# Patient Record
Sex: MALE | Race: WHITE | Employment: OTHER | URBAN - METROPOLITAN AREA
[De-identification: names, ages, dates, MRNs, and addresses within clinical notes are randomized per-mention and may not be internally consistent; named-entity substitution may affect disease eponyms.]

---

## 2017-07-20 PROBLEM — K31.89 DUODENAL MASS: Status: ACTIVE | Noted: 2017-07-20

## 2017-07-20 PROBLEM — K22.70 BARRETT'S ESOPHAGUS WITHOUT DYSPLASIA: Status: ACTIVE | Noted: 2017-07-20

## 2020-05-12 ENCOUNTER — HOSPITAL ENCOUNTER (OUTPATIENT)
Dept: NON INVASIVE DIAGNOSTICS | Age: 70
Discharge: HOME | End: 2020-05-12
Payer: MEDICARE

## 2020-05-12 LAB
LV EF: 60 %
LVEF MODALITY: NORMAL

## 2020-05-12 PROCEDURE — 93225 XTRNL ECG REC<48 HRS REC: CPT

## 2020-05-12 PROCEDURE — 93306 TTE W/DOPPLER COMPLETE: CPT

## 2020-05-12 PROCEDURE — 93226 XTRNL ECG REC<48 HR SCAN A/R: CPT

## 2021-05-07 LAB
AVERAGE GLUCOSE: NORMAL
CREATININE, URINE: NORMAL
HBA1C MFR BLD: 6.3 %
MICROALBUMIN/CREAT 24H UR: NORMAL MG/G{CREAT}
MICROALBUMIN/CREAT UR-RTO: NORMAL

## 2022-02-28 RX ORDER — TAMSULOSIN HYDROCHLORIDE 0.4 MG/1
CAPSULE ORAL
Qty: 90 CAPSULE | Refills: 3 | Status: ON HOLD
Start: 2022-02-28 | End: 2022-05-27 | Stop reason: HOSPADM

## 2022-02-28 RX ORDER — OMEPRAZOLE 40 MG/1
CAPSULE, DELAYED RELEASE ORAL
Qty: 90 CAPSULE | Refills: 3 | Status: ON HOLD
Start: 2022-02-28 | End: 2022-05-27 | Stop reason: HOSPADM

## 2022-02-28 RX ORDER — METOPROLOL TARTRATE 50 MG/1
TABLET, FILM COATED ORAL
Qty: 180 TABLET | Refills: 3 | Status: ON HOLD
Start: 2022-02-28 | End: 2022-05-27 | Stop reason: HOSPADM

## 2022-05-02 RX ORDER — LOSARTAN POTASSIUM AND HYDROCHLOROTHIAZIDE 25; 100 MG/1; MG/1
TABLET ORAL
Qty: 90 TABLET | Refills: 1 | OUTPATIENT
Start: 2022-05-02

## 2022-05-16 RX ORDER — LOSARTAN POTASSIUM AND HYDROCHLOROTHIAZIDE 25; 100 MG/1; MG/1
TABLET ORAL
Qty: 90 TABLET | Refills: 1 | OUTPATIENT
Start: 2022-05-16

## 2022-05-17 ENCOUNTER — OFFICE VISIT (OUTPATIENT)
Dept: PRIMARY CARE CLINIC | Age: 72
End: 2022-05-17
Payer: MEDICARE

## 2022-05-17 ENCOUNTER — CLINICAL DOCUMENTATION (OUTPATIENT)
Dept: SPIRITUAL SERVICES | Age: 72
End: 2022-05-17

## 2022-05-17 VITALS
SYSTOLIC BLOOD PRESSURE: 138 MMHG | HEART RATE: 60 BPM | TEMPERATURE: 98 F | BODY MASS INDEX: 40.36 KG/M2 | WEIGHT: 298 LBS | DIASTOLIC BLOOD PRESSURE: 86 MMHG | OXYGEN SATURATION: 97 % | HEIGHT: 72 IN

## 2022-05-17 DIAGNOSIS — Z86.79 HISTORY OF ATRIAL FLUTTER: ICD-10-CM

## 2022-05-17 DIAGNOSIS — Z00.00 INITIAL MEDICARE ANNUAL WELLNESS VISIT: ICD-10-CM

## 2022-05-17 DIAGNOSIS — Z11.59 NEED FOR HEPATITIS C SCREENING TEST: ICD-10-CM

## 2022-05-17 DIAGNOSIS — E11.9 TYPE 2 DIABETES MELLITUS WITHOUT COMPLICATION, WITHOUT LONG-TERM CURRENT USE OF INSULIN (HCC): ICD-10-CM

## 2022-05-17 DIAGNOSIS — Z72.89 OTHER PROBLEMS RELATED TO LIFESTYLE: ICD-10-CM

## 2022-05-17 DIAGNOSIS — Z71.89 COUNSELING FOR LIVING WILL: ICD-10-CM

## 2022-05-17 DIAGNOSIS — E11.9 TYPE 2 DIABETES MELLITUS WITHOUT COMPLICATION, WITHOUT LONG-TERM CURRENT USE OF INSULIN (HCC): Primary | ICD-10-CM

## 2022-05-17 DIAGNOSIS — I10 PRIMARY HYPERTENSION: ICD-10-CM

## 2022-05-17 LAB
ALBUMIN SERPL-MCNC: 4 G/DL (ref 3.5–5.2)
ALP BLD-CCNC: 75 U/L (ref 40–129)
ALT SERPL-CCNC: 14 U/L (ref 0–40)
ANION GAP SERPL CALCULATED.3IONS-SCNC: 18 MMOL/L (ref 7–16)
AST SERPL-CCNC: 26 U/L (ref 0–39)
BILIRUB SERPL-MCNC: 0.6 MG/DL (ref 0–1.2)
BUN BLDV-MCNC: 21 MG/DL (ref 6–23)
CALCIUM SERPL-MCNC: 9.6 MG/DL (ref 8.6–10.2)
CHLORIDE BLD-SCNC: 102 MMOL/L (ref 98–107)
CHOLESTEROL, TOTAL: 142 MG/DL (ref 0–199)
CO2: 23 MMOL/L (ref 22–29)
CREAT SERPL-MCNC: 1.2 MG/DL (ref 0.7–1.2)
GFR AFRICAN AMERICAN: >60
GFR NON-AFRICAN AMERICAN: 59 ML/MIN/1.73
GLUCOSE BLD-MCNC: 116 MG/DL (ref 74–99)
HBA1C MFR BLD: 6.7 % (ref 4–5.6)
HCT VFR BLD CALC: 43.4 % (ref 37–54)
HDLC SERPL-MCNC: 54 MG/DL
HEMOGLOBIN: 13.1 G/DL (ref 12.5–16.5)
LDL CHOLESTEROL CALCULATED: 67 MG/DL (ref 0–99)
MCH RBC QN AUTO: 28.3 PG (ref 26–35)
MCHC RBC AUTO-ENTMCNC: 30.2 % (ref 32–34.5)
MCV RBC AUTO: 93.7 FL (ref 80–99.9)
PDW BLD-RTO: 15.3 FL (ref 11.5–15)
PLATELET # BLD: 161 E9/L (ref 130–450)
PMV BLD AUTO: 12.4 FL (ref 7–12)
POTASSIUM SERPL-SCNC: 4.9 MMOL/L (ref 3.5–5)
RBC # BLD: 4.63 E12/L (ref 3.8–5.8)
SODIUM BLD-SCNC: 143 MMOL/L (ref 132–146)
TOTAL PROTEIN: 8.3 G/DL (ref 6.4–8.3)
TRIGL SERPL-MCNC: 106 MG/DL (ref 0–149)
TSH SERPL DL<=0.05 MIU/L-ACNC: 2.55 UIU/ML (ref 0.27–4.2)
VLDLC SERPL CALC-MCNC: 21 MG/DL
WBC # BLD: 6.5 E9/L (ref 4.5–11.5)

## 2022-05-17 PROCEDURE — 3044F HG A1C LEVEL LT 7.0%: CPT | Performed by: FAMILY MEDICINE

## 2022-05-17 PROCEDURE — 3017F COLORECTAL CA SCREEN DOC REV: CPT | Performed by: FAMILY MEDICINE

## 2022-05-17 PROCEDURE — G0439 PPPS, SUBSEQ VISIT: HCPCS | Performed by: FAMILY MEDICINE

## 2022-05-17 PROCEDURE — 4040F PNEUMOC VAC/ADMIN/RCVD: CPT | Performed by: FAMILY MEDICINE

## 2022-05-17 PROCEDURE — 1123F ACP DISCUSS/DSCN MKR DOCD: CPT | Performed by: FAMILY MEDICINE

## 2022-05-17 RX ORDER — ATORVASTATIN CALCIUM 20 MG/1
20 TABLET, FILM COATED ORAL DAILY
Qty: 90 TABLET | Refills: 1 | Status: SHIPPED | OUTPATIENT
Start: 2022-05-17

## 2022-05-17 RX ORDER — BLOOD SUGAR DIAGNOSTIC
STRIP MISCELLANEOUS
COMMUNITY
Start: 2022-03-24 | End: 2022-07-13 | Stop reason: SDUPTHER

## 2022-05-17 RX ORDER — GLIPIZIDE 5 MG/1
5 TABLET ORAL
Qty: 180 TABLET | Refills: 1 | Status: ON HOLD
Start: 2022-05-17 | End: 2022-05-27 | Stop reason: HOSPADM

## 2022-05-17 RX ORDER — ATORVASTATIN CALCIUM 20 MG/1
20 TABLET, FILM COATED ORAL DAILY
COMMUNITY
End: 2022-05-17 | Stop reason: SDUPTHER

## 2022-05-17 RX ORDER — GLIPIZIDE 5 MG/1
TABLET ORAL
COMMUNITY
Start: 2022-04-03 | End: 2022-05-17 | Stop reason: SDUPTHER

## 2022-05-17 RX ORDER — LOSARTAN POTASSIUM AND HYDROCHLOROTHIAZIDE 25; 100 MG/1; MG/1
TABLET ORAL
Qty: 90 TABLET | Refills: 1 | Status: ON HOLD
Start: 2022-05-17 | End: 2022-05-27 | Stop reason: HOSPADM

## 2022-05-17 SDOH — ECONOMIC STABILITY: FOOD INSECURITY: WITHIN THE PAST 12 MONTHS, THE FOOD YOU BOUGHT JUST DIDN'T LAST AND YOU DIDN'T HAVE MONEY TO GET MORE.: NEVER TRUE

## 2022-05-17 SDOH — ECONOMIC STABILITY: FOOD INSECURITY: WITHIN THE PAST 12 MONTHS, YOU WORRIED THAT YOUR FOOD WOULD RUN OUT BEFORE YOU GOT MONEY TO BUY MORE.: NEVER TRUE

## 2022-05-17 SDOH — HEALTH STABILITY: PHYSICAL HEALTH: ON AVERAGE, HOW MANY DAYS PER WEEK DO YOU ENGAGE IN MODERATE TO STRENUOUS EXERCISE (LIKE A BRISK WALK)?: 0 DAYS

## 2022-05-17 ASSESSMENT — LIFESTYLE VARIABLES
HOW OFTEN DURING THE LAST YEAR HAVE YOU FAILED TO DO WHAT WAS NORMALLY EXPECTED FROM YOU BECAUSE OF DRINKING: 0
HOW MANY STANDARD DRINKS CONTAINING ALCOHOL DO YOU HAVE ON A TYPICAL DAY: 1
HOW OFTEN DURING THE LAST YEAR HAVE YOU FAILED TO DO WHAT WAS NORMALLY EXPECTED FROM YOU BECAUSE OF DRINKING: NEVER
HOW OFTEN DURING THE LAST YEAR HAVE YOU FOUND THAT YOU WERE NOT ABLE TO STOP DRINKING ONCE YOU HAD STARTED: NEVER
HOW OFTEN DO YOU HAVE SIX OR MORE DRINKS ON ONE OCCASION: 2
HAS A RELATIVE, FRIEND, DOCTOR, OR ANOTHER HEALTH PROFESSIONAL EXPRESSED CONCERN ABOUT YOUR DRINKING OR SUGGESTED YOU CUT DOWN: NO
HOW OFTEN DURING THE LAST YEAR HAVE YOU FOUND THAT YOU WERE NOT ABLE TO STOP DRINKING ONCE YOU HAD STARTED: 0
HOW OFTEN DO YOU HAVE A DRINK CONTAINING ALCOHOL: MONTHLY OR LESS
HOW OFTEN DURING THE LAST YEAR HAVE YOU BEEN UNABLE TO REMEMBER WHAT HAPPENED THE NIGHT BEFORE BECAUSE YOU HAD BEEN DRINKING: NEVER
HOW OFTEN DURING THE LAST YEAR HAVE YOU BEEN UNABLE TO REMEMBER WHAT HAPPENED THE NIGHT BEFORE BECAUSE YOU HAD BEEN DRINKING: 0
HOW OFTEN DURING THE LAST YEAR HAVE YOU HAD A FEELING OF GUILT OR REMORSE AFTER DRINKING: 0
HOW OFTEN DURING THE LAST YEAR HAVE YOU NEEDED AN ALCOHOLIC DRINK FIRST THING IN THE MORNING TO GET YOURSELF GOING AFTER A NIGHT OF HEAVY DRINKING: NEVER
HOW OFTEN DO YOU HAVE A DRINK CONTAINING ALCOHOL: 2
HOW OFTEN DURING THE LAST YEAR HAVE YOU HAD A FEELING OF GUILT OR REMORSE AFTER DRINKING: NEVER
HOW OFTEN DURING THE LAST YEAR HAVE YOU NEEDED AN ALCOHOLIC DRINK FIRST THING IN THE MORNING TO GET YOURSELF GOING AFTER A NIGHT OF HEAVY DRINKING: 0
HAVE YOU OR SOMEONE ELSE BEEN INJURED AS A RESULT OF YOUR DRINKING: 0
HAS A RELATIVE, FRIEND, DOCTOR, OR ANOTHER HEALTH PROFESSIONAL EXPRESSED CONCERN ABOUT YOUR DRINKING OR SUGGESTED YOU CUT DOWN: 0
HOW MANY STANDARD DRINKS CONTAINING ALCOHOL DO YOU HAVE ON A TYPICAL DAY: 1 OR 2
HAVE YOU OR SOMEONE ELSE BEEN INJURED AS A RESULT OF YOUR DRINKING: NO

## 2022-05-17 ASSESSMENT — PATIENT HEALTH QUESTIONNAIRE - PHQ9
SUM OF ALL RESPONSES TO PHQ9 QUESTIONS 1 & 2: 0
SUM OF ALL RESPONSES TO PHQ QUESTIONS 1-9: 0
1. LITTLE INTEREST OR PLEASURE IN DOING THINGS: 0
SUM OF ALL RESPONSES TO PHQ QUESTIONS 1-9: 0
2. FEELING DOWN, DEPRESSED OR HOPELESS: 0
SUM OF ALL RESPONSES TO PHQ QUESTIONS 1-9: 0
SUM OF ALL RESPONSES TO PHQ QUESTIONS 1-9: 0

## 2022-05-17 ASSESSMENT — SOCIAL DETERMINANTS OF HEALTH (SDOH): HOW HARD IS IT FOR YOU TO PAY FOR THE VERY BASICS LIKE FOOD, HOUSING, MEDICAL CARE, AND HEATING?: NOT HARD AT ALL

## 2022-05-17 NOTE — PATIENT INSTRUCTIONS
Advance Directives: Care Instructions  Overview  An advance directive is a legal way to state your wishes at the end of your life. It tells your family and your doctor what to do if you can't say what youwant. There are two main types of advance directives. You can change them any timeyour wishes change. Living will. This form tells your family and your doctor your wishes about life support and other treatment. The form is also called a declaration. Medical power of . This form lets you name a person to make treatment decisions for you when you can't speak for yourself. This person is called a health care agent (health care proxy, health care surrogate). The form is also called a durable power of  for health care. If you do not have an advance directive, decisions about your medical care maybe made by a family member, or by a doctor or a  who doesn't know you. It may help to think of an advance directive as a gift to the people who carefor you. If you have one, they won't have to make tough decisions by themselves. Follow-up care is a key part of your treatment and safety. Be sure to make and go to all appointments, and call your doctor if you are having problems. It's also a good idea to know your test results and keep alist of the medicines you take. What should you include in an advance directive? Many states have a unique advance directive form. (It may ask you to address specific issues.) Or you might use a universal form that's approved by manystates. If your form doesn't tell you what to address, it may be hard to know what to include in your advance directive. Use the questions below to help you getstarted.  Who do you want to make decisions about your medical care if you are not able to?  What life-support measures do you want if you have a serious illness that gets worse over time or can't be cured?  What are you most afraid of that might happen?  (Maybe you're afraid of having pain, losing your independence, or being kept alive by machines.)   Where would you prefer to die? (Your home? A hospital? A nursing home?)   Do you want to donate your organs when you die?  Do you want certain Denominational practices performed before you die? When should you call for help? Be sure to contact your doctor if you have any questions. Where can you learn more? Go to https://chpepiceweb.[employer]. org and sign in to your Oceen account. Enter R264 in the [employer] box to learn more about \"Advance Directives: Care Instructions. \"     If you do not have an account, please click on the \"Sign Up Now\" link. Current as of: October 18, 2021               Content Version: 13.2  © 2006-2022 [employer]. Care instructions adapted under license by [facility] ([facility]). If you have questions about a medical condition or this instruction, always ask your healthcare professional. [place] [address] any warranty or liability for your use of this information. Learning About Living Zulmasoto Ibanez  What is a living will? A living will, also called a declaration, is a legal form. It tells your family and your doctor your wishes when you can't speak for yourself. It's used by the health professionals who will treat you as you near the end of your life or ifyou get seriously hurt or ill. If you put your wishes in writing, your loved ones and others will know what kind of care you want. They won't need to guess. This can ease your mind and behelpful to others. And you can change or cancel your living will at any time. A living will is not the same as an estate or property will. An estate willexplains what you want to happen with your money and property after you die. How do you use it? Keep these facts in mind about how a living will is used.  Your living will is used only if you can't speak or make decisions for yourself.  Most often, one or more doctors must certify that you can't speak or decide for yourself before your living will takes effect.  If you get better and can speak for yourself again, you can accept or refuse any treatment. It doesn't matter what you said in your living will.  Some states may limit your right to refuse treatment in certain cases. For example, you may need to clearly state in your living will that you don't want artificial hydration and nutrition, such as being fed through a tube. Is a living will a legal document? A living will is a legal document. Each state has its own laws about livingwills. And a living will may be called something else in your state. Here are some things to know about living lerma.  You don't need an  to complete a living will. But legal advice can be helpful if your state's laws are unclear. It can also help if your health history is complicated or your family can't agree on what should be in your living will.  You can change your living will at any time. Some people find that their wishes about end-of-life care change as their health changes. If you make big changes to your living will, complete a new form.  If you move to another state, make sure that your living will is legal in the state where you now live. In most cases, doctors will respect your wishes even if you have a form from a different state.  You might use a universal form that has been approved by many states. This kind of form can sometimes be filled out and stored online. Your digital copy will then be available wherever you have a connection to the internet. The doctors and nurses who need to treat you can find it right away.  Your state may offer an online registry. This is another place where you can store your living will online.  It's a good idea to get your living will notarized. This means using a person called a  to watch two people sign, or witness, your living will.   What should you know when you create a living will? Here are some questions to ask yourself as you make your living will.  Do you know enough about life support methods that might be used? If not, talk to your doctor so you know what might be done if you can't breathe on your own, your heart stops, or you can't swallow.  What things would you still want to be able to do after you receive life-support methods? Would you want to be able to walk? To speak? To eat on your own? To live without the help of machines?  Do you want certain Restorationism practices performed if you become very ill?  If you have a choice, where do you want to be cared for? In your home? At a hospital or nursing home?  If you have a choice at the end of your life, where would you prefer to die? At home? In a hospital or nursing home? Somewhere else?  Would you prefer to be buried or cremated?  Do you want your organs to be donated after you die? What should you do with your living will?  Make sure that your family members and your health care agent have copies of your living will (also called a declaration).  Give your doctor a copy of your living will. Ask to have it kept as part of your medical record. If you have more than one doctor, make sure that each one has a copy.  Put a copy of your living will where it can be easily found. For example, some people may put a copy on their refrigerator door. If you are using a digital copy, be sure your doctor, family members, and health care agent know how to find and access it. Where can you learn more? Go to https://elias.[employer] org and sign in to your [employer] account. Enter A506 in the [facility] box to learn more about \"Learning About Living [place]. \"     If you do not have an account, please click on the \"Sign Up Now\" link. Current as of: October 18, 2021               Content Version: 13.2  © 8024-0561 Healthwise, Incorporated.    Care instructions adapted under license by [employer]. If you have questions about a medical condition or this instruction, always ask your healthcare professional. [address] any warranty or liability for your use of this information. Eating Healthy Foods: Care Instructions  Your Care Instructions     Eating healthy foods can help lower your risk for disease. Healthy food gives you energy and keeps your heart strong, your brain active, your musclesworking, and your bones strong. A healthy diet includes a variety of foods from the basic food groups: grains, vegetables, fruits, milk and milk products, and meat and beans. Some people may eat more of their favorite foods from only one food group and, as a result, miss getting the nutrients they need. So, it is important to pay attention not only to what you eat but also to what you are missing from your diet. You caneat a healthy, balanced diet by making a few small changes. Follow-up care is a key part of your treatment and safety. Be sure to make and go to all appointments, and call your doctor if you are having problems. It's also a good idea to know your test results and keep alist of the medicines you take. How can you care for yourself at home? Look at what you eat   Keep a food diary for a week or two and record everything you eat or drink. Track the number of servings you eat from each food group.  For a balanced diet every day, eat a variety of:  ? 6 or more ounce-equivalents of grains, such as cereals, breads, crackers, rice, or pasta, every day. An ounce-equivalent is 1 slice of bread, 1 cup of ready-to-eat cereal, or ½ cup of cooked rice, cooked pasta, or cooked cereal.  ? 2½ cups of vegetables, especially:  - Dark-green vegetables such as broccoli and spinach.  - Orange vegetables such as carrots and sweet potatoes. - Dry beans (such as richard and kidney beans) and peas (such as lentils). ? 2 cups of fresh, frozen, or canned fruit.  A small apple or 1 banana or orange equals 1 cup. ? 3 cups of nonfat or low-fat milk, yogurt, or other milk products. ? 5½ ounces of meat and beans, such as chicken, fish, lean meat, beans, nuts, and seeds. One egg, 1 tablespoon of peanut butter, ½ ounce nuts or seeds, or ¼ cup of cooked beans equals 1 ounce of meat.  Learn how to read food labels for serving sizes and ingredients. Fast-food and convenience-food meals often contain few or no fruits or vegetables. Make sure you eat some fruits and vegetables to make the meal more nutritious.  Look at your food diary. For each food group, add up what you have eaten and then divide the total by the number of days. This will give you an idea of how much you are eating from each food group. See if you can find some ways to change your diet to make it more healthy. Start small   Do not try to make dramatic changes to your diet all at once. You might feel that you are missing out on your favorite foods and then be more likely to fail.  Start slowly, and gradually change your habits. Try some of the following:  ? Use whole wheat bread instead of white bread. ? Use nonfat or low-fat milk instead of whole milk. ? Eat brown rice instead of white rice, and eat whole wheat pasta instead of white-flour pasta. ? Try low-fat cheeses and low-fat yogurt. ? Add more fruits and vegetables to meals and have them for snacks. ? Add lettuce, tomato, cucumber, and onion to sandwiches. ? Add fruit to yogurt and cereal.  Enjoy food   You can still eat your favorite foods. You just may need to eat less of them. If your favorite foods are high in fat, salt, and sugar, limit how often you eat them, but do not cut them out entirely.  Eat a wide variety of foods. Make healthy choices when eating out   The type of restaurant you choose can help you make healthy choices. Even fast-food chains are now offering more low-fat or healthier choices on the menu.    Choose smaller portions, or take half of your meal home.  When eating out, try:  ? A veggie pizza with a whole wheat crust or grilled chicken (instead of sausage or pepperoni). ? Pasta with roasted vegetables, grilled chicken, or marinara sauce instead of cream sauce. ? A vegetable wrap or grilled chicken wrap. ? Broiled or poached food instead of fried or breaded items. Make healthy choices easy   Buy packaged, prewashed, ready-to-eat fresh vegetables and fruits, such as baby carrots, salad mixes, and chopped or shredded broccoli and cauliflower.  Buy packaged, presliced fruits, such as melon or pineapple.  Choose 100% fruit or vegetable juice instead of soda. Limit juice intake to 4 to 6 oz (½ to ¾ cup) a day.  Blend low-fat yogurt, fruit juice, and canned or frozen fruit to make a smoothie for breakfast or a snack. Where can you learn more? Go to https://WorkThinkpePillPack.[employer]. org and sign in to your Anipipo account. Enter F536 in the [employer] box to learn more about \"Eating Healthy Foods: Care Instructions. \"     If you do not have an account, please click on the \"Sign Up Now\" link. Current as of: September 8, 2021               Content Version: 13.2  © 7924-8230 Healthwise, Incorporated. Care instructions adapted under license by [facility] ([facility]). If you have questions about a medical condition or this instruction, always ask your healthcare professional. [place] [address] any warranty or liability for your use of this information. Personalized Preventive Plan for Mary Nicole - 5/17/2022  Medicare offers a range of preventive health benefits. Some of the tests and screenings are paid in full while other may be subject to a deductible, co-insurance, and/or copay. Some of these benefits include a comprehensive review of your medical history including lifestyle, illnesses that may run in your family, and various assessments and screenings as appropriate.     After reviewing your medical record and screening and assessments performed today your provider may have ordered immunizations, labs, imaging, and/or referrals for you. A list of these orders (if applicable) as well as your Preventive Care list are included within your After Visit Summary for your review. Other Preventive Recommendations:    · A preventive eye exam performed by an eye specialist is recommended every 1-2 years to screen for glaucoma; cataracts, macular degeneration, and other eye disorders. · A preventive dental visit is recommended every 6 months. · Try to get at least 150 minutes of exercise per week or 10,000 steps per day on a pedometer . · Order or download the FREE \"Exercise & Physical Activity: Your Everyday Guide\" from [employer]. Call 6-347.238.4782 or search [employer] online. · You need 8042-8559 mg of calcium and 7430-8875 IU of vitamin D per day. It is possible to meet your calcium requirement with diet alone, but a vitamin D supplement is usually necessary to meet this goal.  · When exposed to the sun, use a sunscreen that protects against both UVA and UVB radiation with an SPF of 30 or greater. Reapply every 2 to 3 hours or after sweating, drying off with a towel, or swimming. · Always wear a seat belt when traveling in a car. Always wear a helmet when riding a bicycle or motorcycle.

## 2022-05-18 ENCOUNTER — CLINICAL DOCUMENTATION (OUTPATIENT)
Dept: SPIRITUAL SERVICES | Age: 72
End: 2022-05-18

## 2022-05-18 LAB — HEPATITIS C ANTIBODY INTERPRETATION: NORMAL

## 2022-05-18 NOTE — ACP (ADVANCE CARE PLANNING)
Advance Care Planning   Ambulatory ACP Specialist Patient Outreach    Date:  5/18/2022  ACP Specialist:  Natali Bates    Outreach call to patient in follow-up to ACP Specialist referral from: Beau Epley, DO    [x] PCP  [] Provider   [] Ambulatory Care Management [] Other for Reason:    [x] Advance Directive Assistance  [] Code Status Discussion  [] Complete Portable DNR Order  [] Discuss Goals of Care  [] Complete POST/MOST  [] Early ACP Decision-Making  [] Other    Date Referral Received: 05/17/2022  Today's Outreach:  [x] First   [] Second  [] Third                               Third outreach made by [x]  phone  [] email []   "[employer]t     Intervention:  [x] Spoke with Patient  [] Left VM requesting return call      Outcome: Appointment set to complete ACP doc. along with his spouse. Next Step:   [x] ACP scheduled conversation  [] Outreach again in one week               [] Email / Mail ACP Info Sheets  [] Email / Mail Advance Directive            [] Close Referral. Routing closure to referring provider/staff and to ACP Specialist .      Thank you for this referral.

## 2022-05-23 ENCOUNTER — CLINICAL DOCUMENTATION (OUTPATIENT)
Dept: SPIRITUAL SERVICES | Age: 72
End: 2022-05-23

## 2022-05-23 NOTE — PROGRESS NOTES
Advance Care Planning   Advance Care Planning Note  Ambulatory Spiritual Care Services    Date:  5/23/2022    Received request from [facility] Provider. Consultation conversation participants:   Patient who understands ACP conversation  Spouse     Goals of ACP Conversation:  Discuss advance care planning documents    Health Care Decision Makers:      Primary Decision Maker: John Anderson - Spouse - [phone]    Secondary Decision Maker: Clyde Morfin 57 Lamberto Hernandez (Other) Decision Maker: Wong Christopher - [phone]     Summary:  Completed [address] (Patient Wishes)  Currently on file:   Healthcare Power of /Advance Directive Appointment of Health Care Agent  Living Will/Advance Directive  Anatomical Gift/Organ Donation  Legal Guardianship Document    Assessment:   I met with Mr. Lauri Lam and assisted him to complete HCPOA & Living Will Declaration    Interventions:  Provided education on documents for clarity and greater understanding  Discussed and provided education on state decision maker hierarchy  Assisted in the completion of documents according to patient's wishes at this time  Encouraged ongoing ACP conversation with future decision makers and loved ones    Care Preferences Communicated:     Hospitalization:  If the patient's health worsens and it becomes clear that the chance of recovery is unlikely,     the patient wants hospitalization. Ventilation:   If the patient, in their present state of health, suddenly became very ill and unable to breathe on their own,     the patient would desire the use of a ventilator (breathing machine). If their health worsens and it becomes clear that the change of recovery is unlikely,     the patient would NOT desire the use of a ventilator (breathing machine).     Resuscitation:  In the event the patient's heart stopped as a result of an underlying serious health condition, the patient communicates a preference for      a natural death (no CPR). Outcomes:  New advance directive completed. Returned original document(s) to patient, as well as copies for distribution to appointed agents  Copy of advance directive given to staff to scan into medical record. Routed ACP note to attending provider or other IDT member. Patient / Healthcare Decision Maker Instructions:  Upload completed ACP document(s) in their K1 Speedt ACP page.     Electronically signed by Senia Acuña, [address] [employer] on 5/23/2022 at 2:35 PM.

## 2022-05-24 ENCOUNTER — HOSPITAL ENCOUNTER (INPATIENT)
Age: 72
LOS: 3 days | Discharge: ANOTHER HOSPITAL | DRG: 291 | End: 2022-05-27
Attending: EMERGENCY MEDICINE | Admitting: INTERNAL MEDICINE
Payer: MEDICARE

## 2022-05-24 ENCOUNTER — APPOINTMENT (OUTPATIENT)
Dept: GENERAL RADIOLOGY | Age: 72
DRG: 291 | End: 2022-05-24
Payer: MEDICARE

## 2022-05-24 DIAGNOSIS — I20.8 OTHER FORMS OF ANGINA PECTORIS (HCC): Primary | ICD-10-CM

## 2022-05-24 PROBLEM — R07.9 CHEST PAIN: Status: ACTIVE | Noted: 2022-05-24

## 2022-05-24 LAB
ALBUMIN SERPL-MCNC: 3.9 G/DL (ref 3.5–5.2)
ALP BLD-CCNC: 82 U/L (ref 40–129)
ALT SERPL-CCNC: 17 U/L (ref 0–40)
ANION GAP SERPL CALCULATED.3IONS-SCNC: 10 MMOL/L (ref 7–16)
AST SERPL-CCNC: 23 U/L (ref 0–39)
BASOPHILS ABSOLUTE: 0.05 E9/L (ref 0–0.2)
BASOPHILS RELATIVE PERCENT: 0.7 % (ref 0–2)
BILIRUB SERPL-MCNC: 0.5 MG/DL (ref 0–1.2)
BUN BLDV-MCNC: 26 MG/DL (ref 6–23)
CALCIUM SERPL-MCNC: 9.4 MG/DL (ref 8.6–10.2)
CHLORIDE BLD-SCNC: 102 MMOL/L (ref 98–107)
CO2: 25 MMOL/L (ref 22–29)
CREAT SERPL-MCNC: 1.1 MG/DL (ref 0.7–1.2)
EOSINOPHILS ABSOLUTE: 0.11 E9/L (ref 0.05–0.5)
EOSINOPHILS RELATIVE PERCENT: 1.4 % (ref 0–6)
GFR AFRICAN AMERICAN: >60
GFR NON-AFRICAN AMERICAN: >60 ML/MIN/1.73
GLUCOSE BLD-MCNC: 152 MG/DL (ref 74–99)
HBA1C MFR BLD: 6.5 % (ref 4–5.6)
HCT VFR BLD CALC: 42.7 % (ref 37–54)
HEMOGLOBIN: 13.4 G/DL (ref 12.5–16.5)
IMMATURE GRANULOCYTES #: 0.02 E9/L
IMMATURE GRANULOCYTES %: 0.3 % (ref 0–5)
LYMPHOCYTES ABSOLUTE: 1.39 E9/L (ref 1.5–4)
LYMPHOCYTES RELATIVE PERCENT: 18.2 % (ref 20–42)
MCH RBC QN AUTO: 28.4 PG (ref 26–35)
MCHC RBC AUTO-ENTMCNC: 31.4 % (ref 32–34.5)
MCV RBC AUTO: 90.5 FL (ref 80–99.9)
METER GLUCOSE: 106 MG/DL (ref 74–99)
METER GLUCOSE: 112 MG/DL (ref 74–99)
MONOCYTES ABSOLUTE: 0.45 E9/L (ref 0.1–0.95)
MONOCYTES RELATIVE PERCENT: 5.9 % (ref 2–12)
NEUTROPHILS ABSOLUTE: 5.61 E9/L (ref 1.8–7.3)
NEUTROPHILS RELATIVE PERCENT: 73.5 % (ref 43–80)
PDW BLD-RTO: 14.7 FL (ref 11.5–15)
PLATELET # BLD: 223 E9/L (ref 130–450)
PMV BLD AUTO: 12.2 FL (ref 7–12)
POTASSIUM REFLEX MAGNESIUM: 4.2 MMOL/L (ref 3.5–5)
PRO-BNP: 334 PG/ML (ref 0–125)
RBC # BLD: 4.72 E12/L (ref 3.8–5.8)
SODIUM BLD-SCNC: 137 MMOL/L (ref 132–146)
TOTAL PROTEIN: 8.1 G/DL (ref 6.4–8.3)
TROPONIN, HIGH SENSITIVITY: 10 NG/L (ref 0–11)
TROPONIN, HIGH SENSITIVITY: 7 NG/L (ref 0–11)
TROPONIN, HIGH SENSITIVITY: 9 NG/L (ref 0–11)
TROPONIN, HIGH SENSITIVITY: 9 NG/L (ref 0–11)
WBC # BLD: 7.6 E9/L (ref 4.5–11.5)

## 2022-05-24 PROCEDURE — 93005 ELECTROCARDIOGRAM TRACING: CPT | Performed by: PHYSICIAN ASSISTANT

## 2022-05-24 PROCEDURE — 80053 COMPREHEN METABOLIC PANEL: CPT

## 2022-05-24 PROCEDURE — 6370000000 HC RX 637 (ALT 250 FOR IP): Performed by: INTERNAL MEDICINE

## 2022-05-24 PROCEDURE — 6370000000 HC RX 637 (ALT 250 FOR IP): Performed by: EMERGENCY MEDICINE

## 2022-05-24 PROCEDURE — 99285 EMERGENCY DEPT VISIT HI MDM: CPT

## 2022-05-24 PROCEDURE — 71046 X-RAY EXAM CHEST 2 VIEWS: CPT

## 2022-05-24 PROCEDURE — 36415 COLL VENOUS BLD VENIPUNCTURE: CPT

## 2022-05-24 PROCEDURE — 83036 HEMOGLOBIN GLYCOSYLATED A1C: CPT

## 2022-05-24 PROCEDURE — 83880 ASSAY OF NATRIURETIC PEPTIDE: CPT

## 2022-05-24 PROCEDURE — 1200000000 HC SEMI PRIVATE

## 2022-05-24 PROCEDURE — 85025 COMPLETE CBC W/AUTO DIFF WBC: CPT

## 2022-05-24 PROCEDURE — 96372 THER/PROPH/DIAG INJ SC/IM: CPT

## 2022-05-24 PROCEDURE — 84484 ASSAY OF TROPONIN QUANT: CPT

## 2022-05-24 PROCEDURE — 6360000002 HC RX W HCPCS: Performed by: INTERNAL MEDICINE

## 2022-05-24 PROCEDURE — 82962 GLUCOSE BLOOD TEST: CPT

## 2022-05-24 RX ORDER — ENOXAPARIN SODIUM 100 MG/ML
40 INJECTION SUBCUTANEOUS DAILY
Status: DISCONTINUED | OUTPATIENT
Start: 2022-05-24 | End: 2022-05-24 | Stop reason: DRUGHIGH

## 2022-05-24 RX ORDER — TAMSULOSIN HYDROCHLORIDE 0.4 MG/1
0.4 CAPSULE ORAL DAILY
Status: DISCONTINUED | OUTPATIENT
Start: 2022-05-25 | End: 2022-05-27 | Stop reason: HOSPADM

## 2022-05-24 RX ORDER — DEXTROSE MONOHYDRATE 50 MG/ML
100 INJECTION, SOLUTION INTRAVENOUS PRN
Status: DISCONTINUED | OUTPATIENT
Start: 2022-05-24 | End: 2022-05-27 | Stop reason: HOSPADM

## 2022-05-24 RX ORDER — MORPHINE SULFATE 2 MG/ML
2 INJECTION, SOLUTION INTRAMUSCULAR; INTRAVENOUS EVERY 4 HOURS PRN
Status: DISCONTINUED | OUTPATIENT
Start: 2022-05-24 | End: 2022-05-27 | Stop reason: HOSPADM

## 2022-05-24 RX ORDER — INSULIN LISPRO 100 [IU]/ML
0-6 INJECTION, SOLUTION INTRAVENOUS; SUBCUTANEOUS
Status: DISCONTINUED | OUTPATIENT
Start: 2022-05-24 | End: 2022-05-27 | Stop reason: HOSPADM

## 2022-05-24 RX ORDER — ATORVASTATIN CALCIUM 40 MG/1
80 TABLET, FILM COATED ORAL NIGHTLY
Status: DISCONTINUED | OUTPATIENT
Start: 2022-05-24 | End: 2022-05-27 | Stop reason: HOSPADM

## 2022-05-24 RX ORDER — INSULIN LISPRO 100 [IU]/ML
0-3 INJECTION, SOLUTION INTRAVENOUS; SUBCUTANEOUS NIGHTLY
Status: DISCONTINUED | OUTPATIENT
Start: 2022-05-24 | End: 2022-05-27 | Stop reason: HOSPADM

## 2022-05-24 RX ORDER — METOPROLOL TARTRATE 50 MG/1
50 TABLET, FILM COATED ORAL DAILY
Status: DISCONTINUED | OUTPATIENT
Start: 2022-05-25 | End: 2022-05-25

## 2022-05-24 RX ORDER — ASPIRIN 81 MG/1
324 TABLET, CHEWABLE ORAL ONCE
Status: COMPLETED | OUTPATIENT
Start: 2022-05-24 | End: 2022-05-24

## 2022-05-24 RX ORDER — LOSARTAN POTASSIUM AND HYDROCHLOROTHIAZIDE 25; 100 MG/1; MG/1
1 TABLET ORAL DAILY
Status: DISCONTINUED | OUTPATIENT
Start: 2022-05-24 | End: 2022-05-24 | Stop reason: RX

## 2022-05-24 RX ORDER — ASPIRIN 325 MG
325 TABLET ORAL DAILY
Status: DISCONTINUED | OUTPATIENT
Start: 2022-05-25 | End: 2022-05-25

## 2022-05-24 RX ORDER — OMEPRAZOLE 20 MG/1
40 CAPSULE, DELAYED RELEASE ORAL EVERY MORNING
Status: DISCONTINUED | OUTPATIENT
Start: 2022-05-25 | End: 2022-05-24 | Stop reason: CLARIF

## 2022-05-24 RX ORDER — LOSARTAN POTASSIUM 50 MG/1
100 TABLET ORAL DAILY
Status: DISCONTINUED | OUTPATIENT
Start: 2022-05-25 | End: 2022-05-27 | Stop reason: HOSPADM

## 2022-05-24 RX ORDER — NITROGLYCERIN 0.4 MG/1
0.4 TABLET SUBLINGUAL EVERY 5 MIN PRN
Status: DISCONTINUED | OUTPATIENT
Start: 2022-05-24 | End: 2022-05-27 | Stop reason: HOSPADM

## 2022-05-24 RX ORDER — HYDROCHLOROTHIAZIDE 25 MG/1
25 TABLET ORAL DAILY
Status: DISCONTINUED | OUTPATIENT
Start: 2022-05-25 | End: 2022-05-25

## 2022-05-24 RX ORDER — ENOXAPARIN SODIUM 100 MG/ML
30 INJECTION SUBCUTANEOUS 2 TIMES DAILY
Status: DISCONTINUED | OUTPATIENT
Start: 2022-05-24 | End: 2022-05-25

## 2022-05-24 RX ORDER — METOPROLOL TARTRATE 50 MG/1
50 TABLET, FILM COATED ORAL 2 TIMES DAILY
Status: DISCONTINUED | OUTPATIENT
Start: 2022-05-24 | End: 2022-05-24

## 2022-05-24 RX ORDER — PANTOPRAZOLE SODIUM 40 MG/1
40 TABLET, DELAYED RELEASE ORAL EVERY MORNING
Status: DISCONTINUED | OUTPATIENT
Start: 2022-05-25 | End: 2022-05-27 | Stop reason: HOSPADM

## 2022-05-24 RX ORDER — ONDANSETRON 2 MG/ML
4 INJECTION INTRAMUSCULAR; INTRAVENOUS EVERY 6 HOURS PRN
Status: DISCONTINUED | OUTPATIENT
Start: 2022-05-24 | End: 2022-05-27 | Stop reason: HOSPADM

## 2022-05-24 RX ADMIN — ENOXAPARIN SODIUM 30 MG: 100 INJECTION SUBCUTANEOUS at 22:44

## 2022-05-24 RX ADMIN — ASPIRIN 81 MG 324 MG: 81 TABLET ORAL at 16:05

## 2022-05-24 RX ADMIN — ATORVASTATIN CALCIUM 80 MG: 40 TABLET, FILM COATED ORAL at 22:44

## 2022-05-24 ASSESSMENT — ENCOUNTER SYMPTOMS
CHEST TIGHTNESS: 0
WHEEZING: 0
NAUSEA: 0
DIARRHEA: 0
SHORTNESS OF BREATH: 0
VOMITING: 0
ABDOMINAL PAIN: 0
BACK PAIN: 0
COUGH: 0
SORE THROAT: 0

## 2022-05-24 ASSESSMENT — PAIN SCALES - GENERAL
PAINLEVEL_OUTOF10: 0
PAINLEVEL_OUTOF10: 0

## 2022-05-24 NOTE — PROGRESS NOTES
Pharmacist Review and Automatic Dose Adjustment of Prophylactic Enoxaparin    *Review reason for admission/hospital problem list*    The reviewing pharmacist has made an adjustment to the ordered enoxaparin dose or converted to UFH per the approved [facility] protocol and table as identified below. Analisa Esteban is a 66 y.o. male. Recent Labs     05/24/22  1125   CREATININE 1.1       CrCl cannot be calculated (Unknown ideal weight. ). Recent Labs     05/24/22  1125   HGB 13.4   HCT 42.7        No results for input(s): INR in the last 72 hours. Height:   Ht Readings from Last 1 Encounters:   05/24/22 6' (1.829 m)     Weight:  Wt Readings from Last 1 Encounters:   05/17/22 298 lb (135.2 kg)               Plan: Based upon the patient's weight and renal function, the ordered enoxaparin dose of 40 mg daily has been changed/converted to 30 mg BID.       Thank you,  Lucia Dotson 1159, [facility]  5/24/2022, 4:36 PM

## 2022-05-24 NOTE — ED NOTES
Department of Emergency Medicine  FIRST PROVIDER TRIAGE NOTE             Independent MLP           5/24/22  11:14 AM EDT    Date of Encounter: 5/24/22   MRN: [id number]      HPI: Geovanna Noyola is a 66 y.o. male who presents to the ED for No chief complaint on file. chest pain, palpitations     ROS: Negative for fever or cough. PE: Gen Appearance/Constitutional: alert  CV: regular rate  Pulm: CTA bilat     Initial Plan of Care: All treatment areas with department are currently occupied. Plan to order/Initiate the following while awaiting opening in ED: labs, EKG and imaging studies.   Initiate Treatment-Testing, Proceed toTreatment Area When Bed Available for ED Attending/MLP to Continue Care    Electronically signed by TIFFANIE Ayala   DD: 5/24/22       TIFFANIE Ayala  05/24/22 5146

## 2022-05-24 NOTE — ED PROVIDER NOTES
Chief complaint:  Chest pain    HPI history provided by the patient  Patient presents her complaining of months of intermittent midsternal chest pain, typically gets it a couple times a week and does not last very long. No migration or radiation of pain and no palpitations or shortness of breath, no lightheadedness or syncope. States he had another episode today that has been there more persistently throughout the day and has not really gone away so he is in for an evaluation. He has a history of atrial fibrillation and was ablated about 10 years ago and has not seen cardiology since then. Denies new leg pain or swelling. No lightheadedness or syncope. No abdominal pain. No nausea vomiting or diarrhea. No fevers or URI symptoms. Nothing has made it better or worse. No blood in the stool or black tarry stools. No emesis. Review of Systems   Constitutional: Negative for chills, diaphoresis, fatigue and fever. HENT: Negative for congestion and sore throat. Respiratory: Negative for cough, chest tightness, shortness of breath and wheezing. Cardiovascular: Positive for chest pain. Negative for palpitations and leg swelling. Gastrointestinal: Negative for abdominal pain, diarrhea, nausea and vomiting. Genitourinary: Negative for dysuria, flank pain and frequency. Musculoskeletal: Negative for arthralgias, back pain, gait problem, joint swelling, myalgias, neck pain and neck stiffness. Skin: Negative for rash and wound. Neurological: Negative for dizziness, syncope, weakness, light-headedness, numbness and headaches. All other systems reviewed and are negative. Physical Exam  Vitals and nursing note reviewed. Constitutional:       General: He is not in acute distress. Appearance: He is well-developed. He is not ill-appearing, toxic-appearing or diaphoretic. HENT:      Head: Normocephalic and atraumatic. Eyes:      General: No scleral icterus.      Pupils: Pupils are equal, round, and reactive to light. Cardiovascular:      Rate and Rhythm: Normal rate and regular rhythm. Heart sounds: Normal heart sounds. No murmur heard. Pulmonary:      Effort: Pulmonary effort is normal. No respiratory distress. Breath sounds: Normal breath sounds. No stridor, decreased air movement or transmitted upper airway sounds. No decreased breath sounds, wheezing, rhonchi or rales. Chest:      Chest wall: No tenderness. Abdominal:      General: Bowel sounds are normal. There is no distension. Palpations: Abdomen is soft. Tenderness: There is no abdominal tenderness. There is no right CVA tenderness, left CVA tenderness, guarding or rebound. Musculoskeletal:         General: No swelling, tenderness, deformity or signs of injury. Cervical back: Normal range of motion and neck supple. No signs of trauma or rigidity. No pain with movement, spinous process tenderness or muscular tenderness. Normal range of motion. Right lower leg: No edema. Left lower leg: No edema. Comments: Arms legs are neurovascular intact. No pretibial edema or calf pain. Skin:     General: Skin is warm and dry. Coloration: Skin is not cyanotic, jaundiced, mottled or pale. Findings: No erythema or rash. Neurological:      General: No focal deficit present. Mental Status: He is alert and oriented to person, place, and time. GCS: GCS eye subscore is 4. GCS verbal subscore is 5. GCS motor subscore is 6. Cranial Nerves: Cranial nerves are intact. No cranial nerve deficit. Sensory: Sensation is intact. Motor: Motor function is intact. Coordination: Coordination is intact. Coordination normal.          Procedures     [facility]     ED Course as of 05/30/22 1230   Tue May 24, 2022   1314 Patient sitting the bed resting comfortably no distress. Unchanged complaint or exam.  Repeat troponin ordered.  [NC]   6213 Case discussed with Dr. Kim Babcock, detailed overview given, they will admit the patient. [NC]      ED Course User Index  [NC] Mukul Young DO     EKG Interpretation    Interpreted by emergency department physician    Rhythm: normal sinus  and sinus arrhythmia  Rate: 72  Axis: normal  Ectopy: none  Conduction: left bundle branch block (complete)  ST Segments: non specific   T Waves: non specific changes  Q Waves: none    Clinical Impression: no acute changes    Mukul Young DO  ED Course as of 05/30/22 1230   Tue May 24, 2022   1314 Patient sitting the bed resting comfortably no distress. Unchanged complaint or exam.  Repeat troponin ordered. [NC]   1108 Case discussed with Dr. Etta Hughes, detailed overview given, they will admit the patient. [NC]      ED Course User Index  [NC] Mukul Young DO       --------------------------------------------- PAST HISTORY ---------------------------------------------  Past Medical History:  has a past medical history of Anemia, Atrial flutter ([facility] [address] Aguila's esophagus, Coronary artery disease involving native coronary artery, Diverticulitis, Fatty liver, History of Holter monitoring, Hyperlipidemia, Hypertension, Lipoma, Lumbar spinal stenosis, Osteoarthritis, PUD (peptic ulcer disease), S/P angioplasty with stent, and Type 2 diabetes mellitus without complication ([facility] [address] Past Surgical History:  has a past surgical history that includes Cardiac surgery; Upper gastrointestinal endoscopy (03/27/2017); Upper gastrointestinal endoscopy (07/17/2017); lipoma resection; Knee Arthroplasty (Left); and Atrial ablation surgery (2012). Social History:  reports that he has never smoked. He has never used smokeless tobacco. He reports that he does not drink alcohol and does not use drugs. Family History: family history is not on file. The patients home medications have been reviewed. Allergies: Patient has no known allergies.     -------------------------------------------------- RESULTS -------------------------------------------------    LABS:  Results for orders placed or performed during the hospital encounter of 05/24/22   COVID-19, Rapid    Specimen: Nasopharyngeal Swab; Nares   Result Value Ref Range    SARS-CoV-2, NAAT Not Detected Not Detected   CBC with Auto Differential   Result Value Ref Range    WBC 7.6 4.5 - 11.5 E9/L    RBC 4.72 3.80 - 5.80 E12/L    Hemoglobin 13.4 12.5 - 16.5 g/dL    Hematocrit 42.7 37.0 - 54.0 %    MCV 90.5 80.0 - 99.9 fL    MCH 28.4 26.0 - 35.0 pg    MCHC 31.4 (L) 32.0 - 34.5 %    RDW 14.7 11.5 - 15.0 fL    Platelets 429 823 - 507 E9/L    MPV 12.2 (H) 7.0 - 12.0 fL    Neutrophils % 73.5 43.0 - 80.0 %    Immature Granulocytes % 0.3 0.0 - 5.0 %    Lymphocytes % 18.2 (L) 20.0 - 42.0 %    Monocytes % 5.9 2.0 - 12.0 %    Eosinophils % 1.4 0.0 - 6.0 %    Basophils % 0.7 0.0 - 2.0 %    Neutrophils Absolute 5.61 1.80 - 7.30 E9/L    Immature Granulocytes # 0.02 E9/L    Lymphocytes Absolute 1.39 (L) 1.50 - 4.00 E9/L    Monocytes Absolute 0.45 0.10 - 0.95 E9/L    Eosinophils Absolute 0.11 0.05 - 0.50 E9/L    Basophils Absolute 0.05 0.00 - 0.20 E9/L   Comprehensive Metabolic Panel w/ Reflex to MG   Result Value Ref Range    Sodium 137 132 - 146 mmol/L    Potassium reflex Magnesium 4.2 3.5 - 5.0 mmol/L    Chloride 102 98 - 107 mmol/L    CO2 25 22 - 29 mmol/L    Anion Gap 10 7 - 16 mmol/L    Glucose 152 (H) 74 - 99 mg/dL    BUN 26 (H) 6 - 23 mg/dL    CREATININE 1.1 0.7 - 1.2 mg/dL    GFR Non-African American >60 >=60 mL/min/1.73    GFR African American >60     Calcium 9.4 8.6 - 10.2 mg/dL    Total Protein 8.1 6.4 - 8.3 g/dL    Albumin 3.9 3.5 - 5.2 g/dL    Total Bilirubin 0.5 0.0 - 1.2 mg/dL    Alkaline Phosphatase 82 40 - 129 U/L    ALT 17 0 - 40 U/L    AST 23 0 - 39 U/L   Troponin   Result Value Ref Range    Troponin, High Sensitivity 10 0 - 11 ng/L   Brain Natriuretic Peptide   Result Value Ref Range    Pro- (H) 0 - 125 pg/mL   Troponin   Result Value Ref Range Troponin, High Sensitivity 7 0 - 11 ng/L   Troponin   Result Value Ref Range    Troponin, High Sensitivity 9 0 - 11 ng/L   Troponin   Result Value Ref Range    Troponin, High Sensitivity 9 0 - 11 ng/L   Hemoglobin A1c   Result Value Ref Range    Hemoglobin A1C 6.5 (H) 4.0 - 5.6 %   Basic Metabolic Panel   Result Value Ref Range    Sodium 136 132 - 146 mmol/L    Potassium 4.0 3.5 - 5.0 mmol/L    Chloride 101 98 - 107 mmol/L    CO2 24 22 - 29 mmol/L    Anion Gap 11 7 - 16 mmol/L    Glucose 206 (H) 74 - 99 mg/dL    BUN 20 6 - 23 mg/dL    CREATININE 0.9 0.7 - 1.2 mg/dL    GFR Non-African American >60 >=60 mL/min/1.73    GFR African American >60     Calcium 9.3 8.6 - 10.2 mg/dL   CBC with Auto Differential   Result Value Ref Range    WBC 5.8 4.5 - 11.5 E9/L    RBC 4.52 3.80 - 5.80 E12/L    Hemoglobin 12.9 12.5 - 16.5 g/dL    Hematocrit 40.6 37.0 - 54.0 %    MCV 89.8 80.0 - 99.9 fL    MCH 28.5 26.0 - 35.0 pg    MCHC 31.8 (L) 32.0 - 34.5 %    RDW 14.7 11.5 - 15.0 fL    Platelets 919 204 - 363 E9/L    MPV 11.7 7.0 - 12.0 fL    Neutrophils % 70.9 43.0 - 80.0 %    Immature Granulocytes % 0.2 0.0 - 5.0 %    Lymphocytes % 20.8 20.0 - 42.0 %    Monocytes % 6.2 2.0 - 12.0 %    Eosinophils % 1.4 0.0 - 6.0 %    Basophils % 0.5 0.0 - 2.0 %    Neutrophils Absolute 4.14 1.80 - 7.30 E9/L    Immature Granulocytes # 0.01 E9/L    Lymphocytes Absolute 1.21 (L) 1.50 - 4.00 E9/L    Monocytes Absolute 0.36 0.10 - 0.95 E9/L    Eosinophils Absolute 0.08 0.05 - 0.50 E9/L    Basophils Absolute 0.03 0.00 - 0.20 E9/L   Lipid Panel   Result Value Ref Range    Cholesterol, Total 129 0 - 199 mg/dL    Triglycerides 137 0 - 149 mg/dL    HDL 55 >40 mg/dL    LDL Calculated 47 0 - 99 mg/dL    VLDL Cholesterol Calculated 27 mg/dL   Magnesium   Result Value Ref Range    Magnesium 1.6 1.6 - 2.6 mg/dL   Phosphorus   Result Value Ref Range    Phosphorus 3.2 2.5 - 4.5 mg/dL   TSH   Result Value Ref Range    TSH 2.260 0.270 - 4.200 uIU/mL   Basic Metabolic Panel Result Value Ref Range    Sodium 137 132 - 146 mmol/L    Potassium 4.1 3.5 - 5.0 mmol/L    Chloride 102 98 - 107 mmol/L    CO2 24 22 - 29 mmol/L    Anion Gap 11 7 - 16 mmol/L    Glucose 156 (H) 74 - 99 mg/dL    BUN 31 (H) 6 - 23 mg/dL    CREATININE 1.5 (H) 0.7 - 1.2 mg/dL    GFR Non-African American 46 >=60 mL/min/1.73    GFR African American 56     Calcium 9.4 8.6 - 10.2 mg/dL   CBC with Auto Differential   Result Value Ref Range    WBC 6.7 4.5 - 11.5 E9/L    RBC 4.31 3.80 - 5.80 E12/L    Hemoglobin 12.3 (L) 12.5 - 16.5 g/dL    Hematocrit 38.8 37.0 - 54.0 %    MCV 90.0 80.0 - 99.9 fL    MCH 28.5 26.0 - 35.0 pg    MCHC 31.7 (L) 32.0 - 34.5 %    RDW 14.9 11.5 - 15.0 fL    Platelets 650 351 - 702 E9/L    MPV 12.4 (H) 7.0 - 12.0 fL    Neutrophils % 66.6 43.0 - 80.0 %    Immature Granulocytes % 0.7 0.0 - 5.0 %    Lymphocytes % 22.7 20.0 - 42.0 %    Monocytes % 7.3 2.0 - 12.0 %    Eosinophils % 2.1 0.0 - 6.0 %    Basophils % 0.6 0.0 - 2.0 %    Neutrophils Absolute 4.45 1.80 - 7.30 E9/L    Immature Granulocytes # 0.05 E9/L    Lymphocytes Absolute 1.52 1.50 - 4.00 E9/L    Monocytes Absolute 0.49 0.10 - 0.95 E9/L    Eosinophils Absolute 0.14 0.05 - 0.50 E9/L    Basophils Absolute 0.04 0.00 - 0.20 I4/T   Basic Metabolic Panel   Result Value Ref Range    Sodium 137 132 - 146 mmol/L    Potassium 4.4 3.5 - 5.0 mmol/L    Chloride 103 98 - 107 mmol/L    CO2 23 22 - 29 mmol/L    Anion Gap 11 7 - 16 mmol/L    Glucose 141 (H) 74 - 99 mg/dL    BUN 27 (H) 6 - 23 mg/dL    CREATININE 1.3 (H) 0.7 - 1.2 mg/dL    GFR Non-African American 54 >=60 mL/min/1.73    GFR African American >60     Calcium 9.6 8.6 - 10.2 mg/dL   Basic Metabolic Panel   Result Value Ref Range    Sodium 138 132 - 146 mmol/L    Potassium 4.6 3.5 - 5.0 mmol/L    Chloride 101 98 - 107 mmol/L    CO2 28 22 - 29 mmol/L    Anion Gap 9 7 - 16 mmol/L    Glucose 159 (H) 74 - 99 mg/dL    BUN 28 (H) 6 - 23 mg/dL    CREATININE 1.2 0.7 - 1.2 mg/dL    GFR Non-African American 59 >=60 mL/min/1.73    GFR African American >60     Calcium 9.4 8.6 - 10.2 mg/dL   CBC with Auto Differential   Result Value Ref Range    WBC 5.2 4.5 - 11.5 E9/L    RBC 4.22 3.80 - 5.80 E12/L    Hemoglobin 12.1 (L) 12.5 - 16.5 g/dL    Hematocrit 38.8 37.0 - 54.0 %    MCV 91.9 80.0 - 99.9 fL    MCH 28.7 26.0 - 35.0 pg    MCHC 31.2 (L) 32.0 - 34.5 %    RDW 14.8 11.5 - 15.0 fL    Platelets 736 855 - 994 E9/L    MPV 11.8 7.0 - 12.0 fL    Neutrophils % 65.9 43.0 - 80.0 %    Immature Granulocytes % 0.6 0.0 - 5.0 %    Lymphocytes % 23.1 20.0 - 42.0 %    Monocytes % 7.3 2.0 - 12.0 %    Eosinophils % 2.5 0.0 - 6.0 %    Basophils % 0.6 0.0 - 2.0 %    Neutrophils Absolute 3.43 1.80 - 7.30 E9/L    Immature Granulocytes # 0.03 E9/L    Lymphocytes Absolute 1.20 (L) 1.50 - 4.00 E9/L    Monocytes Absolute 0.38 0.10 - 0.95 E9/L    Eosinophils Absolute 0.13 0.05 - 0.50 E9/L    Basophils Absolute 0.03 0.00 - 0.20 E9/L   POCT Glucose   Result Value Ref Range    Meter Glucose 112 (H) 74 - 99 mg/dL   POCT Glucose   Result Value Ref Range    Meter Glucose 106 (H) 74 - 99 mg/dL   POCT Glucose   Result Value Ref Range    Meter Glucose 139 (H) 74 - 99 mg/dL   POCT Glucose   Result Value Ref Range    Meter Glucose 178 (H) 74 - 99 mg/dL   POCT Glucose   Result Value Ref Range    Meter Glucose 125 (H) 74 - 99 mg/dL   POCT Glucose   Result Value Ref Range    Meter Glucose 215 (H) 74 - 99 mg/dL   POCT Glucose   Result Value Ref Range    Meter Glucose 145 (H) 74 - 99 mg/dL   POCT Glucose   Result Value Ref Range    Meter Glucose 128 (H) 74 - 99 mg/dL   POCT Glucose   Result Value Ref Range    Meter Glucose 182 (H) 74 - 99 mg/dL   POCT Glucose   Result Value Ref Range    Meter Glucose 228 (H) 74 - 99 mg/dL   POCT Glucose   Result Value Ref Range    Meter Glucose 157 (H) 74 - 99 mg/dL   POCT Glucose   Result Value Ref Range    Meter Glucose 146 (H) 74 - 99 mg/dL   EKG 12 Lead   Result Value Ref Range    Ventricular Rate 72 BPM    Atrial Rate 72 BPM P-R Interval 164 ms    QRS Duration 160 ms    Q-T Interval 444 ms    QTc Calculation (Bazett) 486 ms    R Axis -4 degrees    T Axis 57 degrees   EKG 12 Lead   Result Value Ref Range    Ventricular Rate 75 BPM    Atrial Rate 75 BPM    P-R Interval 180 ms    QRS Duration 166 ms    Q-T Interval 460 ms    QTc Calculation (Bazett) 513 ms    P Axis 81 degrees    R Axis -14 degrees    T Axis 79 degrees       RADIOLOGY:  XR CHEST (2 VW)   Final Result   No acute process. ------------------------- NURSING NOTES AND VITALS REVIEWED ---------------------------  Date / Time Roomed:  5/24/2022 11:12 AM  ED Bed Assignment:  [id number]    The nursing notes within the ED encounter and vital signs as below have been reviewed. No data found. Oxygen Saturation Interpretation: Abnormal    ----------------------------      Counseling:  I have spoken with the patient and discussed todays results, in addition to providing specific details for the plan of care and counseling regarding the diagnosis and prognosis. Their questions are answered at this time and they are agreeable with the plan of admission.    --------------------------------- ADDITIONAL PROVIDER NOTES ---------------------------------  Consultations: This patient's ED course included: a personal history and physicial examination, re-evaluation prior to disposition, multiple bedside re-evaluations, cardiac monitoring and continuous pulse oximetry    This patient has remained hemodynamically stable during their ED course. Diagnosis:  1. Other forms of angina pectoris ([address]        Disposition:  Patient's disposition: Admit to telemetry  Patient's condition is stable.            Dustin Hammer DO  05/30/22 1239

## 2022-05-24 NOTE — H&P
Department of Internal Medicine  History and Physical Examination     Primary Care Physician: Jimmie Azul DO   Admitting Physician: Dr. Herbert Pretty  Admission date and time: 5/24/2022 11:12 AM    Room:  01/01  Admitting diagnosis: Chest pain with a moderate risk for cardiac etiology    Patient Name: Wil Hernandez  MRN: [id number]    Date of Service: 5/24/2022     Chief Complaint:  Chest pain    HISTORY OF PRESENT ILLNESS:    Wil Hernandez is a 78-year-old male patient who presented to [address] [place] [address] emergency department with complaints of chest pain. Chest pain occurred with minor exertion today. ER work-up was undertaken and was rather benign aside from an abnormal appearing EKG with presumably new left bundle branch block. Case was discussed with the ER physician with plans to admit to the service of Dr. Herbert Pretty. Lawyer Aquino is seen and examined at bedside with family present. He confirms the above given history. Chest pain occurred while minimally exerting self. Did not entirely resolve with rest.  No palpitations, near-syncope. No cough, shortness of breath or URI complaints. No fevers or chills, known sick contacts or exposures. There is some increase in fatigue but otherwise mild generalized weakness without focal component. No headache, neck pain or stiffness. No abdominal pain, nausea, vomiting, bowel change, hematochezia urinary complaints.     PAST MEDICAL Hx:  Past Medical History:   Diagnosis Date    Anemia     Atrial flutter ([address]     Aguila's esophagus     Diverticulitis     Fatty liver     History of Holter monitoring 05/12/2020    Hyperlipidemia     Hypertension     Lipoma     Subcyst    Lumbar spinal stenosis     Osteoarthritis     PUD (peptic ulcer disease)     Type 2 diabetes mellitus without complication ([address]        PAST SURGICAL Hx:   Past Surgical History:   Procedure Laterality Date    ATRIAL ABLATION SURGERY  2012    CARDIAC SURGERY      pt states 6 - 7 years ago     KNEE ARTHROPLASTY Left     LIPOMA RESECTION      UPPER GASTROINTESTINAL ENDOSCOPY  03/27/2017    Dr. Dontae Ontiveros, Findings: Long segment Barretts, 5cm, Moderate Gastritis, Duodenal bulb/post pyloric channel mass versus severe brunner gland hyperplasia, mild duodenitis distally, biopsies taken    UPPER GASTROINTESTINAL ENDOSCOPY  07/17/2017    Dr. Dontae Ontiveros, Findings: 5cm segment of Aguila's esiphagitis from 34 to 39 cm with no targeted lesions, Mild gastritis, Stomach mass in the antral pyloric channel area,       FAMILY Hx:  No family history on file. HOME MEDICATIONS:  Prior to Admission medications    Medication Sig Start Date End Date Taking? Authorizing Provider   ACCU-CHEK FELICIA PLUS strip  3/24/22   Historical Provider, MD   losartan-hydroCHLOROthiazide (HYZAAR) 100-25 MG per tablet TAKE 1 TABLET EVERY DAY 5/17/22   Sheron Dandy Ellerhorst, DO   metFORMIN (GLUCOPHAGE) 1000 MG tablet Take 1 tablet by mouth 2 times daily (with meals) 5/17/22   Sheron Dandy Ellerhorst, DO   glipiZIDE (GLUCOTROL) 5 MG tablet Take 1 tablet by mouth 2 times daily (before meals) 5/17/22   Sheron Dandy Ellerhorst, DO   atorvastatin (LIPITOR) 20 MG tablet Take 1 tablet by mouth daily 5/17/22   Sheron Dandy Ellerhorst, DO   omeprazole (PRILOSEC) 40 MG delayed release capsule TAKE 1 CAPSULE EVERY DAY 2/28/22   Amy Cantrell, DO   tamsulosin (FLOMAX) 0.4 MG capsule TAKE 1 CAPSULE EVERY DAY 2/28/22   Shirline Rimleonel, DO   metoprolol tartrate (LOPRESSOR) 50 MG tablet TAKE 1 TABLET TWICE DAILY 2/28/22   Amy Cantrell, DO   aspirin 325 MG tablet Take 325 mg by mouth daily    Historical Provider, MD       ALLERGIES:  Patient has no known allergies.     SOCIAL Hx:  Social History     Socioeconomic History    Marital status:      Spouse name: Not on file    Number of children: Not on file    Years of education: Not on file    Highest education level: Not on file   Occupational History    Not on file   Tobacco Use    Smoking status: Never Smoker    Smokeless tobacco: Never Used   Substance and Sexual Activity    Alcohol use: No    Drug use: No    Sexual activity: Not on file   Other Topics Concern    Not on file   Social History Narrative    Not on file     Social Determinants of Health     Financial Resource Strain: Low Risk     Difficulty of Paying Living Expenses: Not hard at all   Food Insecurity: No Food Insecurity    Worried About Running Out of Food in the Last Year: Never true    Mark of Food in the Last Year: Never true   Transportation Needs:     Lack of Transportation (Medical): Not on file    Lack of Transportation (Non-Medical): Not on file   Physical Activity: Unknown    Days of Exercise per Week: 0 days    Minutes of Exercise per Session: Not on file   Stress:     Feeling of Stress : Not on file   Social Connections:     Frequency of Communication with Friends and Family: Not on file    Frequency of Social Gatherings with Friends and Family: Not on file    Attends [employer] Services: Not on file    Active Member of [address] [place] [address] or Organizations: Not on file    Attends Club or Organization Meetings: Not on file    Marital Status: Not on file   Intimate Partner Violence:     Fear of Current or Ex-Partner: Not on file    Emotionally Abused: Not on file    Physically Abused: Not on file    Sexually Abused: Not on file   Housing Stability:     Unable to Pay for Housing in the Last Year: Not on file    Number of [place] in the Last Year: Not on file    Unstable Housing in the Last Year: Not on file     ROS: 12 point review of symptoms was conducted, pertinent positives and negative were reviewed, aside from that all 12 systems were reviewed and negative.        PHYSICAL EXAM:  VITALS:  Vitals:    05/24/22 1238   BP: 138/65   Pulse: 74   Resp: 18   Temp:    SpO2: 95%         CONSTITUTIONAL:    Awake, alert, cooperative, comfortable without distress    EYES:    PERRL, EOMI, sclera clear without icterus, conjunctiva normal    ENT:    Normocephalic, atraumatic, sinuses nontender on palpation. External ears without lesions. Oral pharynx with moist mucus membranes. NECK:    Supple, symmetrical, trachea midline, no adenopathy, thyroid symmetric, not enlarged and no tenderness, skin normal, no bruits, no JVD    HEMATOLOGIC/LYMPHATICS:    No cervical lymphadenopathy and no supraclavicular lymphadenopathy    LUNGS:    Symmetric. No increased work of breathing, diminished air exchange, clear to auscultation bilaterally, no wheezes, rhonchi, or rales,     CARDIOVASCULAR:    Normal apical impulse, regular rate and rhythm, normal S1 and S2, systolic murmur noted    ABDOMEN:    This contour, normal bowel sounds, soft, non-distended, non-tender, no rebound or guarding elicited on palpation     MUSCULOSKELETAL:    There is no redness, warmth, or swelling of the joints. Tone is normal.    NEUROLOGIC:    Awake, alert, oriented to name, place and time. Cranial nerves II-XII are grossly intact. Motor is 5 out of 5 bilaterally. SKIN:    No bruising or bleeding. No redness, warmth, or swelling    EXTREMITIES:    Peripheral pulses present. No significant pitting.     LABORATORY DATA:  CBC with Differential:    Lab Results   Component Value Date    WBC 7.6 05/24/2022    RBC 4.72 05/24/2022    HGB 13.4 05/24/2022    HCT 42.7 05/24/2022     05/24/2022    MCV 90.5 05/24/2022    MCH 28.4 05/24/2022    MCHC 31.4 05/24/2022    RDW 14.7 05/24/2022    LYMPHOPCT 18.2 05/24/2022    MONOPCT 5.9 05/24/2022    BASOPCT 0.7 05/24/2022    MONOSABS 0.45 05/24/2022    LYMPHSABS 1.39 05/24/2022    EOSABS 0.11 05/24/2022    BASOSABS 0.05 05/24/2022     BMP:    Lab Results   Component Value Date     05/24/2022    K 4.2 05/24/2022     05/24/2022    CO2 25 05/24/2022    BUN 26 05/24/2022    LABALBU 3.9 05/24/2022    LABALBU 4.1 08/30/2011    CREATININE 1.1 05/24/2022    CALCIUM 9.4 05/24/2022    GFRAA >60 05/24/2022    LABGLOM >60 05/24/2022    GLUCOSE 152 05/24/2022    GLUCOSE 177 08/30/2011       ASSESSMENT:  ·  Moderate risk for cardiac etiology  · New left bundle branch block on EKG  · Non-insulin-dependent diabetes mellitus type 2  · Essential hypertension  · Hyperlipidemia  · BPH  · History of ablation for treatment of atrial fibrillation with conversion to normal sinus rhythm 12 years prior  · Morbid obesity with BMI 40.42 kg per metered squared    PLAN:  Jose Alejandro Drought 56-year-old male patient who presented to [address] [place] [address] emergency department with chest pain while minimally exerting himself. He was worked up further and found to have a new left bundle branch block on EKG with a negative troponin x1. Recheck is pending. The remainder of his laboratory diagnostic evaluation was benign. We have discussed the case with both the patient himself and family at bedside. He opts to see [facility] cardiology despite having seen a different cardiology service 12 years prior. He is on rather comprehensive regimen at baseline including aspirin, statin, beta-blocker which will be continued. Echocardiogram will be obtained, routine labs be assessed for medical optimization purposes including A1c, lipid panel and thyroid studies which will be addressed accordingly. We will defer the mode and timing of ischemic work-up to the cardiovascular team. Underlying co-morbidites will be addressed during hospitalization as well. Labs and vital signs will be monitored closely and addressed accordingly. See additional orders for details.      JEROME Geronimo CNP  2:15 PM  5/24/2022

## 2022-05-25 ENCOUNTER — APPOINTMENT (OUTPATIENT)
Dept: NUCLEAR MEDICINE | Age: 72
DRG: 291 | End: 2022-05-25
Payer: MEDICARE

## 2022-05-25 LAB
ANION GAP SERPL CALCULATED.3IONS-SCNC: 11 MMOL/L (ref 7–16)
BASOPHILS ABSOLUTE: 0.03 E9/L (ref 0–0.2)
BASOPHILS RELATIVE PERCENT: 0.5 % (ref 0–2)
BUN BLDV-MCNC: 20 MG/DL (ref 6–23)
CALCIUM SERPL-MCNC: 9.3 MG/DL (ref 8.6–10.2)
CHLORIDE BLD-SCNC: 101 MMOL/L (ref 98–107)
CHOLESTEROL, TOTAL: 129 MG/DL (ref 0–199)
CO2: 24 MMOL/L (ref 22–29)
CREAT SERPL-MCNC: 0.9 MG/DL (ref 0.7–1.2)
EOSINOPHILS ABSOLUTE: 0.08 E9/L (ref 0.05–0.5)
EOSINOPHILS RELATIVE PERCENT: 1.4 % (ref 0–6)
GFR AFRICAN AMERICAN: >60
GFR NON-AFRICAN AMERICAN: >60 ML/MIN/1.73
GLUCOSE BLD-MCNC: 206 MG/DL (ref 74–99)
HCT VFR BLD CALC: 40.6 % (ref 37–54)
HDLC SERPL-MCNC: 55 MG/DL
HEMOGLOBIN: 12.9 G/DL (ref 12.5–16.5)
IMMATURE GRANULOCYTES #: 0.01 E9/L
IMMATURE GRANULOCYTES %: 0.2 % (ref 0–5)
LDL CHOLESTEROL CALCULATED: 47 MG/DL (ref 0–99)
LV EF: 33 %
LVEF MODALITY: NORMAL
LYMPHOCYTES ABSOLUTE: 1.21 E9/L (ref 1.5–4)
LYMPHOCYTES RELATIVE PERCENT: 20.8 % (ref 20–42)
MAGNESIUM: 1.6 MG/DL (ref 1.6–2.6)
MCH RBC QN AUTO: 28.5 PG (ref 26–35)
MCHC RBC AUTO-ENTMCNC: 31.8 % (ref 32–34.5)
MCV RBC AUTO: 89.8 FL (ref 80–99.9)
METER GLUCOSE: 125 MG/DL (ref 74–99)
METER GLUCOSE: 139 MG/DL (ref 74–99)
METER GLUCOSE: 178 MG/DL (ref 74–99)
METER GLUCOSE: 215 MG/DL (ref 74–99)
MONOCYTES ABSOLUTE: 0.36 E9/L (ref 0.1–0.95)
MONOCYTES RELATIVE PERCENT: 6.2 % (ref 2–12)
NEUTROPHILS ABSOLUTE: 4.14 E9/L (ref 1.8–7.3)
NEUTROPHILS RELATIVE PERCENT: 70.9 % (ref 43–80)
PDW BLD-RTO: 14.7 FL (ref 11.5–15)
PHOSPHORUS: 3.2 MG/DL (ref 2.5–4.5)
PLATELET # BLD: 166 E9/L (ref 130–450)
PMV BLD AUTO: 11.7 FL (ref 7–12)
POTASSIUM SERPL-SCNC: 4 MMOL/L (ref 3.5–5)
RBC # BLD: 4.52 E12/L (ref 3.8–5.8)
SARS-COV-2, NAAT: NOT DETECTED
SODIUM BLD-SCNC: 136 MMOL/L (ref 132–146)
TRIGL SERPL-MCNC: 137 MG/DL (ref 0–149)
TSH SERPL DL<=0.05 MIU/L-ACNC: 2.26 UIU/ML (ref 0.27–4.2)
VLDLC SERPL CALC-MCNC: 27 MG/DL
WBC # BLD: 5.8 E9/L (ref 4.5–11.5)

## 2022-05-25 PROCEDURE — APPSS60 APP SPLIT SHARED TIME 46-60 MINUTES: Performed by: PHYSICIAN ASSISTANT

## 2022-05-25 PROCEDURE — 82962 GLUCOSE BLOOD TEST: CPT

## 2022-05-25 PROCEDURE — 36415 COLL VENOUS BLD VENIPUNCTURE: CPT

## 2022-05-25 PROCEDURE — 84100 ASSAY OF PHOSPHORUS: CPT

## 2022-05-25 PROCEDURE — 99222 1ST HOSP IP/OBS MODERATE 55: CPT | Performed by: INTERNAL MEDICINE

## 2022-05-25 PROCEDURE — 80048 BASIC METABOLIC PNL TOTAL CA: CPT

## 2022-05-25 PROCEDURE — 93005 ELECTROCARDIOGRAM TRACING: CPT | Performed by: INTERNAL MEDICINE

## 2022-05-25 PROCEDURE — C8929 TTE W OR WO FOL WCON,DOPPLER: HCPCS

## 2022-05-25 PROCEDURE — 85025 COMPLETE CBC W/AUTO DIFF WBC: CPT

## 2022-05-25 PROCEDURE — 96372 THER/PROPH/DIAG INJ SC/IM: CPT

## 2022-05-25 PROCEDURE — 6370000000 HC RX 637 (ALT 250 FOR IP): Performed by: PHYSICIAN ASSISTANT

## 2022-05-25 PROCEDURE — 87635 SARS-COV-2 COVID-19 AMP PRB: CPT

## 2022-05-25 PROCEDURE — 1200000000 HC SEMI PRIVATE

## 2022-05-25 PROCEDURE — 6360000004 HC RX CONTRAST MEDICATION: Performed by: INTERNAL MEDICINE

## 2022-05-25 PROCEDURE — 83735 ASSAY OF MAGNESIUM: CPT

## 2022-05-25 PROCEDURE — 6370000000 HC RX 637 (ALT 250 FOR IP): Performed by: NURSE PRACTITIONER

## 2022-05-25 PROCEDURE — 84443 ASSAY THYROID STIM HORMONE: CPT

## 2022-05-25 PROCEDURE — 6370000000 HC RX 637 (ALT 250 FOR IP): Performed by: INTERNAL MEDICINE

## 2022-05-25 PROCEDURE — 6360000002 HC RX W HCPCS: Performed by: PHYSICIAN ASSISTANT

## 2022-05-25 PROCEDURE — 80061 LIPID PANEL: CPT

## 2022-05-25 RX ORDER — ISOSORBIDE MONONITRATE 30 MG/1
30 TABLET, EXTENDED RELEASE ORAL DAILY
Status: DISCONTINUED | OUTPATIENT
Start: 2022-05-25 | End: 2022-05-27 | Stop reason: HOSPADM

## 2022-05-25 RX ORDER — METOPROLOL SUCCINATE 50 MG/1
50 TABLET, EXTENDED RELEASE ORAL DAILY
Status: DISCONTINUED | OUTPATIENT
Start: 2022-05-25 | End: 2022-05-27 | Stop reason: HOSPADM

## 2022-05-25 RX ORDER — ENOXAPARIN SODIUM 150 MG/ML
1 INJECTION SUBCUTANEOUS 2 TIMES DAILY
Status: DISPENSED | OUTPATIENT
Start: 2022-05-25 | End: 2022-05-26

## 2022-05-25 RX ORDER — ENOXAPARIN SODIUM 150 MG/ML
1 INJECTION SUBCUTANEOUS 2 TIMES DAILY
Status: DISCONTINUED | OUTPATIENT
Start: 2022-05-25 | End: 2022-05-25

## 2022-05-25 RX ORDER — ASPIRIN 81 MG/1
81 TABLET ORAL DAILY
Status: DISCONTINUED | OUTPATIENT
Start: 2022-05-25 | End: 2022-05-27 | Stop reason: HOSPADM

## 2022-05-25 RX ADMIN — PANTOPRAZOLE SODIUM 40 MG: 40 TABLET, DELAYED RELEASE ORAL at 09:31

## 2022-05-25 RX ADMIN — ISOSORBIDE MONONITRATE 30 MG: 30 TABLET, EXTENDED RELEASE ORAL at 09:31

## 2022-05-25 RX ADMIN — LOSARTAN POTASSIUM 100 MG: 50 TABLET, FILM COATED ORAL at 09:31

## 2022-05-25 RX ADMIN — METOPROLOL SUCCINATE 50 MG: 50 TABLET, EXTENDED RELEASE ORAL at 09:31

## 2022-05-25 RX ADMIN — TAMSULOSIN HYDROCHLORIDE 0.4 MG: 0.4 CAPSULE ORAL at 09:31

## 2022-05-25 RX ADMIN — ASPIRIN 81 MG: 81 TABLET, COATED ORAL at 09:31

## 2022-05-25 RX ADMIN — ATORVASTATIN CALCIUM 80 MG: 40 TABLET, FILM COATED ORAL at 20:07

## 2022-05-25 RX ADMIN — INSULIN LISPRO 1 UNITS: 100 INJECTION, SOLUTION INTRAVENOUS; SUBCUTANEOUS at 13:21

## 2022-05-25 RX ADMIN — ENOXAPARIN SODIUM 135 MG: 150 INJECTION SUBCUTANEOUS at 09:31

## 2022-05-25 RX ADMIN — INSULIN LISPRO 1 UNITS: 100 INJECTION, SOLUTION INTRAVENOUS; SUBCUTANEOUS at 20:07

## 2022-05-25 RX ADMIN — PERFLUTREN 1.5 ML: 6.52 INJECTION, SUSPENSION INTRAVENOUS at 08:18

## 2022-05-25 ASSESSMENT — PAIN SCALES - GENERAL: PAINLEVEL_OUTOF10: 0

## 2022-05-25 NOTE — PLAN OF CARE
Problem: Pain  Goal: Verbalizes/displays adequate comfort level or baseline comfort level  5/25/2022 1741 by Laura Leung RN  Outcome: Progressing  5/25/2022 1741 by Laura Leung RN  Outcome: Progressing     Problem: Safety - Adult  Goal: Free from fall injury  5/25/2022 1741 by Laura Leung RN  Outcome: Progressing  5/25/2022 1741 by Laura Leung RN  Outcome: Progressing

## 2022-05-25 NOTE — CONSULTS
Inpatient [facility] Cardiology Consultation      Reason for Consult: Chest pain    Consulting Physician: Dr. Sary Taylor    Requesting Physician: Dr. Pete Rico    Date of Consultation: 5/25/2022    HISTORY OF PRESENT ILLNESS:   Patient is a 66year old WM not previously known to [facility] Cardiology. He has been seen in the past by Dr. Bushra Coronado and [facility] Cardiology. He is being seen in consultation this hospital admission by Dr. Sary Taylor for evaluation and recommendations regarding chest pain. PMH: HTN, HLD, non-insulin requiring T2DM with peripheral neuropathy, probable JOSE MANUEL, morbid obesity, tobacco abuse, GERD/gastritis, Aguila's esophagus, BPH, VHD and paroxysmal atrial flutter s/p ablation at [facility] in 2012. Denies known history of JOSE MANUEL, CAD, MI, CHF. Last stress test over 10 years ago, denies prior cardiac catheterization. Patient presented to [facility] on May 24, 2022 with complaints of chest pain. · Patient states that over the past month or so, he has been noting of new onset, intermittent episodes of chest discomfort and palpitations. The chest discomfort is located midsternally with no radiation. Described as a pressure-like sensation. He only notices the chest discomfort with exertion, it is not related to meals, cough, deep inspiration or palpation. No recent injury or fall. He states once he sits down to rest, the chest discomfort completely resolves within a few minutes. The episodes have been occurring multiple times per week, but not on a daily basis. He does note of associated palpitations with some of the chest discomfort episodes, however at times the chest pressure occurs solely on its own. · Patient additionally notes of associated shortness of breath and diaphoresis at times with the chest discomfort episodes. · He is currently chest pain-free during my time of interview this morning. · He denies nausea, emesis, dizziness, near-syncope or syncope.   He denies PND, orthopnea or peripheral edema.  · Admits to medication compliance. · He states he notes of no recurrence of his atrial flutter since his ablation in 2012, but has not been actively following with cardiology/EP service since then. The above noted symptoms remind him of his symptomatology associated with his atrial flutter in the past.      Please note: past medical records were reviewed per electronic medical record (EMR) - see detailed reports under Past Medical/ Surgical History. PAST MEDICAL HISTORY:    · HTN  · HLD, on statin therapy  · Non-insulin requiring T2DM  · Morbid obesity   · GERD. Gastritis. Aguila's esophagus by EGD  · BPH  · Tobacco abuse  · Paroxysmal atrial flutter, not on [address] (only on  mg daily)  · Followed with [facility] EP in the past (last seen 2012)  · S/p typical atrial flutter ablation June 2012 at [facility]  · Now with CHADsVASc score of at least 3 (DM, HTN, age)  · VHD  · JAISON ([facility], 2012): 2+ MR, normal LV size and function  · TTE (Dr. Charlene Alford, 5/2020): LVEF 60%. Stage I DD. Mild MR. Mildly dilated RV. Bi-atrial dilation. PAST SURGICAL HISTORY:    Past Surgical History:   Procedure Laterality Date    ATRIAL ABLATION SURGERY  2012    CARDIAC SURGERY      pt states 6 - 7 years ago     KNEE ARTHROPLASTY Left     LIPOMA RESECTION      UPPER GASTROINTESTINAL ENDOSCOPY  03/27/2017    Dr. Iqra Hilario, Findings: Long segment Barretts, 5cm, Moderate Gastritis, Duodenal bulb/post pyloric channel mass versus severe brunner gland hyperplasia, mild duodenitis distally, biopsies taken    UPPER GASTROINTESTINAL ENDOSCOPY  07/17/2017    Dr. Iqra Hilario, Findings: 5cm segment of Aguila's esiphagitis from 34 to 39 cm with no targeted lesions, Mild gastritis, Stomach mass in the antral pyloric channel area,       HOME MEDICATIONS:  Prior to Admission medications    Medication Sig Start Date End Date Taking?  Authorizing Provider   ACCU-CHEK FELICIA PLUS strip  3/24/22   Historical Provider, MD   losartan-hydroCHLOROthiazide WC, Amrik Fernandes, DO    insulin lispro (HUMALOG) injection vial 0-3 Units, 0-3 Units, SubCUTAneous, Nightly, Amrik Fernandes, DO    enoxaparin Sodium (LOVENOX) injection 30 mg, 30 mg, SubCUTAneous, BID, Amrik Fernandes, DO, 30 mg at 05/24/22 9424    pantoprazole (PROTONIX) tablet 40 mg, 40 mg, Oral, QAM, Amrik Dom, DO    losartan (COZAAR) tablet 100 mg, 100 mg, Oral, Daily **AND** hydroCHLOROthiazide (HYDRODIURIL) tablet 25 mg, 25 mg, Oral, Daily, Amrik Fernandes, DO    metoprolol tartrate (LOPRESSOR) tablet 50 mg, 50 mg, Oral, Daily, Amrik Dom, DO      ALLERGIES:  Patient has no known allergies. SOCIAL HISTORY:    Lives in a one story home with his wife. Uses cane as needed for ambulation. Former tobacco cigarette smoker, quit around 4. Smoked < one ppd for approximately 10 years. Now socially smokes cigars. Rare social alcohol use. Denies former/current illicit drug use    FAMILY HISTORY:   Non-contributory at this time due to patient's advanced age      REVIEW OF SYSTEMS:     Negative except as noted above in HPI      PHYSICAL EXAM:   BP (!) 116/56   Pulse 80   Temp 99.1 °F (37.3 °C) (Oral)   Resp 20   Ht 6' (1.829 m)   Wt 295 lb 8 oz (134 kg)   SpO2 92%   BMI 40.08 kg/m²   CONST:  Well developed, morbidly obese WM who appears stated age. Awake, alert, cooperative, no apparent distress. HEENT:   Head- Normocephalic, atraumatic. Eyes- Conjunctivae pink, anicteric. Neck-  No stridor, trachea midline, no apparent jugular venous distention. CHEST: Chest symmetrical and non-tender to palpation. No accessory muscle use or intercostal retractions. RESPIRATORY: Lung sounds - clear throughout fields. Diminished  CARDIOVASCULAR:     No noted carotid bruit. Heart Ausculation- Regular rate and rhythm, 5-7/7 systolic murmur apex  PV: No significant lower extremity edema. Pedal pulses palpable, no clubbing or cyanosis. ABDOMEN: Soft, non-tender to light palpation. Bowel sounds present.    MS: Good muscle strength and tone. No atrophy or abnormal movements. SKIN: Warm and dry. NEURO / PSYCH: Oriented to person, place and time. Speech clear and appropriate. Follows all commands. Pleasant affect. DATA:      Diagnostic:  All diagnostic testing and lab work thus far this admission reviewed in detail. CXR 5/24/2022  Impression  No acute process. Intake/Output Summary (Last 24 hours) at 5/25/2022 0724  Last data filed at 5/24/2022 1757  Gross per 24 hour   Intake 240 ml   Output --   Net 240 ml       Labs:   CBC:   Recent Labs     05/24/22  1125   WBC 7.6   HGB 13.4   HCT 42.7        BMP:   Recent Labs     05/24/22  1125      K 4.2   CO2 25   BUN 26*   CREATININE 1.1   LABGLOM >60   CALCIUM 9.4     HgA1c:   Lab Results   Component Value Date    LABA1C 6.5 (H) 05/24/2022     FASTING LIPID PANEL:  Lab Results   Component Value Date    CHOL 142 05/17/2022    HDL 54 05/17/2022    LDLCALC 67 05/17/2022    TRIG 106 05/17/2022     LIVER PROFILE:  Recent Labs     05/24/22  1125   AST 23   ALT 17   LABALBU 3.9      Ref Range & Units 05/24/22 1125   Troponin, High Sensitivity 0 - 11 ng/L 10      Ref Range & Units 05/24/22 1814 05/24/22 1713 05/24/22 1557    Troponin, High Sensitivity 0 - 11 ng/L 9  9 CM  7 CM       Ref Range & Units 05/24/22 1125   Pro-BNP 0 - 125 pg/mL 334 High         ASSESSMENT:  · Chest pain  · hS-troponin 10 --> 7 --> 9 --> 9  · New LBBB on EKG  · Currently chest pain free  · Palpitations  · Paroxysmal atrial flutter, not on OAC (only on  mg daily at home)  · Followed with [facility] EP in the past (last seen 2012)  · S/p typical atrial flutter ablation June 2012 at [facility]  · Now with CHADsVASc score of at least 3 (DM, HTN, age)  · Maintaining SR on telemetry thus far  · Reported new LV dysfunction/HFrEF on preliminary TTE review.   Appears euvolemic on exam,   · Mild MR on TTE 5/2020  · HTN, controlled  · HLD, on statin therapy  · Non-insulin requiring T2DM with peripheral neuropathy  · Probable JOSE MANUEL  · Morbid obesity  · GERD. Gastritis. Aguila's esophagus  · Tobacco abuse  · BPH        RECOMMENDATIONS:  · Telemetry reviewed -- no note of recurrent Aflutter thus far this admission  · Given patient with high CHADsVASc score and possibility of recurrent, paroxysmal Aflutter given symptoms, risks and benefits of [address] initiation discussed with the patient. He is agreeable at this time  · Start therapeutic Lovenox with eventual transition to Eliquis 5 mg BID. Last dose tonight given plans for cardiac catheterization tomorrow  · TTE preliminary review per Dr. Rizwan Henderson -- new LV dysfunction with LVEF ~35%. Full report pending. Will cancel stress test at this time and schedule for LHC (all risks, benefits and alternatives to procedure discussed with patient at this time, he is agreeable to proceed). Cath lab schedule is full today per staff -- patient has been placed on schedule for tomorrow, NPO at midnight. HF score 6  · Continue ASA 81 mg daily, statin therapy. Imdur has been added to medical regimen today  · GDMT for LV dysfunction (pending finalized TTE report): · Switch Lopressor to Toprol-XL 50 mg daily  · Continue Losartan 100 mg daily with consideration of Entresto initiation post cardiac catheterization  · Consider addition of Aldactone prior to discharge   · Consider outpatient SGLT-2 addition in setting of T2DM and LV dysfunction  · HCTZ has been discontinued -- patient appears euvolemic with no need for loop diuretic at this time  · 14 Day Event monitor upon discharge  · Check TSH, Magnesium level and lipid panel  · Outpatient sleep study recommended  · Further recommendations to follow as per Dr. Rizwan Henderson        The above case and recommendations have been discussed and made in collaboration with Dr. Rizwan Henderson. NOTE: This report was transcribed using voice recognition software.  Every effort was made to ensure accuracy; however, inadvertent computerized transcription errors may be present. Alannah Avila, [address] [place] [address], [place] [id number] Cardiology    Electronically signed by Catherine Nava PA-C on 5/25/2022 at 7:24 AM       Patient seen and examined case discussed in detail with cardiology nurse practitioner/PA and I agree with assessment and plan and history of physical examination discussed in detail and documented above. I also independently examined the patient, reviewed the patient chart, records, imaging and labs and made medication adjustment and discussed the case with the hospitalist team and patient directly. I contributed more than 50% of the patient care.

## 2022-05-25 NOTE — PROGRESS NOTES
Internal Medicine Progress Note    AMANDA=[facility]    Jaylene Chávez. Wilmer Marin, SHIRAOEdmundoIEdmundo Lakhani D.O., MARIA R Quinonez D.O. Pepper Alvarez, MSN, APRN, NP-C  Clare Jernigan. Kate Gonzalez, MSN, APRN-CNP     Primary Care Physician: Adina Tapia DO   Admitting Physician:  Jennifer Akbar DO  Admission date and time: 5/24/2022 11:12 AM    Room:  [address] [place], [place] [id number]  Admitting diagnosis: Chest pain [R07.9]    Patient Name: Juan Hernandez  MRN: [id number]    Date of Service: 5/25/2022     Subjective:  Cris Hansen is a 66 y.o. male who was seen and examined today,5/25/2022, at the bedside. He continues to have chest discomfort. We have discussed the addition of long-acting nitrate, further cardiology consultation and ischemic work-up. He voices no other questions or concerns. No family present during my examination. Review of System:   Constitutional:   Denies fever or chills, weight loss or gain, fatigue or malaise. HEENT:   Denies ear pain, sore throat, sinus or eye problems. Cardiovascular:   Going intermittent chest discomfort as described above, irregular heartbeats, or palpitations. Respiratory:   Denies shortness of breath, coughing, sputum production, hemoptysis, or wheezing. Gastrointestinal:   Denies nausea, vomiting, diarrhea, or constipation. Denies any abdominal pain. Genitourinary:    Denies any urgency, frequency, hematuria. Voiding  without difficulty. Extremities:   Denies lower extremity swelling, edema or cyanosis. Neurology:    Denies any headache or focal neurological deficits, Denies generalized weakness or memory difficulty. Psch:   Denies being anxious or depressed. Musculoskeletal:    Denies  myalgias, joint complaints or back pain. Integumentary:   Denies any rashes, ulcers, or excoriations. Denies bruising. Hematologic/Lymphatic:  Denies bruising or bleeding. Physical Exam:  No intake/output data recorded.     Intake/Output Summary (Last 24 hours) at 5/25/2022 0825  Last data filed at 5/24/2022 1757  Gross per 24 hour   Intake 240 ml   Output --   Net 240 ml   I/O last 3 completed shifts: In: 240 [P.O.:240]  Out: -   Patient Vitals for the past 96 hrs (Last 3 readings):   Weight   05/24/22 1619 295 lb 8 oz (134 kg)     Vital Signs:   Blood pressure (!) 116/56, pulse 80, temperature 99.1 °F (37.3 °C), temperature source Oral, resp. rate 20, height 6' (1.829 m), weight 295 lb 8 oz (134 kg), SpO2 92 %. General appearance:  Alert, responsive, oriented to person, place, and time. Overall comfortable appearing, no distress. Head:  Normocephalic. No masses, lesions or tenderness. Eyes:  PERRLA. EOMI. Sclera clear. ENT:  Ears normal. Mucosa normal.  Neck:    Supple. Trachea midline. No thyromegaly. No JVD. No bruits. Heart:    Rhythm regular. Rate controlled. S1 and S2. Systolic murmur. Normal sinus rhythm with rate of 80s on bedside telemetry. .  Lungs:    Symmetrical. Clear to auscultation bilaterally. No wheezes. No rhonchi. No rales. Abdomen:   Soft. Obese. Non-tender. Non-distended. Bowel sounds positive. No organomegaly or masses. No pain on palpation. Extremities:    Peripheral pulses present. No significant pitting peripheral edema. No ulcers. No cyanosis. No clubbing. Neurologic:    Alert x 3. No focal deficit. Cranial nerves grossly intact. No focal weakness. Psych:   Behavior is normal. Mood appears normal. Speech is not rapid and/or pressured. Musculoskeletal:   Spine ROM normal. Muscular strength intact. Gait not assessed. Integumentary:  No rashes  Skin normal color and texture.   Genitalia/Breast:  Deferred    Medication:  Scheduled Meds:   aspirin  81 mg Oral Daily    [Held by provider] apixaban  5 mg Oral BID    enoxaparin  1 mg/kg SubCUTAneous BID    isosorbide mononitrate  30 mg Oral Daily    atorvastatin  80 mg Oral Nightly    tamsulosin  0.4 mg Oral Daily    insulin lispro  0-6 Units SubCUTAneous TID WC    insulin lispro  0-3 Units SubCUTAneous Nightly    pantoprazole  40 mg Oral QAM    losartan  100 mg Oral Daily    metoprolol tartrate  50 mg Oral Daily     Continuous Infusions:   dextrose         Objective Data:  CBC with Differential:    Lab Results   Component Value Date    WBC 7.6 05/24/2022    RBC 4.72 05/24/2022    HGB 13.4 05/24/2022    HCT 42.7 05/24/2022     05/24/2022    MCV 90.5 05/24/2022    MCH 28.4 05/24/2022    MCHC 31.4 05/24/2022    RDW 14.7 05/24/2022    LYMPHOPCT 18.2 05/24/2022    MONOPCT 5.9 05/24/2022    BASOPCT 0.7 05/24/2022    MONOSABS 0.45 05/24/2022    LYMPHSABS 1.39 05/24/2022    EOSABS 0.11 05/24/2022    BASOSABS 0.05 05/24/2022     BMP:    Lab Results   Component Value Date     05/24/2022    K 4.2 05/24/2022     05/24/2022    CO2 25 05/24/2022    BUN 26 05/24/2022    LABALBU 3.9 05/24/2022    LABALBU 4.1 08/30/2011    CREATININE 1.1 05/24/2022    CALCIUM 9.4 05/24/2022    GFRAA >60 05/24/2022    LABGLOM >60 05/24/2022    GLUCOSE 152 05/24/2022    GLUCOSE 177 08/30/2011     HgBA1c:    Lab Results   Component Value Date    LABA1C 6.5 05/24/2022     FLP:    Lab Results   Component Value Date    TRIG 106 05/17/2022    HDL 54 05/17/2022    LDLCALC 67 05/17/2022    LABVLDL 21 05/17/2022     TSH:    Lab Results   Component Value Date    TSH 2.550 05/17/2022       Assessment:  · Chest pain with Moderate risk for cardiac etiology  · New left bundle branch block on EKG  · Non-insulin-dependent diabetes mellitus type 2, HbA1c 6.5%  · Essential hypertension  · Hyperlipidemia with LDL/HDL at goal  · BPH  · History of ablation for treatment of atrial fibrillation with conversion to normal sinus rhythm 12 years prior  · Morbid obesity with BMI 40.42 kg per metered squared      Plan:   Maryam Nunez is a 30-year-old male patient who presented with chest pain and EKG changes with symptomatology Felter represent least moderate risk for cardiac etiology.   Cardiovascular service followed 12 years ago was discussed with the patient and he opts to follow with [address] [place] [address] cardiology during this hospitalization and moving forward. Repeat cardiac enzymes have essentially plateaued and have never been significantly elevated but the patient continues to have ongoing chest discomfort intermittently. We will add Imdur. Blood pressures are on the lower range of normal and for this reason hydrochlorothiazide will be discontinued to allow for the addition of Imdur. His cholesterol panel shows good control/shows him to be at goal.  A1c is very well controlled as well. Medical optimization is being undertaken. We await updated cardiovascular recommendations regarding mode and timing of ischemic work-up. Chronic morbidities, laboratory values and vital signs are being monitored and addressed accordingly. Continue current therapy. See orders for further plan of care. More than 50% of my  time was spent at the bedside counseling/coordinating care with the patient and/or family with face to face contact. This time was spent reviewing notes and laboratory data as well as instructing and counseling the patient. Time I spent with the family or surrogate(s) is included only if the patient was incapable of providing the necessary information or participating in medical decisions. I also discussed the differential diagnosis and all of the proposed management plans with the patient and individuals accompanying the patient. Neftali Hilton requires this high level of physician care and nursing on the IMC/Telemetry unit due the complexity of decision management and chance of rapid decline or death. Continued cardiac monitoring and higher level of nursing are required. I am readily available for any further decision-making and intervention.      Jose Alberto Rdz, JEROME - CNP  5/25/2022  8:25 AM

## 2022-05-25 NOTE — CARE COORDINATION
Ss note:5/25/2022.9:52 AM No covid testing. Per IDR, pt to transfer for Heart Cath tomorrow, pt is on room air. Met with pt, he is alert/oriented times 4, pleasant. Resides at home with wife & son Julio Hoyt. PTA pt is independent/drives, states he has a walker & cane that he will use on occasion. No hx of HHC/SNF. Uses [facility] to obtain medications or [facility] in [place] on [address]. No needs relayed.  ALEN Aguiar

## 2022-05-26 LAB
ANION GAP SERPL CALCULATED.3IONS-SCNC: 11 MMOL/L (ref 7–16)
ANION GAP SERPL CALCULATED.3IONS-SCNC: 11 MMOL/L (ref 7–16)
BASOPHILS ABSOLUTE: 0.04 E9/L (ref 0–0.2)
BASOPHILS RELATIVE PERCENT: 0.6 % (ref 0–2)
BUN BLDV-MCNC: 27 MG/DL (ref 6–23)
BUN BLDV-MCNC: 31 MG/DL (ref 6–23)
CALCIUM SERPL-MCNC: 9.4 MG/DL (ref 8.6–10.2)
CALCIUM SERPL-MCNC: 9.6 MG/DL (ref 8.6–10.2)
CHLORIDE BLD-SCNC: 102 MMOL/L (ref 98–107)
CHLORIDE BLD-SCNC: 103 MMOL/L (ref 98–107)
CO2: 23 MMOL/L (ref 22–29)
CO2: 24 MMOL/L (ref 22–29)
CREAT SERPL-MCNC: 1.3 MG/DL (ref 0.7–1.2)
CREAT SERPL-MCNC: 1.5 MG/DL (ref 0.7–1.2)
EKG ATRIAL RATE: 72 BPM
EKG P-R INTERVAL: 164 MS
EKG Q-T INTERVAL: 444 MS
EKG QRS DURATION: 160 MS
EKG QTC CALCULATION (BAZETT): 486 MS
EKG R AXIS: -4 DEGREES
EKG T AXIS: 57 DEGREES
EKG VENTRICULAR RATE: 72 BPM
EOSINOPHILS ABSOLUTE: 0.14 E9/L (ref 0.05–0.5)
EOSINOPHILS RELATIVE PERCENT: 2.1 % (ref 0–6)
GFR AFRICAN AMERICAN: 56
GFR AFRICAN AMERICAN: >60
GFR NON-AFRICAN AMERICAN: 46 ML/MIN/1.73
GFR NON-AFRICAN AMERICAN: 54 ML/MIN/1.73
GLUCOSE BLD-MCNC: 141 MG/DL (ref 74–99)
GLUCOSE BLD-MCNC: 156 MG/DL (ref 74–99)
HCT VFR BLD CALC: 38.8 % (ref 37–54)
HEMOGLOBIN: 12.3 G/DL (ref 12.5–16.5)
IMMATURE GRANULOCYTES #: 0.05 E9/L
IMMATURE GRANULOCYTES %: 0.7 % (ref 0–5)
LYMPHOCYTES ABSOLUTE: 1.52 E9/L (ref 1.5–4)
LYMPHOCYTES RELATIVE PERCENT: 22.7 % (ref 20–42)
MCH RBC QN AUTO: 28.5 PG (ref 26–35)
MCHC RBC AUTO-ENTMCNC: 31.7 % (ref 32–34.5)
MCV RBC AUTO: 90 FL (ref 80–99.9)
METER GLUCOSE: 128 MG/DL (ref 74–99)
METER GLUCOSE: 145 MG/DL (ref 74–99)
METER GLUCOSE: 182 MG/DL (ref 74–99)
METER GLUCOSE: 228 MG/DL (ref 74–99)
MONOCYTES ABSOLUTE: 0.49 E9/L (ref 0.1–0.95)
MONOCYTES RELATIVE PERCENT: 7.3 % (ref 2–12)
NEUTROPHILS ABSOLUTE: 4.45 E9/L (ref 1.8–7.3)
NEUTROPHILS RELATIVE PERCENT: 66.6 % (ref 43–80)
PDW BLD-RTO: 14.9 FL (ref 11.5–15)
PLATELET # BLD: 168 E9/L (ref 130–450)
PMV BLD AUTO: 12.4 FL (ref 7–12)
POTASSIUM SERPL-SCNC: 4.1 MMOL/L (ref 3.5–5)
POTASSIUM SERPL-SCNC: 4.4 MMOL/L (ref 3.5–5)
RBC # BLD: 4.31 E12/L (ref 3.8–5.8)
SODIUM BLD-SCNC: 137 MMOL/L (ref 132–146)
SODIUM BLD-SCNC: 137 MMOL/L (ref 132–146)
WBC # BLD: 6.7 E9/L (ref 4.5–11.5)

## 2022-05-26 PROCEDURE — 2580000003 HC RX 258: Performed by: INTERNAL MEDICINE

## 2022-05-26 PROCEDURE — 6370000000 HC RX 637 (ALT 250 FOR IP): Performed by: INTERNAL MEDICINE

## 2022-05-26 PROCEDURE — 1200000000 HC SEMI PRIVATE

## 2022-05-26 PROCEDURE — 6370000000 HC RX 637 (ALT 250 FOR IP): Performed by: NURSE PRACTITIONER

## 2022-05-26 PROCEDURE — 36415 COLL VENOUS BLD VENIPUNCTURE: CPT

## 2022-05-26 PROCEDURE — 96374 THER/PROPH/DIAG INJ IV PUSH: CPT

## 2022-05-26 PROCEDURE — 6360000002 HC RX W HCPCS: Performed by: INTERNAL MEDICINE

## 2022-05-26 PROCEDURE — 6370000000 HC RX 637 (ALT 250 FOR IP): Performed by: PHYSICIAN ASSISTANT

## 2022-05-26 PROCEDURE — 82962 GLUCOSE BLOOD TEST: CPT

## 2022-05-26 PROCEDURE — 85025 COMPLETE CBC W/AUTO DIFF WBC: CPT

## 2022-05-26 PROCEDURE — 80048 BASIC METABOLIC PNL TOTAL CA: CPT

## 2022-05-26 PROCEDURE — 99233 SBSQ HOSP IP/OBS HIGH 50: CPT | Performed by: INTERNAL MEDICINE

## 2022-05-26 RX ORDER — HYDRALAZINE HYDROCHLORIDE 20 MG/ML
5 INJECTION INTRAMUSCULAR; INTRAVENOUS EVERY 4 HOURS PRN
Status: DISCONTINUED | OUTPATIENT
Start: 2022-05-26 | End: 2022-05-27 | Stop reason: HOSPADM

## 2022-05-26 RX ORDER — SODIUM CHLORIDE, SODIUM LACTATE, POTASSIUM CHLORIDE, CALCIUM CHLORIDE 600; 310; 30; 20 MG/100ML; MG/100ML; MG/100ML; MG/100ML
INJECTION, SOLUTION INTRAVENOUS CONTINUOUS
Status: DISCONTINUED | OUTPATIENT
Start: 2022-05-26 | End: 2022-05-27 | Stop reason: HOSPADM

## 2022-05-26 RX ADMIN — INSULIN LISPRO 1 UNITS: 100 INJECTION, SOLUTION INTRAVENOUS; SUBCUTANEOUS at 17:43

## 2022-05-26 RX ADMIN — ISOSORBIDE MONONITRATE 30 MG: 30 TABLET, EXTENDED RELEASE ORAL at 15:04

## 2022-05-26 RX ADMIN — ASPIRIN 81 MG: 81 TABLET, COATED ORAL at 15:06

## 2022-05-26 RX ADMIN — ATORVASTATIN CALCIUM 80 MG: 40 TABLET, FILM COATED ORAL at 19:42

## 2022-05-26 RX ADMIN — SODIUM CHLORIDE, POTASSIUM CHLORIDE, SODIUM LACTATE AND CALCIUM CHLORIDE: 600; 310; 30; 20 INJECTION, SOLUTION INTRAVENOUS at 14:56

## 2022-05-26 RX ADMIN — METOPROLOL SUCCINATE 50 MG: 50 TABLET, EXTENDED RELEASE ORAL at 15:03

## 2022-05-26 RX ADMIN — ONDANSETRON 4 MG: 2 INJECTION INTRAMUSCULAR; INTRAVENOUS at 19:42

## 2022-05-26 RX ADMIN — TAMSULOSIN HYDROCHLORIDE 0.4 MG: 0.4 CAPSULE ORAL at 15:04

## 2022-05-26 ASSESSMENT — PAIN SCALES - GENERAL
PAINLEVEL_OUTOF10: 0

## 2022-05-26 NOTE — PROGRESS NOTES
INPATIENT CARDIOLOGY Follow UP    Name: Hardy Hull    Age: 66 y.o. Date of Admission: 5/24/2022 11:12 AM    Date of Service: 5/26/2022      Referring Physician: Good Singh DO      Subjective: Denies chest pain or shortness of breath at rest but report dyspnea on exertion. Cardiac transition postponed due to acute on chronic kidney injury.   Creatinine improving and please initiate 75 cc normal saline at midnight until cardiac authorization is complete to avoid and decrease risk of contrast-induced nephropathy with cardiac cath          Review of Systems:   Cardiac: As per HPI  General: Denies fever or chills  Pulmonary: As per HPI  HEENT: Denies runny nose  GI: No complaints  : No complaints  Endocrine: Denies night sweats  Musculoskeletal: No complaints  Skin: Dry skin  Neuro: No complaints  Psych: Denies depression    Past Medical History:  Past Medical History:   Diagnosis Date    Anemia     Atrial flutter ([address]     Aguila's esophagus     Diverticulitis     Fatty liver     History of Holter monitoring 05/12/2020    Hyperlipidemia     Hypertension     Lipoma     Subcyst    Lumbar spinal stenosis     Osteoarthritis     PUD (peptic ulcer disease)     Type 2 diabetes mellitus without complication ([address]        Past Surgical History:  Past Surgical History:   Procedure Laterality Date    ATRIAL ABLATION SURGERY  2012    CARDIAC SURGERY      pt states 6 - 7 years ago     KNEE ARTHROPLASTY Left     LIPOMA RESECTION      UPPER GASTROINTESTINAL ENDOSCOPY  03/27/2017    Dr. Brenda Moy, Findings: Long segment Barretts, 5cm, Moderate Gastritis, Duodenal bulb/post pyloric channel mass versus severe brunner gland hyperplasia, mild duodenitis distally, biopsies taken    UPPER GASTROINTESTINAL ENDOSCOPY  07/17/2017    Dr. Brenda Moy, Findings: 5cm segment of Aguila's esiphagitis from 34 to 39 cm with no targeted lesions, Mild gastritis, Stomach mass in the antral pyloric channel area,       Family History:  No family history on file. Social History:  Social History     Socioeconomic History    Marital status:      Spouse name: Not on file    Number of children: Not on file    Years of education: Not on file    Highest education level: Not on file   Occupational History    Not on file   Tobacco Use    Smoking status: Never Smoker    Smokeless tobacco: Never Used   Substance and Sexual Activity    Alcohol use: No    Drug use: No    Sexual activity: Not on file   Other Topics Concern    Not on file   Social History Narrative    Not on file     Social Determinants of Health     Financial Resource Strain: Low Risk     Difficulty of Paying Living Expenses: Not hard at all   Food Insecurity: No Food Insecurity    Worried About [address] [employer] in the Last Year: Never true    [address] [employer] in the Last Year: Never true   Transportation Needs:     Lack of Transportation (Medical): Not on file    Lack of Transportation (Non-Medical):  Not on file   Physical Activity: Unknown    Days of Exercise per Week: 0 days    Minutes of Exercise per Session: Not on file   Stress:     Feeling of Stress : Not on file   Social Connections:     Frequency of Communication with Friends and Family: Not on file    Frequency of Social Gatherings with Friends and Family: Not on file    Attends Holiness Services: Not on file    Active Member of [address] [place] [address] or Organizations: Not on file    Attends Club or Organization Meetings: Not on file    Marital Status: Not on file   Intimate Partner Violence:     Fear of Current or Ex-Partner: Not on file    Emotionally Abused: Not on file    Physically Abused: Not on file    Sexually Abused: Not on file   Housing Stability:     Unable to Pay for Housing in the Last Year: Not on file    Number of [place] in the Last Year: Not on file    Unstable Housing in the Last Year: Not on file       Allergies:  No Known Allergies    Home Medications:  Prior to Admission medications    Medication Sig Start Date End Date Taking?  Authorizing Provider   ACCU-CHEROSEMARIE FELICIA PLUS strip  3/24/22   Historical Provider, MD   losartan-hydroCHLOROthiazide (HYZAAR) 100-25 MG per tablet TAKE 1 TABLET EVERY DAY 5/17/22   Justin Mcallister,    metFORMIN (GLUCOPHAGE) 1000 MG tablet Take 1 tablet by mouth 2 times daily (with meals) 5/17/22   Kameron Mcallister,    glipiZIDE (GLUCOTROL) 5 MG tablet Take 1 tablet by mouth 2 times daily (before meals)  Patient taking differently: Take 10 mg by mouth 2 times daily (before meals)  5/17/22   Justin Mcallister DO   atorvastatin (LIPITOR) 20 MG tablet Take 1 tablet by mouth daily 5/17/22   Justin Mcallister DO   omeprazole (PRILOSEC) 40 MG delayed release capsule TAKE 1 CAPSULE EVERY DAY 2/28/22   Brain Certain, DO   tamsulosin (FLOMAX) 0.4 MG capsule TAKE 1 CAPSULE EVERY DAY 2/28/22   Brain Certain, DO   metoprolol tartrate (LOPRESSOR) 50 MG tablet TAKE 1 TABLET TWICE DAILY  Patient taking differently: daily  2/28/22   Brain Certain, DO   aspirin 325 MG tablet Take 325 mg by mouth daily    Historical Provider, MD       Current Medications:  Current Facility-Administered Medications   Medication Dose Route Frequency Provider Last Rate Last Admin    lactated ringers infusion   IntraVENous Continuous Jeris Obey DO 43 mL/hr at 05/26/22 1456 New Bag at 05/26/22 1456    aspirin EC tablet 81 mg  81 mg Oral Daily [facility] YANA Zhou   81 mg at 05/26/22 1506    [Held by provider] apixaban (ELIQUIS) tablet 5 mg  5 mg Oral BID Yeni Guzman PA-C        isosorbide mononitrate (IMDUR) extended release tablet 30 mg  30 mg Oral Daily JEROME Ward - CNP   30 mg at 05/26/22 1504    metoprolol succinate (TOPROL XL) extended release tablet 50 mg  50 mg Oral Daily [facility] YANA Zhou   50 mg at 05/26/22 1503    nitroGLYCERIN (NITROSTAT) SL tablet 0.4 mg  0.4 mg SubLINGual Q5 Min PRN Elba Fess, DO        atorvastatin (LIPITOR) tablet 80 mg  80 mg Oral Nightly Lenice Obey, DO   80 mg at 05/25/22 2007    tamsulosin [facility]) capsule 0.4 mg  0.4 mg Oral Daily Lenice Obey, DO   0.4 mg at 05/26/22 1504    ondansetron (ZOFRAN) injection 4 mg  4 mg IntraVENous Q6H PRN Lenice Obey, DO        morphine (PF) injection 2 mg  2 mg IntraVENous Q4H PRN Lenice Obey, DO        glucose chewable tablet 16 g  4 tablet Oral PRN Lenice Obey, DO        dextrose bolus 10% 125 mL  125 mL IntraVENous PRN Lenice Obey, DO        Or    dextrose bolus 10% 250 mL  250 mL IntraVENous PRN Lenice Obey, DO        glucagon (rDNA) injection 1 mg  1 mg IntraMUSCular PRN Lenice Obey, DO        dextrose 5 % solution  100 mL/hr IntraVENous PRN Lenice Obey, DO        insulin lispro (HUMALOG) injection vial 0-6 Units  0-6 Units SubCUTAneous TID WC Lenice Obey, DO   1 Units at 05/25/22 1321    insulin lispro (HUMALOG) injection vial 0-3 Units  0-3 Units SubCUTAneous Nightly Lenice Obey, DO   1 Units at 05/25/22 2007    pantoprazole (PROTONIX) tablet 40 mg  40 mg Oral QAM Lenice Obey, DO   40 mg at 05/25/22 0931    [Held by provider] losartan (COZAAR) tablet 100 mg  100 mg Oral Daily Lenice Obey, DO   100 mg at 05/25/22 0931      lactated ringers 43 mL/hr at 05/26/22 1456    dextrose         Physical Exam:  /68   Pulse 93   Temp 98.4 °F (36.9 °C) (Oral)   Resp 18   Ht 6' (1.829 m)   Wt 295 lb (133.8 kg)   SpO2 95%   BMI 40.01 kg/m²   Wt Readings from Last 3 Encounters:   05/26/22 295 lb (133.8 kg)   05/17/22 298 lb (135.2 kg)   08/23/17 (!) 321 lb (145.6 kg)       Appearance: Alert and oriented x3 not in acute distress.   Skin: Dry skin  Head: Atraumatic  Eyes: Intact extraocular muscles   ENMT: Mucous membranes are moist  Neck: Supple  Lungs: Clear to auscultation  Cardiac: Normal S1 and S2  Abdomen: Protuberant  Extremities: Intact range of motion  Neurologic: No focal neurological deficits  Peripheral Pulses: 2+ peripheral pulses    Intake/Output:    Intake/Output Summary (Last 24 hours) at 5/26/2022 1643  Last data filed at 5/26/2022 1421  Gross per 24 hour   Intake 360 ml   Output --   Net 360 ml     I/O this shift:  In: 360 [P.O.:360]  Out: -     Laboratory Tests:  Recent Labs     05/24/22  1125 05/24/22  1125 05/25/22  0939 05/26/22  0634 05/26/22  1214      < > 136 137 137   K 4.2  --  4.0 4.1 4.4      < > 101 102 103   CO2 25   < > 24 24 23   BUN 26*   < > 20 31* 27*   CREATININE 1.1  --  0.9 1.5* 1.3*   GLUCOSE 152*   < > 206* 156* 141*   CALCIUM 9.4   < > 9.3 9.4 9.6    < > = values in this interval not displayed.      Lab Results   Component Value Date    MG 1.6 05/25/2022     Recent Labs     05/24/22  1125   ALKPHOS 82   ALT 17   AST 23   PROT 8.1   BILITOT 0.5   LABALBU 3.9     Recent Labs     05/24/22  1125 05/25/22  0939 05/26/22  0634   WBC 7.6 5.8 6.7   RBC 4.72 4.52 4.31   HGB 13.4 12.9 12.3*   HCT 42.7 40.6 38.8   MCV 90.5 89.8 90.0   MCH 28.4 28.5 28.5   MCHC 31.4* 31.8* 31.7*   RDW 14.7 14.7 14.9    166 168   MPV 12.2* 11.7 12.4*     No results found for: CKTOTAL, CKMB, CKMBINDEX, TROPONINI  Recent Labs     05/24/22  1125 05/24/22  1557 05/24/22  1713 05/24/22  1814   TROPHS 10 7 9 9     No results found for: INR, PROTIME  Lab Results   Component Value Date    TSH 2.260 05/25/2022    TSH 2.550 05/17/2022    TSH 2.208 04/30/2013     Lab Results   Component Value Date    LABA1C 6.5 (H) 05/24/2022    LABA1C 6.7 (H) 05/17/2022    LABA1C 5.9 (H) 08/30/2021     No results found for: EAG  Lab Results   Component Value Date    CHOL 129 05/25/2022    CHOL 142 05/17/2022    CHOL 137 08/30/2021     Lab Results   Component Value Date    TRIG 137 05/25/2022    TRIG 106 05/17/2022    TRIG 96 08/30/2021     Lab Results   Component Value Date    HDL 55 05/25/2022    HDL 54 05/17/2022    HDL 48 08/30/2021     Lab Results   Component Value Date    LDLCALC 47 05/25/2022    LDLCALC 67 05/17/2022    LDLCALC 70 08/30/2021     Lab Results   Component Value Date    LABVLDL 27 05/25/2022    LABVLDL 21 05/17/2022    LABVLDL 19 08/30/2021     No results found for: CHOLHDLRATIO  Recent Labs     05/24/22  1125   PROBNP 334*       Cardiac Tests:  EKG reviewed (EKG date: May 25, 2022  Normal sinus rhythm with sinus arrhythmia  Left bundle branch block  Abnormal ECG  When compared with ECG of 24-MAY-2022 11:32,  No significant change was found):        Echocardiogram reviewed: May 2022  Summary   Technically difficult examination. Mild asymmetric septal hypertrophy. There is doppler evidence of stage I diastolic dysfunction. Dilated apex with hypokinetic wall motion   Ejection fraction is visually estimated at 30 to 35%. Normal right ventricular size and function. Mild mitral regurgitation is present. Mild tricuspid regurgitation. Stress test reviewed:      Cardiac catheterization reviewed:     CXR reviewed: The ASCVD Risk score (Tammi Gautam, et al., 2013) failed to calculate for the following reasons:     The valid total cholesterol range is 130 to 320 mg/dL    ASSESSMENT / PLAN:  · Chest pain with Moderate risk for cardiac etiology and dyspnea on exertion and new onset heart failure with depressed systolic function  Awaiting invasive coronary angiogram tomorrow once kidney function has improved  Initiate 75 cc normal saline at midnight until cardiac catheterization is complete to avoid contrast-induced nephropathy with cardiac catheterization  Avoid nephrotoxic agents    · Heart failure with depressed systolic function with EF noted to be 30 to 35%  Currently does not appear to be in volume overload  Holding losartan until cardiac authorization is complete  Continue on Imdur and beta-blocker  Initiate Entresto once cardiac catheterization is complete if kidney function is stable  Hold Flomax and change Flomax to finasteride if blood pressure drops in order to make room for blood pressure for initiation of guideline directed medical therapy. LifeVest prior to discharge    · Atrial fibrillation/flutter status post ablation 12 years ago  Continue beta-blocker and anticoagulation    · New left bundle branch block on EKG  · Non-insulin-dependent diabetes mellitus type 2, HbA1c 6.5%  · Essential hypertension  · Hyperlipidemia with LDL/HDL at goal  · BPH  · Morbid obesity with BMI 40.42 kg per metered squared  · Acute kidney injury  Initiate 75 cc normal saline at midnight until cardiac catheterization is complete to avoid contrast-induced nephropathy with cardiac catheterization                  Thank you for allowing me to participate in your patient's care. Please feel free to contact me if you have any questions or concerns.     Marcos Purcell MD  [facility]) Cardiology

## 2022-05-26 NOTE — PROGRESS NOTES
Internal Medicine Progress Note    [facility]    Beulahgabriela Bradleyalma. Kemal Sandoval., F.A.CEdmundoOEdmundoI. Jung Quintero D.O., SHIRAOJALIL Mayo D.O. Nhi Oreilly, MSN, APRN, NP-C  Ale Carter. Araceli Dodson, MSN, APRN-CNP     Primary Care Physician: Gilberto Garcia DO   Admitting Physician:  Merry Acosta DO  Admission date and time: 5/24/2022 11:12 AM    Room:  [address] [place] [address]  Admitting diagnosis: Chest pain [R07.9]    Patient Name: Wilson Blizzard  MRN: [id number]    Date of Service: 5/26/2022     Subjective:  Jordan Andrade is a 66 y.o. male who was seen and examined today,5/26/2022, at the bedside. Patient sitting in chair resting comfortably. The patient was scheduled to have cardiac catheterization today but a bump in the creatinine was present. Today it was 1.5 and repeat showed 1.3. Catheterization has been rescheduled for tomorrow IV hydration therapy and medication changes has been made. Patient denies any complaints of chest pain. Wife was present at the bedside    Review of System:   Constitutional:   Denies fever or chills, weight loss or gain, fatigue or malaise. HEENT:   Denies ear pain, sore throat, sinus or eye problems. Cardiovascular:   Going intermittent chest discomfort as described above, irregular heartbeats, or palpitations. Respiratory:   Denies shortness of breath, coughing, sputum production, hemoptysis, or wheezing. Gastrointestinal:   Denies nausea, vomiting, diarrhea, or constipation. Denies any abdominal pain. Genitourinary:    Denies any urgency, frequency, hematuria. Voiding  without difficulty. Extremities:   Denies lower extremity swelling, edema or cyanosis. Neurology:    Denies any headache or focal neurological deficits, Denies generalized weakness or memory difficulty. Psch:   Denies being anxious or depressed. Musculoskeletal:    Denies  myalgias, joint complaints or back pain.    Integumentary:   Denies any rashes, ulcers, or excoriations. Denies bruising. Hematologic/Lymphatic:  Denies bruising or bleeding. Physical Exam:  I/O this shift:  In: 360 [P.O.:360]  Out: -     Intake/Output Summary (Last 24 hours) at 5/26/2022 1601  Last data filed at 5/26/2022 1421  Gross per 24 hour   Intake 360 ml   Output --   Net 360 ml   I/O last 3 completed shifts: In: 480 [P.O.:480]  Out: -   Patient Vitals for the past 96 hrs (Last 3 readings):   Weight   05/26/22 0548 295 lb (133.8 kg)   05/24/22 1619 295 lb 8 oz (134 kg)     Vital Signs:   Blood pressure 125/68, pulse 93, temperature 98.4 °F (36.9 °C), temperature source Oral, resp. rate 18, height 6' (1.829 m), weight 295 lb (133.8 kg), SpO2 95 %. General appearance:  Alert, responsive, oriented to person, place, and time. Overall comfortable appearing, no distress. Head:  Normocephalic. No masses, lesions or tenderness. Eyes:  PERRLA. EOMI. Sclera clear. ENT:  Ears normal. Mucosa normal.  Neck:    Supple. Trachea midline. No thyromegaly. No JVD. No bruits. Heart:    Rhythm regular. Rate controlled. S1 and S2. Systolic murmur. Normal sinus rhythm with rate of 80s on bedside telemetry. .  Lungs:    Symmetrical. Clear to auscultation bilaterally. No wheezes. No rhonchi. No rales. Abdomen:   Soft. Obese. Non-tender. Non-distended. Bowel sounds positive. No organomegaly or masses. No pain on palpation. Extremities:    Peripheral pulses present. No significant pitting peripheral edema. No ulcers. No cyanosis. No clubbing. Neurologic:    Alert x 3. No focal deficit. Cranial nerves grossly intact. No focal weakness. Psych:   Behavior is normal. Mood appears normal. Speech is not rapid and/or pressured. Musculoskeletal:   Spine ROM normal. Muscular strength intact. Gait not assessed. Integumentary:  No rashes  Skin normal color and texture.   Genitalia/Breast:  Deferred    Medication:  Scheduled Meds:   aspirin  81 mg Oral Daily    [Held by provider] apixaban  5 mg Oral BID    isosorbide mononitrate  30 mg Oral Daily    metoprolol succinate  50 mg Oral Daily    atorvastatin  80 mg Oral Nightly    tamsulosin  0.4 mg Oral Daily    insulin lispro  0-6 Units SubCUTAneous TID WC    insulin lispro  0-3 Units SubCUTAneous Nightly    pantoprazole  40 mg Oral QAM    [Held by provider] losartan  100 mg Oral Daily     Continuous Infusions:   lactated ringers 43 mL/hr at 05/26/22 1456    dextrose         Objective Data:  CBC with Differential:    Lab Results   Component Value Date    WBC 6.7 05/26/2022    RBC 4.31 05/26/2022    HGB 12.3 05/26/2022    HCT 38.8 05/26/2022     05/26/2022    MCV 90.0 05/26/2022    MCH 28.5 05/26/2022    MCHC 31.7 05/26/2022    RDW 14.9 05/26/2022    LYMPHOPCT 22.7 05/26/2022    MONOPCT 7.3 05/26/2022    BASOPCT 0.6 05/26/2022    MONOSABS 0.49 05/26/2022    LYMPHSABS 1.52 05/26/2022    EOSABS 0.14 05/26/2022    BASOSABS 0.04 05/26/2022     BMP:    Lab Results   Component Value Date     05/26/2022    K 4.4 05/26/2022    K 4.2 05/24/2022     05/26/2022    CO2 23 05/26/2022    BUN 27 05/26/2022    LABALBU 3.9 05/24/2022    LABALBU 4.1 08/30/2011    CREATININE 1.3 05/26/2022    CALCIUM 9.6 05/26/2022    GFRAA >60 05/26/2022    LABGLOM 54 05/26/2022    GLUCOSE 141 05/26/2022    GLUCOSE 177 08/30/2011     HgBA1c:    Lab Results   Component Value Date    LABA1C 6.5 05/24/2022     FLP:    Lab Results   Component Value Date    TRIG 137 05/25/2022    HDL 55 05/25/2022    LDLCALC 47 05/25/2022    LABVLDL 27 05/25/2022     TSH:    Lab Results   Component Value Date    TSH 2.260 05/25/2022       Assessment:  · Chest pain with Moderate risk for cardiac etiology  · New left bundle branch block on EKG  · Non-insulin-dependent diabetes mellitus type 2, HbA1c 6.5%  · Essential hypertension  · Hyperlipidemia with LDL/HDL at goal  · BPH  · History of ablation for treatment of atrial fibrillation with conversion to normal sinus rhythm 12 years prior  · Morbid obesity with BMI 40.42 kg per metered squared  · Acute kidney injury      Plan:     · Patient show signs of acute kidney injury and cardiac catheterization is on hold until tomorrow  · Repeat creatinine has improved with IV hydration therapy will be instituted  · Adjustment made in cardiac medication per cardiology  · Continue to encourage ambulation   · Repeat lab work ordered for tomorrow    More than 50% of my  time was spent at the bedside counseling/coordinating care with the patient and/or family with face to face contact. This time was spent reviewing notes and laboratory data as well as instructing and counseling the patient. Time I spent with the family or surrogate(s) is included only if the patient was incapable of providing the necessary information or participating in medical decisions. I also discussed the differential diagnosis and all of the proposed management plans with the patient and individuals accompanying the patient. Elton Young requires this high level of physician care and nursing on the Telemetry unit due the complexity of decision management and chance of rapid decline or death. Continued cardiac monitoring and higher level of nursing are required. I am readily available for any further decision-making and intervention.      DO Price [place]  5/26/2022  4:01 PM

## 2022-05-26 NOTE — PLAN OF CARE
Problem: Pain  Goal: Verbalizes/displays adequate comfort level or baseline comfort level  5/26/2022 0549 by Ambreen Wu RN  Outcome: Progressing       Problem: Safety - Adult  Goal: Free from fall injury  5/26/2022 0549 by Ambreen Wu RN  Outcome: Progressing

## 2022-05-26 NOTE — PLAN OF CARE
Problem: Pain  Goal: Verbalizes/displays adequate comfort level or baseline comfort level  5/26/2022 1231 by Corina Yeboah, RN  Outcome: Progressing     Problem: Safety - Adult  Goal: Free from fall injury  5/26/2022 1231 by Corina Yeboah, RN  Outcome: Progressing

## 2022-05-27 ENCOUNTER — HOSPITAL ENCOUNTER (INPATIENT)
Dept: CARDIAC CATH/INVASIVE PROCEDURES | Age: 72
LOS: 1 days | Discharge: HOME | DRG: 246 | End: 2022-05-28
Attending: INTERNAL MEDICINE | Admitting: INTERNAL MEDICINE
Payer: MEDICARE

## 2022-05-27 VITALS
RESPIRATION RATE: 12 BRPM | TEMPERATURE: 98.1 F | HEART RATE: 68 BPM | DIASTOLIC BLOOD PRESSURE: 55 MMHG | SYSTOLIC BLOOD PRESSURE: 117 MMHG | WEIGHT: 298.9 LBS | HEIGHT: 72 IN | BODY MASS INDEX: 40.48 KG/M2 | OXYGEN SATURATION: 91 %

## 2022-05-27 DIAGNOSIS — E11.9 TYPE 2 DIABETES MELLITUS WITHOUT COMPLICATION, WITHOUT LONG-TERM CURRENT USE OF INSULIN (HCC): ICD-10-CM

## 2022-05-27 DIAGNOSIS — I25.10 CAD IN NATIVE ARTERY: ICD-10-CM

## 2022-05-27 LAB
ABO/RH: NORMAL
ANION GAP SERPL CALCULATED.3IONS-SCNC: 9 MMOL/L (ref 7–16)
ANTIBODY SCREEN: NORMAL
BASOPHILS ABSOLUTE: 0.03 E9/L (ref 0–0.2)
BASOPHILS RELATIVE PERCENT: 0.6 % (ref 0–2)
BUN BLDV-MCNC: 28 MG/DL (ref 6–23)
CALCIUM SERPL-MCNC: 9.4 MG/DL (ref 8.6–10.2)
CHLORIDE BLD-SCNC: 101 MMOL/L (ref 98–107)
CO2: 28 MMOL/L (ref 22–29)
CREAT SERPL-MCNC: 1.2 MG/DL (ref 0.7–1.2)
EOSINOPHILS ABSOLUTE: 0.13 E9/L (ref 0.05–0.5)
EOSINOPHILS RELATIVE PERCENT: 2.5 % (ref 0–6)
GFR AFRICAN AMERICAN: >60
GFR NON-AFRICAN AMERICAN: 59 ML/MIN/1.73
GLUCOSE BLD-MCNC: 159 MG/DL (ref 74–99)
HCT VFR BLD CALC: 38.8 % (ref 37–54)
HEMOGLOBIN: 12.1 G/DL (ref 12.5–16.5)
IMMATURE GRANULOCYTES #: 0.03 E9/L
IMMATURE GRANULOCYTES %: 0.6 % (ref 0–5)
LYMPHOCYTES ABSOLUTE: 1.2 E9/L (ref 1.5–4)
LYMPHOCYTES RELATIVE PERCENT: 23.1 % (ref 20–42)
MCH RBC QN AUTO: 28.7 PG (ref 26–35)
MCHC RBC AUTO-ENTMCNC: 31.2 % (ref 32–34.5)
MCV RBC AUTO: 91.9 FL (ref 80–99.9)
METER GLUCOSE: 146 MG/DL (ref 74–99)
METER GLUCOSE: 157 MG/DL (ref 74–99)
METER GLUCOSE: 191 MG/DL (ref 74–99)
MONOCYTES ABSOLUTE: 0.38 E9/L (ref 0.1–0.95)
MONOCYTES RELATIVE PERCENT: 7.3 % (ref 2–12)
NEUTROPHILS ABSOLUTE: 3.43 E9/L (ref 1.8–7.3)
NEUTROPHILS RELATIVE PERCENT: 65.9 % (ref 43–80)
PDW BLD-RTO: 14.8 FL (ref 11.5–15)
PLATELET # BLD: 153 E9/L (ref 130–450)
PMV BLD AUTO: 11.8 FL (ref 7–12)
POC ACT LR: 313 SECONDS
POTASSIUM SERPL-SCNC: 4.6 MMOL/L (ref 3.5–5)
RBC # BLD: 4.22 E12/L (ref 3.8–5.8)
SODIUM BLD-SCNC: 138 MMOL/L (ref 132–146)
WBC # BLD: 5.2 E9/L (ref 4.5–11.5)

## 2022-05-27 PROCEDURE — B2111ZZ FLUOROSCOPY OF MULTIPLE CORONARY ARTERIES USING LOW OSMOLAR CONTRAST: ICD-10-PCS | Performed by: INTERNAL MEDICINE

## 2022-05-27 PROCEDURE — 86900 BLOOD TYPING SEROLOGIC ABO: CPT

## 2022-05-27 PROCEDURE — 93005 ELECTROCARDIOGRAM TRACING: CPT

## 2022-05-27 PROCEDURE — 6370000000 HC RX 637 (ALT 250 FOR IP): Performed by: NURSE PRACTITIONER

## 2022-05-27 PROCEDURE — 2580000003 HC RX 258: Performed by: INTERNAL MEDICINE

## 2022-05-27 PROCEDURE — 85025 COMPLETE CBC W/AUTO DIFF WBC: CPT

## 2022-05-27 PROCEDURE — 82962 GLUCOSE BLOOD TEST: CPT

## 2022-05-27 PROCEDURE — C1725 CATH, TRANSLUMIN NON-LASER: HCPCS

## 2022-05-27 PROCEDURE — 93454 CORONARY ARTERY ANGIO S&I: CPT

## 2022-05-27 PROCEDURE — 6370000000 HC RX 637 (ALT 250 FOR IP): Performed by: INTERNAL MEDICINE

## 2022-05-27 PROCEDURE — C1874 STENT, COATED/COV W/DEL SYS: HCPCS

## 2022-05-27 PROCEDURE — 86901 BLOOD TYPING SEROLOGIC RH(D): CPT

## 2022-05-27 PROCEDURE — C9600 PERC DRUG-EL COR STENT SING: HCPCS

## 2022-05-27 PROCEDURE — 80048 BASIC METABOLIC PNL TOTAL CA: CPT

## 2022-05-27 PROCEDURE — 2140000000 HC CCU INTERMEDIATE R&B

## 2022-05-27 PROCEDURE — B2151ZZ FLUOROSCOPY OF LEFT HEART USING LOW OSMOLAR CONTRAST: ICD-10-PCS | Performed by: INTERNAL MEDICINE

## 2022-05-27 PROCEDURE — 99223 1ST HOSP IP/OBS HIGH 75: CPT | Performed by: INTERNAL MEDICINE

## 2022-05-27 PROCEDURE — C1769 GUIDE WIRE: HCPCS

## 2022-05-27 PROCEDURE — 93454 CORONARY ARTERY ANGIO S&I: CPT | Performed by: INTERNAL MEDICINE

## 2022-05-27 PROCEDURE — 36415 COLL VENOUS BLD VENIPUNCTURE: CPT

## 2022-05-27 PROCEDURE — 6370000000 HC RX 637 (ALT 250 FOR IP): Performed by: PHYSICIAN ASSISTANT

## 2022-05-27 PROCEDURE — C1887 CATHETER, GUIDING: HCPCS

## 2022-05-27 PROCEDURE — 4A023N7 MEASUREMENT OF CARDIAC SAMPLING AND PRESSURE, LEFT HEART, PERCUTANEOUS APPROACH: ICD-10-PCS | Performed by: INTERNAL MEDICINE

## 2022-05-27 PROCEDURE — 2709999900 HC NON-CHARGEABLE SUPPLY

## 2022-05-27 PROCEDURE — 6360000002 HC RX W HCPCS

## 2022-05-27 PROCEDURE — 86850 RBC ANTIBODY SCREEN: CPT

## 2022-05-27 PROCEDURE — 6370000000 HC RX 637 (ALT 250 FOR IP)

## 2022-05-27 PROCEDURE — 92928 PRQ TCAT PLMT NTRAC ST 1 LES: CPT | Performed by: INTERNAL MEDICINE

## 2022-05-27 PROCEDURE — 027034Z DILATION OF CORONARY ARTERY, ONE ARTERY WITH DRUG-ELUTING INTRALUMINAL DEVICE, PERCUTANEOUS APPROACH: ICD-10-PCS | Performed by: INTERNAL MEDICINE

## 2022-05-27 PROCEDURE — 85347 COAGULATION TIME ACTIVATED: CPT

## 2022-05-27 PROCEDURE — C1894 INTRO/SHEATH, NON-LASER: HCPCS

## 2022-05-27 PROCEDURE — 2500000003 HC RX 250 WO HCPCS

## 2022-05-27 RX ORDER — NITROGLYCERIN 0.4 MG/1
0.4 TABLET SUBLINGUAL EVERY 5 MIN PRN
Status: DISCONTINUED | OUTPATIENT
Start: 2022-05-27 | End: 2022-05-28 | Stop reason: HOSPADM

## 2022-05-27 RX ORDER — LOSARTAN POTASSIUM 50 MG/1
100 TABLET ORAL DAILY
Status: DISCONTINUED | OUTPATIENT
Start: 2022-05-28 | End: 2022-05-28 | Stop reason: HOSPADM

## 2022-05-27 RX ORDER — HYDROCHLOROTHIAZIDE 25 MG/1
25 TABLET ORAL DAILY
Status: DISCONTINUED | OUTPATIENT
Start: 2022-05-28 | End: 2022-05-28

## 2022-05-27 RX ORDER — TAMSULOSIN HYDROCHLORIDE 0.4 MG/1
0.4 CAPSULE ORAL DAILY
Qty: 30 CAPSULE | Refills: 3 | DISCHARGE
Start: 2022-05-28

## 2022-05-27 RX ORDER — PANTOPRAZOLE SODIUM 40 MG/1
40 TABLET, DELAYED RELEASE ORAL EVERY MORNING
Qty: 30 TABLET | Refills: 3 | Status: ON HOLD | DISCHARGE
Start: 2022-05-28 | End: 2022-05-28 | Stop reason: HOSPADM

## 2022-05-27 RX ORDER — TAMSULOSIN HYDROCHLORIDE 0.4 MG/1
0.4 CAPSULE ORAL DAILY
Status: DISCONTINUED | OUTPATIENT
Start: 2022-05-28 | End: 2022-05-28 | Stop reason: HOSPADM

## 2022-05-27 RX ORDER — INSULIN LISPRO 100 [IU]/ML
0-3 INJECTION, SOLUTION INTRAVENOUS; SUBCUTANEOUS NIGHTLY
Status: DISCONTINUED | OUTPATIENT
Start: 2022-05-27 | End: 2022-05-28 | Stop reason: HOSPADM

## 2022-05-27 RX ORDER — ACETAMINOPHEN 325 MG/1
650 TABLET ORAL EVERY 4 HOURS PRN
Status: DISCONTINUED | OUTPATIENT
Start: 2022-05-27 | End: 2022-05-28 | Stop reason: HOSPADM

## 2022-05-27 RX ORDER — INSULIN LISPRO 100 [IU]/ML
0-6 INJECTION, SOLUTION INTRAVENOUS; SUBCUTANEOUS
Status: DISCONTINUED | OUTPATIENT
Start: 2022-05-27 | End: 2022-05-28 | Stop reason: HOSPADM

## 2022-05-27 RX ORDER — ISOSORBIDE MONONITRATE 30 MG/1
30 TABLET, EXTENDED RELEASE ORAL DAILY
Qty: 30 TABLET | Refills: 3 | DISCHARGE
Start: 2022-05-28 | End: 2022-08-23

## 2022-05-27 RX ORDER — CLOPIDOGREL BISULFATE 75 MG/1
75 TABLET ORAL DAILY
Status: DISCONTINUED | OUTPATIENT
Start: 2022-05-28 | End: 2022-05-28 | Stop reason: HOSPADM

## 2022-05-27 RX ORDER — METOPROLOL SUCCINATE 50 MG/1
50 TABLET, EXTENDED RELEASE ORAL DAILY
Qty: 30 TABLET | Refills: 3 | Status: ON HOLD | DISCHARGE
Start: 2022-05-28 | End: 2022-05-28 | Stop reason: HOSPADM

## 2022-05-27 RX ORDER — PANTOPRAZOLE SODIUM 40 MG/1
40 TABLET, DELAYED RELEASE ORAL EVERY MORNING
Status: DISCONTINUED | OUTPATIENT
Start: 2022-05-28 | End: 2022-05-28 | Stop reason: HOSPADM

## 2022-05-27 RX ORDER — ASPIRIN 81 MG/1
81 TABLET, CHEWABLE ORAL DAILY
Status: DISCONTINUED | OUTPATIENT
Start: 2022-05-28 | End: 2022-05-28 | Stop reason: HOSPADM

## 2022-05-27 RX ORDER — ATORVASTATIN CALCIUM 20 MG/1
20 TABLET, FILM COATED ORAL DAILY
Status: DISCONTINUED | OUTPATIENT
Start: 2022-05-27 | End: 2022-05-28 | Stop reason: HOSPADM

## 2022-05-27 RX ORDER — METOPROLOL SUCCINATE 50 MG/1
50 TABLET, EXTENDED RELEASE ORAL DAILY
Status: DISCONTINUED | OUTPATIENT
Start: 2022-05-28 | End: 2022-05-28 | Stop reason: HOSPADM

## 2022-05-27 RX ORDER — ONDANSETRON 2 MG/ML
4 INJECTION INTRAMUSCULAR; INTRAVENOUS EVERY 6 HOURS PRN
Status: DISCONTINUED | OUTPATIENT
Start: 2022-05-27 | End: 2022-05-28 | Stop reason: HOSPADM

## 2022-05-27 RX ORDER — LOSARTAN POTASSIUM AND HYDROCHLOROTHIAZIDE 25; 100 MG/1; MG/1
1 TABLET ORAL DAILY
Status: DISCONTINUED | OUTPATIENT
Start: 2022-05-28 | End: 2022-05-27 | Stop reason: CLARIF

## 2022-05-27 RX ORDER — INSULIN LISPRO 100 [IU]/ML
0-6 INJECTION, SOLUTION INTRAVENOUS; SUBCUTANEOUS
Status: ON HOLD | DISCHARGE
Start: 2022-05-27 | End: 2022-05-28 | Stop reason: HOSPADM

## 2022-05-27 RX ORDER — INSULIN LISPRO 100 [IU]/ML
0-3 INJECTION, SOLUTION INTRAVENOUS; SUBCUTANEOUS NIGHTLY
Status: ON HOLD | DISCHARGE
Start: 2022-05-27 | End: 2022-05-28 | Stop reason: HOSPADM

## 2022-05-27 RX ORDER — ISOSORBIDE MONONITRATE 30 MG/1
30 TABLET, EXTENDED RELEASE ORAL DAILY
Status: DISCONTINUED | OUTPATIENT
Start: 2022-05-28 | End: 2022-05-28 | Stop reason: HOSPADM

## 2022-05-27 RX ORDER — SODIUM CHLORIDE 9 MG/ML
INJECTION, SOLUTION INTRAVENOUS CONTINUOUS
Status: ACTIVE | OUTPATIENT
Start: 2022-05-27 | End: 2022-05-28

## 2022-05-27 RX ORDER — GLIPIZIDE 5 MG/1
10 TABLET ORAL
Status: DISCONTINUED | OUTPATIENT
Start: 2022-05-28 | End: 2022-05-28 | Stop reason: HOSPADM

## 2022-05-27 RX ADMIN — SODIUM CHLORIDE: 9 INJECTION, SOLUTION INTRAVENOUS at 19:29

## 2022-05-27 RX ADMIN — PANTOPRAZOLE SODIUM 40 MG: 40 TABLET, DELAYED RELEASE ORAL at 08:04

## 2022-05-27 RX ADMIN — ISOSORBIDE MONONITRATE 30 MG: 30 TABLET, EXTENDED RELEASE ORAL at 08:02

## 2022-05-27 RX ADMIN — ASPIRIN 81 MG: 81 TABLET, COATED ORAL at 08:03

## 2022-05-27 RX ADMIN — SODIUM CHLORIDE: 9 INJECTION, SOLUTION INTRAVENOUS at 23:17

## 2022-05-27 RX ADMIN — INSULIN LISPRO 1 UNITS: 100 INJECTION, SOLUTION INTRAVENOUS; SUBCUTANEOUS at 23:18

## 2022-05-27 RX ADMIN — ATORVASTATIN CALCIUM 20 MG: 20 TABLET, FILM COATED ORAL at 23:18

## 2022-05-27 RX ADMIN — METOPROLOL SUCCINATE 50 MG: 50 TABLET, EXTENDED RELEASE ORAL at 08:05

## 2022-05-27 RX ADMIN — TAMSULOSIN HYDROCHLORIDE 0.4 MG: 0.4 CAPSULE ORAL at 08:05

## 2022-05-27 ASSESSMENT — LIFESTYLE VARIABLES: HOW OFTEN DO YOU HAVE A DRINK CONTAINING ALCOHOL: NEVER

## 2022-05-27 NOTE — ACP (ADVANCE CARE PLANNING)
Advance Care Planning   Healthcare Decision Maker:    Primary Decision Maker: Zenaida Garcia - [facility] - [phone]    Secondary Decision Maker: Aric Davis - [phone]    Secondary Decision Maker: Max Hodges - [phone]      Electronically signed by Bartolo Acevedo RN-BC on 5/27/2022 at 8:11 AM

## 2022-05-27 NOTE — CARE COORDINATION
Ss note:5/27/2022 10:44 AM Neg Covid on 5-25. Pt for heart cath at [facility] when medically stable. Resides at home with wife and son [place]iza [place], [place] pt is independent/drives, no hx of HHC/SNF Uses [employer] insurance to obtain medications or [facility] in [place] on [address].  ALEN Atkins

## 2022-05-27 NOTE — PROCEDURES
[address]                  [address] [place],  [facility]                            CARDIAC CATHETERIZATION    PATIENT NAME: Shaneka Pozo                       :        1950  MED REC NO:   [id number]                            ROOM:  ACCOUNT NO:   [de-identified]                           ADMIT DATE: 2022  PROVIDER:     Kita Severs, MD    DATE OF PROCEDURE:  2022    CARDIAC CATHETERIZATION AND ANGIOPLASTY REPORT    PROCEDURES:  Selective coronary angiography. Balloon angioplasty with  deployment of drug-eluting coronary stent to the first obtuse marginal  branch of the left circumflex. The patient received intracoronary nitroglycerin administration during  the procedure. The procedure was done through right radial approach using ultrasound  guidance. The patient received intravenous Versed and intravenous fentanyl for  sedation. INDICATION:  Chest pain. Newly diagnosed cardiomyopathy. AUC:  HF - 6. PRESSURES:  Aorta 99/36 with a mean of 60. CORONARY ANGIOGRAPHY:  LEFT MAIN:  The left main artery did not appear to have any significant  angiographic disease. LAD:  The left anterior descending artery and its branches did not  appear to have any significant angiographic disease. LCX:  The left circumflex is a large codominant vessel. The first  marginal branch has around 70% to 80% stenosis in its proximal third with MITCHELL III flow. The distal circumflex before a large terminal lateral branch has around  30% disease. RCA:  The right coronary artery is a small-to moderate-sized codominant  vessel which did not appear to have any significant angiographic  disease. INTERVENTIONAL PROCEDURE:  EQUIPMENT:  1.  6-Dominican JL3.5 Launcher guide catheter. 2.  0.014/300 ChoICE extra support J-exchange wire. 3.  2.0 mm x 15 mm mini Trek balloon.   4.  2.25 mm x 18 mm Medtronic [place] Resolute drug-eluting coronary stent.    TECHNIQUE:  The procedure was done through right radial approach. The  patient was given 5000 units of intravenous heparin. An ACT during the  procedure was 313 and no more heparin was given during the procedure. Intravenous Versed and intravenous fentanyl were given for sedation. Intracoronary nitroglycerin was given during the procedure. The patient  was given 300 mg of Plavix to swallow during the procedure. The left coronary artery was selectively engaged with 6-Turkmen JL3.5  guide catheter. Extra support exchange wire was then advanced through  the stenosis in the obtuse marginal branch of the left circumflex and  further distally into this branch. The site of stenosis was dilated  with the 2.0 mm x 15 mm balloon inflated to 12 atmospheres. This was  followed by advancing a 2.25 mm x 18 mm [place] Resolute drug-eluting  coronary stent which was deployed at the site at 12 atmospheres. Contrast injection after intracoronary nitroglycerin administration  revealed very good results without any residual stenosis, with no  evidence of dissections or flaps and with MITCHELL-3 flow in the vessel. The patient tolerated the procedure well and left the cardiac  catheterization laboratory in a stable condition. ESTIMATED BLOOD LOSS:  20 mL. CONTRAST USED:  100 mL. CONCLUSION:  1. Coronary artery disease. a. Left main:  No significant disease. b.  LAD:  No significant disease. c.  LCX:  70% to 80% proximal stenosis of the first marginal branch with MITCHELL III flow. 30% to 40% distal left circumflex disease. The left circumflex is a  codominant vessel. d.  RCA:  Moderate size codominant vessel with no significant  angiographic disease. 2.  Successful balloon angioplasty with the deployment of a drug-eluting  coronary stent to the obtuse marginal branch of the left circumflex with  very good results with MITCHELL III flow.         Kenyon Stringer MD    D: 05/27/2022 18:08:47       T: 05/27/2022 18:11:02     [email or web address]  Job#: [id number]     Doc#: [id number]    CC:

## 2022-05-27 NOTE — H&P
History & Physical     CHIEF COMPLAINT: Transferred from [address] [facility] hospital for cath    ADMITTING PHYSICIAN:  Radha Lam MD    DATE OF SERVICE: 5/27/2022     HISTORY OF PRESENT ILLNESS:    Patient is a 66year old WM not previously known to [facility]. He has been seen in the past by Dr. Tian Kelley and [facility]. He is being seen in consultation this hospital admission by Dr. Luz Randolph for evaluation and recommendations regarding chest pain.      PMH: HTN, HLD, non-insulin requiring T2DM with peripheral neuropathy, probable JOSE MANUEL, morbid obesity, tobacco abuse, GERD/gastritis, Aguila's esophagus, BPH, VHD and paroxysmal atrial flutter s/p ablation at [facility] in 2012. Denies known history of JOSE MANUEL, CAD, MI, CHF. Last stress test over 10 years ago, denies prior cardiac catheterization.     Patient presented to [facility] on May 24, 2022 with complaints of chest pain. § Patient states that over the past month or so, he has been noting of new onset, intermittent episodes of chest discomfort and palpitations. The chest discomfort is located midsternally with no radiation. Described as a pressure-like sensation. He only notices the chest discomfort with exertion, it is not related to meals, cough, deep inspiration or palpation. No recent injury or fall. He states once he sits down to rest, the chest discomfort completely resolves within a few minutes. The episodes have been occurring multiple times per week, but not on a daily basis. He does note of associated palpitations with some of the chest discomfort episodes, however at times the chest pressure occurs solely on its own. § Patient additionally notes of associated shortness of breath and diaphoresis at times with the chest discomfort episodes. § He is currently chest pain-free during my time of interview this morning. § He denies nausea, emesis, dizziness, near-syncope or syncope. He denies PND, orthopnea or peripheral edema.   § Admits to medication compliance. § He states he notes of no recurrence of his atrial flutter since his ablation in 2012, but has not been actively following with cardiology/EP service since then. The above noted symptoms remind him of his symptomatology associated with his atrial flutter in the past.  § No atrial Fib / flutter noted during his hospital stay  § Echo done 5/25.22 was read by Dr. Marlin Barrios as:  Technically difficult examination. Mild asymmetric septal hypertrophy. There is doppler evidence of stage I diastolic dysfunction. Dilated apex with hypokinetic wall motion   Ejection fraction is visually estimated at 30 to 35%. Normal right ventricular size and function. Mild mitral regurgitation is present. Mild tricuspid regurgitation.             Past Medical History:  Past Medical History:   Diagnosis Date    Anemia     Atrial flutter ([address]     Aguila's esophagus     Diverticulitis     Fatty liver     History of Holter monitoring 05/12/2020    Hyperlipidemia     Hypertension     Lipoma     Subcyst    Lumbar spinal stenosis     Osteoarthritis     PUD (peptic ulcer disease)     Type 2 diabetes mellitus without complication ([address]        Past Surgical History:   Past Surgical History:   Procedure Laterality Date    ATRIAL ABLATION SURGERY  2012    CARDIAC SURGERY      pt states 6 - 7 years ago     KNEE ARTHROPLASTY Left     LIPOMA RESECTION      UPPER GASTROINTESTINAL ENDOSCOPY  03/27/2017    Dr. Juan Alberto Hu, Findings: Long segment Barretts, 5cm, Moderate Gastritis, Duodenal bulb/post pyloric channel mass versus severe brunner gland hyperplasia, mild duodenitis distally, biopsies taken    UPPER GASTROINTESTINAL ENDOSCOPY  07/17/2017    Dr. Juan Alberto Hu, Findings: 5cm segment of Aguila's esiphagitis from 34 to 39 cm with no targeted lesions, Mild gastritis, Stomach mass in the antral pyloric channel area,        Home Medications:    Prior to Admission medications    Medication Sig Start Date End Date Taking?  Authorizing Provider   tamsulosin (FLOMAX) 0.4 mg capsule Take 1 capsule by mouth daily 5/28/22   Black Abebe, DO   insulin lispro (HUMALOG) 100 UNIT/ML SOLN injection vial Inject 0-3 Units into the skin nightly 5/27/22   Chandler Abebe, DO   insulin lispro (HUMALOG) 100 UNIT/ML SOLN injection vial Inject 0-6 Units into the skin 3 times daily (with meals) 5/27/22   Black Ally Abebe, DO   isosorbide mononitrate (IMDUR) 30 MG extended release tablet Take 1 tablet by mouth daily 5/28/22   Black Abebe, DO   metoprolol succinate (TOPROL XL) 50 MG extended release tablet Take 1 tablet by mouth daily 5/28/22   Black Abebe, DO   pantoprazole (PROTONIX) 40 MG tablet Take 1 tablet by mouth every morning 5/28/22   Alycia Shoulder, DO   ACCU-CHEK FELICIA PLUS strip  3/24/22   Historical Provider, MD   atorvastatin (LIPITOR) 20 MG tablet Take 1 tablet by mouth daily 5/17/22   Ariel Mcallister, DO   aspirin 325 MG tablet Take 325 mg by mouth daily    Historical Provider, MD       Hospital Medications:    Current Outpatient Medications:     [START ON 5/28/2022] tamsulosin (FLOMAX) 0.4 mg capsule, Take 1 capsule by mouth daily, Disp: 30 capsule, Rfl: 3    insulin lispro (HUMALOG) 100 UNIT/ML SOLN injection vial, Inject 0-3 Units into the skin nightly, Disp: , Rfl:     insulin lispro (HUMALOG) 100 UNIT/ML SOLN injection vial, Inject 0-6 Units into the skin 3 times daily (with meals), Disp: , Rfl:     [START ON 5/28/2022] isosorbide mononitrate (IMDUR) 30 MG extended release tablet, Take 1 tablet by mouth daily, Disp: 30 tablet, Rfl: 3    [START ON 5/28/2022] metoprolol succinate (TOPROL XL) 50 MG extended release tablet, Take 1 tablet by mouth daily, Disp: 30 tablet, Rfl: 3    [START ON 5/28/2022] pantoprazole (PROTONIX) 40 MG tablet, Take 1 tablet by mouth every morning, Disp: 30 tablet, Rfl: 3    ACCU-CHEK FELICIA PLUS strip, , Disp: , Rfl:     atorvastatin (LIPITOR) 20 MG tablet, Take 1 tablet by mouth daily, Disp: 90 tablet, Rfl: 1    aspirin 325 MG tablet, Take 325 mg by mouth daily, Disp: , Rfl:     Allergies:  Patient has no known allergies. Social History:    Social History     Socioeconomic History    Marital status:      Spouse name: Not on file    Number of children: Not on file    Years of education: Not on file    Highest education level: Not on file   Occupational History    Not on file   Tobacco Use    Smoking status: Never Smoker    Smokeless tobacco: Never Used   Substance and Sexual Activity    Alcohol use: No    Drug use: No    Sexual activity: Not on file   Other Topics Concern    Not on file   Social History Narrative    Not on file     Social Determinants of Health     Financial Resource Strain: Low Risk     Difficulty of Paying Living Expenses: Not hard at all   Food Insecurity: No Food Insecurity    Worried About [address] [employer] in the Last Year: Never true    [address] in the Last Year: Never true   Transportation Needs:     Lack of Transportation (Medical): Not on file    Lack of Transportation (Non-Medical):  Not on file   Physical Activity: Unknown    Days of Exercise per Week: 0 days    Minutes of Exercise per Session: Not on file   Stress:     Feeling of Stress : Not on file   Social Connections:     Frequency of Communication with Friends and Family: Not on file    Frequency of Social Gatherings with Friends and Family: Not on file    Attends Holiness Services: Not on file    Active Member of Clubs or Organizations: Not on file    Attends Club or Organization Meetings: Not on file    Marital Status: Not on file   Intimate Partner Violence:     Fear of Current or Ex-Partner: Not on file    Emotionally Abused: Not on file    Physically Abused: Not on file    Sexually Abused: Not on file   Housing Stability:     Unable to Pay for Housing in the Last Year: Not on file    Number of Places Lived in the Last Year: Not on file    Unstable Housing in the Last Year: Not on file        Family History: History reviewed. No pertinent family history. REVIEW OF SYSTEMS:    · Constitutional: No fatigue, fevers, chills or night sweats  · Eyes: No visual changes or drainage  · ENT: No headaches or hearing loss. No mouth sores or sore throat. · Cardiovascular: No chest pain or pressure. No palpitations or lower extremity swelling. · Respiratory: No DOMINGUEZ, cough, orthopnea or PND    · Gastrointestinal: No abdominal pain, nausea, emesis or decreased appetite  · Genitourinary: No urgency, dysuria or hematuria. · Musculoskeletal: No gait disturbance, weakness or joint complaints  · Integumentary: No rash, hives or pruritis   · Neurological: No dizziness, headaches or seizures. No numbness or tingling  · Psychiatric: No anxiety or depression. · Endocrine: No temperature intolerance. No recent weight changes. · Hematologic/Lymphatic: No abnormal bruising or bleeding. No swollen lymph node    PHYSICAL EXAM:    /62   Pulse 64   Temp 98.4 °F (36.9 °C) (Temporal)   Resp 18   Ht 6' (1.829 m)   Wt 298 lb (135.2 kg)   SpO2 94%   BMI 40.42 kg/m²    CONST: In general, this is a well developed, morbidly obese male who appears of stated age. Awake, alert, cooperative, no apparent distress  HEENT:   Head- normocephalic, atraumatic   Eyes- conjunctivae pink  Throat - Oral mucosa pink and moist  Neck-  no stridor, no jugular venous distention   CHEST: Chest symmetrical and non-tender to palpation. No noted accessory muscle use or intercostal retractions. RESPIRATORY:  Lung auscultation - clear to all fields   CARDIOVASCULAR:     No carotid bruit noted. Heart Inspection shows no noted pulsations  Heart Palpation - no heaves or thrills - PMI is non-displaced   Heart Ausculation -Regular rate and rhythm, no murmur. No s3, s4  or rub   PV: No lower extremity edema. No varicosities.  Pedal pulses palpable - no clubbing or cyanosis   ABDOMEN: Soft, non-tender to light palpation. Bowel sounds present. No palpable masses no organomegaly; no abdominal bruit  MS: Good muscle strength and tone. Not atrophy or abnormal movements. : Deferred  SKIN: Warm and dry no statis dermatitis or ulcers   NEURO / PSYCH: Oriented to person, place and time. Speech clear and appropriate. Follows all commands. Pleasant affect     DATA:    Diagnostics:    TELEMETRY:  EKG:   CXR:     Labs:   CBC:   Recent Labs     05/26/22  0634 05/27/22  0720   WBC 6.7 5.2   HGB 12.3* 12.1*   HCT 38.8 38.8    153     BMP:   Recent Labs     05/26/22  1214 05/27/22  0720    138   K 4.4 4.6   CO2 23 28   BUN 27* 28*   CREATININE 1.3* 1.2   LABGLOM 54 59   GLUCOSE 141* 159*     BNP: No results for input(s): BNP in the last 72 hours. PT/INR: No results for input(s): PROTIME, INR in the last 72 hours. APTT:No results for input(s): APTT in the last 72 hours. CARDIAC ENZYMES:No results for input(s): CKTOTAL, CKMB, CKMBINDEX, TROPONINI in the last 72 hours. FASTING LIPID PANEL:  Lab Results   Component Value Date    HDL 55 05/25/2022    LDLCALC 47 05/25/2022    TRIG 137 05/25/2022     LIVER PROFILE:No results for input(s): AST, ALT, LABALBU in the last 72 hours. IMPRESSION:   · Chest pain   · Heart failure with depressed systolic function with EF noted to be 30 to 35%. Appears Euvolemic  · H/o atrial fibrillation/flutter status post ablation 12 years ago   · New left bundle branch block on EKG  · Non-insulin-dependent diabetes mellitus type 2, HbA1c 6.5%  · Essential hypertension  · Hyperlipidemia with LDL/HDL at goal  · BPH  · Morbid obesity with BMI 40.42 kg per metered squared  · Acute kidney injury, improved         PLAN:   1. Cath +/- PCI today. Risks, benefits and alternative therapies to the procedure explained. He understood and consented to proceed  2.  Further recommendations to follow    Electronically signed by Damien Garcia MD on 5/27/2022 at 5:12 PM.

## 2022-05-27 NOTE — CARE COORDINATION
Free 30 day trial card for Eliquis was explained and given to patient with instructions. Patient instructed to take this card with prescription to retail pharmacy to obtain 30 day supply for free. Instructed patient to follow-up with PCP within 1-2 weeks in order to obtain subsequent prescription for this medication at which time PCP will complete prior authorization if required. Patient verbalized understanding.  Electronically signed by Dinh Willis on 5/27/2022 at 11:03 AM

## 2022-05-27 NOTE — PROGRESS NOTES
INPATIENT CARDIOLOGY Follow UP    Name: Janel Santos    Age: 66 y.o. Date of Admission: 5/24/2022 11:12 AM    Date of Service: 5/27/2022      Referring Physician: Norma Mckeon DO      Subjective: Spoke with Cath Lab today. Patient is going for cardiac transition for further evaluation at [facility] today. Past Medical History:  Past Medical History:   Diagnosis Date    Anemia     Atrial flutter ([address]     Aguila's esophagus     Diverticulitis     Fatty liver     History of Holter monitoring 05/12/2020    Hyperlipidemia     Hypertension     Lipoma     Subcyst    Lumbar spinal stenosis     Osteoarthritis     PUD (peptic ulcer disease)     Type 2 diabetes mellitus without complication ([address]        Past Surgical History:  Past Surgical History:   Procedure Laterality Date    ATRIAL ABLATION SURGERY  2012    CARDIAC SURGERY      pt states 6 - 7 years ago     KNEE ARTHROPLASTY Left     LIPOMA RESECTION      UPPER GASTROINTESTINAL ENDOSCOPY  03/27/2017    Dr. Grover Newman, Findings: Long segment Barretts, 5cm, Moderate Gastritis, Duodenal bulb/post pyloric channel mass versus severe brunner gland hyperplasia, mild duodenitis distally, biopsies taken    UPPER GASTROINTESTINAL ENDOSCOPY  07/17/2017    Dr. Grover Newman, Findings: 5cm segment of Aguila's esiphagitis from 34 to 39 cm with no targeted lesions, Mild gastritis, Stomach mass in the antral pyloric channel area,       Family History:  No family history on file.     Social History:  Social History     Socioeconomic History    Marital status:      Spouse name: Not on file    Number of children: Not on file    Years of education: Not on file    Highest education level: Not on file   Occupational History    Not on file   Tobacco Use    Smoking status: Never Smoker    Smokeless tobacco: Never Used   Substance and Sexual Activity    Alcohol use: No    Drug use: No    Sexual activity: Not on file   Other Topics Concern    Not on file   Social History Narrative    Not on file     Social Determinants of Health     Financial Resource Strain: Low Risk     Difficulty of Paying Living Expenses: Not hard at all   Food Insecurity: No Food Insecurity    Worried About Running Out of Food in the Last Year: Never true    [address] in the Last Year: Never true   Transportation Needs:     Lack of Transportation (Medical): Not on file    Lack of Transportation (Non-Medical): Not on file   Physical Activity: Unknown    Days of Exercise per Week: 0 days    Minutes of Exercise per Session: Not on file   Stress:     Feeling of Stress : Not on file   Social Connections:     Frequency of Communication with Friends and Family: Not on file    Frequency of Social Gatherings with Friends and Family: Not on file    Attends Tenriism Services: Not on file    Active Member of [address] [place] [address] [employer] or Organizations: Not on file    Attends Club or Organization Meetings: Not on file    Marital Status: Not on file   Intimate Partner Violence:     Fear of Current or Ex-Partner: Not on file    Emotionally Abused: Not on file    Physically Abused: Not on file    Sexually Abused: Not on file   Housing Stability:     Unable to Pay for Housing in the Last Year: Not on file    Number of [place] in the Last Year: Not on file    Unstable Housing in the Last Year: Not on file       Allergies:  No Known Allergies    Home Medications:  Prior to Admission medications    Medication Sig Start Date End Date Taking?  Authorizing Provider   tamsulosin (FLOMAX) 0.4 mg capsule Take 1 capsule by mouth daily 5/28/22  Yes Chandler Abebe, DO   insulin lispro (HUMALOG) 100 UNIT/ML SOLN injection vial Inject 0-3 Units into the skin nightly 5/27/22  Yes Chandler Abebe DO   insulin lispro (HUMALOG) 100 UNIT/ML SOLN injection vial Inject 0-6 Units into the skin 3 times daily (with meals) 5/27/22  Yes Chandler Abebe, DO   isosorbide mononitrate (IMDUR) 30 MG extended release tablet Take 1 tablet by mouth daily 5/28/22  Yes Chandler Abebe, DO   metoprolol succinate (TOPROL XL) 50 MG extended release tablet Take 1 tablet by mouth daily 5/28/22  Yes Chandler Abebe, DO   pantoprazole (PROTONIX) 40 MG tablet Take 1 tablet by mouth every morning 5/28/22  Yes Chandler Abebe, DO   ACCU-CHEK FELICIA PLUS strip  3/24/22   Historical Provider, MD   atorvastatin (LIPITOR) 20 MG tablet Take 1 tablet by mouth daily 5/17/22   Dereck Mcallister, DO   aspirin 325 MG tablet Take 325 mg by mouth daily    Historical Provider, MD       Current Medications:  No current facility-administered medications for this encounter.      Current Outpatient Medications   Medication Sig Dispense Refill    [START ON 5/28/2022] tamsulosin (FLOMAX) 0.4 mg capsule Take 1 capsule by mouth daily 30 capsule 3    insulin lispro (HUMALOG) 100 UNIT/ML SOLN injection vial Inject 0-3 Units into the skin nightly      insulin lispro (HUMALOG) 100 UNIT/ML SOLN injection vial Inject 0-6 Units into the skin 3 times daily (with meals)      [START ON 5/28/2022] isosorbide mononitrate (IMDUR) 30 MG extended release tablet Take 1 tablet by mouth daily 30 tablet 3    [START ON 5/28/2022] metoprolol succinate (TOPROL XL) 50 MG extended release tablet Take 1 tablet by mouth daily 30 tablet 3    [START ON 5/28/2022] pantoprazole (PROTONIX) 40 MG tablet Take 1 tablet by mouth every morning 30 tablet 3    ACCU-CHEK FELICIA PLUS strip       atorvastatin (LIPITOR) 20 MG tablet Take 1 tablet by mouth daily 90 tablet 1    aspirin 325 MG tablet Take 325 mg by mouth daily           Physical Exam:  BP (!) 117/55   Pulse 68   Temp 98.1 °F (36.7 °C) (Oral)   Resp 12   Ht 6' (1.829 m)   Wt 298 lb 14.4 oz (135.6 kg)   SpO2 91%   BMI 40.54 kg/m²   Wt Readings from Last 3 Encounters:   05/27/22 298 lb 14.4 oz (135.6 kg)   05/27/22 298 lb (135.2 kg)   05/17/22 298 lb (135.2 kg)       Appearance: Alert and oriented x3 not in acute distress. Skin: Dry skin  Head: Atraumatic  Eyes: Intact extraocular muscles   ENMT: Mucous membranes are moist  Neck: Supple  Lungs: Clear to auscultation  Cardiac: Normal S1 and S2  Abdomen: Protuberant  Extremities: Intact range of motion  Neurologic: No focal neurological deficits  Peripheral Pulses: 2+ peripheral pulses    Intake/Output:    Intake/Output Summary (Last 24 hours) at 5/27/2022 1633  Last data filed at 5/27/2022 0730  Gross per 24 hour   Intake 625.86 ml   Output --   Net 625.86 ml     No intake/output data recorded. Laboratory Tests:  Recent Labs     05/25/22  0939 05/25/22  0939 05/26/22  0634 05/26/22  1214 05/27/22  0720      < > 137 137 138   K 4.0   < > 4.1 4.4 4.6      < > 102 103 101   CO2 24   < > 24 23 28   BUN 20   < > 31* 27* 28*   CREATININE 0.9  --  1.5* 1.3* 1.2   GLUCOSE 206*   < > 156* 141* 159*   CALCIUM 9.3   < > 9.4 9.6 9.4    < > = values in this interval not displayed. Lab Results   Component Value Date    MG 1.6 05/25/2022     No results for input(s): ALKPHOS, ALT, AST, PROT, BILITOT, BILIDIR, LABALBU in the last 72 hours.   Recent Labs     05/25/22  0939 05/26/22  0634 05/27/22  0720   WBC 5.8 6.7 5.2   RBC 4.52 4.31 4.22   HGB 12.9 12.3* 12.1*   HCT 40.6 38.8 38.8   MCV 89.8 90.0 91.9   MCH 28.5 28.5 28.7   MCHC 31.8* 31.7* 31.2*   RDW 14.7 14.9 14.8    168 153   MPV 11.7 12.4* 11.8     No results found for: CKTOTAL, CKMB, CKMBINDEX, TROPONINI  Recent Labs     05/24/22  1713 05/24/22  1814   TROPHS 9 9     No results found for: INR, PROTIME  Lab Results   Component Value Date    TSH 2.260 05/25/2022    TSH 2.550 05/17/2022    TSH 2.208 04/30/2013     Lab Results   Component Value Date    LABA1C 6.5 (H) 05/24/2022    LABA1C 6.7 (H) 05/17/2022    LABA1C 5.9 (H) 08/30/2021     No results found for: EAG  Lab Results   Component Value Date    CHOL 129 05/25/2022    CHOL 142 05/17/2022    CHOL 137 08/30/2021     Lab Results   Component Value Date    TRIG 137 05/25/2022    TRIG 106 05/17/2022    TRIG 96 08/30/2021     Lab Results   Component Value Date    HDL 55 05/25/2022    HDL 54 05/17/2022    HDL 48 08/30/2021     Lab Results   Component Value Date    LDLCALC 47 05/25/2022    LDLCALC 67 05/17/2022    LDLCALC 70 08/30/2021     Lab Results   Component Value Date    LABVLDL 27 05/25/2022    LABVLDL 21 05/17/2022    LABVLDL 19 08/30/2021     No results found for: CHOLHDLRATIO  No results for input(s): PROBNP in the last 72 hours. Cardiac Tests:  EKG reviewed (EKG date: May 25, 2022  Normal sinus rhythm with sinus arrhythmia  Left bundle branch block  Abnormal ECG  When compared with ECG of 24-MAY-2022 11:32,  No significant change was found):        Echocardiogram reviewed: May 2022  Summary   Technically difficult examination. Mild asymmetric septal hypertrophy. There is doppler evidence of stage I diastolic dysfunction. Dilated apex with hypokinetic wall motion   Ejection fraction is visually estimated at 30 to 35%. Normal right ventricular size and function. Mild mitral regurgitation is present. Mild tricuspid regurgitation. Stress test reviewed:      Cardiac catheterization reviewed:     CXR reviewed: The ASCVD Risk score (Miguel Paul, et al., 2013) failed to calculate for the following reasons:     The valid total cholesterol range is 130 to 320 mg/dL    ASSESSMENT / PLAN:  · Chest pain with Moderate risk for cardiac etiology and dyspnea on exertion and new onset heart failure with depressed systolic function  Awaiting invasive coronary angiogram tomorrow once kidney function has improved  He was gently hydrated to avoid contrast-induced nephropathy  Avoid nephrotoxic agents    · Heart failure with depressed systolic function with EF noted to be 30 to 35%  Currently does not appear to be in volume overload  Holding losartan until cardiac authorization is complete  Continue on Imdur and beta-blocker  Initiate Entresto once cardiac catheterization is complete if kidney function is stable  Hold Flomax and change Flomax to finasteride if blood pressure drops in order to make room for blood pressure for initiation of guideline directed medical therapy. LifeVest prior to discharge    · Atrial fibrillation/flutter status post ablation 12 years ago  Continue beta-blocker and anticoagulation    · New left bundle branch block on EKG  · Non-insulin-dependent diabetes mellitus type 2, HbA1c 6.5%  · Essential hypertension  · Hyperlipidemia with LDL/HDL at goal  · BPH  · Morbid obesity with BMI 40.42 kg per metered squared  · Acute kidney injury  He was gently hydrated to avoid contrast-induced nephropathy                  Thank you for allowing me to participate in your patient's care. Please feel free to contact me if you have any questions or concerns.     Juliana Laureano MD  [facility]) Cardiology

## 2022-05-28 VITALS
WEIGHT: 298 LBS | HEART RATE: 89 BPM | RESPIRATION RATE: 18 BRPM | HEIGHT: 72 IN | BODY MASS INDEX: 40.36 KG/M2 | OXYGEN SATURATION: 95 % | TEMPERATURE: 97.2 F | DIASTOLIC BLOOD PRESSURE: 81 MMHG | SYSTOLIC BLOOD PRESSURE: 151 MMHG

## 2022-05-28 LAB
ANION GAP SERPL CALCULATED.3IONS-SCNC: 15 MMOL/L (ref 7–16)
BUN BLDV-MCNC: 20 MG/DL (ref 6–23)
CALCIUM SERPL-MCNC: 9.4 MG/DL (ref 8.6–10.2)
CHLORIDE BLD-SCNC: 101 MMOL/L (ref 98–107)
CO2: 24 MMOL/L (ref 22–29)
CREAT SERPL-MCNC: 1.2 MG/DL (ref 0.7–1.2)
EKG ATRIAL RATE: 75 BPM
EKG P AXIS: 81 DEGREES
EKG P-R INTERVAL: 180 MS
EKG Q-T INTERVAL: 460 MS
EKG QRS DURATION: 166 MS
EKG QTC CALCULATION (BAZETT): 513 MS
EKG R AXIS: -14 DEGREES
EKG T AXIS: 79 DEGREES
EKG VENTRICULAR RATE: 75 BPM
GFR AFRICAN AMERICAN: >60
GFR NON-AFRICAN AMERICAN: 59 ML/MIN/1.73
GLUCOSE BLD-MCNC: 126 MG/DL (ref 74–99)
METER GLUCOSE: 129 MG/DL (ref 74–99)
POTASSIUM REFLEX MAGNESIUM: 4.3 MMOL/L (ref 3.5–5)
SODIUM BLD-SCNC: 140 MMOL/L (ref 132–146)

## 2022-05-28 PROCEDURE — 80048 BASIC METABOLIC PNL TOTAL CA: CPT

## 2022-05-28 PROCEDURE — 82962 GLUCOSE BLOOD TEST: CPT

## 2022-05-28 PROCEDURE — 6370000000 HC RX 637 (ALT 250 FOR IP): Performed by: INTERNAL MEDICINE

## 2022-05-28 PROCEDURE — 99233 SBSQ HOSP IP/OBS HIGH 50: CPT | Performed by: INTERNAL MEDICINE

## 2022-05-28 PROCEDURE — 36415 COLL VENOUS BLD VENIPUNCTURE: CPT

## 2022-05-28 RX ORDER — FUROSEMIDE 20 MG/1
20 TABLET ORAL DAILY
Qty: 60 TABLET | Refills: 0 | Status: SHIPPED | OUTPATIENT
Start: 2022-05-29 | End: 2022-06-02 | Stop reason: SDUPTHER

## 2022-05-28 RX ORDER — METOPROLOL SUCCINATE 50 MG/1
50 TABLET, EXTENDED RELEASE ORAL DAILY
Qty: 30 TABLET | Refills: 0 | Status: SHIPPED | OUTPATIENT
Start: 2022-05-29 | End: 2022-06-02 | Stop reason: SDUPTHER

## 2022-05-28 RX ORDER — SPIRONOLACTONE 25 MG/1
25 TABLET ORAL DAILY
Status: DISCONTINUED | OUTPATIENT
Start: 2022-05-28 | End: 2022-05-28 | Stop reason: HOSPADM

## 2022-05-28 RX ORDER — FUROSEMIDE 40 MG/1
20 TABLET ORAL DAILY
Status: DISCONTINUED | OUTPATIENT
Start: 2022-05-28 | End: 2022-05-28 | Stop reason: HOSPADM

## 2022-05-28 RX ORDER — SPIRONOLACTONE 25 MG/1
25 TABLET ORAL DAILY
Qty: 30 TABLET | Refills: 0 | Status: SHIPPED | OUTPATIENT
Start: 2022-05-29 | End: 2022-06-02 | Stop reason: SDUPTHER

## 2022-05-28 RX ORDER — OMEPRAZOLE 40 MG/1
40 CAPSULE, DELAYED RELEASE ORAL DAILY
Qty: 90 CAPSULE | Refills: 0
Start: 2022-05-28

## 2022-05-28 RX ORDER — CLOPIDOGREL BISULFATE 75 MG/1
75 TABLET ORAL DAILY
Qty: 30 TABLET | Refills: 0 | Status: SHIPPED | OUTPATIENT
Start: 2022-05-29 | End: 2022-06-02 | Stop reason: SDUPTHER

## 2022-05-28 RX ORDER — GLIPIZIDE 5 MG/1
5 TABLET ORAL
Qty: 180 TABLET | Refills: 0
Start: 2022-05-28 | End: 2022-11-04

## 2022-05-28 RX ORDER — LOSARTAN POTASSIUM 100 MG/1
100 TABLET ORAL DAILY
Qty: 30 TABLET | Refills: 0 | Status: SHIPPED | OUTPATIENT
Start: 2022-05-29 | End: 2022-06-02 | Stop reason: SDUPTHER

## 2022-05-28 RX ADMIN — HYDROCHLOROTHIAZIDE 25 MG: 25 TABLET ORAL at 10:30

## 2022-05-28 RX ADMIN — ASPIRIN 81 MG 81 MG: 81 TABLET ORAL at 10:31

## 2022-05-28 RX ADMIN — TAMSULOSIN HYDROCHLORIDE 0.4 MG: 0.4 CAPSULE ORAL at 10:30

## 2022-05-28 RX ADMIN — PANTOPRAZOLE SODIUM 40 MG: 40 TABLET, DELAYED RELEASE ORAL at 10:30

## 2022-05-28 RX ADMIN — LOSARTAN POTASSIUM 100 MG: 50 TABLET, FILM COATED ORAL at 10:30

## 2022-05-28 RX ADMIN — CLOPIDOGREL BISULFATE 75 MG: 75 TABLET ORAL at 10:31

## 2022-05-28 RX ADMIN — ISOSORBIDE MONONITRATE 30 MG: 30 TABLET, EXTENDED RELEASE ORAL at 10:30

## 2022-05-28 RX ADMIN — FUROSEMIDE 20 MG: 40 TABLET ORAL at 13:37

## 2022-05-28 RX ADMIN — GLIPIZIDE 10 MG: 5 TABLET ORAL at 06:43

## 2022-05-28 RX ADMIN — METOPROLOL SUCCINATE 50 MG: 50 TABLET, EXTENDED RELEASE ORAL at 10:30

## 2022-05-28 RX ADMIN — SPIRONOLACTONE 25 MG: 25 TABLET ORAL at 13:37

## 2022-05-28 ASSESSMENT — PAIN SCALES - GENERAL
PAINLEVEL_OUTOF10: 0
PAINLEVEL_OUTOF10: 0

## 2022-05-28 NOTE — PROGRESS NOTES
Vascband weaned and removed, 2x2 gauze and opsite applied. Patient instructed on wrist care, not to push, pull or lift. Site soft, stable, no bleeding or hematoma.

## 2022-05-28 NOTE — DISCHARGE SUMMARY
Hospitalist Discharge Summary      Same day admit discharge. Patient admitted for observation post-cath. Cardiology has cleared patient for discharge. Please see same-day H&P for further course detail. Thanks for allowing us to participate in the care of [facility]. We will continue to follow along.  Please do not hesitate to contact us if needed.  +++++++++++++++++++++++++++++++++++++++++++++++++  Esteban Alvares [address] [place] [address]

## 2022-05-28 NOTE — PROGRESS NOTES
[facility] Cardiology Inpatient Progress Note    Patient is a 66 y.o. male of Beau Epley, DO seen in hospital follow up. Chief complaint: CAD    HPI: No CP or SOB.      Patient Active Problem List   Diagnosis    Aguila's esophagus without dysplasia    Duodenal mass    Chest pain    Cardiomyopathy ([address]    CAD in native artery       No Known Allergies    Current Facility-Administered Medications   Medication Dose Route Frequency Provider Last Rate Last Admin    atorvastatin (LIPITOR) tablet 20 mg  20 mg Oral Daily Juanita Raygoza MD   20 mg at 05/27/22 2318    glipiZIDE (GLUCOTROL) tablet 10 mg  10 mg Oral BID AC Juanita Raygoza MD   10 mg at 05/28/22 1220    insulin lispro (HUMALOG) injection vial 0-3 Units  0-3 Units SubCUTAneous Nightly Juanita Raygoza MD   1 Units at 05/27/22 2318    insulin lispro (HUMALOG) injection vial 0-6 Units  0-6 Units SubCUTAneous TID  Charlene Vega MD        isosorbide mononitrate (IMDUR) extended release tablet 30 mg  30 mg Oral Daily Juanita Raygoza MD   30 mg at 05/28/22 1030    [Held by provider] metFORMIN (GLUCOPHAGE) tablet 1,000 mg  1,000 mg Oral BID  Juanita Raygoza MD        metoprolol succinate (TOPROL XL) extended release tablet 50 mg  50 mg Oral Daily Juanita Raygoza MD   50 mg at 05/28/22 1030    pantoprazole (PROTONIX) tablet 40 mg  40 mg Oral QAM Juanita Raygoza MD   40 mg at 05/28/22 1030    tamsulosin (FLOMAX) capsule 0.4 mg  0.4 mg Oral Daily Juanita Raygoza MD   0.4 mg at 05/28/22 1030    acetaminophen (TYLENOL) tablet 650 mg  650 mg Oral Q4H PRN Juanita Raygoza MD        [facility]ansetron [facility]) injection 4 mg  4 mg IntraVENous Q6H PRN Juanita Raygoza MD        aspirin chewable tablet 81 mg  81 mg Oral Daily Juanita Raygoza MD   81 mg at 05/28/22 1031    clopidogrel (PLAVIX) tablet 75 mg  75 mg Oral Daily Juanita Raygoza MD   75 mg at 05/28/22 1031    nitroGLYCERIN (NITROSTAT) SL tablet 0.4 mg  0.4 mg SubLINGual Q5 Min PRN Juanita Raygoza MD  losartan (COZAAR) tablet 100 mg  100 mg Oral Daily Mushtaq Nye MD   100 mg at 05/28/22 1030    And    hydroCHLOROthiazide (HYDRODIURIL) tablet 25 mg  25 mg Oral Daily Mushtaq Nye MD   25 mg at 05/28/22 1030       Review of systems:   Heart: as above   Lungs: as above   Eyes: denies changes in vision or discharge. Ears: denies changes in hearing or pain. Nose: denies epistaxis or masses   Throat: denies sore throat or trouble swallowing. Neuro: denies numbness, tingling, tremors. Skin: denies rashes or itching. : denies hematuria, dysuria   GI: denies vomiting, diarrhea   Psych: denies mood changed, anxiety, depression. Physical Exam   BP (!) 151/81   Pulse 89   Temp 97.2 °F (36.2 °C) (Temporal)   Resp 18   Ht 6' (1.829 m)   Wt 298 lb (135.2 kg)   SpO2 95%   BMI 40.42 kg/m²   Constitutional: Oriented to person, place, and time. No distress. Well developed. Head: Normocephalic and atraumatic. Neck: Neck supple. No hepatojugular reflux. No JVD present. Carotid bruit is not present. No tracheal deviation present. No thyromegaly present. Cardiovascular: Normal rate, regular rhythm, normal heart sounds. intact distal pulses. No gallop and no friction rub. No murmur heard. Pulmonary: Breath sounds normal. No respiratory distress. No wheezes. No rales. Chest: Effort normal. No tenderness. Abdominal: Soft. Bowel sounds are normal. No distension or mass. No tenderness, rebound or guarding. Musculoskeletal: . No tenderness. No clubbing or cyanosis. Extremitites: Intact distal pulses. No edema  Neurological: Alert and oriented to person, place, and time. Skin: Skin is warm and dry. No rash noted. Not diaphoretic. No erythema. Psychiatric: Normal mood and affect. Behavior is normal.   Lymphadenopathy: No cervical adenopathy. No groin adenopathy.     CBC:   Lab Results   Component Value Date    WBC 5.2 05/27/2022    RBC 4.22 05/27/2022    HGB 12.1 05/27/2022    HCT 38.8 05/27/2022    MCV 91.9 05/27/2022    MCH 28.7 05/27/2022    MCHC 31.2 05/27/2022    RDW 14.8 05/27/2022     05/27/2022    MPV 11.8 05/27/2022     BMP:   Lab Results   Component Value Date     05/28/2022    K 4.3 05/28/2022     05/28/2022    CO2 24 05/28/2022    BUN 20 05/28/2022    LABALBU 3.9 05/24/2022    LABALBU 4.1 08/30/2011    CREATININE 1.2 05/28/2022    CALCIUM 9.4 05/28/2022    GFRAA >60 05/28/2022    LABGLOM 59 05/28/2022     Magnesium:    Lab Results   Component Value Date    MG 1.6 05/25/2022     Cardiac Enzymes:   Lab Results   Component Value Date    TROPHS 9 05/24/2022    TROPHS 9 05/24/2022    TROPHS 7 05/24/2022      PT/INR:  No results found for: PROTIME, INR  TSH:    Lab Results   Component Value Date    TSH 2.260 05/25/2022     Rhythm Strip: normal sinus rhythm. Echo done 5/25/2022:  Technically difficult examination. Mild asymmetric septal hypertrophy. There is doppler evidence of stage I diastolic dysfunction. Dilated apex with hypokinetic wall motion   Ejection fraction is visually estimated at 30 to 35%. Normal right ventricular size and function. Mild mitral regurgitation is present. Mild tricuspid regurgitation. CATH CONCLUSION 5/28/2022:  1. Coronary artery disease. a. Left main:  No significant disease. b.  LAD:  No significant disease. c.  LCX:  70% to 80% proximal stenosis of the first marginal branch. 30% to 40% distal left circumflex disease. The left circumflex is a codominant vessel. d.  RCA:  Moderate size codominant vessel with no significant angiographic disease. 2.  Successful balloon angioplasty with the deployment of a drug-eluting coronary stent to the obtuse marginal branch of the left circumflex with very good results. ASSESSMENT & PLAN:    Patient Active Problem List   Diagnosis    Aguila's esophagus without dysplasia    Duodenal mass    Chest pain    Cardiomyopathy ([address]    CAD in native artery     1.  CP/CAD: Post PCI to OM. ASA/plavix/BB/statin/nitrate. 2. Acute on chronic systolic CHF:    Stable volume. BB/ARB/nitrate. Start Aldactone/modest dose of PO lasix. Stop HCTZ. 3. H/o atrial fibrillation/flutter status post ablation 12 years ago:    ASA/BB. 4. LBBB    5. HTN: Observe. 6. Lipids: Statin. 7. DM: Per primary service. 8. KEN: Resolved. 9. Morbid obesity     Okay to discharge from cardiology. Frances Mancuso D.O.   Cardiologist  Cardiology, [facility]

## 2022-05-28 NOTE — H&P
Hospitalist History & Physical      PCP: Denisa Khan DO    Date of Admission: 5/27/2022    Date of Service: Pt seen/examined on 5/27/2022 and is admitted to Inpatient with expected LOS greater than two midnights due to medical therapy. Chief Complaint:  had no chief complaint listed for this encounter. History Of Present Illness:    Mr. Kelsey Chung, a 66y.o. year old male  who  has a past medical history of Anemia, Atrial flutter ([address] Aguila's esophagus, Coronary artery disease involving native coronary artery, Diverticulitis, Fatty liver, History of Holter monitoring, Hyperlipidemia, Hypertension, Lipoma, Lumbar spinal stenosis, Osteoarthritis, PUD (peptic ulcer disease), S/P angioplasty with stent, and Type 2 diabetes mellitus without complication ([address] Patient has came to ED to [facility] for chest pain on 5/24 he is having intermittently for a month, with minimal exertion and associated with diaphoresis and sob. On EKG he was found to presumably have a new left bundle branch block. He was medically treated for CAD and had work up done that demonstrated abnormal wall motion and LVEF 30-35%. Patient was transferred to [facility] for cardiac cath  Patient is seen in 6300 unit after cardiac cath.  Denies chest pain or sob or other concerns      Past Medical History:   Diagnosis Date    Anemia     Atrial flutter ([address]     Aguila's esophagus     Coronary artery disease involving native coronary artery 05/27/2022    Diverticulitis     Fatty liver     History of Holter monitoring 05/12/2020    Hyperlipidemia     Hypertension     Lipoma     Subcyst    Lumbar spinal stenosis     Osteoarthritis     PUD (peptic ulcer disease)     S/P angioplasty with stent 05/27/2022    1st OM    Type 2 diabetes mellitus without complication ([address]        Past Surgical History:   Procedure Laterality Date    ATRIAL ABLATION SURGERY  2012    CARDIAC SURGERY      pt states 6 - 7 years ago     KNEE ARTHROPLASTY Left     LIPOMA RESECTION      UPPER GASTROINTESTINAL ENDOSCOPY  03/27/2017    Dr. Guevara Lopez, Findings: Long segment Barretts, 5cm, Moderate Gastritis, Duodenal bulb/post pyloric channel mass versus severe brunner gland hyperplasia, mild duodenitis distally, biopsies taken    UPPER GASTROINTESTINAL ENDOSCOPY  07/17/2017    Dr. Guevara Lopez, Findings: 5cm segment of Aguila's esiphagitis from 34 to 39 cm with no targeted lesions, Mild gastritis, Stomach mass in the antral pyloric channel area,       Prior to Admission medications    Medication Sig Start Date End Date Taking? Authorizing Provider   tamsulosin (FLOMAX) 0.4 mg capsule Take 1 capsule by mouth daily 5/28/22   Paul Abebe, DO   insulin lispro (HUMALOG) 100 UNIT/ML SOLN injection vial Inject 0-3 Units into the skin nightly 5/27/22   Chandler Abebe, DO   insulin lispro (HUMALOG) 100 UNIT/ML SOLN injection vial Inject 0-6 Units into the skin 3 times daily (with meals) 5/27/22   Paul Abebe, DO   isosorbide mononitrate (IMDUR) 30 MG extended release tablet Take 1 tablet by mouth daily 5/28/22   Paul Abebe, DO   metoprolol succinate (TOPROL XL) 50 MG extended release tablet Take 1 tablet by mouth daily 5/28/22   Paul Abebe, DO   pantoprazole (PROTONIX) 40 MG tablet Take 1 tablet by mouth every morning 5/28/22   Paul Abebe, DO   ACCU-CHEK FELICIA PLUS strip  3/24/22   Historical Provider, MD   atorvastatin (LIPITOR) 20 MG tablet Take 1 tablet by mouth daily 5/17/22   Екатерина Mcallister, DO   aspirin 325 MG tablet Take 325 mg by mouth daily    Historical Provider, MD         Allergies:  Patient has no known allergies. Social History:    TOBACCO:   reports that he has never smoked. He has never used smokeless tobacco.  ETOH:   reports no history of alcohol use. Family History:    Reviewed in detail and negative for DM, CAD, Cancer, CVA. Positive as follows\"  History reviewed. No pertinent family history.     REVIEW OF SYSTEMS: Pertinent positives as noted in the HPI. All other systems reviewed and negative. PHYSICAL EXAM:  /79   Pulse 73   Temp 97.8 °F (36.6 °C) (Temporal)   Resp 18   Ht 6' (1.829 m)   Wt 298 lb (135.2 kg)   SpO2 97%   BMI 40.42 kg/m²   General appearance: No apparent distress, appears stated age and cooperative. Obese  HEENT: Normal cephalic, atraumatic without obvious deformity. Pupils equal, round, and reactive to light. Extra ocular muscles intact. Conjunctivae/corneas clear. Neck: Supple, with full range of motion. No jugular venous distention. Trachea midline. Respiratory:  CATB  Cardiovascular: RRR, no M/G?R   Abdomen:  Obese, ND, normal BS, soft, NT  Musculoskeletal: No clubbing, cyanosis, edema of bilateral lower extremities. Brisk capillary refill. Skin: Normal skin color. No rashes or lesions. Neurologic:  Neurovascularly intact without any focal sensory/motor deficits. Cranial nerves: II-XII intact, grossly non-focal.  Psychiatric: Alert and oriented, thought content appropriate, normal insight    Reviewed EKG and CXR personally      CBC:   Recent Labs     05/25/22  0939 05/26/22  0634 05/27/22  0720   WBC 5.8 6.7 5.2   RBC 4.52 4.31 4.22   HGB 12.9 12.3* 12.1*   HCT 40.6 38.8 38.8   MCV 89.8 90.0 91.9   RDW 14.7 14.9 14.8    168 153     BMP:   Recent Labs     05/25/22  0939 05/25/22  0939 05/26/22  0634 05/26/22  1214 05/27/22  0720      < > 137 137 138   K 4.0   < > 4.1 4.4 4.6      < > 102 103 101   CO2 24   < > 24 23 28   BUN 20   < > 31* 27* 28*   CREATININE 0.9   < > 1.5* 1.3* 1.2   MG 1.6  --   --   --   --    PHOS 3.2  --   --   --   --     < > = values in this interval not displayed. LFT:  No results for input(s): PROT, ALB, ALKPHOS, ALT, AST, BILITOT, AMYLASE, LIPASE in the last 72 hours. CE:  No results for input(s): Des Beery in the last 72 hours. PT/INR: No results for input(s): INR, APTT in the last 72 hours.   BNP: No results for input(s): BNP in the last 72 hours. ESR: No results found for: SEDRATE  CRP: No results found for: CRP  D Dimer: No results found for: DDIMER   Folate and B12: No results found for: [id number], No results found for: FOLATE  Lactic Acid: No results found for: LACTA  Thyroid Studies:   Lab Results   Component Value Date    TSH 2.260 05/25/2022       Oupatient labs:  Lab Results   Component Value Date    CHOL 129 05/25/2022    TRIG 137 05/25/2022    HDL 55 05/25/2022    LDLCALC 47 05/25/2022    TSH 2.260 05/25/2022    LABA1C 6.5 (H) 05/24/2022       Urinalysis:  No results found for: Katelynn Bustamante, [address], BACTERIA, RBCUA, BLOODU, SPECGRAV, GLUCOSEU    Imaging:  Echo Complete    Result Date: 5/25/2022  Transthoracic Echocardiography Report (TTE)  Demographics   Patient Name    [facility]     Gender            Male                  Rod Watson  [id number]    Room Number       0430  Number   Account #       [de-identified]   Procedure Date    05/25/2022   Corporate ID                Ordering                              Physician   Accession       [id number]  Referring  Number                      Physician   Date of Birth   1950  Sonographer       Sandy Izquierdo [facility]   Age             66 year(s)  Interpreting      Cedric 63                              Physician         Physician Cardiology                                                Poornima Beckett MD                               Any Other  Procedure Type of Study   TTE procedure  Procedure Date Date: 05/25/2022 Start: 07:41 AM Study Location: Echo Lab Technical Quality: Adequate visualization Indications:Chest pain. Patient Status: Routine Contrast Medium: Definity. Height: 72 inches Weight: 295 pounds BSA: 2.51 m^2 BMI: 40.01 kg/m^2 Rhythm: Within normal limits HR: 74 bpm BP: 116/56 mmHg  Findings   Left Ventricle  Mild asymmetric septal hypertrophy. There is doppler evidence of stage I diastolic dysfunction.   Dilated apex with hypokinetic wall motion  Ejection fraction is visually estimated at 30 to 35%. Right Ventricle  Normal right ventricular size and function. Left Atrium  Left atrium was not clearly visualized. Right Atrium  Right Atrium is not clearly visualized. Mitral Valve  Mild mitral regurgitation is present. Tricuspid Valve  Mild tricuspid regurgitation. Aortic Valve  The aortic valve appears mildly sclerotic. Pulmonic Valve  Physiologic and/or trace pulmonic regurgitation present. Pericardial Effusion  Fat pad present. Conclusions   Summary  Technically difficult examination. Mild asymmetric septal hypertrophy. There is doppler evidence of stage I diastolic dysfunction. Dilated apex with hypokinetic wall motion  Ejection fraction is visually estimated at 30 to 35%. Normal right ventricular size and function. Mild mitral regurgitation is present. Mild tricuspid regurgitation.    Signature   ----------------------------------------------------------------  Electronically signed by Migel Villalpando MD(Interpreting  physician) on 05/25/2022 04:30 PM  ----------------------------------------------------------------  M-Mode/2D Measurements & Calculations   LV Diastolic    LV Systolic Dimension: 4.9   AV Cusp Separation: 2.5 cmLA  Dimension: 5.6  cm                           Dimension: 2.7 cmAO Root  cm              LV Volume Diastolic: 886.3   Dimension: 4.1 cm  LV FS:12.5 %    ml  LV PW           LV Volume Systolic: 255.0 ml  Diastolic: 1.2  LV EDV/LV EDV Index: 152.6  cm              ml/61 ml/m^2LV ESV/LV ESV    RV Diastolic Dimension: 2.8  Septum          Index: 110.6 ml/44ml/ m^2    cm  Diastolic: 1.4  EF Calculated: 27.5 %  cm              LV Mass Index: 125 l/min*m^2  CO: 5.35 l/min  LV Length: 8.5 cm            LA volume/Index: 76.2 ml  CI: 2.13  l/m*m^2         LVOT: 2.1 cm  LV Mass: 313.23  g  Doppler Measurements & Calculations   MV Peak E-Wave: 0.64 AV Peak Velocity:     LVOT Peak Velocity: 0.99 m/s  m/s                  1.81 m/s LVOT Mean Velocity: 0.7 m/s  MV Peak A-Wave: 0.97 AV Peak Gradient:     LVOT Peak Gradient: 3.9  m/s                  13.05 mmHg            mmHgLVOT Mean Gradient: 2.3  MV E/A Ratio: 0.65   AV Mean Velocity:     mmHg  MV Peak Gradient:    1.29 m/s              Estimated RVSP: 33.4 mmHg  5.7 mmHg             AV Mean Gradient: 7.4 Estimated RAP:5 mmHg  MV Mean Gradient:    mmHg  2.4 mmHg             AV VTI: 40.2 cm  MV Mean Velocity:    AV Area               TR Velocity:2.66 m/s  0.73 m/s             (Continuity):1.8 cm^2 TR Gradient:28.34 mmHg  MV Deceleration                            PV Peak Velocity: 1.26 m/s  Time: 298.7 msec     LVOT VTI: 20.9 cm     PV Peak Gradient: 6.36 mmHg  MV P1/2t: 55.7 msec  IVRT: 114.2 msec      PV Mean Velocity: 0.85 m/s  MVA by PHT:3.95 cm^2 Estimated PASP: 33.34 PV Mean Gradient: 3.4 mmHg  MV Area              mmHg  (continuity): 3.3  cm^2  [email or web address] [id number]    XR CHEST (2 VW)    Result Date: 5/24/2022  EXAMINATION: TWO XRAY VIEWS OF THE CHEST 5/24/2022 11:41 am COMPARISON: None. HISTORY: ORDERING SYSTEM PROVIDED HISTORY: cp TECHNOLOGIST PROVIDED HISTORY: Reason for exam:->cp FINDINGS: The lungs are without acute focal process. There is no effusion or pneumothorax. The cardiomediastinal silhouette is without acute process. The osseous structures are without acute process. No acute process.        ASSESSMENT:  CAD of native artery now s/p cardiac cath and MARTIN to left circumflex   New left bundle branch block on EKG  Non-insulin-dependent diabetes mellitus type 2  Essential hypertension  Hyperlipidemia  BPH  History of ablation for treatment of atrial fibrillation with conversion to normal sinus rhythm 12 years prior  Morbid obesity with BMI 40.42 kg per metered squared  HTN     PLAN:  - Telemetry admit  - On DAPT and statins  - Glucose control   - BMP in the morning  - DC when cleared by Cardio         Diet: ADULT DIET; Regular; 4 carb choices (60 gm/meal); Low Fat/Low Chol/High Fiber/2 gm Na  Code Status: Full Code  Surrogate decision maker confirmed with patient:   Extended Emergency Contact Information  Primary Emergency Contact: Kaila Jose Luis  Address: [address] [place] Hernadez Phone: [phone]  Relation: Spouse  Secondary Emergency Contact: Emile Sandhoff. Address: [address] [place] [address]  Mobile Phone: [phone]  Relation: Child  Preferred language: English   needed? No    DVT Prophylaxis: []Lovenox []Heparin []PCD [] [address] []Encouraged ambulation  Disposition: []Med/Surg [] Intermediate [] ICU/CCU  Admit status: [] Observation [] Inpatient     +++++++++++++++++++++++++++++++++++++++++++++++++  Colby Rodriguez MD  [facility] Physician - [address] [place] [address]  +++++++++++++++++++++++++++++++++++++++++++++++++  NOTE: This report was transcribed using voice recognition software. Every effort was made to ensure accuracy; however, inadvertent computerized transcription errors may be present.

## 2022-05-28 NOTE — PROGRESS NOTES
Hospitalist Progress Note      Chart reviewed. Patient admitted this morning by my colleague as a transfer from [address] [place] [address] for cardiac cath in which he received PCI to [facility]. Vitals and labs remain stable. Monitor renal fxn.   Non-billable rounding.      +++++++++++++++++++++++++++++++++++++++++++++++++  Veronica Barnes, [facility] [address]

## 2022-05-28 NOTE — PLAN OF CARE
Problem: Chronic Conditions and Co-morbidities  Goal: Patient's chronic conditions and co-morbidity symptoms are monitored and maintained or improved  Outcome: Progressing  Flowsheets (Taken 5/27/2022 1930)  Care Plan - Patient's Chronic Conditions and Co-Morbidity Symptoms are Monitored and Maintained or Improved: Monitor and assess patient's chronic conditions and comorbid symptoms for stability, deterioration, or improvement     Problem: Discharge Planning  Goal: Discharge to home or other facility with appropriate resources  Outcome: Progressing  Flowsheets (Taken 5/27/2022 1930)  Discharge to home or other facility with appropriate resources: Identify barriers to discharge with patient and caregiver     Problem: ABCDS Injury Assessment  Goal: Absence of physical injury  Outcome: Progressing  Flowsheets (Taken 5/28/2022 0225)  Absence of Physical Injury: Implement safety measures based on patient assessment

## 2022-05-28 NOTE — PROGRESS NOTES
Discharge instructions provided to patient and son. Information regarding medications, care of the radial cardiac cath site, and follow up appointments given. IV removed and dressing applied. Monitor removed, cleaned, and returned to nurses station. Importance of taking medications as prescribed discussed at length with verbalized understanding. Medications from pharmacy checked for accuracy. Stent card sent with patient and instructed to keep in wallet at all times.

## 2022-05-28 NOTE — PROGRESS NOTES
CLINICAL PHARMACY NOTE: MEDS TO BEDS    Total # of Prescriptions Filled: 5   The following medications were delivered to the patient:  · Spironolactone 25mg  · Metoprolol 50mg  · Furosemide 20mg  · Losartan 100mg  · Clopidogrel 75mg    Additional Documentation:

## 2022-05-28 NOTE — PATIENT CARE CONFERENCE
P Quality Flow/Interdisciplinary Rounds Progress Note        Quality Flow Rounds held on May 28, 2022    Disciplines Attending:  Bedside Nurse, ,  and Nursing Unit Leadership    González Bird was admitted on 5/27/2022  6:43 PM    Anticipated Discharge Date:       Disposition:    Bryce Score:  Bryce Scale Score: 23    Readmission Risk              Risk of Unplanned Readmission:  11           Discussed patient goal for the day, patient clinical progression, and barriers to discharge.   The following Goal(s) of the Day/Commitment(s) have been identified:  Labs - Report Results      Douglas Heaton RN  May 28, 2022

## 2022-05-28 NOTE — DISCHARGE SUMMARY
[address]                               DISCHARGE SUMMARY    PATIENT NAME: Val Durant                       :        1950  MED REC NO:   [id number]                            ROOM:       0430  ACCOUNT NO:   [de-identified]                           ADMIT DATE: 2022  PROVIDER:     Lisa Wilks DO                  DISCHARGE DATE:  2022    ADMITTING DIAGNOSES:  Chest pain with moderate risk for cardiac  etiology; new-onset left bundle-branch block, EKG. SECONDARY DISCHARGE DIAGNOSES:  Non-insulin-dependent diabetes mellitus  type 2 with hemoglobin A1c 6.5, essential hypertension, hyperlipidemia,  benign prostatic hypertrophy, history of ablation treatment for atrial  fibrillation converting to normal sinus rhythm, morbid obesity with  elevated BMI of 40.42. COMPLICATIONS:  Acute kidney injury. OPERATIONS:  None. The patient was transferred to [facility] for cardiac  catheterization. CONSULTATION OBTAINED:  With Dr. Sandy Toth from [facility]. ADMITTING PHYSICIANS:  Dr. Donavon Dowling and Dr. Gaye Burgess. CHIEF COMPLAINT AND HISTORY OF CHIEF COMPLAINT:  This is a 76-year-old  white male who was admitted to [address] [place] [address]. The patient  presented to the hospital here on 2022. The patient presented to  the hospital with what appeared to be chest pain. The patient presented  to the emergency room with complaints of chest pain. This occurred with  minor exertion. Workup in the emergency room was rather benign with the  exception of abnormal EKG showing new-onset left bundle-branch block. The patient was admitted to the hospital at this time. The patient  presented here to the general telemetry floor.     PAST MEDICAL HISTORY:  Positive for usual childhood diseases, anemia,  cardiac dysrhythmia with atrial flutter, fatty liver disease,  hyperlipidemia, hypertension, lumbar spinal stenosis, osteoarthritis,  peptic ulcer disease, and type 2 diabetes mellitus. PHYSICAL EXAMINATION:  VITAL SIGNS:  Showed blood pressure 138/65, pulse 74, respirations 18,  O2 saturation was 95%. GENERAL APPEARANCE:  This is a 79-year-old white male who is alert,  responsive, oriented to person, place, and time. HEENT:  Normal.  NECK:  With adequate carotid upstrokes without bruits. Trachea midline. Thyroid palpable. LUNGS:  Clear. HEART:  Fairly regular. ABDOMEN:  Obese. EXTREMITIES:  Revealed trace amount of edema. LABORATORY DATA:  While the patient was in the hospital, he had the  following laboratory studies performed:  Hemoglobin and hematocrit were  13.4 and 42.7 respectively. White count was 7000. Platelet count  adequate. BUN and creatinine were 26 and 1.1 respectively. HOSPITAL COURSE OF STAY:  The patient was admitted directly to the  hospital to the general telemetry floor. Cardiac consultation was  obtained with Dr. Luz Randolph from [address] [place] [address] because of new-onset left  bundle-branch block along with further symptomatology of chest pain. Recommend cardiac catheterization to be planned. Adjustments were made  in medications. The patient will be undergoing a cardiac  catheterization on 05/26/2022. His creatinine was elevated at 1.5 at  this time. Cardiac catheterization delay, IV hydration therapy was  carried out. ACE inhibitor was discontinued. Repeat lab work on  05/27/2022 was stable. On 05/27/2022, his BUN and creatinine at this  time were improved at 28 and 1.2 respectively. Electrolytes were  Satisfactory. CBC is stable. It was felt at this time that there is improvement noted on renal  function. He was stable enough to be transferred to [facility] for  cardiac catheterization.   On the day of discharge, vital signs revealed  the following:  His blood pressure 117/55, temperature 98.1, pulse 68,  respirations 12, O2 saturation 91%, height 6 feet, and weight 298  pounds. The patient was continued on Humalog sliding scale, Imdur 20 mg  p.o. daily, metoprolol XL 50 daily, Protonix 40 daily, Flomax 0.4 at  bedtime, aspirin 325 daily, atorvastatin 20 mg daily. The patient was  transferred to [facility] by ambulance in stable condition for  cardiac catheterization. The patient is recommended to follow up with  Dr. Dre Duong in one week. More than 40 minutes were spent on the day of discharge, more than 50%  of the time spent face-to-face with the patient discussing the plan of  care and treatment at this time.         Roxanne Ballard DO    D: 05/27/2022 16:07:25       T: 05/28/2022 2:59:34     [email or web address]  Job#: [id number]     Doc#: [id number]    CC:

## 2022-05-31 ENCOUNTER — TELEPHONE (OUTPATIENT)
Dept: ADMINISTRATIVE | Age: 72
End: 2022-05-31

## 2022-05-31 ENCOUNTER — TELEPHONE (OUTPATIENT)
Dept: PRIMARY CARE CLINIC | Age: 72
End: 2022-05-31

## 2022-05-31 LAB
EKG ATRIAL RATE: 74 BPM
EKG P AXIS: 69 DEGREES
EKG P-R INTERVAL: 192 MS
EKG Q-T INTERVAL: 464 MS
EKG QRS DURATION: 164 MS
EKG QTC CALCULATION (BAZETT): 515 MS
EKG R AXIS: -5 DEGREES
EKG T AXIS: 48 DEGREES
EKG VENTRICULAR RATE: 74 BPM

## 2022-05-31 NOTE — TELEPHONE ENCOUNTER
Patient was inform to cancel holter number was provided and patient is scheduled for 6/2/2022 at 345

## 2022-05-31 NOTE — TELEPHONE ENCOUNTER
Pt  Called to schedule hfu with Dr. Lidia Ashby. Pt was admitted to [facility] for a cardiac catherization and dc'd on 5/28/22. Pt was advised to f/u with Dr. Lidia Ashby.

## 2022-05-31 NOTE — TELEPHONE ENCOUNTER
Holter monitor may be cancelled. I typically see pt's 2 weeks following hospitalization for a post-hospital visit.

## 2022-05-31 NOTE — TELEPHONE ENCOUNTER
Patient had heart cath on 5/27/2022 with Dr Ivy Heart. Patient had blockage and had stent placement. Wife is still wandering if he will need his holter monitor which is scheduled on 6/3/2022. Patients wife is also wanting to know if he needs a follow up with you or just wait until his next scheduled appt in August for he is scheduling a follow up with cardiology. Please advise.

## 2022-06-02 ENCOUNTER — OFFICE VISIT (OUTPATIENT)
Dept: PRIMARY CARE CLINIC | Age: 72
End: 2022-06-02
Payer: MEDICARE

## 2022-06-02 VITALS
BODY MASS INDEX: 39.85 KG/M2 | WEIGHT: 294.2 LBS | SYSTOLIC BLOOD PRESSURE: 114 MMHG | TEMPERATURE: 97.4 F | HEIGHT: 72 IN | DIASTOLIC BLOOD PRESSURE: 72 MMHG | HEART RATE: 82 BPM

## 2022-06-02 DIAGNOSIS — I25.10 CAD IN NATIVE ARTERY: ICD-10-CM

## 2022-06-02 DIAGNOSIS — I42.9 CARDIOMYOPATHY, UNSPECIFIED TYPE (HCC): ICD-10-CM

## 2022-06-02 DIAGNOSIS — Z09 HOSPITAL DISCHARGE FOLLOW-UP: Primary | ICD-10-CM

## 2022-06-02 PROCEDURE — 1123F ACP DISCUSS/DSCN MKR DOCD: CPT | Performed by: FAMILY MEDICINE

## 2022-06-02 PROCEDURE — 1036F TOBACCO NON-USER: CPT | Performed by: FAMILY MEDICINE

## 2022-06-02 PROCEDURE — 1111F DSCHRG MED/CURRENT MED MERGE: CPT | Performed by: FAMILY MEDICINE

## 2022-06-02 PROCEDURE — G8417 CALC BMI ABV UP PARAM F/U: HCPCS | Performed by: FAMILY MEDICINE

## 2022-06-02 PROCEDURE — 3017F COLORECTAL CA SCREEN DOC REV: CPT | Performed by: FAMILY MEDICINE

## 2022-06-02 PROCEDURE — 99214 OFFICE O/P EST MOD 30 MIN: CPT | Performed by: FAMILY MEDICINE

## 2022-06-02 PROCEDURE — G8427 DOCREV CUR MEDS BY ELIG CLIN: HCPCS | Performed by: FAMILY MEDICINE

## 2022-06-02 RX ORDER — METOPROLOL SUCCINATE 50 MG/1
50 TABLET, EXTENDED RELEASE ORAL DAILY
Qty: 90 TABLET | Refills: 1 | Status: SHIPPED | OUTPATIENT
Start: 2022-06-02

## 2022-06-02 RX ORDER — ASPIRIN 81 MG/1
81 TABLET ORAL DAILY
COMMUNITY

## 2022-06-02 RX ORDER — LOSARTAN POTASSIUM 100 MG/1
100 TABLET ORAL DAILY
Qty: 90 TABLET | Refills: 1 | Status: SHIPPED | OUTPATIENT
Start: 2022-06-02

## 2022-06-02 RX ORDER — CLOPIDOGREL BISULFATE 75 MG/1
75 TABLET ORAL DAILY
Qty: 90 TABLET | Refills: 1 | Status: SHIPPED | OUTPATIENT
Start: 2022-06-02

## 2022-06-02 RX ORDER — FUROSEMIDE 20 MG/1
20 TABLET ORAL DAILY
Qty: 90 TABLET | Refills: 1 | Status: SHIPPED | OUTPATIENT
Start: 2022-06-02

## 2022-06-02 RX ORDER — SPIRONOLACTONE 25 MG/1
25 TABLET ORAL DAILY
Qty: 90 TABLET | Refills: 1 | Status: SHIPPED | OUTPATIENT
Start: 2022-06-02

## 2022-06-02 NOTE — PROGRESS NOTES
Post-Discharge Transitional Care  Follow Up      Rosemary Garcia   YOB: 1950    Date of Office Visit:  6/2/2022  Date of Hospital Admission: 5/27/22  Date of Hospital Discharge: 5/28/22  Risk of hospital readmission (high >=14%. Medium >=10%) :Readmission Risk Score: 9.7 ( )      Care management risk score Rising risk (score 2-5) and Complex Care (Scores >=6): 1     Non face to face  following discharge, date last encounter closed (first attempt may have been earlier): *No documented post hospital discharge outreach found in the last 14 days    Call initiated 2 business days of discharge: *No response recorded in the last 14 days    ASSESSMENT/PLAN:   Hospital discharge follow-up  -     [place] DISCHARGE MEDS RECONCILED W/ CURRENT OUTPATIENT MED LIST      Medical Decision Making: moderate complexity  Return in 3 months (on 9/2/2022). Subjective:   HPI:  Follow up of Hospital problems/diagnosis(es): CAD w stent placement with Dr. Lucian Fine. Inpatient course: Discharge summary reviewed- see chart. Interval history/Current status: Patient here today for hospital follow-up for stent placement completed by Dr. Lucian Fine. Hospital notes reviewed. Patient states that since having his stent placed, he is significantly better. States that he is able to tolerate ambulation and functional mobility without shortness of breath. Patient follows up with cardiology in the near future. Medication reconciliation completed. Patient offers no further complaints. Patient Active Problem List   Diagnosis    Aguila's esophagus without dysplasia    Duodenal mass    Chest pain    Cardiomyopathy ([facility] [address]    CAD in native artery       Medications listed as ordered at the time of discharge from hospital     Medication List          Accurate as of June 2, 2022  3:34 PM. If you have any questions, ask your nurse or doctor.             CHANGE how you take these medications    glipiZIDE 5 mg tablet  Commonly known as: GLUCOTROL  Take 1 tablet by mouth 2 times daily (before meals)  What changed: additional instructions        CONTINUE taking these medications    Accu-Chek Alicia Plus strip  Generic drug: blood glucose test strips     * aspirin 81 MG EC tablet     * aspirin 325 mg tablet     atorvastatin 20 MG tablet  Commonly known as: LIPITOR  Take 1 tablet by mouth daily     clopidogrel 75 MG tablet  Commonly known as: PLAVIX  Take 1 tablet by mouth daily     furosemide 20 MG tablet  Commonly known as: LASIX  Take 1 tablet by mouth daily     isosorbide mononitrate 30 MG extended release tablet  Commonly known as: IMDUR  Take 1 tablet by mouth daily     losartan 100 MG tablet  Commonly known as: COZAAR  Take 1 tablet by mouth daily     metFORMIN 1000 MG tablet  Commonly known as: GLUCOPHAGE  Take 1 tablet by mouth 2 times daily (with meals)     metoprolol succinate 50 MG extended release tablet  Commonly known as: TOPROL XL  Take 1 tablet by mouth daily     omeprazole 40 MG delayed release capsule  Commonly known as: PRILOSEC  Take 1 capsule by mouth daily     spironolactone 25 MG tablet  Commonly known as: ALDACTONE  Take 1 tablet by mouth daily     tamsulosin 0.4 mg capsule  Commonly known as: FLOMAX  Take 1 capsule by mouth daily         * This list has 2 medication(s) that are the same as other medications prescribed for you. Read the directions carefully, and ask your doctor or other care provider to review them with you.                   Medications marked \"taking\" at this time  Outpatient Medications Marked as Taking for the 6/2/22 encounter (Office Visit) with Emiliano Cagle, DO   Medication Sig Dispense Refill    aspirin 81 MG EC tablet Take 81 mg by mouth daily      clopidogrel (PLAVIX) 75 MG tablet Take 1 tablet by mouth daily 30 tablet 0    furosemide (LASIX) 20 MG tablet Take 1 tablet by mouth daily 60 tablet 0    losartan (COZAAR) 100 MG tablet Take 1 tablet by mouth daily 30 tablet 0    glipiZIDE (GLUCOTROL) 5 mg tablet Take 1 tablet by mouth 2 times daily (before meals) (Patient taking differently: Take 5 mg by mouth 2 times daily (before meals) Take 2 tablets by mouth two times a day.) 180 tablet 0    metFORMIN (GLUCOPHAGE) 1000 MG tablet Take 1 tablet by mouth 2 times daily (with meals) 180 tablet 0    metoprolol succinate (TOPROL XL) 50 MG extended release tablet Take 1 tablet by mouth daily 30 tablet 0    omeprazole (PRILOSEC) 40 MG delayed release capsule Take 1 capsule by mouth daily 90 capsule 0    spironolactone (ALDACTONE) 25 MG tablet Take 1 tablet by mouth daily 30 tablet 0    tamsulosin (FLOMAX) 0.4 mg capsule Take 1 capsule by mouth daily 30 capsule 3    ACCU-CHEK FELICIA PLUS strip       atorvastatin (LIPITOR) 20 MG tablet Take 1 tablet by mouth daily 90 tablet 1        Medications patient taking as of now reconciled against medications ordered at time of hospital discharge: Yes    A comprehensive review of systems was negative except for what was noted in the HPI.     Objective:    /72   Pulse 82   Temp 97.4 °F (36.3 °C) (Temporal)   Ht 6' (1.829 m)   Wt 294 lb 3.2 oz (133.4 kg)   BMI 39.90 kg/m²   General Appearance: alert and oriented to person, place and time, well developed and well- nourished, in no acute distress  Skin: warm and dry, no rash or erythema  Head: normocephalic and atraumatic  Eyes: pupils equal, round, and reactive to light, extraocular eye movements intact, conjunctivae normal  ENT: tympanic membrane, external ear and ear canal normal bilaterally, nose without deformity, nasal mucosa and turbinates normal without polyps  Neck: supple and non-tender without mass, no thyromegaly or thyroid nodules, no cervical lymphadenopathy  Pulmonary/Chest: clear to auscultation bilaterally- no wheezes, rales or rhonchi, normal air movement, no respiratory distress  Cardiovascular: normal rate, regular rhythm, normal S1 and S2, no murmurs, rubs, clicks, or gallops, distal pulses intact, no carotid bruits  Abdomen: soft, obese, non-tender, non-distended, normal bowel sounds, no masses or organomegaly  Extremities: no cyanosis, clubbing or edema  Musculoskeletal: normal range of motion, no joint swelling, deformity or tenderness        An electronic signature was used to authenticate this note.   --Scott Damian DO  6/2/22  3:48 PM

## 2022-06-02 NOTE — TELEPHONE ENCOUNTER
Follow-up in 4 to 12 weeks. Follow-up sooner if there are symptoms. If symptoms are significant please go to the emergency room. Also please follow-up with your primary care physician.

## 2022-06-03 ENCOUNTER — HOSPITAL ENCOUNTER (OUTPATIENT)
Dept: NON INVASIVE DIAGNOSTICS | Age: 72
Discharge: HOME | End: 2022-06-03

## 2022-07-13 ENCOUNTER — OFFICE VISIT (OUTPATIENT)
Dept: CARDIOLOGY CLINIC | Age: 72
End: 2022-07-13
Payer: MEDICARE

## 2022-07-13 VITALS
WEIGHT: 300 LBS | RESPIRATION RATE: 16 BRPM | DIASTOLIC BLOOD PRESSURE: 74 MMHG | BODY MASS INDEX: 40.63 KG/M2 | HEIGHT: 72 IN | SYSTOLIC BLOOD PRESSURE: 128 MMHG | HEART RATE: 78 BPM

## 2022-07-13 DIAGNOSIS — R00.2 PALPITATION: ICD-10-CM

## 2022-07-13 DIAGNOSIS — I48.0 PAROXYSMAL ATRIAL FIBRILLATION (HCC): ICD-10-CM

## 2022-07-13 DIAGNOSIS — I20.8 OTHER FORMS OF ANGINA PECTORIS (HCC): ICD-10-CM

## 2022-07-13 DIAGNOSIS — K22.70 BARRETT'S ESOPHAGUS WITHOUT DYSPLASIA: ICD-10-CM

## 2022-07-13 DIAGNOSIS — I25.10 CAD IN NATIVE ARTERY: Primary | ICD-10-CM

## 2022-07-13 DIAGNOSIS — K31.89 DUODENAL MASS: ICD-10-CM

## 2022-07-13 DIAGNOSIS — I25.5 ISCHEMIC CARDIOMYOPATHY: ICD-10-CM

## 2022-07-13 PROCEDURE — 3017F COLORECTAL CA SCREEN DOC REV: CPT | Performed by: INTERNAL MEDICINE

## 2022-07-13 PROCEDURE — G8427 DOCREV CUR MEDS BY ELIG CLIN: HCPCS | Performed by: INTERNAL MEDICINE

## 2022-07-13 PROCEDURE — 4004F PT TOBACCO SCREEN RCVD TLK: CPT | Performed by: INTERNAL MEDICINE

## 2022-07-13 PROCEDURE — 1123F ACP DISCUSS/DSCN MKR DOCD: CPT | Performed by: INTERNAL MEDICINE

## 2022-07-13 PROCEDURE — 93000 ELECTROCARDIOGRAM COMPLETE: CPT | Performed by: INTERNAL MEDICINE

## 2022-07-13 PROCEDURE — 99214 OFFICE O/P EST MOD 30 MIN: CPT | Performed by: INTERNAL MEDICINE

## 2022-07-13 PROCEDURE — G8417 CALC BMI ABV UP PARAM F/U: HCPCS | Performed by: INTERNAL MEDICINE

## 2022-07-13 RX ORDER — BLOOD SUGAR DIAGNOSTIC
81 STRIP MISCELLANEOUS DAILY
Qty: 100 EACH | Refills: 3 | Status: SHIPPED
Start: 2022-07-13 | End: 2022-07-13 | Stop reason: ALTCHOICE

## 2022-07-13 NOTE — PROGRESS NOTES
Out Patient CARDIOLOGY Follow Up Visit    Name: Manuel Angel    Age: 66 y.o. Date of Admission: No admission date for patient encounter. Date of Service: 8/6/2022    Reason for Consultation:   Chief Complaint   Patient presents with    Follow-Up from Hospital     Patient has no complaints          Referring Physician: No admitting provider for patient encounter. History of Present Illness: 53-year-old male with history of [place] esophagus, duodenal mass, coronary artery disease status post OM stent in May 2022, chronic left bundle branch block, hypertension, obesity, hyperlipidemia, diabetes, tobacco abuse, chronic kidney disease who was hospitalized in May 2022 with chest pain and dyspnea on exertion and echocardiogram revealed EF of 30 to 35%. He underwent invasive coronary angiogram and found to have significant OM disease requiring drug-eluting stent. He presented for follow-up visit today and report doing well and denies any symptoms. He declined LifeVest and also declined Entresto. He will follow-up in 6 weeks and will get echocardiogram prior to next visit to see if systolic function has improved and if not we will optimize guideline directed medical therapy. He reports he goes to [place] every 6 months but he will get this test done and follow-up prior to going to [place]. He reports palpitation and subsequently Zio patch heart monitor was arranged for further evaluation        Echocardiogram reviewed: May 2022  Summary   Technically difficult examination. Mild asymmetric septal hypertrophy. There is doppler evidence of stage I diastolic dysfunction. Dilated apex with hypokinetic wall motion   Ejection fraction is visually estimated at 30 to 35%. Normal right ventricular size and function. Mild mitral regurgitation is present. Mild tricuspid regurgitation. Stress test reviewed:       Cardiac catheterization reviewed:    CONCLUSION:  1. Coronary artery disease. a.   Left main:  No significant disease. b.  LAD:  No significant disease. c.  LCX:  70% to 80% proximal stenosis of the first marginal branch. 30% to 40% distal left circumflex disease. The left circumflex is a  codominant vessel. d.  RCA:  Moderate size codominant vessel with no significant  angiographic disease. 2.  Successful balloon angioplasty with the deployment of a drug-eluting  coronary stent to the obtuse marginal branch of the left circumflex with  very good results. Past Medical History:  Past Medical History:   Diagnosis Date    Anemia     Atrial flutter (HCC)     A[place]'s esophagus     Coronary artery disease involving native coronary artery 05/27/2022    Diverticulitis     Fatty liver     History of Holter monitoring 05/12/2020    Hyperlipidemia     Hypertension     Lipoma     Subcyst    Lumbar spinal stenosis     Osteoarthritis     PUD (peptic ulcer disease)     S/P angioplasty with stent 05/27/2022    1st OM    Type 2 diabetes mellitus without complication ([facility] [address]        Past Surgical History:  Past Surgical History:   Procedure Laterality Date    ATRIAL ABLATION SURGERY  2012    CARDIAC SURGERY      pt states 6 - 7 years ago     KNEE ARTHROPLASTY Left     LIPOMA RESECTION      UPPER GASTROINTESTINAL ENDOSCOPY  03/27/2017    Dr. Tri Young, Findings: Long segment Barretts, 5cm, Moderate Gastritis, Duodenal bulb/post pyloric channel mass versus severe brunner gland hyperplasia, mild duodenitis distally, biopsies taken    UPPER GASTROINTESTINAL ENDOSCOPY  07/17/2017    Dr. Tri Young, Findings: 5cm segment of Agu[place]'s esiphagitis from 34 to 39 cm with no targeted lesions, Mild gastritis, Stomach mass in the antral pyloric channel area,       Family History:  History reviewed. No pertinent family history.     Social History:  Social History     Socioeconomic History    Marital status:      Spouse name: Not on file    Number of children: Not on file    Years of education: Not on file Highest education level: Not on file   Occupational History    Not on file   Tobacco Use    Smoking status: Some Days     Types: Cigarettes, Cigars    Smokeless tobacco: Never    Tobacco comments:     occassional cigar    Substance and Sexual Activity    Alcohol use: No    Drug use: No    Sexual activity: Not on file   Other Topics Concern    Not on file   Social History Narrative    Not on file     Social Determinants of Health     Financial Resource Strain: Low Risk     Difficulty of Paying Living Expenses: Not hard at all   Food Insecurity: No Food Insecurity    Worried About Running Out of Food in the Last Year: Never true    Ran Out of Food in the Last Year: Never true   Transportation Needs: Not on file   Physical Activity: Unknown    Days of Exercise per Week: 0 days    Minutes of Exercise per Session: Not on file   Stress: Not on file   Social Connections: Not on file   Intimate Partner Violence: Not on file   Housing Stability: Not on file       Allergies:  No Known Allergies    Home Medications:  Prior to Admission medications    Medication Sig Start Date End Date Taking? Authorizing Provider   aspirin 81 MG EC tablet Take 81 mg by mouth daily   Yes Historical Provider, MD   losartan (COZAAR) 100 MG tablet Take 1 tablet by mouth daily 6/2/22  Yes Asher Mcallister, DO   metoprolol succinate (TOPROL XL) 50 MG extended release tablet Take 1 tablet by mouth daily 6/2/22  Yes Asher Mcallister DO   furosemide (LASIX) 20 MG tablet Take 1 tablet by mouth daily 6/2/22  Yes Asher Mcallister DO   clopidogrel (PLAVIX) 75 MG tablet Take 1 tablet by mouth daily 6/2/22  Yes Asher Mcallister DO   spironolactone (ALDACTONE) 25 MG tablet Take 1 tablet by mouth daily 6/2/22  Yes Asher Mcallister DO   glipiZIDE (GLUCOTROL) 5 mg tablet Take 1 tablet by mouth 2 times daily (before meals)  Patient taking differently: Take 5 mg by mouth 2 times daily (before meals) Take 2 tablets by mouth two times a day. 3    aspirin 325 MG tablet Take 325 mg by mouth daily       Current Facility-Administered Medications   Medication Dose Route Frequency Provider Last Rate Last Admin    perflutren lipid microspheres (DEFINITY) injection 1.65 mg  1.5 mL IntraVENous ONCE PRN Js Beckett MD                 Review of Systems:   Cardiac: As per HPI  General: Denies fever or chills  Pulmonary: As per HPI  HEENT: Denies runny nose  GI: No complaints  : No complaints  Endocrine: Denies night sweats  Musculoskeletal: No complaints  Skin: Dry skin  Neuro: No complaints  Psych: Denies depression    Physical Exam:  /74   Pulse 78   Resp 16   Ht 6' (1.829 m)   Wt 300 lb (136.1 kg)   BMI 40.69 kg/m²   Wt Readings from Last 3 Encounters:   07/13/22 300 lb (136.1 kg)   06/02/22 294 lb 3.2 oz (133.4 kg)   05/27/22 298 lb (135.2 kg)       Appearance: Alert and oriented x3 not in acute distress. Skin: Dry skin  Head: Atraumatic  Eyes: Intact extraocular muscles   ENMT: Mucous membranes are moist  Neck: Supple  Lungs: Clear to auscultation  Cardiac: Normal S1 and S2  Abdomen: Protuberant  Extremities: Intact range of motion  Neurologic: No focal neurological deficits  Peripheral Pulses: 2+ peripheral pulses      Laboratory Tests:  No results for input(s): NA, K, CL, CO2, BUN, CREATININE, GLUCOSE, CALCIUM in the last 72 hours. Lab Results   Component Value Date/Time    MG 1.6 05/25/2022 09:39 AM     No results for input(s): ALKPHOS, ALT, AST, PROT, BILITOT, BILIDIR, LABALBU in the last 72 hours. No results for input(s): WBC, RBC, HGB, HCT, MCV, MCH, MCHC, RDW, PLT, MPV in the last 72 hours. No results found for: CKTOTAL, CKMB, CKMBINDEX, TROPONINI  No results for input(s): CKTOTAL, CKMB, CKMBINDEX, TROPHS in the last 72 hours.   No results found for: INR, PROTIME  Lab Results   Component Value Date    TSH 2.260 05/25/2022    TSH 2.550 05/17/2022    TSH 2.208 04/30/2013     Lab Results   Component Value Date    LABA1C 6.5 (H) 05/24/2022    LABA1C 6.7 (H) 05/17/2022    LABA1C 5.9 (H) 08/30/2021     No results found for: EAG  Lab Results   Component Value Date    CHOL 129 05/25/2022    CHOL 142 05/17/2022    CHOL 137 08/30/2021     Lab Results   Component Value Date    TRIG 137 05/25/2022    TRIG 106 05/17/2022    TRIG 96 08/30/2021     Lab Results   Component Value Date    HDL 55 05/25/2022    HDL 54 05/17/2022    HDL 48 08/30/2021     Lab Results   Component Value Date    LDLCALC 47 05/25/2022    LDLCALC 67 05/17/2022    LDLCALC 70 08/30/2021     Lab Results   Component Value Date    LABVLDL 27 05/25/2022    LABVLDL 21 05/17/2022    LABVLDL 19 08/30/2021     No results found for: CHOLHDLRATIO  No results for input(s): PROBNP in the last 72 hours. Cardiac Tests:  EKG reviewed (EKG date: Sinus rhythm, left bundle branch block, 78 bpm nonspecific ST-T wave changes):      Echocardiogram reviewed: May 2022    Echocardiogram reviewed: May 2022  Summary   Technically difficult examination. Mild asymmetric septal hypertrophy. There is doppler evidence of stage I diastolic dysfunction. Dilated apex with hypokinetic wall motion   Ejection fraction is visually estimated at 30 to 35%. Normal right ventricular size and function. Mild mitral regurgitation is present. Mild tricuspid regurgitation. Stress test reviewed:       Cardiac catheterization reviewed:    CONCLUSION:  1. Coronary artery disease. a. Left main:  No significant disease. b.  LAD:  No significant disease. c.  LCX:  70% to 80% proximal stenosis of the first marginal branch. 30% to 40% distal left circumflex disease. The left circumflex is a  codominant vessel. d.  RCA:  Moderate size codominant vessel with no significant  angiographic disease. 2.  Successful balloon angioplasty with the deployment of a drug-eluting  coronary stent to the obtuse marginal branch of the left circumflex with  very good results. Technically difficult examination.    Mild asymmetric septal hypertrophy. There is doppler evidence of stage I diastolic dysfunction. Dilated apex with hypokinetic wall motion   Ejection fraction is visually estimated at 30 to 35%. Normal right ventricular size and function. Mild mitral regurgitation is present. Mild tricuspid regurgitation. CXR reviewed: The ASCVD Risk score (Peggy Rush., et al., 2013) failed to calculate for the following reasons: The valid total cholesterol range is 130 to 320 mg/dL      ASSESSMENT / PLAN:  1. CAD: S/P PCI to OM. ASA/plavix/BB/statin/nitrate. 2.  chronic systolic CHF:  Currently in NYHA class I   BB/ARB/nitrate\Aldactone  He declined LifeVest and Entresto    Heart failure with depressed systolic function with EF noted to be 30 to 35%  Echocardiogram to be done first week of September and then follow-up in September prior to moving to [place]    3. H/o atrial fibrillation/flutter status post ablation 12 years ago now will palpitations:  Has had no recurrent according to patient and not on anticoagulation but now with palpitations  Am going to place a Zio patch        4. LBBB  Noted to be chronic    5. HTN: Observe. Stable    6. HLP  Continue statin therapy    7. DM   Follow-up with your PCP    8. Chronic kidney disease    Follow-up with your PCP  9. Morbid obesity   Weight loss is recommended    10. Tobacco abuse  Smoking cessation is recommended         Follow in Return in about 6 weeks (around 8/24/2022). Thank you for allowing me to participate in your patient's care. Please feel free to contact me if you have any questions or concerns.     Remy Keenan MD  [facility]) Cardiology

## 2022-07-14 ENCOUNTER — NURSE ONLY (OUTPATIENT)
Dept: CARDIOLOGY CLINIC | Age: 72
End: 2022-07-14

## 2022-07-14 NOTE — PROGRESS NOTES
Patient seen in the office for the placement of a 14 day ZIo monitor per Dr. Chirag Mohan.  Monitor # [id number]

## 2022-08-03 DIAGNOSIS — I48.0 PAROXYSMAL ATRIAL FIBRILLATION (HCC): ICD-10-CM

## 2022-08-22 DIAGNOSIS — I42.9 CARDIOMYOPATHY, UNSPECIFIED TYPE (HCC): ICD-10-CM

## 2022-08-22 DIAGNOSIS — I25.10 CAD IN NATIVE ARTERY: ICD-10-CM

## 2022-08-22 RX ORDER — SPIRONOLACTONE 25 MG/1
TABLET ORAL
Qty: 90 TABLET | Refills: 1 | OUTPATIENT
Start: 2022-08-22

## 2022-08-22 RX ORDER — LOSARTAN POTASSIUM 100 MG/1
TABLET ORAL
Qty: 90 TABLET | Refills: 1 | OUTPATIENT
Start: 2022-08-22

## 2022-08-22 RX ORDER — CLOPIDOGREL BISULFATE 75 MG/1
TABLET ORAL
Qty: 90 TABLET | Refills: 1 | OUTPATIENT
Start: 2022-08-22

## 2022-08-22 RX ORDER — METOPROLOL SUCCINATE 50 MG/1
TABLET, EXTENDED RELEASE ORAL
Qty: 90 TABLET | Refills: 1 | OUTPATIENT
Start: 2022-08-22

## 2022-08-23 ENCOUNTER — HOSPITAL ENCOUNTER (OUTPATIENT)
Age: 72
Discharge: HOME | End: 2022-08-25
Payer: MEDICARE

## 2022-08-23 ENCOUNTER — OFFICE VISIT (OUTPATIENT)
Dept: PRIMARY CARE CLINIC | Age: 72
End: 2022-08-23
Payer: MEDICARE

## 2022-08-23 ENCOUNTER — HOSPITAL ENCOUNTER (OUTPATIENT)
Dept: GENERAL RADIOLOGY | Age: 72
Discharge: HOME | End: 2022-08-25
Payer: MEDICARE

## 2022-08-23 VITALS
OXYGEN SATURATION: 97 % | WEIGHT: 306 LBS | HEIGHT: 72 IN | HEART RATE: 78 BPM | BODY MASS INDEX: 41.45 KG/M2 | SYSTOLIC BLOOD PRESSURE: 128 MMHG | TEMPERATURE: 97.3 F | DIASTOLIC BLOOD PRESSURE: 78 MMHG

## 2022-08-23 DIAGNOSIS — G89.29 CHRONIC BILATERAL LOW BACK PAIN WITH LEFT-SIDED SCIATICA: ICD-10-CM

## 2022-08-23 DIAGNOSIS — Z23 NEED FOR TDAP VACCINATION: ICD-10-CM

## 2022-08-23 DIAGNOSIS — E11.9 TYPE 2 DIABETES MELLITUS WITHOUT COMPLICATION, WITHOUT LONG-TERM CURRENT USE OF INSULIN (HCC): Primary | ICD-10-CM

## 2022-08-23 DIAGNOSIS — M54.42 CHRONIC BILATERAL LOW BACK PAIN WITH LEFT-SIDED SCIATICA: ICD-10-CM

## 2022-08-23 LAB — HBA1C MFR BLD: 6.4 %

## 2022-08-23 PROCEDURE — 99213 OFFICE O/P EST LOW 20 MIN: CPT | Performed by: FAMILY MEDICINE

## 2022-08-23 PROCEDURE — 1123F ACP DISCUSS/DSCN MKR DOCD: CPT | Performed by: FAMILY MEDICINE

## 2022-08-23 PROCEDURE — G8427 DOCREV CUR MEDS BY ELIG CLIN: HCPCS | Performed by: FAMILY MEDICINE

## 2022-08-23 PROCEDURE — 2022F DILAT RTA XM EVC RTNOPTHY: CPT | Performed by: FAMILY MEDICINE

## 2022-08-23 PROCEDURE — G8417 CALC BMI ABV UP PARAM F/U: HCPCS | Performed by: FAMILY MEDICINE

## 2022-08-23 PROCEDURE — 83036 HEMOGLOBIN GLYCOSYLATED A1C: CPT | Performed by: FAMILY MEDICINE

## 2022-08-23 PROCEDURE — 4004F PT TOBACCO SCREEN RCVD TLK: CPT | Performed by: FAMILY MEDICINE

## 2022-08-23 PROCEDURE — 72100 X-RAY EXAM L-S SPINE 2/3 VWS: CPT

## 2022-08-23 PROCEDURE — 3044F HG A1C LEVEL LT 7.0%: CPT | Performed by: FAMILY MEDICINE

## 2022-08-23 PROCEDURE — 3017F COLORECTAL CA SCREEN DOC REV: CPT | Performed by: FAMILY MEDICINE

## 2022-08-23 PROCEDURE — 90471 IMMUNIZATION ADMIN: CPT | Performed by: FAMILY MEDICINE

## 2022-08-23 PROCEDURE — 90715 TDAP VACCINE 7 YRS/> IM: CPT | Performed by: FAMILY MEDICINE

## 2022-08-23 NOTE — PROGRESS NOTES
Subjective:  66 y.o. male who presents to the office today with chief complaint:  Chief Complaint   Patient presents with    Diabetes     Hemoglobin A1c in office is 6.4    Lower Back Pain     C/o lower back and b/l hip pain has trouble getting moving in am. Rate 10/10. Has been going on for quit some time     Chronic LBP: Worsening. Not taking any medication for it at this time. Causing difficulty with functional mobility. Very stiff first thing in AM. Uses heat/cold with minimal improvement. Chiropractic care not successful. Has not had epidurals in several years- completed in [place] and in [facility]- uncertain names of either of these physicians. Pain currently 10/10. DMII: Currently on metformin 1000mg BID, glipizide. Does not check sugars. Not following diabetic diet. Has gained significant amount of weight over the summer due to poor diet. He states that he plans on eating better in the future. CAD: s/p Stent. Will have echocardiogram on 8/25/22. Will follow up w Dr. Florinda Mark following this. Denies chest pain. Some dyspnea remains. Denies symptoms of bleeding from DAPT. Health Maintenance Due   Topic Date Due    Diabetic foot exam  Never done    Diabetic retinal exam  Never done    DTaP/Tdap/Td vaccine (1 - Tdap) Never done    Shingles vaccine (1 of 2) Never done    Pneumococcal 65+ years Vaccine (2 - PCV) 04/16/2019    COVID-19 Vaccine (3 - Booster for Moderna series) 10/06/2021    Diabetic microalbuminuria test  05/07/2022       Cancer History:  Family Cancer History:    Review of Systems    Review of Systems: All bolded are positive, all others are negative. General:  Fever, chills, diaphoresis, fatigue, malaise, night sweats, weight loss  Psychological:  Anxiety, disorientation, hallucinations. ENT:  Epistaxis, headaches, vertigo, visual changes. Cardiovascular:  Chest pain, irregular heartbeats, palpitations, paroxysmal nocturnal dyspnea.   Respiratory:  Shortness of breath, coughing, sputum production, hemoptysis, wheezing, orthopnea. Gastrointestinal:  Nausea, vomiting, diarrhea, heartburn, constipation, abdominal pain, hematemesis, hematochezia, melena, acholic stools  Genito-Urinary:  Dysuria, urgency, frequency, hematuria  Musculoskeletal:  Joint pain, joint stiffness, joint swelling, muscle pain  Neurology:  Headache, focal neurological deficits, weakness, numbness, paresthesia  Derm:  Rashes, ulcers, excoriations, bruising  Extremities:  Decreased ROM, peripheral edema, mottling      Objective:  Vitals:    08/23/22 0728   BP: 128/78   Pulse: 78   Temp: 97.3 °F (36.3 °C)   SpO2: 97%     Physical Exam  Constitutional:       General: He is not in acute distress. Appearance: He is well-developed. He is obese. He is not diaphoretic. HENT:      Head: Normocephalic and atraumatic. Right Ear: External ear normal.      Left Ear: External ear normal.      Nose: Nose normal.      Mouth/Throat:      Pharynx: No oropharyngeal exudate. Eyes:      General:         Right eye: No discharge. Left eye: No discharge. Conjunctiva/sclera: Conjunctivae normal.      Pupils: Pupils are equal, round, and reactive to light. Cardiovascular:      Rate and Rhythm: Normal rate and regular rhythm. Pulses: Normal pulses. Dorsalis pedis pulses are 2+ on the right side and 2+ on the left side. Posterior tibial pulses are 2+ on the right side and 2+ on the left side. Heart sounds: Normal heart sounds. No murmur heard. No friction rub. No gallop. Pulmonary:      Effort: Pulmonary effort is normal. No respiratory distress. Breath sounds: Normal breath sounds. No wheezing or rales. Abdominal:      General: Bowel sounds are normal. There is no distension. Palpations: Abdomen is soft. Tenderness: There is no abdominal tenderness. There is no guarding or rebound. Musculoskeletal:      Cervical back: Normal range of motion and neck supple.       Right foot: Normal range of motion. No deformity, bunion, Charcot foot or foot drop. Left foot: Normal range of motion. No deformity, bunion, Charcot foot or foot drop. Comments: Tender R PSIS  Full ROM Lumbar spine   Feet:      Right foot:      Protective Sensation: 7 sites tested. 7 sites sensed. Skin integrity: Dry skin present. No ulcer, blister, skin breakdown, erythema or callus. Toenail Condition: Right toenails are abnormally thick. Left foot:      Protective Sensation: 7 sites tested. 7 sites sensed. Skin integrity: Dry skin present. No ulcer, blister, skin breakdown, erythema or callus. Toenail Condition: Left toenails are abnormally thick. Lymphadenopathy:      Cervical: No cervical adenopathy. Skin:     General: Skin is warm. Neurological:      Mental Status: He is alert and oriented to person, place, and time. Comments: 5/5 strength B LE's all myotomes. Sensation intact B LE's   Psychiatric:         Mood and Affect: Mood normal.         Behavior: Behavior normal.         Thought Content: Thought content normal.         Judgment: Judgment normal.       Results for orders placed or performed in visit on 08/23/22   POCT glycosylated hemoglobin (Hb A1C)   Result Value Ref Range    Hemoglobin A1C 6.4 %         Assessment and Plan:  Kiley Patterson was seen today for diabetes and lower back pain. Diagnoses and all orders for this visit:    Type 2 diabetes mellitus without complication, without long-term current use of insulin ([facility])  -     POCT glycosylated hemoglobin (Hb A1C)        Chronic LBP: Referral to Dr. Adonay Dunham sent. X-ray lumbar spine ordered. DMII: Continue current regimen. A1c in good range despite poor diet. Recommended dietary and lifestyle changes. Diabetic foot exam completed. CAD: s/p Stent. Continue to follow cardiology. No follow-ups on file. Patient may come in sooner if needed for medical concerns.      Patient advised to call at any time to cancel, re-schedule, or for any questions/concerns.       Mary Mcallister DO    8/23/22  7:42 AM

## 2022-08-25 ENCOUNTER — HOSPITAL ENCOUNTER (OUTPATIENT)
Dept: CARDIOLOGY | Age: 72
Discharge: HOME | End: 2022-08-25
Payer: MEDICARE

## 2022-08-25 DIAGNOSIS — I25.10 CAD IN NATIVE ARTERY: ICD-10-CM

## 2022-08-25 LAB
LV EF: 33 %
LVEF MODALITY: NORMAL

## 2022-08-25 PROCEDURE — 6360000004 HC RX CONTRAST MEDICATION: Performed by: INTERNAL MEDICINE

## 2022-08-25 PROCEDURE — C8929 TTE W OR WO FOL WCON,DOPPLER: HCPCS

## 2022-08-25 PROCEDURE — 2580000003 HC RX 258: Performed by: INTERNAL MEDICINE

## 2022-08-25 RX ORDER — SODIUM CHLORIDE 0.9 % (FLUSH) 0.9 %
10 SYRINGE (ML) INJECTION PRN
Status: DISCONTINUED | OUTPATIENT
Start: 2022-08-25 | End: 2022-08-26 | Stop reason: HOSPADM

## 2022-08-25 RX ADMIN — PERFLUTREN 1.1 MG: 6.52 INJECTION, SUSPENSION INTRAVENOUS at 08:31

## 2022-08-25 RX ADMIN — SODIUM CHLORIDE, PRESERVATIVE FREE 10 ML: 5 INJECTION INTRAVENOUS at 08:31

## 2022-08-25 RX ADMIN — SODIUM CHLORIDE, PRESERVATIVE FREE 10 ML: 5 INJECTION INTRAVENOUS at 08:41

## 2022-08-31 ENCOUNTER — TELEPHONE (OUTPATIENT)
Dept: CARDIOLOGY CLINIC | Age: 72
End: 2022-08-31

## 2022-08-31 NOTE — TELEPHONE ENCOUNTER
Contacted patient with results per Dr. Yana Perez. Patient verbalized understanding. Appointment scheduled.

## 2022-08-31 NOTE — TELEPHONE ENCOUNTER
----- Message from Marck Jara MD sent at 8/30/2022 10:56 AM EDT -----  Please notify pt that there was No dangerous abnormal heart rhythm or arrhythmias noted. Details of heart monitor will be discussed during follow up visit. Call if there are symptoms for earlier visit.

## 2022-08-31 NOTE — TELEPHONE ENCOUNTER
----- Message from Paris Moreno MD sent at 8/30/2022 10:36 AM EDT -----  Heart function is moderately reduced. Details were discussed during follow-up visit.

## 2022-09-07 ENCOUNTER — OFFICE VISIT (OUTPATIENT)
Dept: PAIN MANAGEMENT | Age: 72
End: 2022-09-07
Payer: MEDICARE

## 2022-09-07 VITALS
SYSTOLIC BLOOD PRESSURE: 148 MMHG | OXYGEN SATURATION: 98 % | TEMPERATURE: 97.5 F | WEIGHT: 300 LBS | HEIGHT: 72 IN | DIASTOLIC BLOOD PRESSURE: 82 MMHG | HEART RATE: 70 BPM | BODY MASS INDEX: 40.63 KG/M2

## 2022-09-07 DIAGNOSIS — M47.816 LUMBAR SPONDYLOSIS: ICD-10-CM

## 2022-09-07 DIAGNOSIS — M51.9 LUMBAR DISC DISORDER: ICD-10-CM

## 2022-09-07 DIAGNOSIS — M48.061 SPINAL STENOSIS OF LUMBAR REGION WITHOUT NEUROGENIC CLAUDICATION: ICD-10-CM

## 2022-09-07 DIAGNOSIS — M47.816 LUMBAR FACET ARTHROPATHY: ICD-10-CM

## 2022-09-07 DIAGNOSIS — G89.4 CHRONIC PAIN SYNDROME: Primary | ICD-10-CM

## 2022-09-07 PROCEDURE — G8417 CALC BMI ABV UP PARAM F/U: HCPCS | Performed by: PAIN MEDICINE

## 2022-09-07 PROCEDURE — 99213 OFFICE O/P EST LOW 20 MIN: CPT | Performed by: PAIN MEDICINE

## 2022-09-07 PROCEDURE — 4004F PT TOBACCO SCREEN RCVD TLK: CPT | Performed by: PAIN MEDICINE

## 2022-09-07 PROCEDURE — 99204 OFFICE O/P NEW MOD 45 MIN: CPT | Performed by: PAIN MEDICINE

## 2022-09-07 PROCEDURE — 1123F ACP DISCUSS/DSCN MKR DOCD: CPT | Performed by: PAIN MEDICINE

## 2022-09-07 PROCEDURE — 3017F COLORECTAL CA SCREEN DOC REV: CPT | Performed by: PAIN MEDICINE

## 2022-09-07 PROCEDURE — G8427 DOCREV CUR MEDS BY ELIG CLIN: HCPCS | Performed by: PAIN MEDICINE

## 2022-09-07 NOTE — PROGRESS NOTES
Do you currently have any of the following:    Fever: No  Headache:  No  Cough: No  Shortness of breath: No  Exposed to anyone with these symptoms: No                                                                                                                Keerthi Sensor presents to the [facility] on 9/7/2022. Audie Lozada is complaining of pain in low back and hips. The pain is constant. The pain is described as aching and burning. Pain is rated on his best day at a 4, on his worst day at a 10, and on average at a 6 on the VAS scale. He has not taken any pain medication for sometime due to ulcers. Audie Lozada does not have issues with constipation. Any procedures since your last visit: No.    He is  on NSAIDS and  is  on anticoagulation medications to include ASA and Plavix and is managed by Kt Fry DO  . Pacemaker or defibrillator: No.    Medication Contract and Consent for Opioid Use Documents Filed        No documents found                       Temp 97.5 °F (36.4 °C) (Infrared)   Ht 6' (1.829 m)   Wt 300 lb (136.1 kg)   BMI 40.69 kg/m²      No LMP for male patient.

## 2022-09-07 NOTE — PROGRESS NOTES
[address]        [address] [facility], [address] [facility]      [phone]          Consult Note      Patient:  Matite Gannon, ROSIBEL 1950    Date of Service:  22    Requesting Physician:  Raymond Pearson, *    Reason for Consult:      Patient presents with complaints of low back pain that started a long time ago and has been progressively getting worse. Pain is described as aching/burning/constant. Pain is aggravated by standing/walking. Pain is relieved by sitting. HISTORY OF PRESENT ILLNESS:      Pain does radiate to bilateral thighs (posterior aspect) down to the knee. He  does not have numbness, tingling and does not have bladder or bowel dysfunction. He has been on anticoagulation medications to include ASA and Plavix. The patient  has not been on herbal supplements. The patient is diabetic. Imaging:   Lumbar spine Xray   Degenerative lumbar spondylosis. Previous treatments: Physical Therapy, Epidural Steroid Injection, and medications. .      Opioid Agreement:  Renewal date:N/A    Past Medical History:   Diagnosis Date    Anemia     Atrial flutter ([address]     Aguila's esophagus     Coronary artery disease involving native coronary artery 2022    Diverticulitis     Fatty liver     History of Holter monitoring 2020    Hyperlipidemia     Hypertension     Lipoma     Subcyst    Lumbar spinal stenosis     Osteoarthritis     PUD (peptic ulcer disease)     S/P angioplasty with stent 2022    1st OM    Type 2 diabetes mellitus without complication ([address]      Past Surgical History:   Procedure Laterality Date    ATRIAL ABLATION SURGERY  2012    CARDIAC SURGERY      pt states 6 - 7 years ago     KNEE ARTHROPLASTY Left     LIPOMA RESECTION      UPPER GASTROINTESTINAL ENDOSCOPY  2017    Dr. Binnie Fothergill, Findings: Long segment Barretts, 5cm, Moderate Gastritis, Duodenal bulb/post pyloric channel mass versus severe brunner gland hyperplasia, mild duodenitis distally, biopsies taken    UPPER GASTROINTESTINAL ENDOSCOPY  07/17/2017    Dr. Wynetta Holter, Findings: 5cm segment of Aguila's esiphagitis from 34 to 39 cm with no targeted lesions, Mild gastritis, Stomach mass in the antral pyloric channel area,     Prior to Admission medications    Medication Sig Start Date End Date Taking? Authorizing Provider   aspirin 81 MG EC tablet Take 81 mg by mouth daily   Yes Historical Provider, MD   losartan (COZAAR) 100 MG tablet Take 1 tablet by mouth daily 6/2/22  Yes Asher Mcallister,    metoprolol succinate (TOPROL XL) 50 MG extended release tablet Take 1 tablet by mouth daily 6/2/22  Yes Asher Mcallister DO   furosemide (LASIX) 20 MG tablet Take 1 tablet by mouth daily 6/2/22  Yes Asher Mcallister DO   clopidogrel (PLAVIX) 75 MG tablet Take 1 tablet by mouth daily 6/2/22  Yes Asher Mcallister DO   spironolactone (ALDACTONE) 25 MG tablet Take 1 tablet by mouth daily 6/2/22  Yes Asher Mcallister DO   glipiZIDE (GLUCOTROL) 5 mg tablet Take 1 tablet by mouth 2 times daily (before meals)  Patient taking differently: Take 5 mg by mouth 2 times daily (before meals) Take 2 tablets by mouth two times a day.  5/28/22  Yes JEROME Houston NP   metFORMIN (GLUCOPHAGE) 1000 MG tablet Take 1 tablet by mouth 2 times daily (with meals) 5/28/22  Yes JEORME Houston NP   omeprazole (PRILOSEC) 40 MG delayed release capsule Take 1 capsule by mouth daily 5/28/22  Yes JEROME Houston NP   tamsulosin [facility]) 0.4 mg capsule Take 1 capsule by mouth daily 5/28/22  Yes Chandler Abebe,    atorvastatin (LIPITOR) 20 MG tablet Take 1 tablet by mouth daily 5/17/22  Yes Asher Mcallister DO   aspirin 325 MG tablet Take 325 mg by mouth daily    Historical Provider, MD     No Known Allergies    Social History     Socioeconomic History    Marital status:      Spouse name: Not on file    Number of children: Not on file Years of education: Not on file    Highest education level: Not on file   Occupational History    Not on file   Tobacco Use    Smoking status: Some Days     Types: Cigarettes, Cigars    Smokeless tobacco: Never    Tobacco comments:     occassional cigar    Vaping Use    Vaping Use: Never used   Substance and Sexual Activity    Alcohol use: No    Drug use: No    Sexual activity: Not on file   Other Topics Concern    Not on file   Social History Narrative    Not on file     Social Determinants of Health     Financial Resource Strain: Low Risk     Difficulty of Paying Living Expenses: Not hard at all   Food Insecurity: No Food Insecurity    Worried About Running Out of Food in the Last Year: Never true    Ran Out of Food in the Last Year: Never true   Transportation Needs: Not on file   Physical Activity: Unknown    Days of Exercise per Week: 0 days    Minutes of Exercise per Session: Not on file   Stress: Not on file   Social Connections: Not on file   Intimate Partner Violence: Not on file   Housing Stability: Not on file     Family History   Problem Relation Age of Onset    Cancer Mother     Hypertension Mother     Cancer Father      REVIEW OF SYSTEMS:     Patient specifically denies fever/chills, chest pain, shortness of breath, new bowel or bladder complaints. All other review of systems was negative. PHYSICAL EXAMINATION:      BP (!) 148/82   Pulse 70   Temp 97.5 °F (36.4 °C) (Infrared)   Ht 6' (1.829 m)   Wt 300 lb (136.1 kg)   SpO2 98%   BMI 40.69 kg/m²     General:      General appearance:   elderly, pleasant, and well-hydrated.    , in moderate discomfort and A & O x3  Build:Overweight    HEENT:    Head:normocephalic and atraumatic  Sclera: icterus absent,     Lungs:    Breathing:Breathing Pattern: regular, no distress    Abdomen:    Shape:obese, non-distended, and normal  Tenderness:none    Lumbar spine:    Spine inspection:scoliosis   CVA tenderness:No   Palpation:tenderness paravertebral muscles, facet loading, left, right, positive, and tenderness. Range of motion:abnormal moderately Lateral bending, flexion, extension rotation bilateral and is  painful. Musculoskeletal:    Trigger points in Paraveteral:absent bilaterally  SI joint tenderness:negative right, negative left              DWAYNE test:not done right, not done             left  Piriformis tenderness:negative right, negative left  Trochanteric bursa tenderness:negative right, negative left  SLR:negative right, negative left, sitting     Extremities:    Tremors:None bilaterally upper and lower  Range of motion: pain with internal rotation of hips negative. Intact:Yes  Edema:Normal    Neurological:    Sensory:normal to light touch bilateral lower extremities. Motor:                       Right Quadriceps5/5          Left Quadriceps5/5           Right Gastrocnemius5/5    Left Gastrocnemius5/5  Right Ant Tibialis5/5  Left Ant Tibialis5/5  Reflexes:    Right Quadriceps reflex2+  Left Quadriceps reflex2+  Right Achilles reflex0  Left Achilles reflex0  Gait:antalgic  Right leg is shorter than left    Dermatology:    Skin:no unusual rashes and no skin lesions    Impression:  Low back pain with radiation to bilateral thighs  Plan:  Degenerative spinal stenosis. Will schedule patient for lumbar spine MRI(open MRI). Avoid NSAIDs(h/o bleeding ulcer)  Cancel urine screen. [place]AR[facility] report reviewed 09/2022. Patient encouraged to stay active and to lose weight  Treatment plan discussed with the patient. We discussed with the patient that combining opioids, benzodiazepines, alcohol, illicit drugs or sleep aids increases the risk of respiratory depression including death. We discussed that these medications may cause drowsiness, sedation or dizziness and have counseled the patient not to drive or operate machinery. We have discussed that these medications will be prescribed only by one provider.  We have discussed with the patient about age related risk factors and have thoroughly discussed the importance of taking these medications as prescribed. The patient verbalizes understanding. curtis Granado M.D.

## 2022-09-21 ENCOUNTER — OFFICE VISIT (OUTPATIENT)
Dept: CARDIOLOGY CLINIC | Age: 72
End: 2022-09-21
Payer: MEDICARE

## 2022-09-21 VITALS
WEIGHT: 304.2 LBS | BODY MASS INDEX: 41.2 KG/M2 | RESPIRATION RATE: 18 BRPM | SYSTOLIC BLOOD PRESSURE: 140 MMHG | HEIGHT: 72 IN | DIASTOLIC BLOOD PRESSURE: 90 MMHG | HEART RATE: 80 BPM

## 2022-09-21 DIAGNOSIS — I48.19 PERSISTENT ATRIAL FIBRILLATION (HCC): ICD-10-CM

## 2022-09-21 DIAGNOSIS — I25.10 CAD IN NATIVE ARTERY: Primary | ICD-10-CM

## 2022-09-21 DIAGNOSIS — I25.5 ISCHEMIC CARDIOMYOPATHY: Primary | ICD-10-CM

## 2022-09-21 DIAGNOSIS — K22.70 BARRETT'S ESOPHAGUS WITHOUT DYSPLASIA: ICD-10-CM

## 2022-09-21 DIAGNOSIS — I50.22 CHRONIC SYSTOLIC CONGESTIVE HEART FAILURE (HCC): ICD-10-CM

## 2022-09-21 PROCEDURE — 1123F ACP DISCUSS/DSCN MKR DOCD: CPT | Performed by: INTERNAL MEDICINE

## 2022-09-21 PROCEDURE — 4004F PT TOBACCO SCREEN RCVD TLK: CPT | Performed by: INTERNAL MEDICINE

## 2022-09-21 PROCEDURE — 3017F COLORECTAL CA SCREEN DOC REV: CPT | Performed by: INTERNAL MEDICINE

## 2022-09-21 PROCEDURE — 99214 OFFICE O/P EST MOD 30 MIN: CPT | Performed by: INTERNAL MEDICINE

## 2022-09-21 PROCEDURE — G8417 CALC BMI ABV UP PARAM F/U: HCPCS | Performed by: INTERNAL MEDICINE

## 2022-09-21 PROCEDURE — 93000 ELECTROCARDIOGRAM COMPLETE: CPT | Performed by: INTERNAL MEDICINE

## 2022-09-21 PROCEDURE — G8427 DOCREV CUR MEDS BY ELIG CLIN: HCPCS | Performed by: INTERNAL MEDICINE

## 2022-09-21 RX ORDER — ISOSORBIDE MONONITRATE 30 MG/1
30 TABLET, EXTENDED RELEASE ORAL DAILY
Qty: 30 TABLET | Refills: 1 | Status: SHIPPED | OUTPATIENT
Start: 2022-09-21

## 2022-09-21 RX ORDER — ISOSORBIDE MONONITRATE 30 MG/1
30 TABLET, EXTENDED RELEASE ORAL DAILY
Qty: 30 TABLET | Refills: 1 | Status: SHIPPED
Start: 2022-09-21 | End: 2022-09-21 | Stop reason: SDUPTHER

## 2022-09-21 NOTE — PROGRESS NOTES
dme    Out Patient CARDIOLOGY Follow Up Visit    Name: Jen Mcneil    Age: 66 y.o. Date of Admission: No admission date for patient encounter. Date of Service: 9/21/2022    Reason for Consultation:   Chief Complaint   Patient presents with    Coronary Artery Disease     6 month OV-Patient has no complaints. Referring Physician: No admitting provider for patient encounter. History of Present Illness: 19-year-old male with history of Aguila esophagus, duodenal mass, coronary artery disease status post OM stent in May 2022, chronic left bundle branch block, hypertension, obesity, hyperlipidemia, diabetes, tobacco abuse, chronic kidney disease who was hospitalized in May 2022 with chest pain and dyspnea on exertion and echocardiogram revealed EF of 30 to 35%. He underwent invasive coronary angiogram and found to have significant OM disease requiring drug-eluting stent. He presented for follow-up visit today and report doing well and denies any symptoms. He declined LifeVest and also declined Entresto. However today he report he will be interested in LifeVest and subsequently going to arrange. Repeat echocardiogram shows no improvement in systolic function. Since he has left bundle branch block I am going to refer the patient to EP to discuss device therapy. In addition given that his degree of obstructive coronary artery disease does not appear to be enough to explain the degree of systolic dysfunction, I am going to arrange for cardiac MRI to be done at the [facility] for upgrade systolic function evaluation and accurate EF assessment as well as to rule out infiltrative cardiac disease. Jardiance and Imdur were added. He still declined Entresto. Echocardiogram reviewed: May 2022  Summary   Technically difficult examination. Mild asymmetric septal hypertrophy. There is doppler evidence of stage I diastolic dysfunction.    Dilated apex with hypokinetic wall motion   Ejection fraction is visually estimated at 30 to 35%. Normal right ventricular size and function. Mild mitral regurgitation is present. Mild tricuspid regurgitation. Stress test reviewed:       Cardiac catheterization reviewed:    CONCLUSION:  1. Coronary artery disease. a. Left main:  No significant disease. b.  LAD:  No significant disease. c.  LCX:  70% to 80% proximal stenosis of the first marginal branch. 30% to 40% distal left circumflex disease. The left circumflex is a  codominant vessel. d.  RCA:  Moderate size codominant vessel with no significant  angiographic disease. 2.  Successful balloon angioplasty with the deployment of a drug-eluting  coronary stent to the obtuse marginal branch of the left circumflex with  very good results.             Past Medical History:  Past Medical History:   Diagnosis Date    Anemia     Atrial flutter (HCC)     Aguila's esophagus     Coronary artery disease involving native coronary artery 05/27/2022    Diverticulitis     Fatty liver     History of Holter monitoring 05/12/2020    Hyperlipidemia     Hypertension     Lipoma     Subcyst    Lumbar spinal stenosis     Osteoarthritis     PUD (peptic ulcer disease)     S/P angioplasty with stent 05/27/2022    1st OM    Type 2 diabetes mellitus without complication ([facility] [address]        Past Surgical History:  Past Surgical History:   Procedure Laterality Date    ATRIAL ABLATION SURGERY  2012    CARDIAC SURGERY      pt states 6 - 7 years ago     KNEE ARTHROPLASTY Left     LIPOMA RESECTION      UPPER GASTROINTESTINAL ENDOSCOPY  03/27/2017    Dr. Jaida Ibrahim, Findings: Long segment Barretts, 5cm, Moderate Gastritis, Duodenal bulb/post pyloric channel mass versus severe brunner gland hyperplasia, mild duodenitis distally, biopsies taken    UPPER GASTROINTESTINAL ENDOSCOPY  07/17/2017    Dr. Jaida Ibrahim, Findings: 5cm segment of Aguila's esiphagitis from 34 to 39 cm with no targeted lesions, Mild gastritis, Stomach mass in the antral pyloric channel area,       Family History:  Family History   Problem Relation Age of Onset    Cancer Mother     Hypertension Mother     Cancer Father        Social History:  Social History     Socioeconomic History    Marital status:      Spouse name: Not on file    Number of children: Not on file    Years of education: Not on file    Highest education level: Not on file   Occupational History    Not on file   Tobacco Use    Smoking status: Some Days     Types: Cigars    Smokeless tobacco: Never    Tobacco comments:     occassional cigar    Vaping Use    Vaping Use: Never used   Substance and Sexual Activity    Alcohol use: No    Drug use: No    Sexual activity: Not on file   Other Topics Concern    Not on file   Social History Narrative    Not on file     Social Determinants of Health     Financial Resource Strain: Low Risk     Difficulty of Paying Living Expenses: Not hard at all   Food Insecurity: No Food Insecurity    Worried About Running Out of Food in the Last Year: Never true    Ran Out of Food in the Last Year: Never true   Transportation Needs: Not on file   Physical Activity: Unknown    Days of Exercise per Week: 0 days    Minutes of Exercise per Session: Not on file   Stress: Not on file   Social Connections: Not on file   Intimate Partner Violence: Not on file   Housing Stability: Not on file       Allergies:  No Known Allergies    Home Medications:  Prior to Admission medications    Medication Sig Start Date End Date Taking?  Authorizing Provider   Multiple Vitamin (MULTI-VITAMIN DAILY PO) Take by mouth daily   Yes Historical Provider, MD   isosorbide mononitrate (IMDUR) 30 MG extended release tablet Take 1 tablet by mouth daily 9/21/22  Yes Tish Beckett MD   empagliflozin (JARDIANCE) 10 MG tablet Take 1 tablet by mouth daily 9/21/22  Yes Tish Beckett MD   aspirin 81 MG EC tablet Take 81 mg by mouth daily   Yes Historical Provider, MD   losartan (COZAAR) 100 MG tablet Take 1 tablet by mouth daily 6/2/22  Yes Meagan Mcallister, DO   metoprolol succinate (TOPROL XL) 50 MG extended release tablet Take 1 tablet by mouth daily 6/2/22  Yes Asher Mcallister DO   furosemide (LASIX) 20 MG tablet Take 1 tablet by mouth daily 6/2/22  Yes Asher Mcallister DO   clopidogrel (PLAVIX) 75 MG tablet Take 1 tablet by mouth daily 6/2/22  Yes Asher Mcallister DO   spironolactone (ALDACTONE) 25 MG tablet Take 1 tablet by mouth daily 6/2/22  Yes Asher Mcallister DO   glipiZIDE (GLUCOTROL) 5 mg tablet Take 1 tablet by mouth 2 times daily (before meals)  Patient taking differently: Take 5 mg by mouth 2 times daily (before meals) Take 2 tablets by mouth two times a day.  5/28/22  Yes JEROME Ward NP   metFORMIN (GLUCOPHAGE) 1000 MG tablet Take 1 tablet by mouth 2 times daily (with meals) 5/28/22  Yes JEROME Lynne NP   omeprazole (PRILOSEC) 40 MG delayed release capsule Take 1 capsule by mouth daily 5/28/22  Yes JEROME Ward NP   tamsulosin [facility]) 0.4 mg capsule Take 1 capsule by mouth daily 5/28/22  Yes Chandler Abebe,    atorvastatin (LIPITOR) 20 MG tablet Take 1 tablet by mouth daily 5/17/22  Yes Asher Mcallister DO   aspirin 325 MG tablet Take 325 mg by mouth daily    Historical Provider, MD       Current Medications:  Current Outpatient Medications   Medication Sig Dispense Refill    Multiple Vitamin (MULTI-VITAMIN DAILY PO) Take by mouth daily      isosorbide mononitrate (IMDUR) 30 MG extended release tablet Take 1 tablet by mouth daily 30 tablet 1    empagliflozin (JARDIANCE) 10 MG tablet Take 1 tablet by mouth daily 30 tablet 1    aspirin 81 MG EC tablet Take 81 mg by mouth daily      losartan (COZAAR) 100 MG tablet Take 1 tablet by mouth daily 90 tablet 1    metoprolol succinate (TOPROL XL) 50 MG extended release tablet Take 1 tablet by mouth daily 90 tablet 1    furosemide (LASIX) 20 MG tablet Take 1 tablet by mouth daily 90 tablet 1    clopidogrel (PLAVIX) 75 MG tablet Take 1 tablet by mouth daily 90 tablet 1    spironolactone (ALDACTONE) 25 MG tablet Take 1 tablet by mouth daily 90 tablet 1    glipiZIDE (GLUCOTROL) 5 mg tablet Take 1 tablet by mouth 2 times daily (before meals) (Patient taking differently: Take 5 mg by mouth 2 times daily (before meals) Take 2 tablets by mouth two times a day.) 180 tablet 0    metFORMIN (GLUCOPHAGE) 1000 MG tablet Take 1 tablet by mouth 2 times daily (with meals) 180 tablet 0    omeprazole (PRILOSEC) 40 MG delayed release capsule Take 1 capsule by mouth daily 90 capsule 0    tamsulosin (FLOMAX) 0.4 mg capsule Take 1 capsule by mouth daily 30 capsule 3    atorvastatin (LIPITOR) 20 MG tablet Take 1 tablet by mouth daily 90 tablet 1    aspirin 325 MG tablet Take 325 mg by mouth daily       Current Facility-Administered Medications   Medication Dose Route Frequency Provider Last Rate Last Admin    perflutren lipid microspheres (DEFINITY) injection 1.65 mg  1.5 mL IntraVENous ONCE PRN Js Riaz Beckett MD                 Review of Systems:   Cardiac: As per HPI  General: Denies fever or chills  Pulmonary: As per HPI  HEENT: Denies runny nose  GI: No complaints  : No complaints  Endocrine: Denies night sweats  Musculoskeletal: No complaints  Skin: Dry skin  Neuro: No complaints  Psych: Denies depression    Physical Exam:  BP (!) 140/90   Pulse 80   Resp 18   Ht 6' (1.829 m)   Wt (!) 304 lb 3.2 oz (138 kg)   BMI 41.26 kg/m²   Wt Readings from Last 3 Encounters:   09/21/22 (!) 304 lb 3.2 oz (138 kg)   09/07/22 300 lb (136.1 kg)   08/23/22 (!) 306 lb (138.8 kg)       Appearance: Alert and oriented x3 not in acute distress.   Skin: Dry skin  Head: Atraumatic  Eyes: Intact extraocular muscles   ENMT: Mucous membranes are moist  Neck: Supple  Lungs: Clear to auscultation  Cardiac: Normal S1 and S2  Abdomen: Protuberant  Extremities: Intact range of motion  Neurologic: No focal neurological deficits  Peripheral Pulses: 2+ pulmonic regurgitation present. Echocardiogram reviewed: May 2022    Echocardiogram reviewed: May 2022  Summary   Technically difficult examination. Mild asymmetric septal hypertrophy. There is doppler evidence of stage I diastolic dysfunction. Dilated apex with hypokinetic wall motion   Ejection fraction is visually estimated at 30 to 35%. Normal right ventricular size and function. Mild mitral regurgitation is present. Mild tricuspid regurgitation. Stress test reviewed:       Cardiac catheterization reviewed:    CONCLUSION:  1. Coronary artery disease. a. Left main:  No significant disease. b.  LAD:  No significant disease. c.  LCX:  70% to 80% proximal stenosis of the first marginal branch. 30% to 40% distal left circumflex disease. The left circumflex is a  codominant vessel. d.  RCA:  Moderate size codominant vessel with no significant  angiographic disease. 2.  Successful balloon angioplasty with the deployment of a drug-eluting  coronary stent to the obtuse marginal branch of the left circumflex with  very good results. Technically difficult examination. Mild asymmetric septal hypertrophy. There is doppler evidence of stage I diastolic dysfunction. Dilated apex with hypokinetic wall motion   Ejection fraction is visually estimated at 30 to 35%. Normal right ventricular size and function. Mild mitral regurgitation is present. Mild tricuspid regurgitation. Zio patch 7/2022  Patient had a min HR of 52 bpm, max HR of 158 bpm, and avg HR of 80 bpm.  Predominant underlying rhythm was Sinus Rhythm. Bundle Branch Block/IVCD was  present. QRS morphology changes were present throughout recording.  18  Supraventricular Tachycardia runs occurred, the run with the fastest interval lasting  5 beats with a max rate of 158 bpm, the longest lasting 14.9 secs with an avg rate  of 111 bpm. Isolated SVEs were rare (<1.0%), SVE Couplets were rare (<1.0%), and  SVE Triplets were rare (<1.0%). Isolated VEs were rare (<1.0%), and no VE Couplets  or VE Triplets were present. Ventricular Trigeminy was present. Difficulty discerning  atrial activity making definitive diagnosis difficult to ascertain. CXR reviewed: The ASCVD Risk score (Ab Youngblood, et al., 2013) failed to calculate for the following reasons: The valid total cholesterol range is 130 to 320 mg/dL      ASSESSMENT / PLAN:  1. CAD: S/P PCI to OM. ASA/plavix/BB/statin/nitrate. 2.  chronic systolic CHF:  Currently in NYHA class I   BB/ARB/nitrate\Aldactone  Jardiance and Imdur were added  He declined Entresto but now report he will be okay with LifeVest   Office secretory is helping to arrange for a LifeVest.   EF noted to be 30 to 35%  In addition given that his degree of obstructive coronary artery disease does not appear to be enough to explain the degree of systolic dysfunction, I am going to arrange for cardiac MRI to be done at the [facility] clinic for upgrade systolic function evaluation and accurate EF assessment as well as to rule out infiltrative cardiac disease. I am referring the patient to EP to discuss device therapy given depressed EF and left bundle branch block    3. H/o atrial fibrillation/flutter status post ablation 12 years ago  Recent heart Monitor revealed no dangerous arrhythmias or recurrent A. fib or flutter    4. LBBB  Noted to be chronic  I am referring the patient to EP to discuss device therapy given depressed EF and left bundle branch block    5. HTN: Observe. Stable    6. HLP  Continue statin therapy    7. DM   Follow-up with your PCP    8. Chronic kidney disease    Follow-up with your PCP  9. Morbid obesity   Weight loss is recommended    10. Tobacco abuse  Smoking cessation is recommended         Follow in Return in about 3 months (around 12/21/2022). Thank you for allowing me to participate in your patient's care.  Please feel free to contact me if you have any questions or concerns.     Chandrika Esquivel MD  [facility] ([facility]) Cardiology

## 2022-09-22 DIAGNOSIS — I25.5 ISCHEMIC CARDIOMYOPATHY: Primary | ICD-10-CM

## 2022-09-22 DIAGNOSIS — I25.5 ISCHEMIC CARDIOMYOPATHY: ICD-10-CM

## 2022-09-22 DIAGNOSIS — I25.10 CAD IN NATIVE ARTERY: ICD-10-CM

## 2022-09-22 DIAGNOSIS — I50.22 CHRONIC SYSTOLIC CONGESTIVE HEART FAILURE, NYHA CLASS 1 (HCC): Primary | ICD-10-CM

## 2022-09-28 ENCOUNTER — OFFICE VISIT (OUTPATIENT)
Dept: PAIN MANAGEMENT | Age: 72
End: 2022-09-28
Payer: MEDICARE

## 2022-09-28 VITALS
RESPIRATION RATE: 16 BRPM | TEMPERATURE: 96.6 F | DIASTOLIC BLOOD PRESSURE: 76 MMHG | HEIGHT: 72 IN | WEIGHT: 304 LBS | SYSTOLIC BLOOD PRESSURE: 124 MMHG | BODY MASS INDEX: 41.17 KG/M2 | OXYGEN SATURATION: 93 % | HEART RATE: 82 BPM

## 2022-09-28 DIAGNOSIS — M48.061 SPINAL STENOSIS OF LUMBAR REGION WITHOUT NEUROGENIC CLAUDICATION: ICD-10-CM

## 2022-09-28 DIAGNOSIS — G89.4 CHRONIC PAIN SYNDROME: Primary | ICD-10-CM

## 2022-09-28 DIAGNOSIS — M47.816 LUMBAR FACET ARTHROPATHY: ICD-10-CM

## 2022-09-28 DIAGNOSIS — M51.9 LUMBAR DISC DISORDER: ICD-10-CM

## 2022-09-28 DIAGNOSIS — M47.816 LUMBAR SPONDYLOSIS: ICD-10-CM

## 2022-09-28 PROCEDURE — G8417 CALC BMI ABV UP PARAM F/U: HCPCS | Performed by: PAIN MEDICINE

## 2022-09-28 PROCEDURE — G8427 DOCREV CUR MEDS BY ELIG CLIN: HCPCS | Performed by: PAIN MEDICINE

## 2022-09-28 PROCEDURE — 99214 OFFICE O/P EST MOD 30 MIN: CPT | Performed by: PAIN MEDICINE

## 2022-09-28 PROCEDURE — 99213 OFFICE O/P EST LOW 20 MIN: CPT | Performed by: PAIN MEDICINE

## 2022-09-28 PROCEDURE — 4004F PT TOBACCO SCREEN RCVD TLK: CPT | Performed by: PAIN MEDICINE

## 2022-09-28 PROCEDURE — 3017F COLORECTAL CA SCREEN DOC REV: CPT | Performed by: PAIN MEDICINE

## 2022-09-28 PROCEDURE — 1123F ACP DISCUSS/DSCN MKR DOCD: CPT | Performed by: PAIN MEDICINE

## 2022-09-28 NOTE — PROGRESS NOTES
[address]  [address] [facility], [address] [place] [address] [place]  [phone]    Follow up Note      Ameena Knox     Date of Visit:  9/28/2022    CC:  Patient presents for follow up   Chief Complaint   Patient presents with    Results     MRI     HPI:    Pain is unchanged. Appropriate analgesia with current medications regimen: N/A. Change in quality of symptoms:no. Medication side effects:not applicable . Recent diagnostic testing:Lumbar spine MRI. Recent interventional procedures:none. He has been on anticoagulation medications to include ASA and Plavix. The patient  has not been on herbal supplements. The patient is diabetic. Imaging:   Lumbar spine Xray 2022  Degenerative lumbar spondylosis. Lumbar spine MRI 2022:  Significant spondylosis from L3-4 to L5-S1     Previous treatments: Physical Therapy, Epidural Steroid Injection, and medications. .       Potential Aberrant Drug-Related Behavior:    No    Urine Drug Screening:  None    OARRS report:  09/2022 consistent     Opioid Agreement:  Renewal date:N/A    Past Medical History:   Diagnosis Date    Anemia     Atrial flutter ([address]     Aguila's esophagus     Coronary artery disease involving native coronary artery 05/27/2022    Diverticulitis     Fatty liver     History of Holter monitoring 05/12/2020    Hyperlipidemia     Hypertension     Lipoma     Subcyst    Lumbar spinal stenosis     Osteoarthritis     PUD (peptic ulcer disease)     S/P angioplasty with stent 05/27/2022    1st OM    Type 2 diabetes mellitus without complication ([address]      Past Surgical History:   Procedure Laterality Date    ATRIAL ABLATION SURGERY  2012    CARDIAC SURGERY      pt states 6 - 7 years ago     KNEE ARTHROPLASTY Left     LIPOMA RESECTION      UPPER GASTROINTESTINAL ENDOSCOPY  03/27/2017    Dr. Pabon Reason, Findings: Long segment Barretts, 5cm, Moderate Gastritis, Duodenal bulb/post pyloric channel mass versus severe brunner gland hyperplasia, mild duodenitis distally, biopsies taken    UPPER GASTROINTESTINAL ENDOSCOPY  07/17/2017    Dr. John Edmond, Findings: 5cm segment of Aguila's esiphagitis from 34 to 39 cm with no targeted lesions, Mild gastritis, Stomach mass in the antral pyloric channel area,     Prior to Admission medications    Medication Sig Start Date End Date Taking? Authorizing Provider   Multiple Vitamin (MULTI-VITAMIN DAILY PO) Take by mouth daily   Yes Historical Provider, MD   isosorbide mononitrate (IMDUR) 30 MG extended release tablet Take 1 tablet by mouth daily 9/21/22  Yes Des Beckett MD   empagliflozin (JARDIANCE) 10 MG tablet Take 1 tablet by mouth daily 9/21/22  Yes Des Beckett MD   aspirin 81 MG EC tablet Take 81 mg by mouth daily   Yes Historical Provider, MD   losartan (COZAAR) 100 MG tablet Take 1 tablet by mouth daily 6/2/22  Yes Asher Mcallister,    metoprolol succinate (TOPROL XL) 50 MG extended release tablet Take 1 tablet by mouth daily 6/2/22  Yes Asher Mcallister DO   furosemide (LASIX) 20 MG tablet Take 1 tablet by mouth daily 6/2/22  Yes Asher Mcallister DO   clopidogrel (PLAVIX) 75 MG tablet Take 1 tablet by mouth daily 6/2/22  Yes Asher Mcallister DO   spironolactone (ALDACTONE) 25 MG tablet Take 1 tablet by mouth daily 6/2/22  Yes Asher Mcallister,    glipiZIDE (GLUCOTROL) 5 mg tablet Take 1 tablet by mouth 2 times daily (before meals)  Patient taking differently: Take 5 mg by mouth 2 times daily (before meals) Take 2 tablets by mouth two times a day.  5/28/22  Yes JEROME Navarro NP   metFORMIN (GLUCOPHAGE) 1000 MG tablet Take 1 tablet by mouth 2 times daily (with meals) 5/28/22  Yes JEROME Navarro NP   omeprazole (PRILOSEC) 40 MG delayed release capsule Take 1 capsule by mouth daily 5/28/22  Yes JEROME Navarro NP   tamsulosin [facility]) 0.4 mg capsule Take 1 capsule by mouth daily 5/28/22  Yes Chandler Abebe, DO   atorvastatin (LIPITOR) 20 MG tablet Take 1 tablet by mouth daily 5/17/22  Yes Asher Mcallister, DO   aspirin 325 MG tablet Take 325 mg by mouth daily    Historical Provider, MD     No Known Allergies    Social History     Socioeconomic History    Marital status:      Spouse name: Not on file    Number of children: Not on file    Years of education: Not on file    Highest education level: Not on file   Occupational History    Not on file   Tobacco Use    Smoking status: Some Days     Types: Cigars    Smokeless tobacco: Never    Tobacco comments:     occassional cigar    Vaping Use    Vaping Use: Never used   Substance and Sexual Activity    Alcohol use: No    Drug use: No    Sexual activity: Not on file   Other Topics Concern    Not on file   Social History Narrative    Not on file     Social Determinants of Health     Financial Resource Strain: Low Risk     Difficulty of Paying Living Expenses: Not hard at all   Food Insecurity: No Food Insecurity    Worried About Running Out of Food in the Last Year: Never true    Ran Out of Food in the Last Year: Never true   Transportation Needs: Not on file   Physical Activity: Unknown    Days of Exercise per Week: 0 days    Minutes of Exercise per Session: Not on file   Stress: Not on file   Social Connections: Not on file   Intimate Partner Violence: Not on file   Housing Stability: Not on file     Family History   Problem Relation Age of Onset    Cancer Mother     Hypertension Mother     Cancer Father      REVIEW OF SYSTEMS:     Raimundo Jensen denies fever/chills, chest pain, shortness of breath, new bowel or bladder complaints. All other review of systems was negative. PHYSICAL EXAMINATION:      /76   Pulse 82   Temp (!) 96.6 °F (35.9 °C) (Infrared)   Resp 16   Ht 6' (1.829 m)   Wt (!) 304 lb (137.9 kg)   SpO2 93%   BMI 41.23 kg/m²     General:       General appearance:   elderly, pleasant, and well-hydrated.    , in moderate discomfort and A & O x3  Build:Overweight     HEENT: Head:normocephalic and atraumatic  Sclera: icterus absent,      Lungs:     Breathing:Breathing Pattern: regular, no distress     Abdomen:     Shape:obese, non-distended, and normal  Tenderness:none     Lumbar spine:     Spine inspection:scoliosis   CVA tenderness:No   Palpation:tenderness paravertebral muscles, facet loading, left, right, positive, and tenderness. Range of motion:not tested. Musculoskeletal:     Trigger points in Paravet[place]:absent bilaterally  SI joint tenderness:negative right, negative left  SLR:negative right, negative left, sitting      Extremities:     Tremors:None bilaterally upper and lower  Range of motion: pain with internal rotation of hips negative. Intact:Yes  Edema:Normal     Neurological:     Sensory:normal to light touch bilateral lower extremities. Motor:                       Right Quadriceps5/5          Left Quadriceps5/5           Right Gastrocnemius5/5    Left Gastrocnemius5/5  Right Ant Tibialis5/5  Left Ant Tibialis5/5  Reflexes:    Right Quadriceps reflex2+  Left Quadriceps reflex2+  Right Achilles reflex0  Left Achilles reflex0  Gait:antalgic  Right leg is shorter than left     Dermatology:     Skin:no unusual rashes and no skin lesions     Impression:  Low back pain with radiation to bilateral thighs  Plan:  Follow up on his low back pain with no acute issues. Results of lumbar spine MRI were discussed with the patient. Currently wearing cardiac life vest and awaiting possible defibrillator implant. Patient is not a candidate for interventional procedures because of his cardiac condition, will re-evaluate after it is addressed. Avoid NSAIDs(h/o bleeding ulcer)  Cancel urine screen. OARRS report reviewed 09/2022. Patient encouraged to stay active and to lose weight  Treatment plan discussed with the patient.     We discussed with the patient that combining opioids, benzodiazepines, alcohol, illicit drugs or sleep aids increases the risk of respiratory depression including death. We discussed that these medications may cause drowsiness, sedation or dizziness and have counseled the patient not to drive or operate machinery. We have discussed that these medications will be prescribed only by one provider. We have discussed with the patient about age related risk factors and have thoroughly discussed the importance of taking these medications as prescribed. The patient verbalizes understanding. ccrefryaning lorenza Mccain M.D.

## 2022-09-28 NOTE — PROGRESS NOTES
Do you currently have any of the following:    Fever: No  Headache:  No  Cough: No  Shortness of breath: No  Exposed to anyone with these symptoms: No                                                                                                                Charito Bias presents to the [address] on 9/28/2022. Tamiko Guerin is complaining of pain in lower back, hips and legs. . The pain is  bad in the AM when he first gets out of bed, better as the day goes on. . The pain is described as aching and sharp. Pain is rated on his best day at a 6, on his worst day at a 10, and on average at a 8 on the VAS scale. He took his last dose of  nothing at this time. Fiorella Spears does not have issues with constipation. Any procedures since your last visit: No,       He is not on NSAIDS and  is  on anticoagulation medications to include ASA and Plavix and is managed by Dr. Lynette Carey with [facility]. .     Pacemaker or defibrillator: No Physician managing device is NA. Medication Contract and Consent for Opioid Use Documents Filed        No documents found                       /76   Pulse 82   Temp (!) 96.6 °F (35.9 °C) (Infrared)   Resp 16   Ht 6' (1.829 m)   Wt (!) 304 lb (137.9 kg)   SpO2 93%   BMI 41.23 kg/m²      No LMP for male patient.

## 2022-09-29 NOTE — PROGRESS NOTES
Cardiac Electrophysiology Outpatient Progress Note    José Mock  1950  Date of Service: 9/30/2022  Referring Provider/PCP: Iza Yañez DO  Chief Complaint:   Chief Complaint   Patient presents with    Cardiomyopathy     New Patient,  states he gets SOB when he walks         [address] [facility] Vikki presents to the office today for the management of these Electrophysiology conditions: Left ventricular systolic dysfunction    [id number] male with history of Aguila esophagus, duodenal mass, coronary artery disease status post OM stent in May 2022, chronic left bundle branch block, hypertension, obesity, hyperlipidemia, diabetes, tobacco abuse, chronic kidney disease     5/2022 :Hospitalized in May 2022 with chest pain and dyspnea on exertion, new onset left bundle branch block. TTE showed LVEF of 30 to 35%. He underwent cath and found to have significant OM disease requiring drug-eluting stent  He declined Entresto. He was started on guideline directed medical therapy {currently on Aldactone 25 a day Toprol-XL 50 mg a day, Cozaar 100 mg daily, Imdur 30 mg daily, Jardiance] and repeat echocardiogram in August 2022 showed persistent left ventricular systolic dysfunction with LVEF 30%    9/30/22 : he feels well and wearing his LifeVest today. He does have NYHA class II symptoms. Denies any chest pain. He is compliant with his medications. No family hx of CAD, he is retired from [employer]. Quit Tobacco in the 80's and rare alcohol use yearly.  He lives at home with wife and son  Patient Active Problem List    Diagnosis Date Noted    BMI 40.0-44.9, adult ([address] 09/07/2022     Priority: Medium    Spinal stenosis of lumbar region without neurogenic claudication 09/07/2022     Priority: Medium    Lumbar spondylosis 09/07/2022     Priority: Medium    Lumbar facet arthropathy 09/07/2022     Priority: Medium    Lumbar disc disorder 09/07/2022     Priority: Medium    Chronic pain syndrome [G89.4 (ICD-10-CM)] 09/07/2022     Priority: Medium    CAD in native artery 05/27/2022     Priority: Medium    Cardiomyopathy [facility]      Priority: Medium    Chest pain 05/24/2022     Priority: Medium    Aguila's esophagus without dysplasia 07/20/2017    Duodenal mass 07/20/2017       Family History   Problem Relation Age of Onset    Cancer Mother     Hypertension Mother     Cancer Father        Past Surgical History:   Procedure Laterality Date    ATRIAL ABLATION SURGERY  2012    CARDIAC SURGERY      pt states 6 - 7 years ago     KNEE ARTHROPLASTY Left     LIPOMA RESECTION      UPPER GASTROINTESTINAL ENDOSCOPY  03/27/2017    Dr. Luz Mosquera, Findings: Long segment Barretts, 5cm, Moderate Gastritis, Duodenal bulb/post pyloric channel mass versus severe brunner gland hyperplasia, mild duodenitis distally, biopsies taken    UPPER GASTROINTESTINAL ENDOSCOPY  07/17/2017    Dr. Luz Mosquera, Findings: 5cm segment of Aguila's esiphagitis from 34 to 39 cm with no targeted lesions, Mild gastritis, Stomach mass in the antral pyloric channel area,       Current Outpatient Medications   Medication Sig Dispense Refill    Multiple Vitamin (MULTI-VITAMIN DAILY PO) Take by mouth daily      isosorbide mononitrate (IMDUR) 30 MG extended release tablet Take 1 tablet by mouth daily 30 tablet 1    empagliflozin (JARDIANCE) 10 MG tablet Take 1 tablet by mouth daily 30 tablet 1    aspirin 81 MG EC tablet Take 81 mg by mouth daily      losartan (COZAAR) 100 MG tablet Take 1 tablet by mouth daily 90 tablet 1    metoprolol succinate (TOPROL XL) 50 MG extended release tablet Take 1 tablet by mouth daily 90 tablet 1    furosemide (LASIX) 20 MG tablet Take 1 tablet by mouth daily 90 tablet 1    clopidogrel (PLAVIX) 75 MG tablet Take 1 tablet by mouth daily 90 tablet 1    spironolactone (ALDACTONE) 25 MG tablet Take 1 tablet by mouth daily 90 tablet 1    glipiZIDE (GLUCOTROL) 5 mg tablet Take 1 tablet by mouth 2 times daily (before meals) (Patient taking differently: Take 5 mg by mouth 2 times daily (before meals) Take 2 tablets by mouth two times a day.) 180 tablet 0    metFORMIN (GLUCOPHAGE) 1000 MG tablet Take 1 tablet by mouth 2 times daily (with meals) 180 tablet 0    omeprazole (PRILOSEC) 40 MG delayed release capsule Take 1 capsule by mouth daily 90 capsule 0    tamsulosin (FLOMAX) 0.4 mg capsule Take 1 capsule by mouth daily 30 capsule 3    atorvastatin (LIPITOR) 20 MG tablet Take 1 tablet by mouth daily 90 tablet 1    aspirin 325 MG tablet Take 325 mg by mouth daily (Patient not taking: Reported on 9/30/2022)       Current Facility-Administered Medications   Medication Dose Route Frequency Provider Last Rate Last Admin    perflutren lipid microspheres (DEFINITY) injection 1.65 mg  1.5 mL IntraVENous ONCE PRN Js Beckett MD            No Known Allergies        ROS:   Review of Systems   Constitutional:  Positive for fatigue. Negative for fever. HENT:  Negative for congestion, nosebleeds and sinus pressure. Eyes:  Negative for redness and visual disturbance. Respiratory:  Positive for shortness of breath. Negative for cough, chest tightness and wheezing. Cardiovascular:  Negative for chest pain, palpitations and leg swelling. Gastrointestinal:  Negative for abdominal distention, abdominal pain, diarrhea and nausea. Endocrine: Negative for cold intolerance, heat intolerance, polydipsia and polyphagia. Genitourinary:  Negative for difficulty urinating, frequency and urgency. Musculoskeletal:  Negative for arthralgias, joint swelling and myalgias. Skin:  Negative for color change and wound. Neurological:  Negative for dizziness, syncope, weakness and numbness. Psychiatric/Behavioral:  Negative for agitation, behavioral problems, confusion, decreased concentration, hallucinations and suicidal ideas. The patient is not nervous/anxious.           PHYSICAL EXAM:  Vitals:    09/30/22 1115   BP: 122/70   Pulse: 86 Weight: (!) 306 lb (138.8 kg)   Height: 6' (1.829 m)     Physical Exam  Vitals reviewed. Constitutional:       Appearance: Normal appearance. He is obese. HENT:      Head: Normocephalic. Mouth/Throat:      Mouth: Mucous membranes are moist.      Pharynx: Oropharynx is clear. Eyes:      Conjunctiva/sclera: Conjunctivae normal.   Neck:      Vascular: No carotid bruit. Cardiovascular:      Rate and Rhythm: Normal rate and regular rhythm. Pulses: Normal pulses. Heart sounds: Normal heart sounds. Pulmonary:      Effort: Pulmonary effort is normal.      Breath sounds: Normal breath sounds. No rales. Chest:      Chest wall: No tenderness. Abdominal:      General: Bowel sounds are normal.      Palpations: Abdomen is soft. Musculoskeletal:         General: Normal range of motion. Cervical back: Normal range of motion and neck supple. Skin:     General: Skin is warm. Neurological:      General: No focal deficit present. Mental Status: He is alert and oriented to person, place, and time. Psychiatric:         Mood and Affect: Mood normal.         Behavior: Behavior normal.         Thought Content:  Thought content normal.        Pertinent Labs:  CBC:   WBC (E9/L)   Date Value   05/27/2022 5.2   05/26/2022 6.7   05/25/2022 5.8     Hemoglobin (g/dL)   Date Value   05/27/2022 12.1 (L)   05/26/2022 12.3 (L)   05/25/2022 12.9     Hematocrit (%)   Date Value   05/27/2022 38.8   05/26/2022 38.8   05/25/2022 40.6     Platelets (B9/D)   Date Value   05/27/2022 153   05/26/2022 168   05/25/2022 166      BMP:   Sodium (mmol/L)   Date Value   05/28/2022 140   05/27/2022 138   05/26/2022 137     Potassium (mmol/L)   Date Value   05/27/2022 4.6   05/26/2022 4.4   05/26/2022 4.1     Potassium reflex Magnesium (mmol/L)   Date Value   05/28/2022 4.3   05/24/2022 4.2     Magnesium (mg/dL)   Date Value   05/25/2022 1.6     Chloride (mmol/L)   Date Value   05/28/2022 101   05/27/2022 101   05/26/2022 103     CO2 (mmol/L)   Date Value   05/28/2022 24   05/27/2022 28   05/26/2022 23     BUN (mg/dL)   Date Value   05/28/2022 20   05/27/2022 28 (H)   05/26/2022 27 (H)     Creatinine (mg/dL)   Date Value   05/28/2022 1.2   05/27/2022 1.2   05/26/2022 1.3 (H)     Glucose (mg/dL)   Date Value   05/28/2022 126 (H)   05/27/2022 159 (H)   05/26/2022 141 (H)   08/30/2011 177 (H)     Calcium (mg/dL)   Date Value   05/28/2022 9.4   05/27/2022 9.4   05/26/2022 9.6      INR: No results found for: INR   BNP: No results found for: BNP   TSH:   TSH (uIU/mL)   Date Value   05/25/2022 2.260   05/17/2022 2.550   04/30/2013 2.208      Cardiac Injury Profile: No results found for: CKTOTAL, CKMB, CKMBINDEX, TROPONINI  Lipid Profile:   Triglycerides (mg/dL)   Date Value   05/25/2022 137     HDL (mg/dL)   Date Value   05/25/2022 55     LDL Calculated (mg/dL)   Date Value   05/25/2022 47     Cholesterol, Total (mg/dL)   Date Value   05/25/2022 129      Hemoglobin A1C:   Hemoglobin A1C (%)   Date Value   08/23/2022 6.4           Pertinent Cardiac Testing:     May 2020   Mild left ventricular concentric hypertrophy noted. Ejection fraction is visually estimated at 60%. Overall ejection fraction is normal .   There is doppler evidence of stage I diastolic dysfunction. The left atrium is mild-moderately dilated. Mildly dilated right ventricle. Mild to moderately enlarged right atrium size. Mild mitral regurgitation is present. Physiologic and/or trace tricuspid regurgitation. There is trace pulmonary hypertension. May 2022  Summary   Technically difficult examination. Mild asymmetric septal hypertrophy. There is doppler evidence of stage I diastolic dysfunction. Dilated apex with hypokinetic wall motion   Ejection fraction is visually estimated at 30 to 35%. Normal right ventricular size and function. Mild mitral regurgitation is present. Mild tricuspid regurgitation.      Zio patch 7/2022  Patient had a min HR of 52 bpm, max HR of 158 bpm, and avg HR of 80 bpm.  Predominant underlying rhythm was Sinus Rhythm. Bundle Branch Block/IVCD was  present. QRS morphology changes were present throughout recording. 18  Supraventricular Tachycardia runs occurred, the run with the fastest interval lasting  5 beats with a max rate of 158 bpm, the longest lasting 14.9 secs with an avg rate  of 111 bpm. Isolated SVEs were rare (<1.0%), SVE Couplets were rare (<1.0%), and  SVE Triplets were rare (<1.0%). Isolated VEs were rare (<1.0%), and no VE Couplets  or VE Triplets were present. Ventricular Trigeminy was present. Difficulty discerning  atrial activity making definitive diagnosis difficult to ascertain. Cardiac catheterization 5/27/2022   CONCLUSION:  1. Coronary artery disease. a. Left main:  No significant disease. b.  LAD:  No significant disease. c.  LCX:  70% to 80% proximal stenosis of the first marginal branch. 30% to 40% distal left circumflex disease. The left circumflex is a codominant vessel. d.  RCA:  Moderate size codominant vessel with no significant  angiographic disease. 2.  Successful balloon angioplasty with the deployment of a drug-eluting  coronary stent to the obtuse marginal branch of the left circumflex with  very good results. TTE  8/25/2022   Left Ventricle   Dilated LV   There is doppler evidence of stage I diastolic dysfunction. Mild concentric left ventricular hypertrophy. Ejection fraction is visually estimated at 30 to 35%. ECG 9/30/2022: normal sinus rhythm, LBBB, QRS 162ms  May 2020 : ECG ; normal sinus rhythm  ms  May 2022 sinus rhythm complete left bundle branch block,  ms  I have independently reviewed all of the ECGs and rhythm strips per above    I have personally reviewed the laboratory, cardiac diagnostic and radiographic testing as outlined above: We have requested previous records. 1. Paroxysmal atrial fibrillation ([address]    2.  Ischemic cardiomyopathy    3. BMI 40.0-44.9, adult ([address]    4. Abnormal EKG    5. LBBB (left bundle branch block)    6. H/O cardiac radiofrequency ablation         ASSESSMENT & PLAN    Left ventricular systolic dysfunction  -Likely ischemic + nonischemic  -Presented with chest pain, new onset left bundle branch block, new LV dysfunction with LVEF 30%, had a heart catheterization that showed significant disease obtuse marginal and had MARTIN placed 5/24/22  - BB/ARB/nitrate\Aldactone, Jardiance and Imdur   -Currently wearing LifeVest  - May 2020 : Normal LVEF 60%, , NSR  - May 2022 : LVEF 30%, LBBB on ECG    2. Atrial Flutter  -S/p typical atrial flutter ablation June 2012 at [facility]    3. Left Bundle branch block  - new since 5/2022    4. JOSE MANUEL    5. Obesity  Body mass index is 41.5 kg/m². 6.  Aguila's esophagus    7. CAD  - 5/27/22 :Successful balloon angioplasty with the deployment of a drug-eluting coronary stent to the obtuse marginal branch of the left circumflex with very good results. 8. SVT  -Supraventricular Tachycardia runs occurred, the run with the fastest interval lasting 5 beats with a max rate of 158 bpm, the longest lasting 14.9 secs with an avg rate of 111 bpm.    Plan   Recommend BIV-ICD for cardiac resynchronization    -During the visit, the patient was provided a copy of \"A decision aid for Implantable Cardiac Defibrillators (ICD): For patient's with heart failure considering an ICD who are at risk for sudden cardiac death(primary prevention). \"  The patient participated in shared decision-making for ICD implantation. The procedure was described in detail. The risks, benefits, and alternatives to ICD implantation and possible defibrillation threshold testing were discussed with the patient.  These risks include but are not limited to bleeding, infection, blood clot, pneumothorax, hemothorax,cardiac valve damage, cardiac perforation and tamponade required emergent thoracotomy, contrast induced nephropathy leading to short or even long term dialysis, vascular injury requiring emergent surgical repair, lead dislodgement, stroke and even death. The patient understands these risks and wishes to proceed with ICD implantation and possible defibrillation threshold testing. Thank you for allowing me to participate in your patient's care. I have spent a total of 60 minutes with the patient and his/her family reviewing the above stated recommendations.  A total of >50% of that time involved face-to-face time providing counseling and or coordination of care with the other providers.  mike Swartz MD  Cardiac Electrophysiology  [address] [place] [address]

## 2022-09-30 ENCOUNTER — OFFICE VISIT (OUTPATIENT)
Dept: NON INVASIVE DIAGNOSTICS | Age: 72
End: 2022-09-30
Payer: MEDICARE

## 2022-09-30 VITALS
BODY MASS INDEX: 41.45 KG/M2 | SYSTOLIC BLOOD PRESSURE: 122 MMHG | HEIGHT: 72 IN | DIASTOLIC BLOOD PRESSURE: 70 MMHG | WEIGHT: 306 LBS | HEART RATE: 86 BPM

## 2022-09-30 DIAGNOSIS — Z98.890 H/O CARDIAC RADIOFREQUENCY ABLATION: ICD-10-CM

## 2022-09-30 DIAGNOSIS — I48.0 PAROXYSMAL ATRIAL FIBRILLATION (HCC): Primary | ICD-10-CM

## 2022-09-30 DIAGNOSIS — R94.31 ABNORMAL EKG: ICD-10-CM

## 2022-09-30 DIAGNOSIS — I44.7 LBBB (LEFT BUNDLE BRANCH BLOCK): ICD-10-CM

## 2022-09-30 DIAGNOSIS — I25.5 ISCHEMIC CARDIOMYOPATHY: ICD-10-CM

## 2022-09-30 PROCEDURE — 93000 ELECTROCARDIOGRAM COMPLETE: CPT | Performed by: INTERNAL MEDICINE

## 2022-09-30 PROCEDURE — 99205 OFFICE O/P NEW HI 60 MIN: CPT | Performed by: INTERNAL MEDICINE

## 2022-09-30 PROCEDURE — 1123F ACP DISCUSS/DSCN MKR DOCD: CPT | Performed by: INTERNAL MEDICINE

## 2022-09-30 PROCEDURE — 3017F COLORECTAL CA SCREEN DOC REV: CPT | Performed by: INTERNAL MEDICINE

## 2022-09-30 PROCEDURE — G8427 DOCREV CUR MEDS BY ELIG CLIN: HCPCS | Performed by: INTERNAL MEDICINE

## 2022-09-30 PROCEDURE — G8417 CALC BMI ABV UP PARAM F/U: HCPCS | Performed by: INTERNAL MEDICINE

## 2022-09-30 PROCEDURE — 4004F PT TOBACCO SCREEN RCVD TLK: CPT | Performed by: INTERNAL MEDICINE

## 2022-09-30 ASSESSMENT — ENCOUNTER SYMPTOMS
CHEST TIGHTNESS: 0
EYE REDNESS: 0
NAUSEA: 0
SHORTNESS OF BREATH: 1
ABDOMINAL DISTENTION: 0
COLOR CHANGE: 0
COUGH: 0
ABDOMINAL PAIN: 0
SINUS PRESSURE: 0
DIARRHEA: 0
WHEEZING: 0

## 2022-10-31 RX ORDER — BLOOD SUGAR DIAGNOSTIC
STRIP MISCELLANEOUS
Qty: 100 STRIP | Refills: 3 | Status: SHIPPED | OUTPATIENT
Start: 2022-10-31

## 2022-11-03 DIAGNOSIS — E11.9 TYPE 2 DIABETES MELLITUS WITHOUT COMPLICATION, WITHOUT LONG-TERM CURRENT USE OF INSULIN (HCC): ICD-10-CM

## 2022-11-04 RX ORDER — GLIPIZIDE 5 MG/1
5 TABLET ORAL
Qty: 180 TABLET | Refills: 1 | Status: SHIPPED | OUTPATIENT
Start: 2022-11-04

## 2022-11-10 ENCOUNTER — ANESTHESIA EVENT (OUTPATIENT)
Dept: CARDIAC CATH/INVASIVE PROCEDURES | Age: 72
End: 2022-11-10

## 2022-11-11 ENCOUNTER — ANESTHESIA (OUTPATIENT)
Dept: CARDIAC CATH/INVASIVE PROCEDURES | Age: 72
End: 2022-11-11

## 2022-11-11 ENCOUNTER — HOSPITAL ENCOUNTER (OUTPATIENT)
Dept: CARDIAC CATH/INVASIVE PROCEDURES | Age: 72
Discharge: HOME | End: 2022-11-11
Attending: STUDENT IN AN ORGANIZED HEALTH CARE EDUCATION/TRAINING PROGRAM | Admitting: STUDENT IN AN ORGANIZED HEALTH CARE EDUCATION/TRAINING PROGRAM
Payer: MEDICARE

## 2022-11-11 ENCOUNTER — APPOINTMENT (OUTPATIENT)
Dept: GENERAL RADIOLOGY | Age: 72
End: 2022-11-11
Attending: STUDENT IN AN ORGANIZED HEALTH CARE EDUCATION/TRAINING PROGRAM
Payer: MEDICARE

## 2022-11-11 VITALS
OXYGEN SATURATION: 93 % | DIASTOLIC BLOOD PRESSURE: 77 MMHG | HEART RATE: 102 BPM | WEIGHT: 300 LBS | RESPIRATION RATE: 18 BRPM | SYSTOLIC BLOOD PRESSURE: 117 MMHG | BODY MASS INDEX: 40.63 KG/M2 | HEIGHT: 72 IN | TEMPERATURE: 97.9 F

## 2022-11-11 PROBLEM — Z48.89 OBSERVATION AFTER SURGERY: Status: ACTIVE | Noted: 2022-11-11

## 2022-11-11 LAB
ANION GAP SERPL CALCULATED.3IONS-SCNC: 13 MMOL/L (ref 7–16)
BASOPHILS ABSOLUTE: 0.04 E9/L (ref 0–0.2)
BASOPHILS RELATIVE PERCENT: 0.6 % (ref 0–2)
BUN BLDV-MCNC: 23 MG/DL (ref 6–23)
CALCIUM SERPL-MCNC: 10.1 MG/DL (ref 8.6–10.2)
CHLORIDE BLD-SCNC: 104 MMOL/L (ref 98–107)
CO2: 25 MMOL/L (ref 22–29)
CREAT SERPL-MCNC: 1.3 MG/DL (ref 0.7–1.2)
EKG ATRIAL RATE: 79 BPM
EKG P AXIS: 25 DEGREES
EKG P-R INTERVAL: 170 MS
EKG Q-T INTERVAL: 430 MS
EKG QRS DURATION: 160 MS
EKG QTC CALCULATION (BAZETT): 493 MS
EKG R AXIS: 0 DEGREES
EKG T AXIS: 75 DEGREES
EKG VENTRICULAR RATE: 79 BPM
EOSINOPHILS ABSOLUTE: 0.12 E9/L (ref 0.05–0.5)
EOSINOPHILS RELATIVE PERCENT: 1.9 % (ref 0–6)
GFR SERPL CREATININE-BSD FRML MDRD: 58 ML/MIN/1.73
GLUCOSE BLD-MCNC: 113 MG/DL (ref 74–99)
HCT VFR BLD CALC: 38.1 % (ref 37–54)
HEMOGLOBIN: 12.1 G/DL (ref 12.5–16.5)
IMMATURE GRANULOCYTES #: 0.03 E9/L
IMMATURE GRANULOCYTES %: 0.5 % (ref 0–5)
LYMPHOCYTES ABSOLUTE: 1.41 E9/L (ref 1.5–4)
LYMPHOCYTES RELATIVE PERCENT: 22.8 % (ref 20–42)
MAGNESIUM: 1.6 MG/DL (ref 1.6–2.6)
MCH RBC QN AUTO: 28.8 PG (ref 26–35)
MCHC RBC AUTO-ENTMCNC: 31.8 % (ref 32–34.5)
MCV RBC AUTO: 90.7 FL (ref 80–99.9)
MONOCYTES ABSOLUTE: 0.36 E9/L (ref 0.1–0.95)
MONOCYTES RELATIVE PERCENT: 5.8 % (ref 2–12)
NEUTROPHILS ABSOLUTE: 4.23 E9/L (ref 1.8–7.3)
NEUTROPHILS RELATIVE PERCENT: 68.4 % (ref 43–80)
PDW BLD-RTO: 14.3 FL (ref 11.5–15)
PLATELET # BLD: 180 E9/L (ref 130–450)
PMV BLD AUTO: 11.4 FL (ref 7–12)
POTASSIUM SERPL-SCNC: 4.5 MMOL/L (ref 3.5–5)
RBC # BLD: 4.2 E12/L (ref 3.8–5.8)
SODIUM BLD-SCNC: 142 MMOL/L (ref 132–146)
WBC # BLD: 6.2 E9/L (ref 4.5–11.5)

## 2022-11-11 PROCEDURE — 80048 BASIC METABOLIC PNL TOTAL CA: CPT

## 2022-11-11 PROCEDURE — 71045 X-RAY EXAM CHEST 1 VIEW: CPT

## 2022-11-11 PROCEDURE — 3700000000 HC ANESTHESIA ATTENDED CARE

## 2022-11-11 PROCEDURE — 2580000003 HC RX 258: Performed by: NURSE ANESTHETIST, CERTIFIED REGISTERED

## 2022-11-11 PROCEDURE — C1894 INTRO/SHEATH, NON-LASER: HCPCS

## 2022-11-11 PROCEDURE — 33225 L VENTRIC PACING LEAD ADD-ON: CPT | Performed by: STUDENT IN AN ORGANIZED HEALTH CARE EDUCATION/TRAINING PROGRAM

## 2022-11-11 PROCEDURE — C1900 LEAD, CORONARY VENOUS: HCPCS

## 2022-11-11 PROCEDURE — 33249 INSJ/RPLCMT DEFIB W/LEAD(S): CPT | Performed by: STUDENT IN AN ORGANIZED HEALTH CARE EDUCATION/TRAINING PROGRAM

## 2022-11-11 PROCEDURE — 6360000002 HC RX W HCPCS: Performed by: NURSE ANESTHETIST, CERTIFIED REGISTERED

## 2022-11-11 PROCEDURE — 3700000001 HC ADD 15 MINUTES (ANESTHESIA)

## 2022-11-11 PROCEDURE — C1898 LEAD, PMKR, OTHER THAN TRANS: HCPCS

## 2022-11-11 PROCEDURE — 36415 COLL VENOUS BLD VENIPUNCTURE: CPT

## 2022-11-11 PROCEDURE — 93005 ELECTROCARDIOGRAM TRACING: CPT | Performed by: STUDENT IN AN ORGANIZED HEALTH CARE EDUCATION/TRAINING PROGRAM

## 2022-11-11 PROCEDURE — 2709999900 HC NON-CHARGEABLE SUPPLY

## 2022-11-11 PROCEDURE — C1892 INTRO/SHEATH,FIXED,PEEL-AWAY: HCPCS

## 2022-11-11 PROCEDURE — C1777 LEAD, AICD, ENDO SINGLE COIL: HCPCS

## 2022-11-11 PROCEDURE — C1882 AICD, OTHER THAN SING/DUAL: HCPCS

## 2022-11-11 PROCEDURE — 93010 ELECTROCARDIOGRAM REPORT: CPT | Performed by: INTERNAL MEDICINE

## 2022-11-11 PROCEDURE — 6360000002 HC RX W HCPCS

## 2022-11-11 PROCEDURE — 2580000003 HC RX 258: Performed by: STUDENT IN AN ORGANIZED HEALTH CARE EDUCATION/TRAINING PROGRAM

## 2022-11-11 PROCEDURE — 83735 ASSAY OF MAGNESIUM: CPT

## 2022-11-11 PROCEDURE — 2500000003 HC RX 250 WO HCPCS

## 2022-11-11 PROCEDURE — C1889 IMPLANT/INSERT DEVICE, NOC: HCPCS

## 2022-11-11 PROCEDURE — 33249 INSJ/RPLCMT DEFIB W/LEAD(S): CPT

## 2022-11-11 PROCEDURE — C1769 GUIDE WIRE: HCPCS

## 2022-11-11 PROCEDURE — 85025 COMPLETE CBC W/AUTO DIFF WBC: CPT

## 2022-11-11 PROCEDURE — 2580000003 HC RX 258

## 2022-11-11 PROCEDURE — C1876 STENT, NON-COA/NON-COV W/DEL: HCPCS

## 2022-11-11 PROCEDURE — 33225 L VENTRIC PACING LEAD ADD-ON: CPT

## 2022-11-11 RX ORDER — FENTANYL CITRATE 50 UG/ML
INJECTION, SOLUTION INTRAMUSCULAR; INTRAVENOUS PRN
Status: DISCONTINUED | OUTPATIENT
Start: 2022-11-11 | End: 2022-11-11 | Stop reason: SDUPTHER

## 2022-11-11 RX ORDER — SODIUM CHLORIDE 0.9 % (FLUSH) 0.9 %
5-40 SYRINGE (ML) INJECTION EVERY 12 HOURS SCHEDULED
Status: DISCONTINUED | OUTPATIENT
Start: 2022-11-11 | End: 2022-11-12 | Stop reason: HOSPADM

## 2022-11-11 RX ORDER — SODIUM CHLORIDE 9 MG/ML
INJECTION, SOLUTION INTRAVENOUS CONTINUOUS PRN
Status: DISCONTINUED | OUTPATIENT
Start: 2022-11-11 | End: 2022-11-11 | Stop reason: SDUPTHER

## 2022-11-11 RX ORDER — SODIUM CHLORIDE 9 MG/ML
INJECTION, SOLUTION INTRAVENOUS PRN
Status: DISCONTINUED | OUTPATIENT
Start: 2022-11-11 | End: 2022-11-12 | Stop reason: HOSPADM

## 2022-11-11 RX ORDER — SODIUM CHLORIDE 9 MG/ML
INJECTION, SOLUTION INTRAVENOUS ONCE
Status: COMPLETED | OUTPATIENT
Start: 2022-11-11 | End: 2022-11-11

## 2022-11-11 RX ORDER — MIDAZOLAM HYDROCHLORIDE 1 MG/ML
INJECTION INTRAMUSCULAR; INTRAVENOUS PRN
Status: DISCONTINUED | OUTPATIENT
Start: 2022-11-11 | End: 2022-11-11 | Stop reason: SDUPTHER

## 2022-11-11 RX ORDER — CEFAZOLIN SODIUM 1 G/3ML
INJECTION, POWDER, FOR SOLUTION INTRAMUSCULAR; INTRAVENOUS PRN
Status: DISCONTINUED | OUTPATIENT
Start: 2022-11-11 | End: 2022-11-11 | Stop reason: SDUPTHER

## 2022-11-11 RX ORDER — PROPOFOL 10 MG/ML
INJECTION, EMULSION INTRAVENOUS PRN
Status: DISCONTINUED | OUTPATIENT
Start: 2022-11-11 | End: 2022-11-11 | Stop reason: SDUPTHER

## 2022-11-11 RX ORDER — ACETAMINOPHEN 325 MG/1
650 TABLET ORAL EVERY 4 HOURS PRN
Status: DISCONTINUED | OUTPATIENT
Start: 2022-11-11 | End: 2022-11-12 | Stop reason: HOSPADM

## 2022-11-11 RX ORDER — SODIUM CHLORIDE 0.9 % (FLUSH) 0.9 %
5-40 SYRINGE (ML) INJECTION PRN
Status: DISCONTINUED | OUTPATIENT
Start: 2022-11-11 | End: 2022-11-12 | Stop reason: HOSPADM

## 2022-11-11 RX ADMIN — FENTANYL CITRATE 25 MCG: 50 INJECTION, SOLUTION INTRAMUSCULAR; INTRAVENOUS at 16:32

## 2022-11-11 RX ADMIN — SODIUM CHLORIDE: 9 INJECTION, SOLUTION INTRAVENOUS at 13:51

## 2022-11-11 RX ADMIN — FENTANYL CITRATE 50 MCG: 50 INJECTION, SOLUTION INTRAMUSCULAR; INTRAVENOUS at 17:24

## 2022-11-11 RX ADMIN — FENTANYL CITRATE 25 MCG: 50 INJECTION, SOLUTION INTRAMUSCULAR; INTRAVENOUS at 15:58

## 2022-11-11 RX ADMIN — SODIUM CHLORIDE: 9 INJECTION, SOLUTION INTRAVENOUS at 15:23

## 2022-11-11 RX ADMIN — FENTANYL CITRATE 50 MCG: 50 INJECTION, SOLUTION INTRAMUSCULAR; INTRAVENOUS at 17:14

## 2022-11-11 RX ADMIN — FENTANYL CITRATE 50 MCG: 50 INJECTION, SOLUTION INTRAMUSCULAR; INTRAVENOUS at 15:50

## 2022-11-11 RX ADMIN — PROPOFOL 20 MCG/KG/MIN: 10 INJECTION, EMULSION INTRAVENOUS at 15:51

## 2022-11-11 RX ADMIN — FENTANYL CITRATE 25 MCG: 50 INJECTION, SOLUTION INTRAMUSCULAR; INTRAVENOUS at 16:11

## 2022-11-11 RX ADMIN — PROPOFOL 30 MG: 10 INJECTION, EMULSION INTRAVENOUS at 15:50

## 2022-11-11 RX ADMIN — CEFAZOLIN 3 G: 1 INJECTION, POWDER, FOR SOLUTION INTRAMUSCULAR; INTRAVENOUS at 15:49

## 2022-11-11 RX ADMIN — MIDAZOLAM 0.5 MG: 1 INJECTION INTRAMUSCULAR; INTRAVENOUS at 16:32

## 2022-11-11 RX ADMIN — FENTANYL CITRATE 50 MCG: 50 INJECTION, SOLUTION INTRAMUSCULAR; INTRAVENOUS at 15:45

## 2022-11-11 RX ADMIN — SODIUM CHLORIDE: 9 INJECTION, SOLUTION INTRAVENOUS at 13:52

## 2022-11-11 RX ADMIN — MIDAZOLAM 2 MG: 1 INJECTION INTRAMUSCULAR; INTRAVENOUS at 15:38

## 2022-11-11 RX ADMIN — PROPOFOL 30 MG: 10 INJECTION, EMULSION INTRAVENOUS at 16:32

## 2022-11-11 RX ADMIN — MIDAZOLAM 0.5 MG: 1 INJECTION INTRAMUSCULAR; INTRAVENOUS at 16:11

## 2022-11-11 RX ADMIN — MIDAZOLAM 0.5 MG: 1 INJECTION INTRAMUSCULAR; INTRAVENOUS at 16:49

## 2022-11-11 RX ADMIN — MIDAZOLAM 0.5 MG: 1 INJECTION INTRAMUSCULAR; INTRAVENOUS at 17:06

## 2022-11-11 RX ADMIN — FENTANYL CITRATE 25 MCG: 50 INJECTION, SOLUTION INTRAMUSCULAR; INTRAVENOUS at 17:53

## 2022-11-11 RX ADMIN — FENTANYL CITRATE 25 MCG: 50 INJECTION, SOLUTION INTRAMUSCULAR; INTRAVENOUS at 17:35

## 2022-11-11 RX ADMIN — FENTANYL CITRATE 25 MCG: 50 INJECTION, SOLUTION INTRAMUSCULAR; INTRAVENOUS at 17:06

## 2022-11-11 ASSESSMENT — LIFESTYLE VARIABLES: SMOKING_STATUS: 1

## 2022-11-11 ASSESSMENT — ENCOUNTER SYMPTOMS
EYE REDNESS: 0
ABDOMINAL PAIN: 0
WHEEZING: 0
SINUS PRESSURE: 0
ABDOMINAL DISTENTION: 0
COUGH: 0
CHEST TIGHTNESS: 0
SHORTNESS OF BREATH: 1
NAUSEA: 0
COLOR CHANGE: 0
DIARRHEA: 0

## 2022-11-11 NOTE — ANESTHESIA PRE PROCEDURE
Department of Anesthesiology  Preprocedure Note       Name:  Dione Fregoso   Age:  66 y.o.  :  1950                                          MRN:  [id number]         Date:  2022      Surgeon: Magdalena Warren    Procedure: Implant BIV ICD, venogram    Medications prior to admission:   Prior to Admission medications    Medication Sig Start Date End Date Taking?  Authorizing Provider   metFORMIN (GLUCOPHAGE) 1000 MG tablet TAKE 1 TABLET TWICE DAILY WITH MEALS 22   Nora Levin PA-C   glipiZIDE (GLUCOTROL) 5 MG tablet TAKE 1 TABLET BY MOUTH 2 TIMES DAILY (BEFORE MEALS) 22   Nora Levin PA-C   ACCU-CHEK FELICIA PLUS strip TEST  FINGERSTICK BLOOD SUGAR EVERY DAY 10/31/22   Claudia Mcallister DO   Multiple Vitamin (MULTI-VITAMIN DAILY PO) Take by mouth daily    Historical Provider, MD   isosorbide mononitrate (IMDUR) 30 MG extended release tablet Take 1 tablet by mouth daily 22   Chetna Beckett MD   empagliflozin (JARDIANCE) 10 MG tablet Take 1 tablet by mouth daily 22   Chetna Beckett MD   aspirin 81 MG EC tablet Take 81 mg by mouth daily    Historical Provider, MD   losartan (COZAAR) 100 MG tablet Take 1 tablet by mouth daily 22   Anard Anoop Mcallister, DO   metoprolol succinate (TOPROL XL) 50 MG extended release tablet Take 1 tablet by mouth daily 22   Claudia Mcallister, DO   furosemide (LASIX) 20 MG tablet Take 1 tablet by mouth daily 22   Joelzard Door Ary, DO   clopidogrel (PLAVIX) 75 MG tablet Take 1 tablet by mouth daily 22   Joelzard Door Ary, DO   spironolactone (ALDACTONE) 25 MG tablet Take 1 tablet by mouth daily 22   Claudia Mcallister DO   omeprazole (PRILOSEC) 40 MG delayed release capsule Take 1 capsule by mouth daily 22   JEROME Baker NP   tamsulosin [facility]) 0.4 mg capsule Take 1 capsule by mouth daily 22   Margie Abebe,    atorvastatin (LIPITOR) 20 MG tablet Take 1 tablet by mouth daily 22   Claudia Davalos DO Ary       Current medications:    Current Outpatient Medications   Medication Sig Dispense Refill    metFORMIN (GLUCOPHAGE) 1000 MG tablet TAKE 1 TABLET TWICE DAILY WITH MEALS 180 tablet 1    glipiZIDE (GLUCOTROL) 5 MG tablet TAKE 1 TABLET BY MOUTH 2 TIMES DAILY (BEFORE MEALS) 180 tablet 1    ACCU-CHEK FELICIA PLUS strip TEST  FINGERSTICK BLOOD SUGAR EVERY  strip 3    Multiple Vitamin (MULTI-VITAMIN DAILY PO) Take by mouth daily      isosorbide mononitrate (IMDUR) 30 MG extended release tablet Take 1 tablet by mouth daily 30 tablet 1    empagliflozin (JARDIANCE) 10 MG tablet Take 1 tablet by mouth daily 30 tablet 1    aspirin 81 MG EC tablet Take 81 mg by mouth daily      losartan (COZAAR) 100 MG tablet Take 1 tablet by mouth daily 90 tablet 1    metoprolol succinate (TOPROL XL) 50 MG extended release tablet Take 1 tablet by mouth daily 90 tablet 1    furosemide (LASIX) 20 MG tablet Take 1 tablet by mouth daily 90 tablet 1    clopidogrel (PLAVIX) 75 MG tablet Take 1 tablet by mouth daily 90 tablet 1    spironolactone (ALDACTONE) 25 MG tablet Take 1 tablet by mouth daily 90 tablet 1    omeprazole (PRILOSEC) 40 MG delayed release capsule Take 1 capsule by mouth daily 90 capsule 0    tamsulosin (FLOMAX) 0.4 mg capsule Take 1 capsule by mouth daily 30 capsule 3    atorvastatin (LIPITOR) 20 MG tablet Take 1 tablet by mouth daily 90 tablet 1     Current Facility-Administered Medications   Medication Dose Route Frequency Provider Last Rate Last Admin    perflutren lipid microspheres (DEFINITY) injection 1.65 mg  1.5 mL IntraVENous ONCE PRN Js Beckett MD           Allergies:  No Known Allergies    Problem List:    Patient Active Problem List   Diagnosis Code    Aguila's esophagus without dysplasia K22.70    Duodenal mass K31.89    Chest pain R07.9    Cardiomyopathy ([facility] [address] I42.9    CAD in native artery I25.10    BMI 40.0-44.9, adult ([facility] [address] Z68.41    Spinal stenosis of lumbar region without neurogenic claudication M48.061    Lumbar spondylosis M47.816    Lumbar facet arthropathy M47.816    Lumbar disc disorder M51.9    Chronic pain syndrome [G89.4 (ICD-10-CM)] G89.4       Past Medical History:        Diagnosis Date    Anemia     Atrial flutter (HCC)     Aguila's esophagus     Coronary artery disease involving native coronary artery 05/27/2022    Diverticulitis     Fatty liver     History of Holter monitoring 05/12/2020    Hyperlipidemia     Hypertension     Lipoma     Subcyst    Lumbar spinal stenosis     Osteoarthritis     PUD (peptic ulcer disease)     S/P angioplasty with stent 05/27/2022    1st OM    Type 2 diabetes mellitus without complication ([facility] [address]        Past Surgical History:        Procedure Laterality Date    ATRIAL ABLATION SURGERY  2012    CARDIAC SURGERY      pt states 6 - 7 years ago     KNEE ARTHROPLASTY Left     LIPOMA RESECTION      UPPER GASTROINTESTINAL ENDOSCOPY  03/27/2017    Dr. Raquel Maria, Findings: Long segment Barretts, 5cm, Moderate Gastritis, Duodenal bulb/post pyloric channel mass versus severe brunner gland hyperplasia, mild duodenitis distally, biopsies taken    UPPER GASTROINTESTINAL ENDOSCOPY  07/17/2017    Dr. Raquel Maria, Findings: 5cm segment of Aguila's esiphagitis from 29 to 39 cm with no targeted lesions, Mild gastritis, Stomach mass in the antral pyloric channel area,       Social History:    Social History     Tobacco Use    Smoking status: Some Days     Types: Cigars    Smokeless tobacco: Never    Tobacco comments:     occassional cigar    Substance Use Topics    Alcohol use:  No                                Ready to quit: Not Answered  Counseling given: Not Answered  Tobacco comments: occassional cigar       Vital Signs (Current):   Vitals:    11/11/22 1327   BP: (!) 142/81   Pulse: 78   Resp: 18   Temp: (!) 35.9 °C (96.7 °F)   TempSrc: Temporal   SpO2: 92%   Weight: 300 lb (136.1 kg)   Height: 6' (1.829 m) BP Readings from Last 3 Encounters:   11/11/22 (!) 142/81   09/30/22 122/70   09/28/22 124/76       NPO Status:  >8 hrs                                                                               BMI:   Wt Readings from Last 3 Encounters:   11/11/22 300 lb (136.1 kg)   09/30/22 (!) 306 lb (138.8 kg)   09/28/22 (!) 304 lb (137.9 kg)     Body mass index is 40.69 kg/m². CBC:   Lab Results   Component Value Date/Time    WBC 5.2 05/27/2022 07:20 AM    RBC 4.22 05/27/2022 07:20 AM    HGB 12.1 05/27/2022 07:20 AM    HCT 38.8 05/27/2022 07:20 AM    MCV 91.9 05/27/2022 07:20 AM    RDW 14.8 05/27/2022 07:20 AM     05/27/2022 07:20 AM       CMP:   Lab Results   Component Value Date/Time     05/28/2022 05:18 AM    K 4.3 05/28/2022 05:18 AM     05/28/2022 05:18 AM    CO2 24 05/28/2022 05:18 AM    BUN 20 05/28/2022 05:18 AM    CREATININE 1.2 05/28/2022 05:18 AM    GFRAA >60 05/28/2022 05:18 AM    LABGLOM 59 05/28/2022 05:18 AM    GLUCOSE 126 05/28/2022 05:18 AM    GLUCOSE 177 08/30/2011 08:21 AM    PROT 8.1 05/24/2022 11:25 AM    CALCIUM 9.4 05/28/2022 05:18 AM    BILITOT 0.5 05/24/2022 11:25 AM    ALKPHOS 82 05/24/2022 11:25 AM    AST 23 05/24/2022 11:25 AM    ALT 17 05/24/2022 11:25 AM       POC Tests: No results for input(s): POCGLU, POCNA, POCK, POCCL, POCBUN, POCHEMO, POCHCT in the last 72 hours.     Coags: No results found for: PROTIME, INR, APTT    HCG (If Applicable): No results found for: PREGTESTUR, PREGSERUM, HCG, HCGQUANT     ABGs: No results found for: PHART, PO2ART, BUN4XPC, EZJ5FXK, BEART, Y6SJMBCG     Type & Screen (If Applicable):  No results found for: LABABO, LABRH    Drug/Infectious Status (If Applicable):  No results found for: HIV, HEPCAB    COVID-19 Screening (If Applicable):   Lab Results   Component Value Date/Time    COVID19 Not Detected 05/25/2022 04:51 PM           Anesthesia Evaluation  Patient summary reviewed and Nursing notes reviewed no history of anesthetic complications:   Airway: Mallampati: III  TM distance: <3 FB   Neck ROM: full  Mouth opening: > = 3 FB   Dental:          Pulmonary:normal exam  breath sounds clear to auscultation  (+) current smoker (CIGARS 1-2 MO)                           Cardiovascular:  Exercise tolerance: poor (<4 METS),   (+) hypertension:, CAD:, CABG/stent (stent x1):, dysrhythmias (s/p ablation ): atrial flutter, hyperlipidemia      NYHA Classification: II  ECG reviewed  Rhythm: regular  Rate: normal  Echocardiogram reviewed  Stress test reviewed  Cleared by cardiology     Beta Blocker:  Dose within 24 Hrs         Neuro/Psych:                ROS comment: Lumbar spondylosis  Chronic pain syndrome  CHRONIC LBP W RADIATION BLE GI/Hepatic/Renal:   (+) PUD, liver disease:,          ROS comment: Aguila's esophagus  Duodenal mass  diverticulitis. Endo/Other:    (+) DiabetesType II DM, well controlled, , blood dyscrasia: anemia, arthritis: OA., . Pt had no PAT visit        ROS comment: l KNEE SCOPE Abdominal:   (+) obese,     Abdomen: soft. Vascular: negative vascular ROS. Other Findings:               CARDIAC CATHETERIZATION     PATIENT NAME: Clarissa Ordaz                       :        1950  MED REC NO:   [id number]                            ROOM:  ACCOUNT NO:   [de-identified]                           ADMIT DATE: 2022  PROVIDER:     Reynadlo Fuchs MD     DATE OF PROCEDURE:  2022     CARDIAC CATHETERIZATION AND ANGIOPLASTY REPORT     PROCEDURES:  Selective coronary angiography. Balloon angioplasty with  deployment of drug-eluting coronary stent to the first obtuse marginal  branch of the left circumflex. The patient received intracoronary nitroglycerin administration during  the procedure. The procedure was done through right radial approach using ultrasound  guidance. The patient received intravenous Versed and intravenous fentanyl for  sedation.      INDICATION:  Chest pain.  Newly diagnosed cardiomyopathy. AUC:  HF - 6. PRESSURES:  Aorta 99/36 with a mean of 60. CORONARY ANGIOGRAPHY:  LEFT MAIN:  The left main artery did not appear to have any significant  angiographic disease. LAD:  The left anterior descending artery and its branches did not  appear to have any significant angiographic disease. LCX:  The left circumflex is a large codominant vessel. The first  marginal branch has around 70% to 80% stenosis in its proximal third with MITCHELL III flow. The distal circumflex before a large terminal lateral branch has around  30% disease. RCA:  The right coronary artery is a small-to moderate-sized codominant  vessel which did not appear to have any significant angiographic  disease. INTERVENTIONAL PROCEDURE:  EQUIPMENT:  1.  6-Palauan JL3.5 Launcher guide catheter. 2.  0.014/300 ChoICE extra support J-exchange wire. 3.  2.0 mm x 15 mm mini Trek balloon. 4.  2.25 mm x 18 mm Medtronic Perham Resolute drug-eluting coronary stent. TECHNIQUE:  The procedure was done through right radial approach. The  patient was given 5000 units of intravenous heparin. An ACT during the  procedure was 313 and no more heparin was given during the procedure. Intravenous Versed and intravenous fentanyl were given for sedation. Intracoronary nitroglycerin was given during the procedure. The patient  was given 300 mg of Plavix to swallow during the procedure. The left coronary artery was selectively engaged with 6-Palauan JL3.5  guide catheter. Extra support exchange wire was then advanced through  the stenosis in the obtuse marginal branch of the left circumflex and  further distally into this branch. The site of stenosis was dilated  with the 2.0 mm x 15 mm balloon inflated to 12 atmospheres. This was  followed by advancing a 2.25 mm x 18 mm Nirmal Resolute drug-eluting  coronary stent which was deployed at the site at 12 atmospheres.      Contrast injection after intracoronary nitroglycerin administration  revealed very good results without any residual stenosis, with no  evidence of dissections or flaps and with MITCHELL-3 flow in the vessel. The patient tolerated the procedure well and left the cardiac  catheterization laboratory in a stable condition. ESTIMATED BLOOD LOSS:  20 mL. CONTRAST USED:  100 mL. CONCLUSION:  1. Coronary artery disease. a. Left main:  No significant disease. b.  LAD:  No significant disease. c.  LCX:  70% to 80% proximal stenosis of the first marginal branch with MITCHELL III flow. 30% to 40% distal left circumflex disease. The left circumflex is a  codominant vessel. d.  RCA:  Moderate size codominant vessel with no significant  angiographic disease. 2.  Successful balloon angioplasty with the deployment of a drug-eluting  coronary stent to the obtuse marginal branch of the left circumflex with  very good results with MITCHELL III flow. Olivia Loza MD     Type of Study      TTE procedure:Echocardiogram W/Contrast.     Procedure Date  Date: 08/25/2022 Start: 08:07 AM     Study Location: Echo Lab  Technical Quality: Limited visualization due to body habitus. Indications:Coronary artery disease. Patient Status: Routine     Height: 72 inches Weight: 300 pounds BSA: 2.53 m^2 BMI: 40.69 kg/m^2     BP: 128/74 mmHg      Findings      Left Ventricle   Dilated LV   There is doppler evidence of stage I diastolic dysfunction. Mild concentric left ventricular hypertrophy. Ejection fraction is visually estimated at 30 to 35%. Right Ventricle   Normal right ventricular size and function. Left Atrium   The left atrium is mildly dilated. Right Atrium   Normal right atrium size. Mitral Valve   Mild mitral regurgitation is present. Tricuspid Valve   Mild tricuspid regurgitation. Aortic Valve   Individual aortic valve leaflets are not clearly visualized.    No hemodynamically significant aortic stenosis is present. Pulmonic Valve   Physiologic and/or trace pulmonic regurgitation present. Conclusions      Summary   Dilated LV   There is doppler evidence of stage I diastolic dysfunction. Mild concentric left ventricular hypertrophy. Ejection fraction is visually estimated at 30 to 35%. Normal right ventricular size and function. Mild mitral regurgitation is present. Mild tricuspid regurgitation. Physiologic and/or trace pulmonic regurgitation present. Signature      ----------------------------------------------------------------   Electronically signed by Isidro Hernandez MD(Interpreting   physician) on 08/30/2022 07:35 AM       Anesthesia Plan      MAC     ASA 4       Induction: intravenous. Anesthetic plan and risks discussed with patient. Use of blood products discussed with patient whom. Plan discussed with attending.                     JEROME Vera - TAYLER   11/11/2022

## 2022-11-11 NOTE — H&P
Cardiac Electrophysiology  H&P Note  Oly Johnson  1950  Date of Service: 11/11/2022  Referring Provider/PCP: Maico Mcnair DO  Chief Complaint: HFrEF, LBBB, outpatient CRT implant    HPI: Oly Johnson is a 66 y.o. 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 He is managed by Claudia Mcnulty and Cecily with aspirin 81 mg daily, lipitor 20 mg daily, plavix 75 mg daily, lasix 20 mg daily, imdur 30 mg daily, losartan 100 mg daily, metoprolol XL 50 mg daily, spironolactone 25 mg daily, and PPI. In 5/2022, he was diagnosed with new onset HFrEF with focal WMA and new LBBB. A [facility] reported OM1 stenosis, which was treated with MARTIN x 1, GDMT, and LifeVest. In 8/2022, a TTE reported LVEF of 30-35% and an ECG reported LBBB with QRSd > 150 msec. He was referred to Dr Alea Beaver, who recommend [facility] CRT-D implant. He presents today, 11/11/2022; for outpatient [facility] CRT-D implant. He denies any other complaints at this time. Prior cardiac testing:  ECG (9/30/22): sinus at 86 bpm, LBBB (QRSd = 162 msec)  TTE (8/25/22): LVEF = 30-35%, LV dilation, mild concentric LVH, stage I LVDD, mild LAE, mild MR, and mild TR. 14 day event monitor (7/28/22): sinus with IVCD at 52 - 137 bpm (mean: 80 bpm), QRS morphology changes were observed, 7 patient events during sinus at  bpm, SVE burden < 1%, 18 brief episodes of SVT (longest: 14.9 sec at 111 bpm), VE burden < 1%, and no AF, VT, 2* type II or 3* AV block, or significant pauses. [facility] (5/27/22): co-dominant circulation (RCA and Lcx), 70-80% proximal OM1 stenosis treated with MARTIN x 1, and 30-40% distal Lcx stenosis.   TTE (5/24/22): technically difficult stufy, LVEF = 30-35% with hypokinetic apex, LV apex dilation, mild asymmetric septal LVH, stage I LVDD, mild MR, and mild TR.  24 hour Holter (5/14/20): sinus at 45 - 105 bpm (mean: 72 bpm), SVE burden < 1%, VE burden < 1%, no AF, SVT, VT, AV block, or significant pauses; and no patient events. TTE (5/12/20): LVEF = 60%, mild concentric LVH, stage I LVDD, mild RV dilation, mild-moderate LAE, mild-moderate LINDA, and mild MR.     Patient Active Problem List    Diagnosis Date Noted    BMI 40.0-44.9, adult ([facility] [address] 09/07/2022     Priority: Medium    Spinal stenosis of lumbar region without neurogenic claudication 09/07/2022     Priority: Medium    Lumbar spondylosis 09/07/2022     Priority: Medium    Lumbar facet arthropathy 09/07/2022     Priority: Medium    Lumbar disc disorder 09/07/2022     Priority: Medium    Chronic pain syndrome [G89.4 (ICD-10-CM)] 09/07/2022     Priority: Medium    CAD in native artery 05/27/2022     Priority: Medium    Cardiomyopathy [facility])      Priority: Medium    Chest pain 05/24/2022     Priority: Medium    Aguila's esophagus without dysplasia 07/20/2017    Duodenal mass 07/20/2017     Past Surgical History:   Procedure Laterality Date    ATRIAL ABLATION SURGERY  2012    CARDIAC SURGERY      pt states 6 - 7 years ago     KNEE ARTHROPLASTY Left     LIPOMA RESECTION      UPPER GASTROINTESTINAL ENDOSCOPY  03/27/2017    Dr. Cheryl Jung, Findings: Long segment Barretts, 5cm, Moderate Gastritis, Duodenal bulb/post pyloric channel mass versus severe brunner gland hyperplasia, mild duodenitis distally, biopsies taken    UPPER GASTROINTESTINAL ENDOSCOPY  07/17/2017    Dr. Cheryl Jung, Findings: 5cm segment of Aguila's esiphagitis from 29 to 39 cm with no targeted lesions, Mild gastritis, Stomach mass in the antral pyloric channel area,      Family History   Problem Relation Age of Onset    Cancer Mother     Hypertension Mother     Cancer Father      Past Surgical History:   Procedure Laterality Date    ATRIAL ABLATION SURGERY  2012    CARDIAC SURGERY      pt states 6 - 7 years ago     KNEE ARTHROPLASTY Left     LIPOMA RESECTION UPPER GASTROINTESTINAL ENDOSCOPY  03/27/2017    Dr. Cameron Hitchcock, Findings: Long segment Barretts, 5cm, Moderate Gastritis, Duodenal bulb/post pyloric channel mass versus severe brunner gland hyperplasia, mild duodenitis distally, biopsies taken    UPPER GASTROINTESTINAL ENDOSCOPY  07/17/2017    Dr. Cameron Hitchcock, Findings: 5cm segment of Aguila's esiphagitis from 34 to 39 cm with no targeted lesions, Mild gastritis, Stomach mass in the antral pyloric channel area,     Current Outpatient Medications   Medication Sig Dispense Refill    metFORMIN (GLUCOPHAGE) 1000 MG tablet TAKE 1 TABLET TWICE DAILY WITH MEALS 180 tablet 1    glipiZIDE (GLUCOTROL) 5 MG tablet TAKE 1 TABLET BY MOUTH 2 TIMES DAILY (BEFORE MEALS) 180 tablet 1    ACCU-CHEK FELICIA PLUS strip TEST  FINGERSTICK BLOOD SUGAR EVERY  strip 3    Multiple Vitamin (MULTI-VITAMIN DAILY PO) Take by mouth daily      isosorbide mononitrate (IMDUR) 30 MG extended release tablet Take 1 tablet by mouth daily 30 tablet 1    empagliflozin (JARDIANCE) 10 MG tablet Take 1 tablet by mouth daily 30 tablet 1    aspirin 81 MG EC tablet Take 81 mg by mouth daily      losartan (COZAAR) 100 MG tablet Take 1 tablet by mouth daily 90 tablet 1    metoprolol succinate (TOPROL XL) 50 MG extended release tablet Take 1 tablet by mouth daily 90 tablet 1    furosemide (LASIX) 20 MG tablet Take 1 tablet by mouth daily 90 tablet 1    clopidogrel (PLAVIX) 75 MG tablet Take 1 tablet by mouth daily 90 tablet 1    spironolactone (ALDACTONE) 25 MG tablet Take 1 tablet by mouth daily 90 tablet 1    omeprazole (PRILOSEC) 40 MG delayed release capsule Take 1 capsule by mouth daily 90 capsule 0    tamsulosin (FLOMAX) 0.4 mg capsule Take 1 capsule by mouth daily 30 capsule 3    atorvastatin (LIPITOR) 20 MG tablet Take 1 tablet by mouth daily 90 tablet 1    aspirin 325 MG tablet Take 325 mg by mouth daily (Patient not taking: Reported on 9/30/2022)       Current Facility-Administered Medications Medication Dose Route Frequency Provider Last Rate Last Admin    perflutren lipid microspheres (DEFINITY) injection 1.65 mg  1.5 mL IntraVENous ONCE PRN Js Beckett MD            No Known Allergies        ROS:   Review of Systems   Constitutional:  Positive for fatigue. Negative for fever. HENT:  Negative for congestion, nosebleeds and sinus pressure. Eyes:  Negative for redness and visual disturbance. Respiratory:  Positive for shortness of breath. Negative for cough, chest tightness and wheezing. Cardiovascular:  Negative for chest pain, palpitations and leg swelling. Gastrointestinal:  Negative for abdominal distention, abdominal pain, diarrhea and nausea. Endocrine: Negative for cold intolerance, heat intolerance, polydipsia and polyphagia. Genitourinary:  Negative for difficulty urinating, frequency and urgency. Musculoskeletal:  Negative for arthralgias, joint swelling and myalgias. Skin:  Negative for color change and wound. Neurological:  Negative for dizziness, syncope, weakness and numbness. Psychiatric/Behavioral:  Negative for agitation, behavioral problems, confusion, decreased concentration, hallucinations and suicidal ideas. The patient is not nervous/anxious. PHYSICAL EXAM:  BP (!) 142/81   Pulse 78   Temp (!) 96.7 °F (35.9 °C) (Temporal)   Resp 18   Ht 6' (1.829 m)   Wt 300 lb (136.1 kg)   SpO2 92%   BMI 40.69 kg/m²    Physical Exam  Vitals reviewed. Constitutional:       Appearance: Normal appearance. He is obese. HENT:      Head: Normocephalic. Mouth/Throat:      Mouth: Mucous membranes are moist.      Pharynx: Oropharynx is clear. Eyes:      Conjunctiva/sclera: Conjunctivae normal.   Neck:      Vascular: No carotid bruit. Cardiovascular:      Rate and Rhythm: Normal rate and regular rhythm. Pulses: Normal pulses. Heart sounds: Normal heart sounds.    Pulmonary:      Effort: Pulmonary effort is normal.      Breath sounds: Normal breath sounds. No rales. Chest:      Chest wall: No tenderness. Abdominal:      General: Bowel sounds are normal.      Palpations: Abdomen is soft. Musculoskeletal:         General: Normal range of motion. Cervical back: Normal range of motion and neck supple. Skin:     General: Skin is warm. Neurological:      General: No focal deficit present. Mental Status: He is alert and oriented to person, place, and time. Psychiatric:         Mood and Affect: Mood normal.         Behavior: Behavior normal.         Thought Content: Thought content normal.        ASSESSMENT & PLAN  NYHA class II HFrEF-mixed  - In 5/2022, he was diagnosed with new onset HFrEF with focal WMA and new LBBB. A [facility] reported OM1 stenosis, which was treated with MARITN x 1, GDMT, and LifeVest. In 8/2022, a TTE reported LVEF of 30-35% and an ECG reported LBBB with QRSd > 150 msec. He was referred to Dr Anne Betancur, who recommend [employer] CRT-D implant.  - I reviewed indications, material risks, and benefits of CRT-D implant with patient, as well as provided a copy [place] Shared Decision Making Tool to patient, which I reviewed with patient. Patient is agreeable to proceed. - Antibiotic prophylaxis: ancef 3 gm IV x 1.  - Anticipate discharge home 2 hours after procedure as long as acceptable device function and no concerns. - Follow-up with Device Clinic on 11/28/22 at 1030.  - TTE in 3 months. - Follow-up with Dr Anne Betancur ~1 week after TTE.  - Medical management per Dr Teetee Ferraro. 2. Typical AFL sp CTI ablation (6/2012 at [facility] [place])    Thank you for allowing me to participate in your patient's care. I have spent a total of 60 minutes with the patient and his/her family reviewing the above stated recommendations. A total of >50% of that time involved face-to-face time providing counseling and or coordination of care with the other providers.     Christ Ocasio,    Cardiac Electrophysiology  [facility]  [facility]) Physicians

## 2022-11-11 NOTE — DISCHARGE INSTRUCTIONS
[facility] ICD Discharge Instructions    Medications: resume all home medications     Incision Care: White bandage: remove on evening of Saturday 11/12/22. Brown (Aquacel) bandage: located under white bandage. Remove in 1 week (Friday 11/18/22). Surgical glue: located underneath the brown bandage. Do NOT pick at, scratch or remove the surgical glue from your incision. This will fall off on its own over the next 6 weeks. It is there to prevent infection. Bathing: Keep the incision dry. You may shower tomorrow evening, but do NOT spray the water directly at the site or scrub at the site. Pat dry only. No soaking in a bathtub, hot tub, or pool for 6 weeks. Daily monitoring: Check the area daily. Notify the office at [phone] if you develop any redness, swelling, drainage, warmth, or fever greater than 100 degrees. Activity:  You may continue regular activity; but limit strenuous movements of the arm closest to your ICD. Wear the arm immobilizer at all times for 48 hours and then at night for 4 weeks. The immobilizer should be loose, not tight in order to avoid restriction of blood flow. Avoid pulling yourself up with that arm. Do not raise elbow higher than shoulder height, do not lift anything weighing more than 3 pounds with your arm, and do not performing any stretching motions with your arm for 6 weeks. Do not do any activities such as golfing, vacuuming, or mowing the lawn for 6 weeks. Prevent any hard blows to the ICD. Driving: No driving until 2 week Device Clinic visit. Return to driving at that time if no issues with device function requiring intervention. Start with local/short trips to familiar places. Avoid highway/ high-speed driving for the first few days after you resume driving. DO NOT drive until you have stopped taking prescription pain medications.       Possible Defibrillator Interferences:  Avoid high frequency ham radios, arc welders, battery powered tools, and strong electromagnetic fields. Avoid any device that may electrically stimulate your body; such as electric muscle stimulators or TENS units. Standing over a running car motor with the garcia up may inhibit the ICD. When using a cell phone, use the ear opposite the side of your ICD if possible    Special Instructions:  Let your dentist, doctor, or medical specialist know that you have an ICD so precautions can be taken to protect the defibrillator. You should NOT have an MRI if have defibrillator unless instructed by your physician. Your defibrillator may set off a metal detector, such as those used in airports and courtrooms. Let security know that you have an ICD & show them your card. ID Card: You will have a temporary ID card until a permanent card is sent to you by the device company. The permanent card will look like a 's license or credit card and should arrive within 8 weeks. Carry your ID card with you at all times    What else do I need to know about my pacemaker or defibrillator? Do not carry a cellular phone in a pocket within six inches of the pacemaker or defibrillator as this can affect the operation of the unit. Hold the cell phone on the opposite ear as the defibrillator or pacemaker insertion site. Do not walk through airport security systems as these devices can detect the metal in your pacemaker or defibrillator and possibly alarm. The new full body scanners are safe for both pacemakers and defibrillators. Keep your pacemaker/defibrillator ID card with you at all times and ask security to clear you with a hand search only if a full body scanner is not available. Do not let security use a hand-held wand. Avoid passing through metal detectors (for example in shopping malls and schools). If you must pass through, walk quickly through. These detectors can interfere with the pacemaker and defibrillator function.   Do not touch the spark plug or distributor on running car or  - turn engine off first.  Avoid holding magnets near your pacemaker or defibrillator. ICD Shock: If your defibrillator gives you a shock, please notify the [facility] office or answering service. If you are shocked more than once in a day or several times in a week, go the nearest Emergency Room and have the physician page the Cardiac Electrophysiology Doctor on call at [phone]. Remote Monitor: Many of these monitors have a delay of 3-6 months currently, in some cases an gemma can be used with newer smartphones to provide monitoring services. Please discuss this with the device clinic at your follow up appointment in 2 weeks. Follow Up:  -Device Clinic: You will be seen on 11/28/22 at 10:30 am at the [address] [place] [address] for  incision check and brief ICD evaluation. If you need the reschedule this appointment, call the office at [phone].  -Echocardiogram: you will be scheduled for echocardiogram to assess the response to the CRT-D procedure in ~12 weeks. If you are not contacted to schedule within 3 business days, please contact.  -Dr Taylor Browne office: you will be scheduled for appointment in the office in ~13 weeks (~1 week after echocardiogram). If you are not contacted to schedule within 3 business days, please contact. [facility]:   [facility] [address]  [phone]         ICD (Implantable Cardioverter-Defibrillator) Placement: What to Expect at [address] [place] [address]     ICD placement is surgery to put an ICD in your chest. An ICD is a small, battery-powered device that fixes life-threatening changes in your heartbeat. Your chest may be sore where the doctor made the cut (incision) and put in the ICD. You also may have a bruise and mild swelling. These symptoms usually get better in 1 to 2 weeks. You may feel a hard ridge along the incision. This usually gets softer in the months after surgery.  You may be able to see and feel the outline of the ICD under your skin. You may be able to go back to work or your usual routine 1 to 2 weeks after surgery. Your doctor will talk to you about how often you will need to have the ICD checked. You'll need to take steps to safely use electric devices. Some of these devices can stop your ICD from working right for a short time. Check with your doctor about what to avoid and what to keep a short distance away from your ICD. For example, you will need to stay away from things with strong magnetic and electrical fields. An example is an MRI machine (unless your ICD is safe for an MRI). You can use a cell phone and other wireless devices, but keep them at least 6 inches away from your ICD. Many household and office electronics don't affect an ICD. This care sheet gives you a general idea about how long it will take for you to recover. But each person recovers at a different pace. Follow the steps below to get better as quickly as possible. How can you care for yourself at home? Activity    Rest when you feel tired. Getting enough sleep will help you recover. Try to walk each day. Start by walking a little more than you did the day before. Bit by bit, increase the amount you walk. Walking boosts blood flow and helps prevent pneumonia and constipation. Do not raise your arm (on the side of your body where the ICD is located) above shoulder level until your doctor says it's okay. This helps keep the ICD and leads in place while you heal. Your doctor may recommend gentle range-of-motion exercises for your shoulder. For at least 3 or 4 weeks, or for as long as your doctor says:  Avoid activities that strain your chest or upper arm muscles. This includes pushing a  or vacuum, mopping floors, swimming, or swinging a golf club or tennis racquet. Avoid strenuous activities, such as bicycle riding, jogging, weight lifting, or heavy aerobic exercise.   Avoid lifting anything that would make you strain. This may include heavy grocery bags and milk containers, a heavy briefcase or backpack, cat litter or dog food bags, or a child. Ask your doctor when you can drive again. You may need to take about 1 to 2 weeks off from work. It depends on the type of work you do and how you feel. Ask your doctor when it is okay for you to have sex. Diet    You can eat your normal diet. If your stomach is upset, try bland, low-fat foods like plain rice, broiled chicken, toast, and yogurt. Drink plenty of fluids (unless your doctor tells you not to). Medicines    Your doctor will tell you if and when you can restart your medicines. You will also get instructions about taking any new medicines. If you stopped taking aspirin or some other blood thinner, your doctor will tell you when to start taking it again. Take pain medicines exactly as directed. If the doctor gave you a prescription medicine for pain, take it as prescribed. If you are not taking a prescription pain medicine, ask your doctor if you can take an over-the-counter medicine. Do not take aspirin, ibuprofen (Advil, Motrin), naproxen (Aleve), or other nonsteroidal anti-inflammatory drugs (NSAIDs) unless your doctor says it is okay. If you think your pain medicine is making you sick to your stomach: Take your medicine after meals (unless your doctor has told you not to). Ask your doctor for a different pain medicine. Incision care    Keep the area clean and dry. Ask your doctor when you can shower or take a bath. If you have strips of tape on the incision, leave the tape on for a week or until it falls off. Wash the area daily with warm, soapy water, and pat it dry. Don't use hydrogen peroxide or alcohol, which can slow healing. You may cover the area with a gauze bandage if it weeps or rubs against clothing. Exercise    Ask your doctor what sort of activity and intensity is safe for you.    Other instructions    Ask your doctor for a list of electric devices that you might need to keep a short distance from your ICD. Be sure you have an action plan from your doctor about what to do if you get shocked. Carry your ICD identification card with you at all times. The card should include the ICD  and model information. Wear medical alert jewelry that states you have an ICD. You can buy this at most drugstores. Tell all of your doctors, dentists, and other health professionals that you have an ICD before you have any test, procedure, or surgery. Be sure you know what to do if you hear an alarm or feel a vibration from your ICD. Your doctor can give you instructions. Have your ICD checked as often as your doctor recommends. In some cases, this may be done from your home. Your doctor will give you instructions about how to do this. Talk with your doctor about the possibility of turning off the ICD at the end of life. You can put your wishes about the ICD in an advance directive. Follow-up care is a key part of your treatment and safety. Be sure to make and go to all appointments, and call your doctor if you are having problems. It's also a good idea to know your test results and keep a list of the medicines you take. When should you call for help? Call 911 anytime you think you may need emergency care. For example, call if:    You passed out (lost consciousness). You have trouble breathing. Call your doctor now or seek immediate medical care if:    You receive a shock from your ICD. You are dizzy or lightheaded, or you feel like you may faint. You have pain that does not get better after you take pain medicine. You hear an alarm or feel a vibration from your ICD that means to call your doctor. You have loose stitches, or your incision comes open. Bright red blood has soaked through the bandage over your incision.      You have signs of infection, such as: Increased pain, swelling, warmth, or redness. Red streaks leading from the incision. Pus draining from the incision. A fever. Watch closely for changes in your health, and be sure to contact your doctor if you have any problems. Where can you learn more? Go to https://chpepiceweb.[employer]. org and sign in to your [employer] account. Enter K184 in the Jirafe box to learn more about \"ICD (Implantable Cardioverter-Defibrillator) Placement: What to Expect at Home. \"     If you do not have an account, please click on the \"Sign Up Now\" link. Current as of: January 10, 2022               Content Version: 13.4  © 2006-2022 [employer]wise[employer] Inc[employer]. Care instructions adapted under license by [facility] ([facility]). If you have questions about a medical condition or this instruction, always ask your healthcare professional. [address] any warranty or liability for your use of this information.

## 2022-11-11 NOTE — ANESTHESIA POSTPROCEDURE EVALUATION
Department of Anesthesiology  Postprocedure Note    Patient: Itz Andrew  MRN: [id number]  YOB: 1950  Date of evaluation: 11/11/2022      Procedure Summary     Date: 11/11/22 Room / Location: [facility] [place] CATH LAB    Anesthesia Start: 2325 Anesthesia Stop: 8963    Procedure: BI-V ICD TRANS V W/ ANES Diagnosis:       Cardiomyopathy, unspecified      Observation after surgery    Scheduled Providers: JEROME Quiroz - CRNA; Barbie Lagunas MD Responsible Provider: Barbie Lagunas MD    Anesthesia Type: MAC ASA Status: 4          Anesthesia Type: No value filed.     Lolis Phase I:      Lolis Phase II:        Anesthesia Post Evaluation    Patient location during evaluation: PACU  Patient participation: complete - patient participated  Level of consciousness: awake and alert  Pain score: 0  Airway patency: patent  Nausea & Vomiting: no nausea  Complications: no  Cardiovascular status: blood pressure returned to baseline  Respiratory status: acceptable  Hydration status: stable

## 2022-11-12 NOTE — OP NOTE
Operative Note      Patient: Noel Urbina  YOB: 1950  MRN: [id number]    Date of Procedure: 11/11/2022    Patient History:  Noel Urbina is a 66 y.o. 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 He is managed by Claudia Avendano and Cecily with aspirin 81 mg daily, lipitor 20 mg daily, plavix 75 mg daily, lasix 20 mg daily, imdur 30 mg daily, losartan 100 mg daily, metoprolol XL 50 mg daily, spironolactone 25 mg daily, and PPI. In 5/2022, he was diagnosed with new onset HFrEF with focal WMA and new LBBB. A [facility] reported OM1 stenosis, which was treated with MARTIN x 1, GDMT, and LifeVest. In 8/2022, a TTE reported LVEF of 30-35% and an ECG reported LBBB with QRSd > 150 msec. He was referred to Dr Rivas Palacios, who recommend [facility] CRT-D implant. He presents today, 11/11/2022; for outpatient [facility] CRT-D implant. He denies any other complaints at this time. Pre-Op Diagnosis: NYHA Class II HFrEF-mixed cardiomyopathy, LVEF 30-35%, Left bundle branch block with QRS duration of > 150 msec    Post-Op Diagnosis: Same          Procedure: [employer] CRT-D implant (CPT code: 87859 + 48552)     Surgeon: Kaiser Arriaga DO     Assistant: None     Anesthesia: see separate Anesthesia report     Estimated Blood Loss (mL): 10 mL     Complications: none     Detailed Description of Procedure:   Verbal and written informed consent obtained from the patient and family. The patient was brought to the EP lab in a fasting state. Monitored anesthesia care was administered by anesthesia provider during the procedure. A venogram with IV contrast confirmed patency and location of left axillary, subclavian veins and SVC.  The left upper chest was inspected for hair at the anticipated incision site within the clavipectoral triangle and if present was removed with electric clippers. The site was then prepared with chlorhexidine gluconate and draped in a sterile fashion. Vancomycin was administered paraenterally within 1 hour prior to incision. The left upper chest area was inspected for main folding lines and striae to guide orientation of incision and if not apparent a pinch test in various directions was performed to visualize the folding lines. The incision was planned to follow perpendicular to the main folding lines in order to minimize scar formation and optimize wound healing. Local anesthesia with 2% lidocaine HCl was applied to the planned incision and pocket area. Once adequate sedation was achieved and lidocaine was administered, an incision was made two finger-breadths inferior the left clavicle between the mid-clavicular line and deltopectoral groove, which was of adequate width to accommodate the device. Using blunt dissection and electrocautery, a subcutaneous device pocket was created superficial to the pectoralis major muscle fascia in order to avoid the pain corpuscles of the subcuticular plane and vascular pectoralis major muscle. The pocket was extended in a medial and inferior direction from the pocket incision site. A superior lip to the pocket was also created. Hemostasis was achieved and confirmed. The previously performed venogram with IV contrast was used to guide access in the axillary vein overlying the first rib with a modified Seldinger technique and micro puncture needle under fluoroscopic visualization in PA caudal 35* in order to reduce the risk of pneumothorax and hemothorax. Three J tip wires were advanced to the right atrium (RA) and IVC in order to confirm venous puncture and avoid inadvertent placement of pacing leads in the arterial system. A 8 Pashto sheath was advanced over the the first one. Wire and dilator were removed.  A 7.3 F 64 cm [employer] 4-FRONT S Active Fix single coil lead (modl: [id number]) with a straight stylet was advanced into the sheath, then the stylet was partially retracted and the lead was advanced into the RA via the sheath. The stylet was removed and a curve was introduced to the distal end of the stylet. The stylet was the reintroduced to the lead. The lead was advanced into the right ventricle (RV) at a lower plane in order to avoid complications with the TV due to the chordae on the septal aspect. The lead was advanced into the pulmonary artery while partially retracting the stylet in order to confirm location in the RV and avoid inadvertent placement of the lead in the middle cardiac vein or in the LV through a patent foramen ovale. The lead was then slowly withdrawn until it dropped into the RV cavity, then the lead tip was positioned on the RV apical septum in right anterior oblique (HER) 30* fluoroscopic view. The lead was positioned on the apical RV septum. WAYNE 40* view showed RV lead tip was 0-60* from horizontal. The lead tip was deployed. The EGM was reviewed and no evidence of perforation (negative ST-segment) was observed. Lead parameters were assessed and found to be adequate (sensing > 5 mV, impedance = 400 - 1200 ohms, threshold < 1.5 V @ 0.4 msec). High output pacing performed and no evidence of phrenic nerve capture was observed. Adequate lead slack was confirmed as RV lead noted to lay along the floor of the RA without prolapsing into IVC. The sheath was split, and the lead was sutured to the pectoralis major muscle with a non-absorbable 0 silk suture over suture sleeve. A 9 F 40 cm [place] [employer] CSG with dilator was advanced into the RA over the send wire, a long J-tip wire. The wire and dilator were removed. A braided core with [employer] Glidewire were introduced to RA via CSG. The Glidewire was then introduced to the RV. The braided core and CSG were advanced into the RV.  The Glidewire was advanced and retracted while slowly retracting CSG and braided core with counterclockwise rotation. After the Glidewire was introduced to coronary sinus, the CSG and braided core were advanced over the wire into the CS. The braided core and wire were removed. An Amplatz super stiff wire was advanced via CSG into CS. A venogram balloon was advanced via CSG into CS over PT CHOICE wire. PT CHOICE wire was removed. Balloon inflated and venogram performed in HER 30* and Iranian 40* views. Only a moderate sized posterolateral CS branch was observed due to incomplete occlusion of the balloon despite further adjustment of balloon position distally. The venogram balloon was exchanged for a 5.5 F 62 cm [employer] Renal lateral vein introducer (LVI) and a 5 F 75 cm [place] vertebral vein selector, which were advanced into CS at the lateral cardiac border in Iranian over PT CHOICE wire. The PT choice wire was introduced into a lateral branch. The CS vert and renal LVI were introduced into the branch. The CS vert was removed. A gentle injection of contrast was delivered via renal LVI, which revealed very small branch, so the renal LVI was retracted slightly and gentle saline flush revealed larger branch that would accommodate a lead. A CHOICE wire was then introduced into this larger branch and the PT CHOICE wire was removed. A 95 cm 5.2 F [employer] straight CS lead (model: [id number]) was then introduced into the branch over the CHOICE wire with proximal electrode beyond ostium of the branch and tip of the renal LVI sheath. The wire was partially retracted. Lead function assessed and determined to be adequate with acceptable safety margin between phrenic nerve stimulation and myocardial capture. The renal LVI was then split with a [employer] slitter with standard slitting technique. The lead was confirmed to remain in a stable position on floruoscopy. The CSG was then split manually. The lead was confirmed to remain in a stable position on flouroscopy. The Amplatz wire was removed.  The lead was cofnrimed to remain in a stable position on flouroscopy. The wire was removed. The lead function was then reassessed and determined to be stable. Phrenic nerve capture was observed, but with acceptable margin between myocardial capture and phrenic nerve stimulation. Adequate lead slack was confirmed as LV lead noted to lay along the floor of the RA without prolapsing into IVC. The sheath was split, and the lead was sutured to the pectoralis major muscle with a non-absorbable 0 silk suture over suture sleeve. Another 7 Bangladeshi sheath was introduced over the third J-tip wire, following which the wire and dilator were removed. A 52 cm 6 F [employer] Ingevity+ active fix (model: 5855) atrial lead with soft straight stylet was introduced into the sheath, then the stylet was partially retracted and the lead was advanced into the IVC. The soft straight stylet was exchanged for the soft curved atrial stylet. A soft stylet was chosen to minimize the risk of cardiac perforation. The lead tip was positioned in the right atrial appendage (RAA). The lead tip was deployed. The EGM was reviewed and current of injury observed, which is associated with adequate threshold and long-term stability. No evidence of perforation (negative ST-segment) was observed. Lead parameters were assessed and found to be adequate (sensing > 1 mV, impedance = 400 - 1200 ohms, threshold < 1.5 V @ 0.4 msec). High output pacing performed and no evidence of phrenic nerve capture was observed. RV pacing was performed and no far field R-wave oversensing was observed. VA retrograde conduction was absent. Adequate lead slack was confirmed as RA lead had a J-shape without engaging the RA floor or TV annulus. The sheath was split, and the lead was sutured to the pectoralis major muscle with a non-absorbable 0 silk suture over suture sleeve.  The pocket was then irrigated with antibiotic infused saline in order to remove tissue debris and uncover any persistent bleeding. Hemostasis was again confirmed. The leads were connected to the [place] [address] X4 CRT-D IS-1/DF4/IS4 (model: [id number]) device. The leads were curled in the pocket in fashion to avoid any acute angles. The device was then placed in a TYRX absorbable pouch, then into the pocket with the leads beneath the device. SURGIFLO hemostatic matrix was injected into the pocket. Fluoroscopy was used to confirm ideal device and lead position in the pocket, lead connector pins in the device header, adequate lead slack, and lead position. The device was secured in place over the leads with a non-absorbable 0 silk suture. The pocket was closed with multiple layers of suture. An absorbable 2-0 Vicryl violet braided suture with CT-1 needle was used to approximate the clavipectoral deep fascial layer with running suture technique with deep bites of equal spacing in order to isolate the pocket, restore anterior wall structure, and prevent device erosion. An absorbable 2-0 Vicryl violet braided suture with CT-1 needle was used to approximate the subcutaneous tissue with running suture technique with deep bites of equal spacing. An absorbable 4-0 Monocryl suture with reverse cutting needle was used to approximate the subcuticular tissue with minimal tension through running suture technique with closely spaced bites and buried sutured knot at the ends. Dermabond was placed over the incision. An Aquacel Ag surgical dressing was placed over the incision. A pressure dressing was placed over the incision. [place] [employer] CRT programming determined by device algorithm and lowest threshold.      Final lead assessment:  RA: sensing = 0.9 mV, impedance = 551 ohms, capture threshold = 1.2 V @ 0.4 msec  RV: sensing = 6.1 mV, impedance = 414 ohms (RV HV = 84 ohms), capture threshold = 1.0 V @ 0.4 msec  LV (LVtip1>LVring2): sensing = 10.5 mV, impedance = 860 ohms, capture threshold = 1.0 V @ 0.4 msec     The device was programmed:  DDD 60/130 ppm, LV1 >LV2,  LV only, AV delay 80 ms sense and 180 ms paced  Lead programming:  RA lead: sensitivity =  0.25 mV AGC, output = 3.5 V @ 0.4 msec TREND  RV lead: sensitivity =  0.6 mV AGC, output =  3.5 V @ 0.4 msec TREND  LV lead: sensitivity = 1.0 mV AGC, output = 3.5 V @ 0.4 msec TREND  Tachy therapies:  VF: 230 bpm, ATP, 41 J, 41 J, 41 J x 6  VT: 185 bpm, Scan, Scan, 41 J, 41 J, 41 J x 4  Monitor: 160 bpm     SUMMARY: Successful [employer] CRT-D implant in the setting of NYHA class II HFrEF-mixed and LBBB > 150 msec with LVEF 30-35% despite > 3 months of maximally tolerated GDMT.      Electronically signed by Catracho Marks DO on 11/12/2022 at 10:02 AM

## 2022-11-13 DIAGNOSIS — E11.9 TYPE 2 DIABETES MELLITUS WITHOUT COMPLICATION, WITHOUT LONG-TERM CURRENT USE OF INSULIN (HCC): ICD-10-CM

## 2022-11-14 ENCOUNTER — TELEPHONE (OUTPATIENT)
Dept: NON INVASIVE DIAGNOSTICS | Age: 72
End: 2022-11-14

## 2022-11-14 DIAGNOSIS — I42.9 CARDIOMYOPATHY, UNSPECIFIED TYPE (HCC): ICD-10-CM

## 2022-11-14 DIAGNOSIS — I25.5 ISCHEMIC CARDIOMYOPATHY: Primary | ICD-10-CM

## 2022-11-14 DIAGNOSIS — I25.10 CAD IN NATIVE ARTERY: ICD-10-CM

## 2022-11-14 LAB
EKG ATRIAL RATE: 80 BPM
EKG P AXIS: 115 DEGREES
EKG P-R INTERVAL: 142 MS
EKG Q-T INTERVAL: 444 MS
EKG QRS DURATION: 108 MS
EKG QTC CALCULATION (BAZETT): 512 MS
EKG R AXIS: -99 DEGREES
EKG T AXIS: 6 DEGREES
EKG VENTRICULAR RATE: 80 BPM

## 2022-11-14 PROCEDURE — 93010 ELECTROCARDIOGRAM REPORT: CPT | Performed by: INTERNAL MEDICINE

## 2022-11-14 RX ORDER — METOPROLOL SUCCINATE 50 MG/1
TABLET, EXTENDED RELEASE ORAL
Qty: 90 TABLET | Refills: 1 | OUTPATIENT
Start: 2022-11-14

## 2022-11-14 RX ORDER — LOSARTAN POTASSIUM 100 MG/1
TABLET ORAL
Qty: 90 TABLET | Refills: 1 | Status: SHIPPED | OUTPATIENT
Start: 2022-11-14

## 2022-11-14 RX ORDER — ATORVASTATIN CALCIUM 20 MG/1
TABLET, FILM COATED ORAL
Qty: 90 TABLET | Refills: 1 | Status: SHIPPED | OUTPATIENT
Start: 2022-11-14

## 2022-11-14 RX ORDER — SPIRONOLACTONE 25 MG/1
TABLET ORAL
Qty: 90 TABLET | Refills: 1 | Status: SHIPPED | OUTPATIENT
Start: 2022-11-14

## 2022-11-14 RX ORDER — CLOPIDOGREL BISULFATE 75 MG/1
TABLET ORAL
Qty: 90 TABLET | Refills: 1 | Status: SHIPPED | OUTPATIENT
Start: 2022-11-14

## 2022-11-14 RX ORDER — METOPROLOL SUCCINATE 50 MG/1
50 TABLET, EXTENDED RELEASE ORAL DAILY
Qty: 90 TABLET | Refills: 1 | Status: SHIPPED | OUTPATIENT
Start: 2022-11-14

## 2022-11-14 NOTE — TELEPHONE ENCOUNTER
----- Message from Leeann Hall DO sent at 11/11/2022  6:31 PM EST -----  Regarding: appt  Device in 2 weeks (already scheduled)    Limited TTE in 12 weeks. Mezu in 13 weeks (~1 week after echo).     Leeann Hall DO

## 2022-11-14 NOTE — TELEPHONE ENCOUNTER
Requesting refill of BP med to also be called in locally as he is out of it and cannot wait for the mail order requested earlier      Metoprolol Succinate 50 mg #30  Si qd    Epifanio West)

## 2022-11-15 RX ORDER — ISOSORBIDE MONONITRATE 30 MG/1
TABLET, EXTENDED RELEASE ORAL
Qty: 30 TABLET | Refills: 5 | Status: SHIPPED | OUTPATIENT
Start: 2022-11-15

## 2022-11-15 RX ORDER — EMPAGLIFLOZIN 10 MG/1
TABLET, FILM COATED ORAL
Qty: 30 TABLET | Refills: 5 | Status: SHIPPED | OUTPATIENT
Start: 2022-11-15

## 2022-11-18 ENCOUNTER — TELEPHONE (OUTPATIENT)
Dept: CARDIAC CATH/INVASIVE PROCEDURES | Age: 72
End: 2022-11-18

## 2022-11-18 NOTE — TELEPHONE ENCOUNTER
I called Mr. Ram Louder to see how he is doing since his CRT-D insertion on  11/11/22. He states he's doing well and that the aquacel dressing was removed today. He says it looks good and denies any bleeding, redness, drainage, swelling, or pain from CRT-D site. He denies any shocks. I remind him to continue to wear the sling, not to lift the L arm beyond the shoulder for 4 weeks & of his follow up appt in the device clinic on 11/28/22 at 10:30 am.  He offered no complaints & is thankful for the phone call.

## 2022-11-28 ENCOUNTER — NURSE ONLY (OUTPATIENT)
Dept: NON INVASIVE DIAGNOSTICS | Age: 72
End: 2022-11-28

## 2022-11-28 NOTE — PATIENT INSTRUCTIONS
Continue arm restrictions until 12/10/22  You may shower starting today    Call if any signs or symptoms of infection [phone] ext: [phone]  Fevers, chills, redness, swelling or drainage. Wound check in one week 12/5/22 @ 0900    Hook up home  [address], [address] support (home monitoring) [phone]    If you receive a shock    1 shock and you feel fine send a home transmission and call the office    [phone]   1 shock and you feel poorly after the shock go to the emergency room and do   not drive. Multiple shocks report to the emergency room and do no drive.

## 2022-11-28 NOTE — PROGRESS NOTES
Surgical glue intact on incision perimeter. Incision is healing however, the incision is widened, incision is not reddened is free of drainage and swelling. Showed incision to JEROME Cloud. He would like the patient to come back in next week for an incision check. Anitha Hale would also like me to show Dr. Immanuel Zepeda the picture of the incision tomorrow when Dr. Immanuel Zepeda is in the office. Post implant wound care, arm restrictions, and shock protocol discussed. I went over remote monitoring with the patient. However, due to [employer] shipping delays due to the Covid 19 pandemic patient may not receive the home monitor for up to six months. I told the patient and his wife he can contact the office if he gets shocked or experiences symptoms such as palpitations or dizziness. Plan: Incision check next week, follow up visit with provider in 3 months ( a week after TTE).     Leann Amor RN, BSN  [facility]

## 2022-11-29 ENCOUNTER — TELEPHONE (OUTPATIENT)
Dept: NON INVASIVE DIAGNOSTICS | Age: 72
End: 2022-11-29

## 2022-11-29 RX ORDER — CEPHALEXIN 500 MG/1
500 CAPSULE ORAL 2 TIMES DAILY
Qty: 14 CAPSULE | Refills: 0 | Status: SHIPPED | OUTPATIENT
Start: 2022-11-29 | End: 2022-12-06

## 2022-11-29 RX ORDER — CEPHALEXIN 500 MG/1
500 CAPSULE ORAL 2 TIMES DAILY
COMMUNITY
Start: 2022-11-29 | End: 2022-11-29 | Stop reason: SDUPTHER

## 2022-11-29 NOTE — TELEPHONE ENCOUNTER
I showed Dr. Burroughs Fails the wound check picture from yesterday. He would like Chani Jaimes to start on Keflex 500 mg bid for 7 days and keep incision check next week. I called Chani Jaimes and informed him of this. Chani Jaimes voiced understanding.     Kain Patterson RN, BSN  [facility]

## 2022-11-30 ENCOUNTER — OFFICE VISIT (OUTPATIENT)
Dept: CARDIOLOGY CLINIC | Age: 72
End: 2022-11-30
Payer: MEDICARE

## 2022-11-30 VITALS
RESPIRATION RATE: 16 BRPM | WEIGHT: 306 LBS | HEART RATE: 83 BPM | BODY MASS INDEX: 41.45 KG/M2 | DIASTOLIC BLOOD PRESSURE: 70 MMHG | SYSTOLIC BLOOD PRESSURE: 124 MMHG | HEIGHT: 72 IN

## 2022-11-30 DIAGNOSIS — I25.10 CAD IN NATIVE ARTERY: Primary | ICD-10-CM

## 2022-11-30 DIAGNOSIS — I42.9 CARDIOMYOPATHY, UNSPECIFIED TYPE (HCC): ICD-10-CM

## 2022-11-30 PROCEDURE — 3017F COLORECTAL CA SCREEN DOC REV: CPT | Performed by: INTERNAL MEDICINE

## 2022-11-30 PROCEDURE — 4004F PT TOBACCO SCREEN RCVD TLK: CPT | Performed by: INTERNAL MEDICINE

## 2022-11-30 PROCEDURE — 93000 ELECTROCARDIOGRAM COMPLETE: CPT | Performed by: INTERNAL MEDICINE

## 2022-11-30 PROCEDURE — G8484 FLU IMMUNIZE NO ADMIN: HCPCS | Performed by: INTERNAL MEDICINE

## 2022-11-30 PROCEDURE — 1123F ACP DISCUSS/DSCN MKR DOCD: CPT | Performed by: INTERNAL MEDICINE

## 2022-11-30 PROCEDURE — 99214 OFFICE O/P EST MOD 30 MIN: CPT | Performed by: INTERNAL MEDICINE

## 2022-11-30 PROCEDURE — G8427 DOCREV CUR MEDS BY ELIG CLIN: HCPCS | Performed by: INTERNAL MEDICINE

## 2022-11-30 PROCEDURE — G8417 CALC BMI ABV UP PARAM F/U: HCPCS | Performed by: INTERNAL MEDICINE

## 2022-11-30 RX ORDER — HYDRALAZINE HYDROCHLORIDE 25 MG/1
25 TABLET, FILM COATED ORAL 2 TIMES DAILY
Qty: 90 TABLET | Refills: 3 | Status: SHIPPED | OUTPATIENT
Start: 2022-11-30

## 2022-11-30 RX ORDER — FUROSEMIDE 20 MG/1
TABLET ORAL
Qty: 90 TABLET | Refills: 1 | Status: SHIPPED | OUTPATIENT
Start: 2022-11-30

## 2022-11-30 NOTE — PROGRESS NOTES
dme    Out Patient CARDIOLOGY Follow Up Visit    Name: Adi Daniels    Age: 67 y.o. Date of Admission: No admission date for patient encounter. Date of Service: 12/27/2022    Reason for Consultation:   Chief Complaint   Patient presents with    Coronary Artery Disease     6 week           Referring Physician: No admitting provider for patient encounter. History of Present Illness: 27-year-old male with history of Aguila esophagus, duodenal mass, coronary artery disease status post OM stent in May 2022, chronic left bundle branch block, hypertension, obesity, hyperlipidemia, diabetes, tobacco abuse, chronic kidney disease who was hospitalized in May 2022 with chest pain and dyspnea on exertion and echocardiogram revealed EF of 30 to 35%. He underwent invasive coronary angiogram and found to have significant OM disease requiring drug-eluting stent. He had [employer] CRT-D implantation by  on November 11, 2022 and report doing well after the procedure. He states he feels much better. Recent ICD incision site is clean without redness or drainage or swelling. He reports he will be going to [place] in the spring. He will follow-up in 3 months and will obtain transthoracic echocardiogram to monitor left ventricular systolic function 2 weeks prior to follow-up visit. Cardiac MRI was arranged previously but patient declined. Monika Candice was offered in the past but patient declined Entresto. Echocardiogram reviewed: May 2022  Summary   Technically difficult examination. Mild asymmetric septal hypertrophy. There is doppler evidence of stage I diastolic dysfunction. Dilated apex with hypokinetic wall motion   Ejection fraction is visually estimated at 30 to 35%. Normal right ventricular size and function. Mild mitral regurgitation is present. Mild tricuspid regurgitation. Stress test reviewed:       Cardiac catheterization reviewed:    CONCLUSION:  1.   Coronary artery disease. a. Left main:  No significant disease. b.  LAD:  No significant disease. c.  LCX:  70% to 80% proximal stenosis of the first marginal branch. 30% to 40% distal left circumflex disease. The left circumflex is a  codominant vessel. d.  RCA:  Moderate size codominant vessel with no significant  angiographic disease. 2.  Successful balloon angioplasty with the deployment of a drug-eluting  coronary stent to the obtuse marginal branch of the left circumflex with  very good results.             Past Medical History:  Past Medical History:   Diagnosis Date    Anemia     Atrial flutter (HCC)     Aguila's esophagus     Coronary artery disease involving native coronary artery 05/27/2022    Diverticulitis     Fatty liver     History of Holter monitoring 05/12/2020    Hyperlipidemia     Hypertension     Lipoma     Subcyst    Lumbar spinal stenosis     Osteoarthritis     PUD (peptic ulcer disease)     S/P angioplasty with stent 05/27/2022    1st OM    Type 2 diabetes mellitus without complication ([facility] [address]        Past Surgical History:  Past Surgical History:   Procedure Laterality Date    ATRIAL ABLATION SURGERY  2012    CARDIAC DEFIBRILLATOR PLACEMENT Left 11/11/2022    [employer] CRT-D Insertion (Dr. Abhinav Borja)    [facility] 6 - 7 years ago     KNEE ARTHROPLASTY Left     LIPOMA RESECTION      UPPER GASTROINTESTINAL ENDOSCOPY  03/27/2017    Dr. Bruna Monet, Findings: Long segment Barretts, 5cm, Moderate Gastritis, Duodenal bulb/post pyloric channel mass versus severe brunner gland hyperplasia, mild duodenitis distally, biopsies taken    UPPER GASTROINTESTINAL ENDOSCOPY  07/17/2017    Dr. Bruna Monet, Findings: 5cm segment of Aguila's esiphagitis from 29 to 39 cm with no targeted lesions, Mild gastritis, Stomach mass in the antral pyloric channel area,       Family History:  Family History   Problem Relation Age of Onset    Cancer Mother     Hypertension Mother     Cancer Father        Social History:  Social History     Socioeconomic History    Marital status:      Spouse name: Not on file    Number of children: Not on file    Years of education: Not on file    Highest education level: Not on file   Occupational History    Not on file   Tobacco Use    Smoking status: Some Days     Types: Cigars    Smokeless tobacco: Never    Tobacco comments:     occassional cigar    Vaping Use    Vaping Use: Never used   Substance and Sexual Activity    Alcohol use: No    Drug use: No    Sexual activity: Not on file   Other Topics Concern    Not on file   Social History Narrative    Not on file     Social Determinants of Health     Financial Resource Strain: Low Risk     Difficulty of Paying Living Expenses: Not hard at all   Food Insecurity: No Food Insecurity    Worried About Running Out of Food in the Last Year: Never true    Ran Out of Food in the Last Year: Never true   Transportation Needs: Not on file   Physical Activity: Unknown    Days of Exercise per Week: 0 days    Minutes of Exercise per Session: Not on file   Stress: Not on file   Social Connections: Not on file   Intimate Partner Violence: Not on file   Housing Stability: Not on file       Allergies:  No Known Allergies    Home Medications:  Prior to Admission medications    Medication Sig Start Date End Date Taking?  Authorizing Provider   hydrALAZINE (APRESOLINE) 25 MG tablet Take 1 tablet by mouth in the morning and at bedtime 11/30/22  Yes Mary Beckett MD   JARDIANCE 10 MG tablet TAKE 1 TABLET EVERY DAY 11/15/22  Yes Mary Beckett MD   isosorbide mononitrate (IMDUR) 30 MG extended release tablet TAKE 1 TABLET EVERY DAY 11/15/22  Yes Mary Beckett MD   atorvastatin (LIPITOR) 20 MG tablet TAKE 1 TABLET EVERY DAY 11/14/22  Yes Asher Mcallister,    spironolactone (ALDACTONE) 25 MG tablet TAKE 1 TABLET EVERY DAY 11/14/22  Yes Asher Mcallister DO   clopidogrel (PLAVIX) 75 MG tablet TAKE 1 TABLET EVERY DAY 11/14/22  Yes Asher Mcallister,    losartan (COZAAR) 100 MG tablet TAKE 1 TABLET EVERY DAY 11/14/22  Yes Asher Mcallister DO   metoprolol succinate (TOPROL XL) 50 MG extended release tablet Take 1 tablet by mouth daily 11/14/22  Yes Asher Mcallister DO   metFORMIN (GLUCOPHAGE) 1000 MG tablet TAKE 1 TABLET TWICE DAILY WITH MEALS 11/4/22  Yes Leanne Islas PA-C   glipiZIDE (GLUCOTROL) 5 MG tablet TAKE 1 TABLET BY MOUTH 2 TIMES DAILY (BEFORE MEALS) 11/4/22  Yes Leanne Islas PA-C   Multiple Vitamin (MULTI-VITAMIN DAILY PO) Take by mouth daily   Yes Historical Provider, MD   aspirin 81 MG EC tablet Take 81 mg by mouth daily   Yes Historical Provider, MD   omeprazole (PRILOSEC) 40 MG delayed release capsule TAKE 1 CAPSULE EVERY DAY 12/19/22   Asher Mcallister DO   tamsulosin (FLOMAX) 0.4 MG capsule TAKE 1 CAPSULE EVERY DAY 12/19/22   Asher Mcallister DO   furosemide (LASIX) 20 MG tablet TAKE 1 TABLET EVERY DAY 11/30/22   Asher Mcallister DO   ACCU-CHEK FELICIA PLUS strip TEST  FINGERSTICK BLOOD SUGAR EVERY DAY 10/31/22   Steven Mcallister DO       Current Medications:  Current Outpatient Medications   Medication Sig Dispense Refill    hydrALAZINE (APRESOLINE) 25 MG tablet Take 1 tablet by mouth in the morning and at bedtime 90 tablet 3    JARDIANCE 10 MG tablet TAKE 1 TABLET EVERY DAY 30 tablet 5    isosorbide mononitrate (IMDUR) 30 MG extended release tablet TAKE 1 TABLET EVERY DAY 30 tablet 5    atorvastatin (LIPITOR) 20 MG tablet TAKE 1 TABLET EVERY DAY 90 tablet 1    spironolactone (ALDACTONE) 25 MG tablet TAKE 1 TABLET EVERY DAY 90 tablet 1    clopidogrel (PLAVIX) 75 MG tablet TAKE 1 TABLET EVERY DAY 90 tablet 1    losartan (COZAAR) 100 MG tablet TAKE 1 TABLET EVERY DAY 90 tablet 1    metoprolol succinate (TOPROL XL) 50 MG extended release tablet Take 1 tablet by mouth daily 90 tablet 1    metFORMIN (GLUCOPHAGE) 1000 MG tablet TAKE 1 TABLET TWICE DAILY WITH MEALS 180 tablet 1    glipiZIDE (GLUCOTROL) 5 MG tablet TAKE 1 TABLET BY MOUTH 2 TIMES DAILY (BEFORE MEALS) 180 tablet 1    Multiple Vitamin (MULTI-VITAMIN DAILY PO) Take by mouth daily      aspirin 81 MG EC tablet Take 81 mg by mouth daily      omeprazole (PRILOSEC) 40 MG delayed release capsule TAKE 1 CAPSULE EVERY DAY 90 capsule 0    tamsulosin (FLOMAX) 0.4 MG capsule TAKE 1 CAPSULE EVERY DAY 90 capsule 3    furosemide (LASIX) 20 MG tablet TAKE 1 TABLET EVERY DAY 90 tablet 1    ACCU-CHEK FELICIA PLUS strip TEST  FINGERSTICK BLOOD SUGAR EVERY  strip 3     Current Facility-Administered Medications   Medication Dose Route Frequency Provider Last Rate Last Admin    perflutren lipid microspheres (DEFINITY) injection 1.5 mL  1.5 mL IntraVENous ONCE PRN Js Beckett MD        perflutren lipid microspheres (DEFINITY) injection 1.65 mg  1.5 mL IntraVENous ONCE PRN Js Beckett MD                 Review of Systems:   Cardiac: As per HPI  General: Denies fever or chills  Pulmonary: As per HPI  HEENT: Denies runny nose  GI: No complaints  : No complaints  Endocrine: Denies night sweats  Musculoskeletal: No complaints  Skin: Dry skin  Neuro: No complaints  Psych: Denies depression    Physical Exam:  /70   Pulse 83   Resp 16   Ht 6' (1.829 m)   Wt (!) 306 lb (138.8 kg)   BMI 41.50 kg/m²   Wt Readings from Last 3 Encounters:   11/30/22 (!) 306 lb (138.8 kg)   11/11/22 300 lb (136.1 kg)   09/30/22 (!) 306 lb (138.8 kg)       Appearance: Alert and oriented x3 not in acute distress. Skin: Dry skin  Head: Atraumatic  Eyes: Intact extraocular muscles   ENMT: Mucous membranes are moist  Neck: Supple  Lungs: Clear to auscultation  Cardiac: Normal S1 and S2  Abdomen: Protuberant  Extremities: Intact range of motion  Neurologic: No focal neurological deficits  Peripheral Pulses: 2+ peripheral pulses      Laboratory Tests:  No results for input(s): NA, K, CL, CO2, BUN, CREATININE, GLUCOSE, CALCIUM in the last 72 hours.   Lab Results Component Value Date/Time    MG 1.6 11/11/2022 01:50 PM    MG 1.6 05/25/2022 09:39 AM     No results for input(s): ALKPHOS, ALT, AST, PROT, BILITOT, BILIDIR, LABALBU in the last 72 hours. No results for input(s): WBC, RBC, HGB, HCT, MCV, MCH, MCHC, RDW, PLT, MPV in the last 72 hours. No results found for: CKTOTAL, CKMB, CKMBINDEX, TROPONINI  No results for input(s): CKTOTAL, CKMB, CKMBINDEX, TROPHS in the last 72 hours. No results found for: INR, PROTIME  Lab Results   Component Value Date    TSH 2.260 05/25/2022    TSH 2.550 05/17/2022    TSH 2.208 04/30/2013     Lab Results   Component Value Date    LABA1C 6.4 08/23/2022    LABA1C 6.5 (H) 05/24/2022    LABA1C 6.7 (H) 05/17/2022     No results found for: EAG  Lab Results   Component Value Date    CHOL 129 05/25/2022    CHOL 142 05/17/2022    CHOL 137 08/30/2021     Lab Results   Component Value Date    TRIG 137 05/25/2022    TRIG 106 05/17/2022    TRIG 96 08/30/2021     Lab Results   Component Value Date    HDL 55 05/25/2022    HDL 54 05/17/2022    HDL 48 08/30/2021     Lab Results   Component Value Date    LDLCALC 47 05/25/2022    LDLCALC 67 05/17/2022    LDLCALC 70 08/30/2021     Lab Results   Component Value Date    LABVLDL 27 05/25/2022    LABVLDL 21 05/17/2022    LABVLDL 19 08/30/2021     No results found for: CHOLHDLRATIO  No results for input(s): PROBNP in the last 72 hours. Cardiac Tests:  EKG reviewed (EKG date: Biventricular pacing 83 bpm):        TTE 7/2022  Summary  Dilated LV  There is doppler evidence of stage I diastolic dysfunction. Mild concentric left ventricular hypertrophy. Ejection fraction is visually estimated at 30 to 35%. Normal right ventricular size and function. Mild mitral regurgitation is present. Mild tricuspid regurgitation. Physiologic and/or trace pulmonic regurgitation present. Echocardiogram reviewed: May 2022    Echocardiogram reviewed: May 2022  Summary   Technically difficult examination.    Mild asymmetric septal hypertrophy. There is doppler evidence of stage I diastolic dysfunction. Dilated apex with hypokinetic wall motion   Ejection fraction is visually estimated at 30 to 35%. Normal right ventricular size and function. Mild mitral regurgitation is present. Mild tricuspid regurgitation. Stress test reviewed:       Cardiac catheterization reviewed:    CONCLUSION:  1. Coronary artery disease. a. Left main:  No significant disease. b.  LAD:  No significant disease. c.  LCX:  70% to 80% proximal stenosis of the first marginal branch. 30% to 40% distal left circumflex disease. The left circumflex is a  codominant vessel. d.  RCA:  Moderate size codominant vessel with no significant  angiographic disease. 2.  Successful balloon angioplasty with the deployment of a drug-eluting  coronary stent to the obtuse marginal branch of the left circumflex with  very good results. Technically difficult examination. Mild asymmetric septal hypertrophy. There is doppler evidence of stage I diastolic dysfunction. Dilated apex with hypokinetic wall motion   Ejection fraction is visually estimated at 30 to 35%. Normal right ventricular size and function. Mild mitral regurgitation is present. Mild tricuspid regurgitation. o patch 7/2022  Patient had a min HR of 52 bpm, max HR of 158 bpm, and avg HR of 80 bpm.  Predominant underlying rhythm was Sinus Rhythm. Bundle Branch Block/IVCD was  present. QRS morphology changes were present throughout recording. 18  Supraventricular Tachycardia runs occurred, the run with the fastest interval lasting  5 beats with a max rate of 158 bpm, the longest lasting 14.9 secs with an avg rate  of 111 bpm. Isolated SVEs were rare (<1.0%), SVE Couplets were rare (<1.0%), and  SVE Triplets were rare (<1.0%). Isolated VEs were rare (<1.0%), and no VE Couplets  or VE Triplets were present. Ventricular Trigeminy was present.  Difficulty discerning  atrial activity making definitive diagnosis difficult to ascertain. CXR reviewed: The ASCVD Risk score (Lay CURRAN, et al., 2019) failed to calculate for the following reasons: The valid total cholesterol range is 130 to 320 mg/dL      ASSESSMENT / PLAN:  1. CAD: S/P PCI to OM. ASA/plavix/BB/statin/nitrate. 2.  chronic systolic CHF:  Currently in NYHA class I   BB/ARB/nitrate\Aldactone  Also on Jardiance and Imdur   He declined Entresto but   He had a [employer] CRT-D implantation on November 11, 2022 by Dr. Margarita Interiano. He reports feels much better after device implantation  Device site is clean without erythema, drainage or swelling  He declined cardiac MRI      3. H/o atrial fibrillation/flutter status post ablation 12 years ago  Recent heart Monitor revealed no dangerous arrhythmias or recurrent A. fib or flutter  Currently has CRT-D [facility] device and will monitor closely for any sign of recurrent A. Fib    4. LBBB  In the setting of low EF and status post CRT-D with [facility] [employer] device    5. HTN: Observe. Stable    6. HLP  Continue statin therapy    7. DM   Follow-up with your PCP    8. Chronic kidney disease    Follow-up with your PCP  9. Morbid obesity   Weight loss is recommended    10. Tobacco abuse  Smoking cessation is recommended         Follow in Return in about 6 months (around 5/30/2023). Thank you for allowing me to participate in your patient's care. Please feel free to contact me if you have any questions or concerns.     Hayde Amezcua MD  [facility]) Cardiology

## 2022-12-05 ENCOUNTER — NURSE ONLY (OUTPATIENT)
Dept: NON INVASIVE DIAGNOSTICS | Age: 72
End: 2022-12-05

## 2022-12-05 RX ORDER — CEPHALEXIN 500 MG/1
500 CAPSULE ORAL 2 TIMES DAILY
Qty: 14 CAPSULE | Refills: 0 | Status: SHIPPED | OUTPATIENT
Start: 2022-12-06 | End: 2022-12-13

## 2022-12-05 NOTE — PROGRESS NOTES
Patient here for left upper chest incision check(see picture in media). Dr. Abelardo Rodríguez came in to assess incision. Plan: another week of Keflex 500 mg bid and wound check in one week.     Neda Brewer RN, BSN  [facility]

## 2022-12-12 ENCOUNTER — NURSE ONLY (OUTPATIENT)
Dept: NON INVASIVE DIAGNOSTICS | Age: 72
End: 2022-12-12

## 2022-12-12 NOTE — PATIENT INSTRUCTIONS
Contact the office with any fever, chills, redness, or drainage ([phone], ext [phone])  Do not pick at the scab. Cleanse with soap and water only. Do not put any lotions, powders or creams on the incision.

## 2022-12-12 NOTE — PROGRESS NOTES
Here for a wound check. Incision is free of redness and drainage. Patient has one more day of Keflex. Instructed to finish Keflex. JEROME Dubois, came in to assess wound. Plan: Recheck wound on 12/27/22. Per Emiliano Obando will also check device. Patient goes to [place] during the winter months. If device is okay will schedule for office visit when he returns to [place] in the spring.     Rigoberto Pena RN, BSN  [facility]

## 2022-12-19 RX ORDER — OMEPRAZOLE 40 MG/1
CAPSULE, DELAYED RELEASE ORAL
Qty: 90 CAPSULE | Refills: 0 | Status: SHIPPED | OUTPATIENT
Start: 2022-12-19

## 2022-12-19 RX ORDER — TAMSULOSIN HYDROCHLORIDE 0.4 MG/1
CAPSULE ORAL
Qty: 90 CAPSULE | Refills: 3 | Status: SHIPPED | OUTPATIENT
Start: 2022-12-19

## 2022-12-27 ENCOUNTER — NURSE ONLY (OUTPATIENT)
Dept: NON INVASIVE DIAGNOSTICS | Age: 72
End: 2022-12-27

## 2022-12-27 NOTE — PROGRESS NOTES
Per Nancy Petersen, APRN, no further antibiotics are needed unless something changes. I notified patient he does not need any further antibiotics, but call the office if he develops fever, chills, redness or drainage from left chest incision. I informed him the crust formation will come off on it's own. He voiced understanding of above.     Jerman Gold RN, BSN  [facility]

## 2022-12-27 NOTE — PATIENT INSTRUCTIONS
Once you get the home Latitude monitor you can plug into wall outlet.   For questions with the Latitude monitor call [phone]

## 2023-01-16 ENCOUNTER — TELEPHONE (OUTPATIENT)
Dept: NON INVASIVE DIAGNOSTICS | Age: 73
End: 2023-01-16

## 2023-01-16 NOTE — TELEPHONE ENCOUNTER
I discussed Latitude transmission with Dr. Kamilah Alex. He reviewed chart. Dr. Kamilah Alex would like the patient to stop his Plavix and start Apixaban (Eliquis) 5 mg twice daily. I spoke with the patient regarding the above. I told him to stop the Plavix when he starts the Eliquis 5 mg twice daily. He asked if I knew how much the Eliquis cost. I told him I didn't know because every insurance is different depending on their formularies. I asked him to contact the office if the cost is too much. We could also check with the 's drug programs. Yeni Bryson voiced understanding of above.     Ama Cifuentes RN, BSN  [facility]

## 2023-01-16 NOTE — TELEPHONE ENCOUNTER
I called the patient to see if he went to [place]. He stated his wife did not want him to leave. He recently received his Latitude home monitor. We received a transmission today that showed episodes of Atrial Fibrillation. He is asymptomatic. Medication: ASA, Plavix, and Toprol XL 50 mg daily. I will send a message to Dr. Christophe Nissen regarding the AF.      Mariel Hutchison RN, BSN  [facility]

## 2023-01-17 NOTE — TELEPHONE ENCOUNTER
I returned Paul's call. He states he checked with his pharmacy. The Eliquis will cost him $500-600 a month. He does have a card for a months free trial. Qing Islas told me he went on line for the assistance program. He spoke with the company. He should hear by tomorrow if he qualifies for the assistance program. I will check back with him tomorrow.     Marek Sol RN, BSN  [facility]

## 2023-01-18 NOTE — TELEPHONE ENCOUNTER
Patient called in the Eliquis will cost him $500-600 a month. He tried for the assistance program through the company. He did not qualify. Ana Irizarry is going to check what the cost of Xarelto and Coumadin will be.      Holly Vogel RN, BSN  [facility]

## 2023-01-23 ENCOUNTER — TELEPHONE (OUTPATIENT)
Dept: NON INVASIVE DIAGNOSTICS | Age: 73
End: 2023-01-23

## 2023-01-23 NOTE — TELEPHONE ENCOUNTER
Continue with recommendation to stop Plavix, due to patient's insurance constraints unable to start apixaban which is the ideal/preferred agent in the situation however we will start Xarelto 20 Mg daily with the largest meal of the day.

## 2023-01-23 NOTE — TELEPHONE ENCOUNTER
Patient called in stating he can afford the Xarelto instead of Eliquis. I will send a note to JEROME Byrne to order the Xarelto for Dr. Suhas Martinez. Patient is requesting a one month to his local [facility], then a 90 day to his mail away pharmacy. Tonie Nunez ddr

## 2023-01-23 NOTE — TELEPHONE ENCOUNTER
I called the patient and let him know the dose of Xarelto will be 20 mg daily. I reminded him to stop the Plavix when he starts the Xarelto. Instructed him to take the Xarelto with the biggest meal of the day. He voiced understanding of above.     Micheline Person RN, BSN  [facility]

## 2023-01-30 ENCOUNTER — TELEPHONE (OUTPATIENT)
Dept: NON INVASIVE DIAGNOSTICS | Age: 73
End: 2023-01-30

## 2023-01-30 NOTE — TELEPHONE ENCOUNTER
Call from patient stating that patient started xarelto on 1/23/2023. He states that starting on 1/24/2023 his gums started bleeding. He states the bleeding comes and goes. He states that they do bleed more when he brushes his teeth but he has also woke up from sleeping with a mouth full of blood. He confirms that he stopped the Plavix but he is still taking ASA 81mg QD. Will discuss with Alice Fuentes NP. Reviewed with Alice Fuentes NP. We could change him to Eliquis, Coumadin or Pradaxa. Pradaxa more than likely will cause the same issue. He can come and get some samples of Eliquis and possibly get on medication assistance.      Mary Cabrera

## 2023-01-30 NOTE — TELEPHONE ENCOUNTER
Called and spoke with patient. Reviewed Christiano Hall NP recommendation. He stats that he just spent 200 dollars on the xarelto pills. He attempted going through patient assistance with the Eliquis and he still can not afford it. At this time. Patient states he is going to continue taking the Xarelto and call us if the bleeding gets worse. He states he really doesn't want to go on Coumadin due to the blood work, but he will switch if the bleeding continues. Patient instructed to call us if any further signs or symptoms. Patient voiced understanding.          Mary Cabrera

## 2023-01-31 DIAGNOSIS — I42.9 CARDIOMYOPATHY, UNSPECIFIED TYPE (HCC): ICD-10-CM

## 2023-01-31 RX ORDER — METOPROLOL SUCCINATE 50 MG/1
TABLET, EXTENDED RELEASE ORAL
Qty: 90 TABLET | Refills: 1 | OUTPATIENT
Start: 2023-01-31

## 2023-02-03 ENCOUNTER — TELEPHONE (OUTPATIENT)
Dept: CARDIOLOGY | Age: 73
End: 2023-02-03

## 2023-02-06 ENCOUNTER — HOSPITAL ENCOUNTER (OUTPATIENT)
Dept: CARDIOLOGY | Age: 73
Discharge: HOME | End: 2023-02-06
Payer: MEDICARE

## 2023-02-06 DIAGNOSIS — I25.10 CAD IN NATIVE ARTERY: ICD-10-CM

## 2023-02-06 PROCEDURE — 93306 TTE W/DOPPLER COMPLETE: CPT

## 2023-02-13 DIAGNOSIS — I42.9 CARDIOMYOPATHY, UNSPECIFIED TYPE (HCC): ICD-10-CM

## 2023-02-13 RX ORDER — METOPROLOL SUCCINATE 50 MG/1
TABLET, EXTENDED RELEASE ORAL
Qty: 90 TABLET | Refills: 1 | Status: SHIPPED | OUTPATIENT
Start: 2023-02-13

## 2023-02-14 NOTE — TELEPHONE ENCOUNTER
Called and spoke with patient regarding an update on how he was doing taking the xarelto. Patient states that his gums are no longer bleeding daily and he no longer wakes up with bleeding gums. He states once in a while during the day he will have some bleeding gums but nothing like it was. Patient denies any other bleeding. Forwarding to Dai Dewey NP.        Mary Cabrera

## 2023-02-17 ENCOUNTER — TELEPHONE (OUTPATIENT)
Dept: CARDIOLOGY CLINIC | Age: 73
End: 2023-02-17

## 2023-02-17 NOTE — TELEPHONE ENCOUNTER
----- Message from Nancy Villalba MD sent at 2/17/2023 10:15 AM EST -----  Heart function is mildly reduced. Details will be discussed during follow-up visit.

## 2023-04-20 ENCOUNTER — OFFICE VISIT (OUTPATIENT)
Dept: NON INVASIVE DIAGNOSTICS | Age: 73
End: 2023-04-20
Payer: MEDICARE

## 2023-04-20 VITALS
BODY MASS INDEX: 41.31 KG/M2 | DIASTOLIC BLOOD PRESSURE: 70 MMHG | HEIGHT: 72 IN | RESPIRATION RATE: 16 BRPM | SYSTOLIC BLOOD PRESSURE: 110 MMHG | WEIGHT: 305 LBS | HEART RATE: 83 BPM

## 2023-04-20 DIAGNOSIS — Z95.810 CARDIAC RESYNCHRONIZATION THERAPY DEFIBRILLATOR (CRT-D) IN PLACE: ICD-10-CM

## 2023-04-20 DIAGNOSIS — I48.0 PAROXYSMAL ATRIAL FIBRILLATION (HCC): Primary | ICD-10-CM

## 2023-04-20 DIAGNOSIS — I25.5 ISCHEMIC CARDIOMYOPATHY: ICD-10-CM

## 2023-04-20 PROCEDURE — 93290 INTERROG DEV EVAL ICPMS IP: CPT | Performed by: NURSE PRACTITIONER

## 2023-04-20 PROCEDURE — 3017F COLORECTAL CA SCREEN DOC REV: CPT | Performed by: NURSE PRACTITIONER

## 2023-04-20 PROCEDURE — G8417 CALC BMI ABV UP PARAM F/U: HCPCS | Performed by: NURSE PRACTITIONER

## 2023-04-20 PROCEDURE — 93284 PRGRMG EVAL IMPLANTABLE DFB: CPT | Performed by: NURSE PRACTITIONER

## 2023-04-20 PROCEDURE — 99215 OFFICE O/P EST HI 40 MIN: CPT | Performed by: NURSE PRACTITIONER

## 2023-04-20 PROCEDURE — G8427 DOCREV CUR MEDS BY ELIG CLIN: HCPCS | Performed by: NURSE PRACTITIONER

## 2023-04-20 PROCEDURE — 1123F ACP DISCUSS/DSCN MKR DOCD: CPT | Performed by: NURSE PRACTITIONER

## 2023-04-20 PROCEDURE — 4004F PT TOBACCO SCREEN RCVD TLK: CPT | Performed by: NURSE PRACTITIONER

## 2023-04-22 SDOH — HEALTH STABILITY: PHYSICAL HEALTH: ON AVERAGE, HOW MANY DAYS PER WEEK DO YOU ENGAGE IN MODERATE TO STRENUOUS EXERCISE (LIKE A BRISK WALK)?: 0 DAYS

## 2023-04-22 SDOH — ECONOMIC STABILITY: FOOD INSECURITY: WITHIN THE PAST 12 MONTHS, THE FOOD YOU BOUGHT JUST DIDN'T LAST AND YOU DIDN'T HAVE MONEY TO GET MORE.: NEVER TRUE

## 2023-04-22 SDOH — ECONOMIC STABILITY: INCOME INSECURITY: HOW HARD IS IT FOR YOU TO PAY FOR THE VERY BASICS LIKE FOOD, HOUSING, MEDICAL CARE, AND HEATING?: NOT VERY HARD

## 2023-04-22 SDOH — ECONOMIC STABILITY: FOOD INSECURITY: WITHIN THE PAST 12 MONTHS, YOU WORRIED THAT YOUR FOOD WOULD RUN OUT BEFORE YOU GOT MONEY TO BUY MORE.: NEVER TRUE

## 2023-04-22 SDOH — HEALTH STABILITY: PHYSICAL HEALTH: ON AVERAGE, HOW MANY MINUTES DO YOU ENGAGE IN EXERCISE AT THIS LEVEL?: 0 MIN

## 2023-04-22 SDOH — ECONOMIC STABILITY: TRANSPORTATION INSECURITY
IN THE PAST 12 MONTHS, HAS LACK OF TRANSPORTATION KEPT YOU FROM MEETINGS, WORK, OR FROM GETTING THINGS NEEDED FOR DAILY LIVING?: NO

## 2023-04-22 SDOH — ECONOMIC STABILITY: HOUSING INSECURITY
IN THE LAST 12 MONTHS, WAS THERE A TIME WHEN YOU DID NOT HAVE A STEADY PLACE TO SLEEP OR SLEPT IN A SHELTER (INCLUDING NOW)?: NO

## 2023-04-22 ASSESSMENT — PATIENT HEALTH QUESTIONNAIRE - PHQ9
1. LITTLE INTEREST OR PLEASURE IN DOING THINGS: 0
2. FEELING DOWN, DEPRESSED OR HOPELESS: 0
SUM OF ALL RESPONSES TO PHQ9 QUESTIONS 1 & 2: 0
SUM OF ALL RESPONSES TO PHQ QUESTIONS 1-9: 0

## 2023-04-22 ASSESSMENT — LIFESTYLE VARIABLES
HOW OFTEN DO YOU HAVE SIX OR MORE DRINKS ON ONE OCCASION: 1
HOW OFTEN DO YOU HAVE A DRINK CONTAINING ALCOHOL: 1
HOW MANY STANDARD DRINKS CONTAINING ALCOHOL DO YOU HAVE ON A TYPICAL DAY: PATIENT DOES NOT DRINK
HOW MANY STANDARD DRINKS CONTAINING ALCOHOL DO YOU HAVE ON A TYPICAL DAY: 0
HOW OFTEN DO YOU HAVE A DRINK CONTAINING ALCOHOL: NEVER

## 2023-04-25 ENCOUNTER — OFFICE VISIT (OUTPATIENT)
Dept: PRIMARY CARE CLINIC | Age: 73
End: 2023-04-25
Payer: MEDICARE

## 2023-04-25 VITALS
SYSTOLIC BLOOD PRESSURE: 124 MMHG | TEMPERATURE: 98.3 F | BODY MASS INDEX: 40.36 KG/M2 | HEART RATE: 83 BPM | RESPIRATION RATE: 20 BRPM | DIASTOLIC BLOOD PRESSURE: 80 MMHG | WEIGHT: 298 LBS | OXYGEN SATURATION: 94 % | HEIGHT: 72 IN

## 2023-04-25 DIAGNOSIS — E11.9 TYPE 2 DIABETES MELLITUS WITHOUT COMPLICATION, WITHOUT LONG-TERM CURRENT USE OF INSULIN (HCC): ICD-10-CM

## 2023-04-25 DIAGNOSIS — Z00.00 MEDICARE ANNUAL WELLNESS VISIT, SUBSEQUENT: ICD-10-CM

## 2023-04-25 DIAGNOSIS — E11.9 TYPE 2 DIABETES MELLITUS WITHOUT COMPLICATION, WITHOUT LONG-TERM CURRENT USE OF INSULIN (HCC): Primary | ICD-10-CM

## 2023-04-25 LAB
ERYTHROCYTE [DISTWIDTH] IN BLOOD BY AUTOMATED COUNT: 15.6 FL (ref 11.5–15)
HBA1C MFR BLD: 6.3 %
HCT VFR BLD AUTO: 39.4 % (ref 37–54)
HGB BLD-MCNC: 12.1 G/DL (ref 12.5–16.5)
MCH RBC QN AUTO: 27.4 PG (ref 26–35)
MCHC RBC AUTO-ENTMCNC: 30.7 % (ref 32–34.5)
MCV RBC AUTO: 89.1 FL (ref 80–99.9)
PLATELET # BLD AUTO: 200 E9/L (ref 130–450)
PMV BLD AUTO: 12.6 FL (ref 7–12)
RBC # BLD AUTO: 4.42 E12/L (ref 3.8–5.8)
WBC # BLD: 8.7 E9/L (ref 4.5–11.5)

## 2023-04-25 PROCEDURE — 83036 HEMOGLOBIN GLYCOSYLATED A1C: CPT | Performed by: FAMILY MEDICINE

## 2023-04-25 PROCEDURE — 1123F ACP DISCUSS/DSCN MKR DOCD: CPT | Performed by: FAMILY MEDICINE

## 2023-04-25 PROCEDURE — G0439 PPPS, SUBSEQ VISIT: HCPCS | Performed by: FAMILY MEDICINE

## 2023-04-25 PROCEDURE — 3017F COLORECTAL CA SCREEN DOC REV: CPT | Performed by: FAMILY MEDICINE

## 2023-04-25 PROCEDURE — 3044F HG A1C LEVEL LT 7.0%: CPT | Performed by: FAMILY MEDICINE

## 2023-04-25 ASSESSMENT — PATIENT HEALTH QUESTIONNAIRE - PHQ9
SUM OF ALL RESPONSES TO PHQ QUESTIONS 1-9: 0
2. FEELING DOWN, DEPRESSED OR HOPELESS: 0
SUM OF ALL RESPONSES TO PHQ QUESTIONS 1-9: 0
1. LITTLE INTEREST OR PLEASURE IN DOING THINGS: 0
SUM OF ALL RESPONSES TO PHQ9 QUESTIONS 1 & 2: 0

## 2023-04-25 ASSESSMENT — LIFESTYLE VARIABLES
HOW MANY STANDARD DRINKS CONTAINING ALCOHOL DO YOU HAVE ON A TYPICAL DAY: PATIENT DOES NOT DRINK
HOW OFTEN DO YOU HAVE A DRINK CONTAINING ALCOHOL: NEVER

## 2023-04-26 LAB
ALBUMIN SERPL-MCNC: 4 G/DL (ref 3.5–5.2)
ALP SERPL-CCNC: 83 U/L (ref 40–129)
ALT SERPL-CCNC: 15 U/L (ref 0–40)
ANION GAP SERPL CALCULATED.3IONS-SCNC: 16 MMOL/L (ref 7–16)
AST SERPL-CCNC: 29 U/L (ref 0–39)
BILIRUB SERPL-MCNC: 0.5 MG/DL (ref 0–1.2)
BUN SERPL-MCNC: 28 MG/DL (ref 6–23)
CALCIUM SERPL-MCNC: 9.9 MG/DL (ref 8.6–10.2)
CHLORIDE SERPL-SCNC: 103 MMOL/L (ref 98–107)
CHOLESTEROL, TOTAL: 151 MG/DL (ref 0–199)
CO2 SERPL-SCNC: 20 MMOL/L (ref 22–29)
CREAT SERPL-MCNC: 1.4 MG/DL (ref 0.7–1.2)
GLUCOSE SERPL-MCNC: 148 MG/DL (ref 74–99)
HDLC SERPL-MCNC: 47 MG/DL
LDLC SERPL CALC-MCNC: 68 MG/DL (ref 0–99)
POTASSIUM SERPL-SCNC: 5.1 MMOL/L (ref 3.5–5)
PROT SERPL-MCNC: 8.3 G/DL (ref 6.4–8.3)
SODIUM SERPL-SCNC: 139 MMOL/L (ref 132–146)
TRIGL SERPL-MCNC: 178 MG/DL (ref 0–149)
TSH SERPL-MCNC: 2.15 UIU/ML (ref 0.27–4.2)
VLDLC SERPL CALC-MCNC: 36 MG/DL

## 2023-05-01 RX ORDER — HYDRALAZINE HYDROCHLORIDE 25 MG/1
TABLET, FILM COATED ORAL
Qty: 180 TABLET | Refills: 3 | Status: SHIPPED | OUTPATIENT
Start: 2023-05-01

## 2023-05-08 ENCOUNTER — TELEPHONE (OUTPATIENT)
Dept: NON INVASIVE DIAGNOSTICS | Age: 73
End: 2023-05-08

## 2023-05-08 NOTE — TELEPHONE ENCOUNTER
Patient is unable to afford Xarelto and wants to know what other medication he can take in place of this medication. Patient has contacted the prescription assistance program and is not eligible for assistance. Please advise.     Electronically signed by Alicia Bruce MA on 5/8/2023 at 10:24 AM

## 2023-05-09 ENCOUNTER — TELEPHONE (OUTPATIENT)
Dept: PRIMARY CARE CLINIC | Age: 73
End: 2023-05-09

## 2023-05-09 NOTE — TELEPHONE ENCOUNTER
PC from Benuel Snellen at Dr. Mardeen Siemens office. Stated patient is unable to afford Xarelto and wants to know what other medication he can take in place of this medication. Patient has contacted the prescription assistance program and is not eligible for assistance. Patient was told to contact PCP    Please advise.

## 2023-05-09 NOTE — TELEPHONE ENCOUNTER
Patient notified of recommendation and PCP notified.     Electronically signed by Bebeto Antunez MA on 5/9/2023 at 11:14 AM

## 2023-05-11 DIAGNOSIS — I48.19 ATRIAL FIBRILLATION, PERSISTENT (HCC): Primary | ICD-10-CM

## 2023-05-11 RX ORDER — WARFARIN SODIUM 5 MG/1
5 TABLET ORAL DAILY
Qty: 30 TABLET | Refills: 3 | Status: SHIPPED | OUTPATIENT
Start: 2023-05-11

## 2023-05-11 NOTE — TELEPHONE ENCOUNTER
Coumadin Rx sent to [address] [place] [address] [address] PC to pt notifying Rx sent to pharmacy, He is to continue Xarelto until pt receives Coumadin. Once he receives Coumadin to start and call office to schedule INR 1 week after starting Coumadin.     Pt voiced understanding

## 2023-05-11 NOTE — TELEPHONE ENCOUNTER
Coumadin is only other option at this point. Is he agreeable to starting coumadin? PC to pt, he is agreeable to start Coumadin and have INR checks. Would like Coumadin Rx sent to [facility] mail order pharmacy. Please advise.

## 2023-05-15 RX ORDER — OMEPRAZOLE 40 MG/1
CAPSULE, DELAYED RELEASE ORAL
Qty: 90 CAPSULE | Refills: 0 | Status: SHIPPED | OUTPATIENT
Start: 2023-05-15

## 2023-06-07 DIAGNOSIS — E11.9 TYPE 2 DIABETES MELLITUS WITHOUT COMPLICATION, WITHOUT LONG-TERM CURRENT USE OF INSULIN (HCC): ICD-10-CM

## 2023-06-07 NOTE — TELEPHONE ENCOUNTER
Requested Prescriptions     Pending Prescriptions Disp Refills    glipiZIDE (GLUCOTROL) 5 MG tablet [Pharmacy Med Name: GLIPIZIDE 5 MG Tablet] 180 tablet 1     Sig: TAKE 1 TABLET BY MOUTH 2 TIMES DAILY (BEFORE MEALS)    metFORMIN (GLUCOPHAGE) 1000 MG tablet [Pharmacy Med Name: METFORMIN HYDROCHLORIDE 1000 MG Tablet] 180 tablet 1     Sig: TAKE 1 TABLET TWICE DAILY WITH MEALS       Next appt is 10/31/2023  Last appt was 4/25/2023

## 2023-06-08 RX ORDER — GLIPIZIDE 5 MG/1
5 TABLET ORAL
Qty: 180 TABLET | Refills: 1 | Status: SHIPPED | OUTPATIENT
Start: 2023-06-08

## 2023-06-20 ENCOUNTER — TELEPHONE (OUTPATIENT)
Dept: CARDIOLOGY CLINIC | Age: 73
End: 2023-06-20

## 2023-06-21 ENCOUNTER — NURSE ONLY (OUTPATIENT)
Dept: PRIMARY CARE CLINIC | Age: 73
End: 2023-06-21
Payer: MEDICARE

## 2023-06-21 DIAGNOSIS — I48.91 ATRIAL FIBRILLATION, NEW ONSET (HCC): Primary | ICD-10-CM

## 2023-06-21 LAB — INR BLD: 1.7

## 2023-06-21 PROCEDURE — 85610 PROTHROMBIN TIME: CPT | Performed by: FAMILY MEDICINE

## 2023-06-21 PROCEDURE — 93793 ANTICOAG MGMT PT WARFARIN: CPT | Performed by: FAMILY MEDICINE

## 2023-06-21 RX ORDER — WARFARIN SODIUM 1 MG/1
1 TABLET ORAL
COMMUNITY
End: 2023-06-21 | Stop reason: SDUPTHER

## 2023-06-21 RX ORDER — WARFARIN SODIUM 1 MG/1
1 TABLET ORAL
Qty: 30 TABLET | Refills: 0 | Status: SHIPPED | OUTPATIENT
Start: 2023-06-21

## 2023-06-24 DIAGNOSIS — I42.9 CARDIOMYOPATHY, UNSPECIFIED TYPE (HCC): ICD-10-CM

## 2023-06-24 DIAGNOSIS — E11.9 TYPE 2 DIABETES MELLITUS WITHOUT COMPLICATION, WITHOUT LONG-TERM CURRENT USE OF INSULIN (HCC): ICD-10-CM

## 2023-06-26 RX ORDER — LOSARTAN POTASSIUM 100 MG/1
TABLET ORAL
Qty: 90 TABLET | Refills: 1 | Status: SHIPPED | OUTPATIENT
Start: 2023-06-26

## 2023-06-26 RX ORDER — ATORVASTATIN CALCIUM 20 MG/1
TABLET, FILM COATED ORAL
Qty: 90 TABLET | Refills: 1 | Status: SHIPPED | OUTPATIENT
Start: 2023-06-26

## 2023-06-26 RX ORDER — SPIRONOLACTONE 25 MG/1
TABLET ORAL
Qty: 90 TABLET | Refills: 1 | Status: SHIPPED | OUTPATIENT
Start: 2023-06-26

## 2023-06-28 ENCOUNTER — NURSE ONLY (OUTPATIENT)
Dept: PRIMARY CARE CLINIC | Age: 73
End: 2023-06-28
Payer: MEDICARE

## 2023-06-28 DIAGNOSIS — I48.91 ATRIAL FIBRILLATION, NEW ONSET (HCC): Primary | ICD-10-CM

## 2023-06-28 LAB
INTERNATIONAL NORMALIZATION RATIO, POC: 1.6
PROTHROMBIN TIME, POC: 22.3

## 2023-06-28 PROCEDURE — 85610 PROTHROMBIN TIME: CPT

## 2023-06-28 PROCEDURE — 93793 ANTICOAG MGMT PT WARFARIN: CPT

## 2023-07-06 ENCOUNTER — TELEPHONE (OUTPATIENT)
Dept: PRIMARY CARE CLINIC | Age: 73
End: 2023-07-06

## 2023-07-06 ENCOUNTER — NURSE ONLY (OUTPATIENT)
Dept: PRIMARY CARE CLINIC | Age: 73
End: 2023-07-06
Payer: MEDICARE

## 2023-07-06 ENCOUNTER — OFFICE VISIT (OUTPATIENT)
Dept: CARDIOLOGY CLINIC | Age: 73
End: 2023-07-06

## 2023-07-06 VITALS
BODY MASS INDEX: 41.04 KG/M2 | SYSTOLIC BLOOD PRESSURE: 100 MMHG | DIASTOLIC BLOOD PRESSURE: 70 MMHG | HEART RATE: 78 BPM | WEIGHT: 303 LBS | RESPIRATION RATE: 18 BRPM | HEIGHT: 72 IN

## 2023-07-06 DIAGNOSIS — I48.91 ATRIAL FIBRILLATION, NEW ONSET (HCC): Primary | ICD-10-CM

## 2023-07-06 DIAGNOSIS — K22.70 BARRETT'S ESOPHAGUS WITHOUT DYSPLASIA: ICD-10-CM

## 2023-07-06 DIAGNOSIS — H25.019 CORTICAL AGE-RELATED CATARACT, UNSPECIFIED LATERALITY: ICD-10-CM

## 2023-07-06 DIAGNOSIS — I25.5 ISCHEMIC CARDIOMYOPATHY: ICD-10-CM

## 2023-07-06 DIAGNOSIS — Z48.89 OBSERVATION AFTER SURGERY: ICD-10-CM

## 2023-07-06 DIAGNOSIS — I25.10 CAD IN NATIVE ARTERY: Primary | ICD-10-CM

## 2023-07-06 DIAGNOSIS — Z01.810 PREOPERATIVE CARDIOVASCULAR EXAMINATION: ICD-10-CM

## 2023-07-06 LAB
INTERNATIONAL NORMALIZATION RATIO, POC: 2.1
PROTHROMBIN TIME, POC: 23.1

## 2023-07-06 PROCEDURE — 85610 PROTHROMBIN TIME: CPT | Performed by: FAMILY MEDICINE

## 2023-07-06 PROCEDURE — 93793 ANTICOAG MGMT PT WARFARIN: CPT | Performed by: FAMILY MEDICINE

## 2023-07-06 NOTE — PROGRESS NOTES
muscles   ENMT: Mucous membranes are moist  Neck: Supple  Lungs: Clear to auscultation  Cardiac: Normal S1 and S2  Abdomen: Protuberant  Extremities: Intact range of motion  Neurologic: No focal neurological deficits  Peripheral Pulses: 2+ peripheral pulses      Laboratory Tests:  No results for input(s): NA, K, CL, CO2, BUN, CREATININE, GLUCOSE, CALCIUM in the last 72 hours. Lab Results   Component Value Date/Time    MG 1.6 11/11/2022 01:50 PM    MG 1.6 05/25/2022 09:39 AM     No results for input(s): ALKPHOS, ALT, AST, PROT, BILITOT, BILIDIR, LABALBU in the last 72 hours. No results for input(s): WBC, RBC, HGB, HCT, MCV, MCH, MCHC, RDW, PLT, MPV in the last 72 hours. No results found for: CKTOTAL, CKMB, CKMBINDEX, TROPONINI  No results for input(s): CKTOTAL, CKMB, CKMBINDEX, TROPHS in the last 72 hours. Lab Results   Component Value Date    INR 2.1 07/06/2023    INR 1.6 06/28/2023    INR 1.70 06/21/2023    PROTIME 23.1 07/06/2023    PROTIME 22.3 06/28/2023     Lab Results   Component Value Date    TSH 2.150 04/25/2023    TSH 2.260 05/25/2022    TSH 2.550 05/17/2022     Lab Results   Component Value Date    LABA1C 6.3 04/25/2023    LABA1C 6.4 08/23/2022    LABA1C 6.5 (H) 05/24/2022     No results found for: EAG  Lab Results   Component Value Date    CHOL 151 04/25/2023    CHOL 129 05/25/2022    CHOL 142 05/17/2022     Lab Results   Component Value Date    TRIG 178 (H) 04/25/2023    TRIG 137 05/25/2022    TRIG 106 05/17/2022     Lab Results   Component Value Date    HDL 47 04/25/2023    HDL 55 05/25/2022    HDL 54 05/17/2022     Lab Results   Component Value Date    LDLCALC 68 04/25/2023    LDLCALC 47 05/25/2022    LDLCALC 67 05/17/2022     Lab Results   Component Value Date    LABVLDL 36 04/25/2023    LABVLDL 27 05/25/2022    LABVLDL 21 05/17/2022     No results found for: CHOLHDLRATIO  No results for input(s): PROBNP in the last 72 hours.     Cardiac Tests:  EKG reviewed (EKG date: Biventricular pacing

## 2023-07-06 NOTE — TELEPHONE ENCOUNTER
Please, advise on scheduling patient      Patient was seen today for lab draw    Patient to return 1 month for INR check and to also follow up with Dr. Antonella Rodgers    Patient wanting to have full panel of fasting labs done at appointment in August as well, however states he will not have them done if insurance isn't going to cover the cost??    Patient's last lab results from April showing recheck in 6 months, appointment was made for October, however patient states he will be out of town from August 25 through April      Shortly after leaving, fax received on patient showing that he is scheduled for surgery on August 21 and Sept 11, 2023. Patient states he will now be in town for those surgeries    Please, advise, ok to schedule for fasting labs, INR, surgical clearance? Unsure how INR to be monitored from August to April if patient to be in [place] and does not follow a doctor there?

## 2023-07-07 NOTE — TELEPHONE ENCOUNTER
Spoke to Dr. Viktor Solis, August will be too soon for fasting labs, so he will give patient a req to have completed while he is in [place]. PC to patient to schedule appointment in August for surgical clearance and INR check. Informed patient he receive a req for the fasting labs and that Dr. Viktor Solis will discuss future INR testing while he is out of town.

## 2023-07-08 DIAGNOSIS — I42.9 CARDIOMYOPATHY, UNSPECIFIED TYPE (HCC): ICD-10-CM

## 2023-07-10 RX ORDER — FUROSEMIDE 20 MG/1
TABLET ORAL
Qty: 90 TABLET | Refills: 1 | Status: SHIPPED | OUTPATIENT
Start: 2023-07-10

## 2023-07-17 NOTE — PROGRESS NOTES
Here for wound/device check prior to going to [place] on 1/6/2023. Incision has crust formation on wound which is improving since 12/12/22 visit (see updated picture in Epic). There is no drainage or warmth at incision. I will have JEROME Sanchez review picture. Plan: Patient does not have his home Latitude monitor yet. His wife will mail the monitor to him in [place] when it arrives to their home. Patient is scheduled for a provider visit on 4/20/2023.     Marcelino Morales RN, BSN  Harjeet No lymphadedenopathy

## 2023-07-23 DIAGNOSIS — I48.19 ATRIAL FIBRILLATION, PERSISTENT (HCC): ICD-10-CM

## 2023-07-24 RX ORDER — WARFARIN SODIUM 5 MG/1
TABLET ORAL
Qty: 90 TABLET | Refills: 1 | Status: SHIPPED | OUTPATIENT
Start: 2023-07-24

## 2023-08-16 ENCOUNTER — OFFICE VISIT (OUTPATIENT)
Dept: PRIMARY CARE CLINIC | Age: 73
End: 2023-08-16
Payer: MEDICARE

## 2023-08-16 VITALS
DIASTOLIC BLOOD PRESSURE: 68 MMHG | HEIGHT: 72 IN | SYSTOLIC BLOOD PRESSURE: 118 MMHG | WEIGHT: 290 LBS | BODY MASS INDEX: 39.28 KG/M2 | HEART RATE: 86 BPM | TEMPERATURE: 97.1 F | RESPIRATION RATE: 18 BRPM | OXYGEN SATURATION: 97 %

## 2023-08-16 DIAGNOSIS — M65.9 SYNOVITIS: ICD-10-CM

## 2023-08-16 DIAGNOSIS — E11.9 TYPE 2 DIABETES MELLITUS WITHOUT COMPLICATION, WITHOUT LONG-TERM CURRENT USE OF INSULIN (HCC): ICD-10-CM

## 2023-08-16 DIAGNOSIS — I48.19 ATRIAL FIBRILLATION, PERSISTENT (HCC): ICD-10-CM

## 2023-08-16 DIAGNOSIS — I42.9 CARDIOMYOPATHY, UNSPECIFIED TYPE (HCC): ICD-10-CM

## 2023-08-16 DIAGNOSIS — I25.10 CAD IN NATIVE ARTERY: ICD-10-CM

## 2023-08-16 DIAGNOSIS — I48.19 ATRIAL FIBRILLATION, PERSISTENT (HCC): Primary | ICD-10-CM

## 2023-08-16 LAB
BILIRUBIN, POC: NORMAL
BLOOD URINE, POC: NORMAL
CLARITY, POC: CLEAR
COLOR, POC: YELLOW
CREATININE URINE POCT: NORMAL
GLUCOSE URINE, POC: NORMAL
HBA1C MFR BLD: 7 % (ref 4–5.6)
HCT VFR BLD CALC: 37.7 % (ref 37–54)
HEMOGLOBIN: 11.1 G/DL (ref 12.5–16.5)
INR BLD: 1.9
KETONES, POC: NORMAL
LEUKOCYTE EST, POC: NORMAL
MCH RBC QN AUTO: 25.7 PG (ref 26–35)
MCHC RBC AUTO-ENTMCNC: 29.4 G/DL (ref 32–34.5)
MCV RBC AUTO: 87.3 FL (ref 80–99.9)
MICROALBUMIN/CREAT 24H UR: NORMAL MG/G{CREAT}
MICROALBUMIN/CREAT UR-RTO: NORMAL
NITRITE, POC: NORMAL
PDW BLD-RTO: 16.4 % (ref 11.5–15)
PH, POC: 5.5
PLATELET # BLD: 228 K/UL (ref 130–450)
PMV BLD AUTO: 12.6 FL (ref 7–12)
PROTEIN, POC: NORMAL
PROTHROMBIN TIME: 21 SEC (ref 9.3–12.4)
RBC # BLD: 4.32 M/UL (ref 3.8–5.8)
SPECIFIC GRAVITY, POC: 1.01
UROBILINOGEN, POC: 0.2
WBC # BLD: 8.2 K/UL (ref 4.5–11.5)

## 2023-08-16 PROCEDURE — 99214 OFFICE O/P EST MOD 30 MIN: CPT | Performed by: FAMILY MEDICINE

## 2023-08-16 PROCEDURE — 1123F ACP DISCUSS/DSCN MKR DOCD: CPT | Performed by: FAMILY MEDICINE

## 2023-08-16 PROCEDURE — G8427 DOCREV CUR MEDS BY ELIG CLIN: HCPCS | Performed by: FAMILY MEDICINE

## 2023-08-16 PROCEDURE — 3051F HG A1C>EQUAL 7.0%<8.0%: CPT | Performed by: FAMILY MEDICINE

## 2023-08-16 PROCEDURE — 3017F COLORECTAL CA SCREEN DOC REV: CPT | Performed by: FAMILY MEDICINE

## 2023-08-16 PROCEDURE — G8417 CALC BMI ABV UP PARAM F/U: HCPCS | Performed by: FAMILY MEDICINE

## 2023-08-16 PROCEDURE — 2022F DILAT RTA XM EVC RTNOPTHY: CPT | Performed by: FAMILY MEDICINE

## 2023-08-16 PROCEDURE — 81002 URINALYSIS NONAUTO W/O SCOPE: CPT | Performed by: FAMILY MEDICINE

## 2023-08-16 PROCEDURE — 4004F PT TOBACCO SCREEN RCVD TLK: CPT | Performed by: FAMILY MEDICINE

## 2023-08-16 PROCEDURE — 82044 UR ALBUMIN SEMIQUANTITATIVE: CPT | Performed by: FAMILY MEDICINE

## 2023-08-16 RX ORDER — WARFARIN SODIUM 1 MG/1
0.5 TABLET ORAL
Qty: 30 TABLET | Refills: 3 | Status: SHIPPED | OUTPATIENT
Start: 2023-08-16

## 2023-08-16 NOTE — PROGRESS NOTES
person, place, and time. Cranial Nerves: No cranial nerve deficit. Psychiatric:         Mood and Affect: Mood normal.         Behavior: Behavior normal.         Thought Content:  Thought content normal.         Judgment: Judgment normal.          CBC  WBC   Date Value Ref Range Status   08/16/2023 8.2 4.5 - 11.5 k/uL Final     RBC   Date Value Ref Range Status   08/16/2023 4.32 3.80 - 5.80 m/uL Final     Hemoglobin   Date Value Ref Range Status   08/16/2023 11.1 (L) 12.5 - 16.5 g/dL Final     Hematocrit   Date Value Ref Range Status   08/16/2023 37.7 37.0 - 54.0 % Final     MCV   Date Value Ref Range Status   08/16/2023 87.3 80.0 - 99.9 fL Final     MCH   Date Value Ref Range Status   08/16/2023 25.7 (L) 26.0 - 35.0 pg Final     MCHC   Date Value Ref Range Status   08/16/2023 29.4 (L) 32.0 - 34.5 g/dL Final     RDW   Date Value Ref Range Status   08/16/2023 16.4 (H) 11.5 - 15.0 % Final     Platelets   Date Value Ref Range Status   08/16/2023 228 130 - 450 k/uL Final     MPV   Date Value Ref Range Status   08/16/2023 12.6 (H) 7.0 - 12.0 fL Final     Neutrophils %   Date Value Ref Range Status   11/11/2022 68.4 43.0 - 80.0 % Final     Immature Granulocytes #   Date Value Ref Range Status   11/11/2022 0.03 E9/L Final     Immature Granulocytes %   Date Value Ref Range Status   11/11/2022 0.5 0.0 - 5.0 % Final     Lymphocytes %   Date Value Ref Range Status   11/11/2022 22.8 20.0 - 42.0 % Final     Monocytes %   Date Value Ref Range Status   11/11/2022 5.8 2.0 - 12.0 % Final     Eosinophils %   Date Value Ref Range Status   11/11/2022 1.9 0.0 - 6.0 % Final     Basophils %   Date Value Ref Range Status   11/11/2022 0.6 0.0 - 2.0 % Final     Neutrophils Absolute   Date Value Ref Range Status   11/11/2022 4.23 1.80 - 7.30 E9/L Final     Lymphocytes Absolute   Date Value Ref Range Status   11/11/2022 1.41 (L) 1.50 - 4.00 E9/L Final     Monocytes Absolute   Date Value Ref Range Status   11/11/2022 0.36 0.10 - 0.95 E9/L

## 2023-08-17 LAB
ALBUMIN SERPL-MCNC: 4.1 G/DL (ref 3.5–5.2)
ALP BLD-CCNC: 89 U/L (ref 40–129)
ALT SERPL-CCNC: 15 U/L (ref 0–40)
ANION GAP SERPL CALCULATED.3IONS-SCNC: 13 MMOL/L (ref 7–16)
AST SERPL-CCNC: 27 U/L (ref 0–39)
BILIRUB SERPL-MCNC: 0.4 MG/DL (ref 0–1.2)
BUN BLDV-MCNC: 30 MG/DL (ref 6–23)
CALCIUM SERPL-MCNC: 9.4 MG/DL (ref 8.6–10.2)
CHLORIDE BLD-SCNC: 104 MMOL/L (ref 98–107)
CHOLESTEROL: 135 MG/DL
CO2: 22 MMOL/L (ref 22–29)
CREAT SERPL-MCNC: 1.6 MG/DL (ref 0.7–1.2)
GFR SERPL CREATININE-BSD FRML MDRD: 47 ML/MIN/1.73M2
GLUCOSE FASTING: 96 MG/DL (ref 74–99)
HDLC SERPL-MCNC: 46 MG/DL
LDL CHOLESTEROL: 58 MG/DL
POTASSIUM SERPL-SCNC: 5.2 MMOL/L (ref 3.5–5)
SODIUM BLD-SCNC: 139 MMOL/L (ref 132–146)
TOTAL PROTEIN: 8.1 G/DL (ref 6.4–8.3)
TRIGL SERPL-MCNC: 156 MG/DL
URIC ACID: 8.4 MG/DL (ref 3.4–7)
VLDLC SERPL CALC-MCNC: 31 MG/DL

## 2023-08-17 ASSESSMENT — ENCOUNTER SYMPTOMS
NAUSEA: 0
COLOR CHANGE: 0
VOMITING: 0
SINUS PRESSURE: 0
PHOTOPHOBIA: 0
CONSTIPATION: 0
DIARRHEA: 0
RHINORRHEA: 0
COUGH: 0
WHEEZING: 0
EYE PAIN: 0
BLOOD IN STOOL: 0
ABDOMINAL DISTENTION: 0
ABDOMINAL PAIN: 0
EYE DISCHARGE: 0
SORE THROAT: 0
FACIAL SWELLING: 0
SHORTNESS OF BREATH: 0
EYE ITCHING: 0

## 2023-08-23 PROCEDURE — 93296 REM INTERROG EVL PM/IDS: CPT | Performed by: STUDENT IN AN ORGANIZED HEALTH CARE EDUCATION/TRAINING PROGRAM

## 2023-08-23 PROCEDURE — 93295 DEV INTERROG REMOTE 1/2/MLT: CPT | Performed by: STUDENT IN AN ORGANIZED HEALTH CARE EDUCATION/TRAINING PROGRAM

## 2023-09-03 PROCEDURE — G2066 INTER DEVC REMOTE 30D: HCPCS | Performed by: STUDENT IN AN ORGANIZED HEALTH CARE EDUCATION/TRAINING PROGRAM

## 2023-09-03 PROCEDURE — 93297 REM INTERROG DEV EVAL ICPMS: CPT | Performed by: STUDENT IN AN ORGANIZED HEALTH CARE EDUCATION/TRAINING PROGRAM

## 2023-09-19 ENCOUNTER — NURSE ONLY (OUTPATIENT)
Dept: PRIMARY CARE CLINIC | Age: 73
End: 2023-09-19
Payer: MEDICARE

## 2023-09-19 DIAGNOSIS — I48.19 ATRIAL FIBRILLATION, PERSISTENT (HCC): Primary | ICD-10-CM

## 2023-09-19 LAB
INR BLD: 1.8
INTERNATIONAL NORMALIZATION RATIO, POC: 1.8
PROTHROMBIN TIME, POC: 21

## 2023-09-19 PROCEDURE — 85610 PROTHROMBIN TIME: CPT | Performed by: FAMILY MEDICINE

## 2023-09-19 PROCEDURE — 90694 VACC AIIV4 NO PRSRV 0.5ML IM: CPT | Performed by: FAMILY MEDICINE

## 2023-09-19 PROCEDURE — G0008 ADMIN INFLUENZA VIRUS VAC: HCPCS | Performed by: FAMILY MEDICINE

## 2023-09-19 NOTE — PATIENT INSTRUCTIONS
after the vaccinated person leaves the clinic. If you see signs of a severe allergic reaction (hives, swelling of the face and throat, difficulty breathing, a fast heartbeat, dizziness, or weakness), call 9-1-1 and get the person to the nearest hospital.    For other signs that concern you, call your health care provider. Adverse reactions should be reported to the Vaccine Adverse Event Reporting System (VAERS). Your health care provider will usually file this report, or you can do it yourself. Visit the VAERS website at www.vaers. [facility].gov or call 8-979.459.2460. VAERS is only for reporting reactions, and VAERS staff members do not give medical advice. 6. The National Vaccine Injury Compensation Program    The [facility] Vaccine Injury Compensation Program (VICP) is a federal program that was created to compensate people who may have been injured by certain vaccines. Claims regarding alleged injury or death due to vaccination have a time limit for filing, which may be as short as two years. Visit the VICP website at www.[facility].gov/vaccinecompensation or call 1-802.770.8848 to learn about the program and about filing a claim. 7. How can I learn more?    o Ask your health care provider. o Call your local or state health department. o Visit the website of the Food and Drug Administration (FDA) for vaccine package inserts and additional information at www.fda.gov/vaccines-blood-biologics/vaccines. o Contact the Centers for Disease Control and Prevention (CDC):  - Call 5-949.257.7315 (1-800-CDC-INFO) or  - Visit CDC's influenza website at www.cdc.gov/flu. Vaccine Information Statement   Inactivated Influenza Vaccine   8/6/2021  [address] [id number]     Department of Health and Human Services  Centers for Disease Control and Prevention

## 2023-10-17 DIAGNOSIS — I48.19 ATRIAL FIBRILLATION, PERSISTENT (HCC): ICD-10-CM

## 2023-10-17 RX ORDER — WARFARIN SODIUM 1 MG/1
0.5 TABLET ORAL
Qty: 30 TABLET | Refills: 3 | Status: SHIPPED | OUTPATIENT
Start: 2023-10-18

## 2023-10-17 NOTE — TELEPHONE ENCOUNTER
Requested Prescriptions     Pending Prescriptions Disp Refills    warfarin (COUMADIN) 1 MG tablet 30 tablet 3     Sig: Take 0.5 tablets by mouth three times a week Pt takes 0.5mg M Wed and Friday with his Warfarin 5mg       Next appt is 10/19/2023  Last appt was 8/16/2023    Patient needs the 1 MG sent into [facility] due to him being in [place].

## 2023-10-19 ENCOUNTER — NURSE ONLY (OUTPATIENT)
Dept: PRIMARY CARE CLINIC | Age: 73
End: 2023-10-19
Payer: MEDICARE

## 2023-10-19 DIAGNOSIS — I25.10 CAD IN NATIVE ARTERY: Primary | ICD-10-CM

## 2023-10-19 LAB
INR BLD: 2.2
INTERNATIONAL NORMALIZATION RATIO, POC: 2.2
PROTHROMBIN TIME, POC: 25.9

## 2023-10-19 PROCEDURE — 93793 ANTICOAG MGMT PT WARFARIN: CPT | Performed by: FAMILY MEDICINE

## 2023-10-19 PROCEDURE — 85610 PROTHROMBIN TIME: CPT | Performed by: FAMILY MEDICINE

## 2023-10-19 RX ORDER — OMEPRAZOLE 40 MG/1
40 CAPSULE, DELAYED RELEASE ORAL DAILY
Qty: 90 CAPSULE | Refills: 1 | Status: SHIPPED | OUTPATIENT
Start: 2023-10-19

## 2023-11-14 ENCOUNTER — TELEPHONE (OUTPATIENT)
Dept: PRIMARY CARE CLINIC | Age: 73
End: 2023-11-14

## 2023-11-14 LAB
INR BLD: 1.7
PROTIME: 17.4 SECONDS

## 2023-11-14 NOTE — TELEPHONE ENCOUNTER
PC to pt, informing INR results 1.7. Pt to take 6mg of Coumadin today and resume normal dosing after. Recheck in 1 month. Pt voiced understanding.

## 2023-11-14 NOTE — TELEPHONE ENCOUNTER
----- Message from Snehal Coyne DO sent at 11/14/2023  9:51 AM EST -----  Please contact patient. INR 1.7. Take 6 mg of Coumadin today, then resume normal dosing. Recheck INR in 1 month.

## 2023-11-22 PROCEDURE — 93296 REM INTERROG EVL PM/IDS: CPT | Performed by: STUDENT IN AN ORGANIZED HEALTH CARE EDUCATION/TRAINING PROGRAM

## 2023-11-22 PROCEDURE — 93295 DEV INTERROG REMOTE 1/2/MLT: CPT | Performed by: STUDENT IN AN ORGANIZED HEALTH CARE EDUCATION/TRAINING PROGRAM

## 2023-11-29 DIAGNOSIS — E11.9 TYPE 2 DIABETES MELLITUS WITHOUT COMPLICATION, WITHOUT LONG-TERM CURRENT USE OF INSULIN (HCC): ICD-10-CM

## 2023-11-29 DIAGNOSIS — I42.9 CARDIOMYOPATHY, UNSPECIFIED TYPE (HCC): ICD-10-CM

## 2023-11-29 RX ORDER — METOPROLOL SUCCINATE 50 MG/1
50 TABLET, EXTENDED RELEASE ORAL DAILY
Qty: 90 TABLET | Refills: 3 | Status: SHIPPED | OUTPATIENT
Start: 2023-11-29

## 2023-11-29 RX ORDER — SPIRONOLACTONE 25 MG/1
25 TABLET ORAL DAILY
Qty: 90 TABLET | Refills: 1 | Status: SHIPPED | OUTPATIENT
Start: 2023-11-29

## 2023-11-29 RX ORDER — LOSARTAN POTASSIUM 100 MG/1
100 TABLET ORAL DAILY
Qty: 90 TABLET | Refills: 1 | Status: SHIPPED | OUTPATIENT
Start: 2023-11-29

## 2023-11-30 RX ORDER — GLIPIZIDE 5 MG/1
5 TABLET ORAL
Qty: 180 TABLET | Refills: 1 | Status: SHIPPED | OUTPATIENT
Start: 2023-11-30

## 2023-11-30 RX ORDER — ATORVASTATIN CALCIUM 20 MG/1
20 TABLET, FILM COATED ORAL DAILY
Qty: 90 TABLET | Refills: 1 | Status: SHIPPED | OUTPATIENT
Start: 2023-11-30

## 2023-12-03 PROCEDURE — 93297 REM INTERROG DEV EVAL ICPMS: CPT | Performed by: STUDENT IN AN ORGANIZED HEALTH CARE EDUCATION/TRAINING PROGRAM

## 2023-12-03 PROCEDURE — G2066 INTER DEVC REMOTE 30D: HCPCS | Performed by: STUDENT IN AN ORGANIZED HEALTH CARE EDUCATION/TRAINING PROGRAM

## 2024-01-22 ENCOUNTER — TELEPHONE (OUTPATIENT)
Dept: PRIMARY CARE CLINIC | Age: 74
End: 2024-01-22

## 2024-01-26 ENCOUNTER — TELEPHONE (OUTPATIENT)
Dept: PRIMARY CARE CLINIC | Age: 74
End: 2024-01-26

## 2024-01-26 NOTE — TELEPHONE ENCOUNTER
PC from Vivi asking to schedule Paul for an INR check after February 15 when patient returns home      OK to schedule?  OK to schedule just a nurse visit or will patient need an office visit?      Please, advise      Please, schedule with Vivi if approved [phone]

## 2024-02-01 ENCOUNTER — TELEPHONE (OUTPATIENT)
Dept: NON INVASIVE DIAGNOSTICS | Age: 74
End: 2024-02-01

## 2024-02-01 NOTE — TELEPHONE ENCOUNTER
I called Vivi to see where Paul is at as we received a Latitude Consult this morning. He received multiple shocks from his device. Vivi stated Paul is in [place], [place]. He went to the ED this morning. She states we can call her if we need any further information. I thanked her for this and told her I hope Paul feels better soon.    Jayne Fragoso RN, BSN  [facility] [place]   Device Clinic

## 2024-02-18 ASSESSMENT — PATIENT HEALTH QUESTIONNAIRE - PHQ9
2. FEELING DOWN, DEPRESSED OR HOPELESS: 0
SUM OF ALL RESPONSES TO PHQ QUESTIONS 1-9: 0
SUM OF ALL RESPONSES TO PHQ9 QUESTIONS 1 & 2: 0
1. LITTLE INTEREST OR PLEASURE IN DOING THINGS: NOT AT ALL
2. FEELING DOWN, DEPRESSED OR HOPELESS: NOT AT ALL
1. LITTLE INTEREST OR PLEASURE IN DOING THINGS: 0
SUM OF ALL RESPONSES TO PHQ QUESTIONS 1-9: 0
SUM OF ALL RESPONSES TO PHQ QUESTIONS 1-9: 0
SUM OF ALL RESPONSES TO PHQ9 QUESTIONS 1 & 2: 0
SUM OF ALL RESPONSES TO PHQ QUESTIONS 1-9: 0

## 2024-02-21 ENCOUNTER — OFFICE VISIT (OUTPATIENT)
Dept: PRIMARY CARE CLINIC | Age: 74
End: 2024-02-21
Payer: MEDICARE

## 2024-02-21 VITALS
SYSTOLIC BLOOD PRESSURE: 120 MMHG | TEMPERATURE: 98.6 F | BODY MASS INDEX: 42.39 KG/M2 | OXYGEN SATURATION: 98 % | HEIGHT: 72 IN | WEIGHT: 313 LBS | HEART RATE: 64 BPM | DIASTOLIC BLOOD PRESSURE: 84 MMHG

## 2024-02-21 DIAGNOSIS — I42.9 CARDIOMYOPATHY, UNSPECIFIED TYPE (HCC): ICD-10-CM

## 2024-02-21 DIAGNOSIS — K22.70 BARRETT'S ESOPHAGUS WITHOUT DYSPLASIA: ICD-10-CM

## 2024-02-21 DIAGNOSIS — Z12.5 PROSTATE CANCER SCREENING: ICD-10-CM

## 2024-02-21 DIAGNOSIS — I48.19 ATRIAL FIBRILLATION, PERSISTENT (HCC): ICD-10-CM

## 2024-02-21 DIAGNOSIS — E11.9 TYPE 2 DIABETES MELLITUS WITHOUT COMPLICATION, WITHOUT LONG-TERM CURRENT USE OF INSULIN (HCC): Primary | ICD-10-CM

## 2024-02-21 DIAGNOSIS — I25.10 CAD IN NATIVE ARTERY: ICD-10-CM

## 2024-02-21 PROBLEM — I47.20 VENTRICULAR TACHYCARDIA (HCC): Status: ACTIVE | Noted: 2024-02-01

## 2024-02-21 LAB — HBA1C MFR BLD: 6.4 %

## 2024-02-21 PROCEDURE — 83036 HEMOGLOBIN GLYCOSYLATED A1C: CPT | Performed by: FAMILY MEDICINE

## 2024-02-21 PROCEDURE — G8484 FLU IMMUNIZE NO ADMIN: HCPCS | Performed by: FAMILY MEDICINE

## 2024-02-21 PROCEDURE — G8417 CALC BMI ABV UP PARAM F/U: HCPCS | Performed by: FAMILY MEDICINE

## 2024-02-21 PROCEDURE — 3017F COLORECTAL CA SCREEN DOC REV: CPT | Performed by: FAMILY MEDICINE

## 2024-02-21 PROCEDURE — 3044F HG A1C LEVEL LT 7.0%: CPT | Performed by: FAMILY MEDICINE

## 2024-02-21 PROCEDURE — 2022F DILAT RTA XM EVC RTNOPTHY: CPT | Performed by: FAMILY MEDICINE

## 2024-02-21 PROCEDURE — 4004F PT TOBACCO SCREEN RCVD TLK: CPT | Performed by: FAMILY MEDICINE

## 2024-02-21 PROCEDURE — 1123F ACP DISCUSS/DSCN MKR DOCD: CPT | Performed by: FAMILY MEDICINE

## 2024-02-21 PROCEDURE — 99214 OFFICE O/P EST MOD 30 MIN: CPT | Performed by: FAMILY MEDICINE

## 2024-02-21 PROCEDURE — G8427 DOCREV CUR MEDS BY ELIG CLIN: HCPCS | Performed by: FAMILY MEDICINE

## 2024-02-21 RX ORDER — TAMSULOSIN HYDROCHLORIDE 0.4 MG/1
0.4 CAPSULE ORAL DAILY
Qty: 90 CAPSULE | Refills: 3 | Status: SHIPPED | OUTPATIENT
Start: 2024-02-21

## 2024-02-21 RX ORDER — AMIODARONE HYDROCHLORIDE 200 MG/1
200 TABLET ORAL DAILY
COMMUNITY
Start: 2024-02-05 | End: 2024-02-21 | Stop reason: SDUPTHER

## 2024-02-21 RX ORDER — AMIODARONE HYDROCHLORIDE 200 MG/1
200 TABLET ORAL DAILY
Qty: 90 TABLET | Refills: 0 | Status: SHIPPED | OUTPATIENT
Start: 2024-02-21 | End: 2024-08-19

## 2024-02-21 ASSESSMENT — ENCOUNTER SYMPTOMS
SHORTNESS OF BREATH: 0
DIARRHEA: 1
RHINORRHEA: 0
EYE ITCHING: 0
SORE THROAT: 0
EYE PAIN: 0
PHOTOPHOBIA: 0
FACIAL SWELLING: 0
WHEEZING: 0
ABDOMINAL PAIN: 0
VOMITING: 0
ABDOMINAL DISTENTION: 0
EYE DISCHARGE: 0
BLOOD IN STOOL: 0
NAUSEA: 0
COUGH: 0
COLOR CHANGE: 0
SINUS PRESSURE: 0
CONSTIPATION: 0

## 2024-02-21 NOTE — PROGRESS NOTES
- 6.0 % Final     Basophils %   Date Value Ref Range Status   11/11/2022 0.6 0.0 - 2.0 % Final     Neutrophils Absolute   Date Value Ref Range Status   11/11/2022 4.23 1.80 - 7.30 E9/L Final     Lymphocytes Absolute   Date Value Ref Range Status   11/11/2022 1.41 (L) 1.50 - 4.00 E9/L Final     Monocytes Absolute   Date Value Ref Range Status   11/11/2022 0.36 0.10 - 0.95 E9/L Final     Eosinophils Absolute   Date Value Ref Range Status   11/11/2022 0.12 0.05 - 0.50 E9/L Final     Basophils Absolute   Date Value Ref Range Status   11/11/2022 0.04 0.00 - 0.20 E9/L Final       CMP  Sodium   Date Value Ref Range Status   08/16/2023 139 132 - 146 mmol/L Final     Potassium   Date Value Ref Range Status   08/16/2023 5.2 (H) 3.5 - 5.0 mmol/L Final     Potassium reflex Magnesium   Date Value Ref Range Status   05/28/2022 4.3 3.5 - 5.0 mmol/L Final     Chloride   Date Value Ref Range Status   08/16/2023 104 98 - 107 mmol/L Final     CO2   Date Value Ref Range Status   08/16/2023 22 22 - 29 mmol/L Final     Anion Gap   Date Value Ref Range Status   08/16/2023 13 7 - 16 mmol/L Final     Glucose   Date Value Ref Range Status   04/25/2023 148 (H) 74 - 99 mg/dL Final   08/30/2011 177 (H) 70 - 110 mg/dL Final     BUN   Date Value Ref Range Status   08/16/2023 30 (H) 6 - 23 mg/dL Final     Creatinine   Date Value Ref Range Status   08/16/2023 1.6 (H) 0.70 - 1.20 mg/dL Final     Est, Glom Filt Rate   Date Value Ref Range Status   08/16/2023 47 (L) >60 mL/min/1.73m2 Final     Comment:       These results are not intended for use in patients <18 years of age.    eGFR results are calculated without a race factor using the 2021 CKD-EPI equation.  Careful clinical correlation is recommended, particularly when comparing to results   calculated using previous equations.  The CKD-EPI equation is less accurate in patients with extremes of muscle mass, extra-renal   metabolism of creatine, excessive creatine ingestion, or following therapy

## 2024-03-05 ENCOUNTER — NURSE ONLY (OUTPATIENT)
Dept: PRIMARY CARE CLINIC | Age: 74
End: 2024-03-05
Payer: MEDICARE

## 2024-03-05 DIAGNOSIS — I42.9 CARDIOMYOPATHY, UNSPECIFIED TYPE (HCC): ICD-10-CM

## 2024-03-05 DIAGNOSIS — I48.19 PERSISTENT ATRIAL FIBRILLATION (HCC): ICD-10-CM

## 2024-03-05 DIAGNOSIS — I25.10 CAD IN NATIVE ARTERY: ICD-10-CM

## 2024-03-05 DIAGNOSIS — I48.19 ATRIAL FIBRILLATION, PERSISTENT (HCC): ICD-10-CM

## 2024-03-05 DIAGNOSIS — Z12.5 PROSTATE CANCER SCREENING: ICD-10-CM

## 2024-03-05 DIAGNOSIS — E11.9 TYPE 2 DIABETES MELLITUS WITHOUT COMPLICATION, WITHOUT LONG-TERM CURRENT USE OF INSULIN (HCC): Primary | ICD-10-CM

## 2024-03-05 DIAGNOSIS — E11.9 TYPE 2 DIABETES MELLITUS WITHOUT COMPLICATION, WITHOUT LONG-TERM CURRENT USE OF INSULIN (HCC): ICD-10-CM

## 2024-03-05 LAB
ALBUMIN SERPL-MCNC: 3.8 G/DL (ref 3.5–5.2)
ALP BLD-CCNC: 97 U/L (ref 40–129)
ALT SERPL-CCNC: 15 U/L (ref 0–40)
ANION GAP SERPL CALCULATED.3IONS-SCNC: 14 MMOL/L (ref 7–16)
AST SERPL-CCNC: 23 U/L (ref 0–39)
BILIRUB SERPL-MCNC: 0.4 MG/DL (ref 0–1.2)
BUN BLDV-MCNC: 15 MG/DL (ref 6–23)
CALCIUM SERPL-MCNC: 9.2 MG/DL (ref 8.6–10.2)
CHLORIDE BLD-SCNC: 103 MMOL/L (ref 98–107)
CHOLESTEROL: 126 MG/DL
CO2: 24 MMOL/L (ref 22–29)
CREAT SERPL-MCNC: 1.2 MG/DL (ref 0.7–1.2)
GFR SERPL CREATININE-BSD FRML MDRD: >60 ML/MIN/1.73M2
GLUCOSE FASTING: 115 MG/DL (ref 74–99)
HCT VFR BLD CALC: 36.9 % (ref 37–54)
HDLC SERPL-MCNC: 51 MG/DL
HEMOGLOBIN: 10.5 G/DL (ref 12.5–16.5)
LDL CHOLESTEROL: 56 MG/DL
MAGNESIUM: 1.5 MG/DL (ref 1.6–2.6)
MCH RBC QN AUTO: 25.4 PG (ref 26–35)
MCHC RBC AUTO-ENTMCNC: 28.5 G/DL (ref 32–34.5)
MCV RBC AUTO: 89.1 FL (ref 80–99.9)
PDW BLD-RTO: 17 % (ref 11.5–15)
PLATELET, FLUORESCENCE: 176 K/UL (ref 130–450)
PMV BLD AUTO: 12.9 FL (ref 7–12)
POTASSIUM SERPL-SCNC: 4.9 MMOL/L (ref 3.5–5)
PROSTATE SPECIFIC ANTIGEN: 1.62 NG/ML (ref 0–4)
RBC # BLD: 4.14 M/UL (ref 3.8–5.8)
SODIUM BLD-SCNC: 141 MMOL/L (ref 132–146)
TOTAL PROTEIN: 8 G/DL (ref 6.4–8.3)
TRIGL SERPL-MCNC: 94 MG/DL
TSH SERPL DL<=0.05 MIU/L-ACNC: 7.27 UIU/ML (ref 0.27–4.2)
VLDLC SERPL CALC-MCNC: 19 MG/DL
WBC # BLD: 6.3 K/UL (ref 4.5–11.5)

## 2024-03-05 PROCEDURE — 36415 COLL VENOUS BLD VENIPUNCTURE: CPT | Performed by: FAMILY MEDICINE

## 2024-03-07 RX ORDER — AMIODARONE HYDROCHLORIDE 200 MG/1
200 TABLET ORAL DAILY
Qty: 90 TABLET | Refills: 3 | OUTPATIENT
Start: 2024-03-07

## 2024-03-09 DIAGNOSIS — D64.9 NORMOCYTIC ANEMIA: ICD-10-CM

## 2024-03-09 DIAGNOSIS — E83.42 HYPOMAGNESEMIA: Primary | ICD-10-CM

## 2024-03-09 DIAGNOSIS — E03.2 HYPOTHYROIDISM DUE TO MEDICATION: ICD-10-CM

## 2024-03-09 RX ORDER — LEVOTHYROXINE SODIUM 0.03 MG/1
25 TABLET ORAL DAILY
Qty: 90 TABLET | Refills: 0 | Status: SHIPPED | OUTPATIENT
Start: 2024-03-09

## 2024-03-09 RX ORDER — CALCIUM CARBONATE 300MG(750)
1 TABLET,CHEWABLE ORAL DAILY
Qty: 90 TABLET | Refills: 1 | Status: SHIPPED | OUTPATIENT
Start: 2024-03-09

## 2024-03-11 RX ORDER — OMEPRAZOLE 40 MG/1
40 CAPSULE, DELAYED RELEASE ORAL DAILY
Qty: 90 CAPSULE | Refills: 1 | Status: SHIPPED | OUTPATIENT
Start: 2024-03-11

## 2024-03-12 ENCOUNTER — TELEPHONE (OUTPATIENT)
Dept: PRIMARY CARE CLINIC | Age: 74
End: 2024-03-12

## 2024-03-12 NOTE — TELEPHONE ENCOUNTER
Letter for handicap placard typed and signed.      PC to pt notifying letter is ready for pickup.    Pt voiced understanding and will stop by this week to .

## 2024-03-12 NOTE — TELEPHONE ENCOUNTER
Patient is requesting an [place] handicap placard please.    Patient stated that he was sorry because PCP just submitted paperwork for a [place] handicap placard. Due to an illness, patient had to sell his place in [place] & move back to [place].

## 2024-03-14 DIAGNOSIS — D64.9 NORMOCYTIC ANEMIA: ICD-10-CM

## 2024-03-14 LAB
CONTROL: NORMAL
FECAL BLOOD IMMUNOCHEMICAL TEST: POSITIVE

## 2024-03-14 PROCEDURE — 82274 ASSAY TEST FOR BLOOD FECAL: CPT | Performed by: FAMILY MEDICINE

## 2024-04-30 NOTE — PROGRESS NOTES
VT, 2* type II or 3* AV block, or significant pauses.  LHC (5/27/22): co-dominant circulation (RCA and Lcx), 70-80% proximal OM1 stenosis treated with MARTIN x 1, and 30-40% distal Lcx stenosis.  TTE (5/24/22): technically difficult stufy, LVEF = 30-35% with hypokinetic apex, LV apex dilation, mild asymmetric septal LVH, stage I LVDD, mild MR, and mild TR.  24 hour Holter (5/14/20): sinus at 45 - 105 bpm (mean: 72 bpm), SVE burden < 1%, VE burden < 1%, no AF, SVT, VT, AV block, or significant pauses; and no patient events.  TTE (5/12/20): LVEF = 60%, mild concentric LVH, stage I LVDD, mild RV dilation, mild-moderate LAE, mild-moderate LINDA, and mild MR.    EKG 5/1/24: SR with V pacing         Device Interrogation 5/1/24:   Underlying rhythm: SR   Mode: DDDR   Battery Voltage/Longevity:  10.5 years     Charge time: 10.6  Pacing: A: 5%  BiV 99%  P wave: 1.7 mV  Impedance: 656 ohms   Threshold: 1.0 V @ 0.4 ms  RV R wave: 9.1 mV  Impedance: 332 ohms   Threshold: 0.7 V @ 0.4 ms  LV R wave:9.9 mV  Impedance:846 ohms   Threshold:1.0 V @ 0.4 ms  Episodes: Reviewed EGM's from shocks in February 2024, new episodes: 6 ATR episodes most recent on May 9 longest of 55 minutes.  Previous ventricular events noted February 1, 2010 NSVT, VT 1 zone x 3, ATP x 4, 41 J shocks x 5.  Reprogramming included: none   Overall device function is normal    All device programmable settings were evaluated per above and in the scanned document, along with iterative adjustments (capture thresholds) to assess and select the most appropriate final programming to provide for consistent delivery of the appropriate therapy and to verify function of the device.     ASSESSMENT & PLAN    NYHA class II HFrEF-mixed S/p CRT-D (BSCI)  - Posterolateral branch DOI 11/12/2022 primary prevention.   - In 5/2022, he was diagnosed with new onset HFrEF with focal WMA and new LBBB. A [facility] [place] [facility] reported OM1 stenosis, which was treated with MARTIN x 1, GDMT, and LifeVest. In

## 2024-05-01 NOTE — TELEPHONE ENCOUNTER
Pt was called to reschedule appointment.  Scheduled for 5/22/24 with Delma.  Patient stated he was put on Amiodarone and was only given a 90 day supply    He will be out shortly not sure if he needs to stay on medication if so he needs a refill to [facility] pharmacy.   Please advise

## 2024-05-07 RX ORDER — AMIODARONE HYDROCHLORIDE 200 MG/1
200 TABLET ORAL DAILY
Qty: 90 TABLET | Refills: 0 | Status: SHIPPED | OUTPATIENT
Start: 2024-05-07 | End: 2024-11-03

## 2024-05-22 ENCOUNTER — TELEPHONE (OUTPATIENT)
Dept: CARDIOLOGY CLINIC | Age: 74
End: 2024-05-22

## 2024-05-22 ENCOUNTER — OFFICE VISIT (OUTPATIENT)
Dept: NON INVASIVE DIAGNOSTICS | Age: 74
End: 2024-05-22
Payer: MEDICARE

## 2024-05-22 VITALS
BODY MASS INDEX: 45.91 KG/M2 | RESPIRATION RATE: 16 BRPM | SYSTOLIC BLOOD PRESSURE: 130 MMHG | OXYGEN SATURATION: 96 % | HEIGHT: 69 IN | DIASTOLIC BLOOD PRESSURE: 78 MMHG | WEIGHT: 310 LBS

## 2024-05-22 DIAGNOSIS — Z79.899 ON AMIODARONE THERAPY: ICD-10-CM

## 2024-05-22 DIAGNOSIS — Z95.810 CARDIAC RESYNCHRONIZATION THERAPY DEFIBRILLATOR (CRT-D) IN PLACE: ICD-10-CM

## 2024-05-22 DIAGNOSIS — I48.0 PAROXYSMAL ATRIAL FIBRILLATION (HCC): Primary | ICD-10-CM

## 2024-05-22 DIAGNOSIS — I47.20 VENTRICULAR TACHYCARDIA (HCC): ICD-10-CM

## 2024-05-22 PROCEDURE — 3017F COLORECTAL CA SCREEN DOC REV: CPT | Performed by: NURSE PRACTITIONER

## 2024-05-22 PROCEDURE — 1123F ACP DISCUSS/DSCN MKR DOCD: CPT | Performed by: NURSE PRACTITIONER

## 2024-05-22 PROCEDURE — G8427 DOCREV CUR MEDS BY ELIG CLIN: HCPCS | Performed by: NURSE PRACTITIONER

## 2024-05-22 PROCEDURE — 99214 OFFICE O/P EST MOD 30 MIN: CPT | Performed by: NURSE PRACTITIONER

## 2024-05-22 PROCEDURE — 93284 PRGRMG EVAL IMPLANTABLE DFB: CPT | Performed by: STUDENT IN AN ORGANIZED HEALTH CARE EDUCATION/TRAINING PROGRAM

## 2024-05-22 PROCEDURE — G8417 CALC BMI ABV UP PARAM F/U: HCPCS | Performed by: NURSE PRACTITIONER

## 2024-05-22 PROCEDURE — 93000 ELECTROCARDIOGRAM COMPLETE: CPT | Performed by: STUDENT IN AN ORGANIZED HEALTH CARE EDUCATION/TRAINING PROGRAM

## 2024-05-22 PROCEDURE — 4004F PT TOBACCO SCREEN RCVD TLK: CPT | Performed by: NURSE PRACTITIONER

## 2024-05-22 RX ORDER — FERROUS GLUCONATE 324(38)MG
324 TABLET ORAL
COMMUNITY

## 2024-05-22 SDOH — HEALTH STABILITY: PHYSICAL HEALTH: ON AVERAGE, HOW MANY DAYS PER WEEK DO YOU ENGAGE IN MODERATE TO STRENUOUS EXERCISE (LIKE A BRISK WALK)?: 0 DAYS

## 2024-05-22 ASSESSMENT — PATIENT HEALTH QUESTIONNAIRE - PHQ9
1. LITTLE INTEREST OR PLEASURE IN DOING THINGS: SEVERAL DAYS
SUM OF ALL RESPONSES TO PHQ QUESTIONS 1-9: 1
SUM OF ALL RESPONSES TO PHQ9 QUESTIONS 1 & 2: 1
2. FEELING DOWN, DEPRESSED OR HOPELESS: NOT AT ALL
SUM OF ALL RESPONSES TO PHQ QUESTIONS 1-9: 1

## 2024-05-22 ASSESSMENT — LIFESTYLE VARIABLES
HOW OFTEN DO YOU HAVE A DRINK CONTAINING ALCOHOL: NEVER
HOW MANY STANDARD DRINKS CONTAINING ALCOHOL DO YOU HAVE ON A TYPICAL DAY: PATIENT DOES NOT DRINK

## 2024-05-22 NOTE — TELEPHONE ENCOUNTER
Patient needs to hold Eliquis for 2  days for colonoscopy/EGD with anesthesia at [facility]. Please Advise

## 2024-05-22 NOTE — PATIENT INSTRUCTIONS
Labs in September     Amiodarone discharge instructions    While taking amiodarone, you will need to have thyroid function test and liver function tests checked every 4-6 months.  In addition, you should have annual pulmonary function tests, chest x-rays and yearly eye exams.

## 2024-05-27 SDOH — ECONOMIC STABILITY: FOOD INSECURITY: WITHIN THE PAST 12 MONTHS, YOU WORRIED THAT YOUR FOOD WOULD RUN OUT BEFORE YOU GOT MONEY TO BUY MORE.: NEVER TRUE

## 2024-05-27 SDOH — ECONOMIC STABILITY: INCOME INSECURITY: HOW HARD IS IT FOR YOU TO PAY FOR THE VERY BASICS LIKE FOOD, HOUSING, MEDICAL CARE, AND HEATING?: NOT HARD AT ALL

## 2024-05-27 SDOH — ECONOMIC STABILITY: FOOD INSECURITY: WITHIN THE PAST 12 MONTHS, THE FOOD YOU BOUGHT JUST DIDN'T LAST AND YOU DIDN'T HAVE MONEY TO GET MORE.: NEVER TRUE

## 2024-06-02 DIAGNOSIS — E11.9 TYPE 2 DIABETES MELLITUS WITHOUT COMPLICATION, WITHOUT LONG-TERM CURRENT USE OF INSULIN (HCC): ICD-10-CM

## 2024-06-03 ENCOUNTER — TELEPHONE (OUTPATIENT)
Dept: PRIMARY CARE CLINIC | Age: 74
End: 2024-06-03

## 2024-06-03 RX ORDER — GLIPIZIDE 5 MG/1
TABLET ORAL
Qty: 180 TABLET | Refills: 3 | Status: SHIPPED | OUTPATIENT
Start: 2024-06-03

## 2024-06-20 SDOH — HEALTH STABILITY: PHYSICAL HEALTH: ON AVERAGE, HOW MANY DAYS PER WEEK DO YOU ENGAGE IN MODERATE TO STRENUOUS EXERCISE (LIKE A BRISK WALK)?: 0 DAYS

## 2024-06-20 ASSESSMENT — LIFESTYLE VARIABLES
HOW MANY STANDARD DRINKS CONTAINING ALCOHOL DO YOU HAVE ON A TYPICAL DAY: 0
HOW OFTEN DO YOU HAVE A DRINK CONTAINING ALCOHOL: NEVER
HOW OFTEN DO YOU HAVE SIX OR MORE DRINKS ON ONE OCCASION: 1
HOW OFTEN DO YOU HAVE A DRINK CONTAINING ALCOHOL: 1
HOW MANY STANDARD DRINKS CONTAINING ALCOHOL DO YOU HAVE ON A TYPICAL DAY: PATIENT DOES NOT DRINK

## 2024-06-20 ASSESSMENT — PATIENT HEALTH QUESTIONNAIRE - PHQ9
SUM OF ALL RESPONSES TO PHQ9 QUESTIONS 1 & 2: 1
SUM OF ALL RESPONSES TO PHQ QUESTIONS 1-9: 1
SUM OF ALL RESPONSES TO PHQ QUESTIONS 1-9: 1
1. LITTLE INTEREST OR PLEASURE IN DOING THINGS: SEVERAL DAYS
SUM OF ALL RESPONSES TO PHQ QUESTIONS 1-9: 1
SUM OF ALL RESPONSES TO PHQ QUESTIONS 1-9: 1
2. FEELING DOWN, DEPRESSED OR HOPELESS: NOT AT ALL

## 2024-06-22 DIAGNOSIS — I42.9 CARDIOMYOPATHY, UNSPECIFIED TYPE (HCC): ICD-10-CM

## 2024-06-22 DIAGNOSIS — E11.9 TYPE 2 DIABETES MELLITUS WITHOUT COMPLICATION, WITHOUT LONG-TERM CURRENT USE OF INSULIN (HCC): ICD-10-CM

## 2024-06-24 ENCOUNTER — OFFICE VISIT (OUTPATIENT)
Dept: PRIMARY CARE CLINIC | Age: 74
End: 2024-06-24
Payer: MEDICARE

## 2024-06-24 VITALS
TEMPERATURE: 98 F | OXYGEN SATURATION: 96 % | SYSTOLIC BLOOD PRESSURE: 100 MMHG | HEIGHT: 69 IN | DIASTOLIC BLOOD PRESSURE: 64 MMHG | HEART RATE: 74 BPM | WEIGHT: 315 LBS | BODY MASS INDEX: 46.65 KG/M2

## 2024-06-24 DIAGNOSIS — Z12.5 SCREENING FOR PROSTATE CANCER: ICD-10-CM

## 2024-06-24 DIAGNOSIS — D64.9 ANEMIA, UNSPECIFIED TYPE: ICD-10-CM

## 2024-06-24 DIAGNOSIS — E11.9 TYPE 2 DIABETES MELLITUS WITHOUT COMPLICATION, WITHOUT LONG-TERM CURRENT USE OF INSULIN (HCC): ICD-10-CM

## 2024-06-24 DIAGNOSIS — I48.19 PERSISTENT ATRIAL FIBRILLATION (HCC): ICD-10-CM

## 2024-06-24 DIAGNOSIS — E03.2 HYPOTHYROIDISM DUE TO MEDICATION: ICD-10-CM

## 2024-06-24 DIAGNOSIS — Z00.00 MEDICARE ANNUAL WELLNESS VISIT, SUBSEQUENT: Primary | ICD-10-CM

## 2024-06-24 DIAGNOSIS — I42.9 CARDIOMYOPATHY, UNSPECIFIED TYPE (HCC): ICD-10-CM

## 2024-06-24 LAB
HBA1C MFR BLD: 6.5 %
HCT VFR BLD CALC: 35.1 % (ref 37–54)
HEMOGLOBIN: 10 G/DL (ref 12.5–16.5)
MCH RBC QN AUTO: 24.9 PG (ref 26–35)
MCHC RBC AUTO-ENTMCNC: 28.5 G/DL (ref 32–34.5)
MCV RBC AUTO: 87.5 FL (ref 80–99.9)
PDW BLD-RTO: 18.1 % (ref 11.5–15)
PLATELET # BLD: 205 K/UL (ref 130–450)
PMV BLD AUTO: 12.9 FL (ref 7–12)
RBC # BLD: 4.01 M/UL (ref 3.8–5.8)
WBC # BLD: 6.5 K/UL (ref 4.5–11.5)

## 2024-06-24 PROCEDURE — G0439 PPPS, SUBSEQ VISIT: HCPCS | Performed by: FAMILY MEDICINE

## 2024-06-24 PROCEDURE — 3017F COLORECTAL CA SCREEN DOC REV: CPT | Performed by: FAMILY MEDICINE

## 2024-06-24 PROCEDURE — 83036 HEMOGLOBIN GLYCOSYLATED A1C: CPT | Performed by: FAMILY MEDICINE

## 2024-06-24 PROCEDURE — 1123F ACP DISCUSS/DSCN MKR DOCD: CPT | Performed by: FAMILY MEDICINE

## 2024-06-24 PROCEDURE — 3044F HG A1C LEVEL LT 7.0%: CPT | Performed by: FAMILY MEDICINE

## 2024-06-24 PROCEDURE — 36415 COLL VENOUS BLD VENIPUNCTURE: CPT | Performed by: FAMILY MEDICINE

## 2024-06-24 RX ORDER — ATORVASTATIN CALCIUM 40 MG/1
40 TABLET, FILM COATED ORAL DAILY
COMMUNITY
End: 2024-06-24 | Stop reason: SDUPTHER

## 2024-06-24 RX ORDER — ATORVASTATIN CALCIUM 40 MG/1
40 TABLET, FILM COATED ORAL DAILY
Qty: 90 TABLET | Refills: 1 | Status: SHIPPED | OUTPATIENT
Start: 2024-06-24

## 2024-06-24 RX ORDER — LEVOTHYROXINE SODIUM 0.03 MG/1
25 TABLET ORAL DAILY
Qty: 90 TABLET | Refills: 0 | Status: SHIPPED | OUTPATIENT
Start: 2024-06-24

## 2024-06-24 NOTE — PROGRESS NOTES
Venipuncture was obtained from left arm. Patient tolerated the procedure without complications or complaints.  
thyromegaly or thyroid nodules, no cervical lymphadenopathy   Pulmonary/Chest: clear to auscultation bilaterally- no wheezes, rales or rhonchi, normal air movement, no respiratory distress and ICD implant left upper anterior chest  Cardiovascular: normal rate, regular rhythm, normal S1 and S2, no murmurs, rubs, clicks or gallops, distal pulses intact, no carotid bruits  Abdomen: soft, non-tender, non-distended, normal bowel sounds, no masses or organomegaly and protuberant  Extremities: no cyanosis, clubbing or edema  Neurologic: gait and coordination normal and speech normal    Plan:  Labs ordered.  Colonoscopy due 8/2024  Encouraged weight loss.  Patient declines institution of an exercise program.    CareTeam (Including outside providers/suppliers regularly involved in providing care):   Patient Care Team:  Eduin Guzman DO as PCP - General (Family Medicine)  Eduin Guzman DO as PCP - Empaneled Provider  Steve Oliva MD as Surgeon (General Surgery)  Ranjana Worrell MD (Gastroenterology)     Reviewed and updated this visit:  Tobacco  Allergies  Meds  Problems  Med Hx  Surg Hx  Soc Hx  Fam Hx

## 2024-06-25 LAB
ALBUMIN: 3.5 G/DL (ref 3.5–5.2)
ALP BLD-CCNC: 82 U/L (ref 40–129)
ALT SERPL-CCNC: 13 U/L (ref 0–40)
ANION GAP SERPL CALCULATED.3IONS-SCNC: 16 MMOL/L (ref 7–16)
AST SERPL-CCNC: 32 U/L (ref 0–39)
BILIRUB SERPL-MCNC: 0.5 MG/DL (ref 0–1.2)
BUN BLDV-MCNC: 18 MG/DL (ref 6–23)
CALCIUM SERPL-MCNC: 8.8 MG/DL (ref 8.6–10.2)
CHLORIDE BLD-SCNC: 106 MMOL/L (ref 98–107)
CHOLESTEROL, TOTAL: 127 MG/DL
CO2: 20 MMOL/L (ref 22–29)
CREAT SERPL-MCNC: 1.4 MG/DL (ref 0.7–1.2)
FOLATE: >20 NG/ML (ref 4.8–24.2)
GFR, ESTIMATED: 51 ML/MIN/1.73M2
GLUCOSE FASTING: 76 MG/DL (ref 74–99)
HDLC SERPL-MCNC: 53 MG/DL
LDL CHOLESTEROL: 53 MG/DL
POTASSIUM SERPL-SCNC: 4.5 MMOL/L (ref 3.5–5)
PROSTATE SPECIFIC ANTIGEN: 1.65 NG/ML (ref 0–4)
SODIUM BLD-SCNC: 142 MMOL/L (ref 132–146)
TOTAL PROTEIN: 7.9 G/DL (ref 6.4–8.3)
TRIGL SERPL-MCNC: 105 MG/DL
TSH SERPL DL<=0.05 MIU/L-ACNC: 6.52 UIU/ML (ref 0.27–4.2)
VITAMIN B-12: 537 PG/ML (ref 211–946)
VLDLC SERPL CALC-MCNC: 21 MG/DL

## 2024-06-25 RX ORDER — LOSARTAN POTASSIUM 100 MG/1
100 TABLET ORAL DAILY
Qty: 90 TABLET | Refills: 3 | Status: SHIPPED | OUTPATIENT
Start: 2024-06-25

## 2024-06-25 RX ORDER — ATORVASTATIN CALCIUM 40 MG/1
40 TABLET, FILM COATED ORAL DAILY
Qty: 90 TABLET | Refills: 1 | Status: SHIPPED | OUTPATIENT
Start: 2024-06-25

## 2024-07-01 DIAGNOSIS — E03.2 HYPOTHYROIDISM DUE TO MEDICATION: ICD-10-CM

## 2024-07-01 RX ORDER — LEVOTHYROXINE SODIUM 0.05 MG/1
25 TABLET ORAL DAILY
Qty: 90 TABLET | Refills: 0 | Status: SHIPPED | OUTPATIENT
Start: 2024-07-01

## 2024-07-09 ENCOUNTER — ANESTHESIA (OUTPATIENT)
Dept: ENDOSCOPY | Age: 74
End: 2024-07-09
Payer: MEDICARE

## 2024-07-09 ENCOUNTER — ANESTHESIA EVENT (OUTPATIENT)
Dept: ENDOSCOPY | Age: 74
End: 2024-07-09
Payer: MEDICARE

## 2024-07-09 ENCOUNTER — HOSPITAL ENCOUNTER (OUTPATIENT)
Age: 74
Setting detail: OUTPATIENT SURGERY
Discharge: HOME | End: 2024-07-09
Attending: INTERNAL MEDICINE | Admitting: INTERNAL MEDICINE
Payer: MEDICARE

## 2024-07-09 VITALS
RESPIRATION RATE: 20 BRPM | HEIGHT: 69 IN | OXYGEN SATURATION: 95 % | BODY MASS INDEX: 46.65 KG/M2 | DIASTOLIC BLOOD PRESSURE: 63 MMHG | WEIGHT: 315 LBS | SYSTOLIC BLOOD PRESSURE: 134 MMHG | TEMPERATURE: 96.6 F | HEART RATE: 63 BPM

## 2024-07-09 DIAGNOSIS — D64.9 ANEMIA, UNSPECIFIED TYPE: ICD-10-CM

## 2024-07-09 LAB — GLUCOSE BLD-MCNC: 96 MG/DL (ref 74–99)

## 2024-07-09 PROCEDURE — 88342 IMHCHEM/IMCYTCHM 1ST ANTB: CPT

## 2024-07-09 PROCEDURE — 2580000003 HC RX 258: Performed by: ANESTHESIOLOGY

## 2024-07-09 PROCEDURE — 3609012400 HC EGD TRANSORAL BIOPSY SINGLE/MULTIPLE: Performed by: INTERNAL MEDICINE

## 2024-07-09 PROCEDURE — 7100000011 HC PHASE II RECOVERY - ADDTL 15 MIN: Performed by: INTERNAL MEDICINE

## 2024-07-09 PROCEDURE — 88305 TISSUE EXAM BY PATHOLOGIST: CPT

## 2024-07-09 PROCEDURE — 2709999900 HC NON-CHARGEABLE SUPPLY: Performed by: INTERNAL MEDICINE

## 2024-07-09 PROCEDURE — 82962 GLUCOSE BLOOD TEST: CPT

## 2024-07-09 PROCEDURE — 6360000002 HC RX W HCPCS: Performed by: NURSE ANESTHETIST, CERTIFIED REGISTERED

## 2024-07-09 PROCEDURE — 3609010600 HC COLONOSCOPY POLYPECTOMY SNARE/COLD BIOPSY: Performed by: INTERNAL MEDICINE

## 2024-07-09 PROCEDURE — 3700000001 HC ADD 15 MINUTES (ANESTHESIA): Performed by: INTERNAL MEDICINE

## 2024-07-09 PROCEDURE — 3700000000 HC ANESTHESIA ATTENDED CARE: Performed by: INTERNAL MEDICINE

## 2024-07-09 PROCEDURE — 7100000010 HC PHASE II RECOVERY - FIRST 15 MIN: Performed by: INTERNAL MEDICINE

## 2024-07-09 RX ORDER — SODIUM CHLORIDE 0.9 % (FLUSH) 0.9 %
5-40 SYRINGE (ML) INJECTION EVERY 12 HOURS SCHEDULED
Status: DISCONTINUED | OUTPATIENT
Start: 2024-07-09 | End: 2024-07-09 | Stop reason: HOSPADM

## 2024-07-09 RX ORDER — PROPOFOL 10 MG/ML
INJECTION, EMULSION INTRAVENOUS PRN
Status: DISCONTINUED | OUTPATIENT
Start: 2024-07-09 | End: 2024-07-09 | Stop reason: SDUPTHER

## 2024-07-09 RX ORDER — SODIUM CHLORIDE 9 MG/ML
25 INJECTION, SOLUTION INTRAVENOUS PRN
Status: DISCONTINUED | OUTPATIENT
Start: 2024-07-09 | End: 2024-07-09 | Stop reason: HOSPADM

## 2024-07-09 RX ORDER — SODIUM CHLORIDE 9 MG/ML
INJECTION, SOLUTION INTRAVENOUS CONTINUOUS
Status: DISCONTINUED | OUTPATIENT
Start: 2024-07-09 | End: 2024-07-09 | Stop reason: HOSPADM

## 2024-07-09 RX ORDER — SODIUM CHLORIDE 0.9 % (FLUSH) 0.9 %
5-40 SYRINGE (ML) INJECTION PRN
Status: DISCONTINUED | OUTPATIENT
Start: 2024-07-09 | End: 2024-07-09 | Stop reason: HOSPADM

## 2024-07-09 RX ADMIN — PROPOFOL 50 MG: 10 INJECTION, EMULSION INTRAVENOUS at 14:02

## 2024-07-09 RX ADMIN — PROPOFOL 30 MG: 10 INJECTION, EMULSION INTRAVENOUS at 14:18

## 2024-07-09 RX ADMIN — PROPOFOL 50 MG: 10 INJECTION, EMULSION INTRAVENOUS at 14:21

## 2024-07-09 RX ADMIN — PROPOFOL 20 MG: 10 INJECTION, EMULSION INTRAVENOUS at 14:13

## 2024-07-09 RX ADMIN — PROPOFOL 140 MG: 10 INJECTION, EMULSION INTRAVENOUS at 13:59

## 2024-07-09 RX ADMIN — SODIUM CHLORIDE: 9 INJECTION, SOLUTION INTRAVENOUS at 13:05

## 2024-07-09 RX ADMIN — PROPOFOL 50 MG: 10 INJECTION, EMULSION INTRAVENOUS at 14:11

## 2024-07-09 RX ADMIN — PROPOFOL 50 MG: 10 INJECTION, EMULSION INTRAVENOUS at 14:06

## 2024-07-09 RX ADMIN — PROPOFOL 20 MG: 10 INJECTION, EMULSION INTRAVENOUS at 14:15

## 2024-07-09 ASSESSMENT — LIFESTYLE VARIABLES: SMOKING_STATUS: 1

## 2024-07-09 ASSESSMENT — PAIN - FUNCTIONAL ASSESSMENT
PAIN_FUNCTIONAL_ASSESSMENT: 0-10
PAIN_FUNCTIONAL_ASSESSMENT: NONE - DENIES PAIN
PAIN_FUNCTIONAL_ASSESSMENT: 0-10

## 2024-07-09 NOTE — PROGRESS NOTES
Immediately prior to the procedure the patient's History and Physical was reviewed- there are no changes with the current vitals.  BP (!) 190/85   Pulse 75   Temp 97.9 °F (36.6 °C) (Infrared)   Resp 18   Ht 1.753 m (5' 9\")   Wt (!) 143.3 kg (316 lb)   SpO2 94%   BMI 46.67 kg/m²     No CP/SOB.  Risks/benefits d/w pt.  All questions answered.  Proceed with EGD and Colonoscopy.    MAURICIO HENDERSON,   7/9/2024  1:50 PM          
SBAR filled out and placed on front of patient's chart. Pt is awaiting transport.   
a responsible adult to take you home after your surgery. You will not be allowed to leave alone or drive yourself home.  It is strongly suggested someone stay with you the first 24 hrs. Your surgery will be cancelled if you do not have a ride home.    PEDIATRIC PATIENTS ONLY:  A parent/legal guardian must accompany a child scheduled for surgery and plan to stay at the hospital until the child is discharged.  Please do not bring other children with you.    Please wear simple, loose fitting clothing to the hospital.  Do not bring valuables (money, credit cards, checkbooks, etc.) Do not wear any makeup (including no eye makeup) or nail polish on your fingers or toes.    DO NOT wear any jewelry or piercings on day of surgery.  All body piercing jewelry must be removed.    Shower the night before surgery with ___Antibacterial soap /Antiseptic wipes________    HYSTERECTOMY PATIENTS ONLY---Remember to bring Blood Bank bracelet to the hospital on the day of surgery.    If you have a Living Will and Durable Power of  for Healthcare, please bring in a copy.    If appropriate bring crutches, inspirex, WALKER, CANE etc...    Notify your Surgeon if you develop any illness between now and surgery time, cough, cold, fever, sore throat, nausea, vomiting, etc.  Please notify your surgeon if you experience dizziness, shortness of breath or blurred vision between now & the time of your surgery.    If you have ___dentures, they will be removed before going to the OR; we will provide you a container. If you wear ___contact lenses or _x__glasses, they will be removed; please bring a case for them.    To provide excellent care visitors will be limited to 1 in the room at any given time.    Please bring picture ID and insurance card.    Sleep apnea patients need to bring CPAP SETTINGS to hospital on day of surgery. Bring CPAP if you will be staying overnight.

## 2024-07-09 NOTE — ANESTHESIA PRE PROCEDURE
Department of Anesthesiology  Preprocedure Note       Name:  Paul Florez   Age:  73 y.o.  :  1950                                          MRN:  [id number]         Date:  2024      Surgeon: Surgeon(s):  Paul Griffin DO    Procedure: Procedure(s):  ESOPHAGOGASTRODUODENOSCOPY (CPT 79013)  COLONOSCOPY    Medications prior to admission:   Prior to Admission medications    Medication Sig Start Date End Date Taking? Authorizing Provider   levothyroxine (SYNTHROID) 50 MCG tablet Take 0.5 tablets by mouth daily 24   Eduin Guzman DO   losartan (COZAAR) 100 MG tablet TAKE 1 TABLET EVERY DAY 24   Eduin Guzman DO   atorvastatin (LIPITOR) 40 MG tablet Take 1 tablet by mouth daily 24   Eduin Guzman DO   atorvastatin (LIPITOR) 40 MG tablet Take 1 tablet by mouth daily 24   Eduin Guzman DO   glipiZIDE (GLUCOTROL) 5 MG tablet TAKE 1 TABLET TWICE DAILY BEFORE MEALS 6/3/24   Eduin Guzman DO   metFORMIN (GLUCOPHAGE) 1000 MG tablet TAKE 1 TABLET TWICE DAILY WITH MEALS 6/3/24   Eduin Guzman DO   ferrous gluconate (FERGON) 324 (38 Fe) MG tablet Take 1 tablet by mouth daily (with breakfast)    Provider, MD Amanda   Magnesium Oxide (MAGNESIUM-OXIDE) 250 MG TABS tablet Take 1 tablet by mouth daily 24   Delma Quezada APRN - CNP   amiodarone (CORDARONE) 200 MG tablet Take 1 tablet by mouth daily 5/7/24 11/3/24  Delma Quezada APRN - CNP   omeprazole (PRILOSEC) 40 MG delayed release capsule TAKE 1 CAPSULE EVERY DAY 3/11/24   Eduin Guzman DO   apixaban (ELIQUIS) 5 MG TABS tablet Take 1 tablet by mouth 2 times daily 2/21/24 2/15/25  Eduin Guzman DO   tamsulosin (FLOMAX) 0.4 MG capsule Take 1 capsule by mouth daily 24   Eduin Guzman DO   metoprolol succinate (TOPROL XL) 50 MG extended release tablet Take 1 tablet by mouth daily 23   Eduin Guzman DO   ACCU-CHEK FLEICIA PLUS strip TEST  FINGERSTICK BLOOD SUGAR EVERY DAY 10/31/22   Asher Mcallister, DO

## 2024-07-09 NOTE — DISCHARGE INSTRUCTIONS
1.  Colon polyps x3 remove by cold snare polypectomy and forceps     2.  Moderate diverticulosis of sigmoid colon.     3.  Grade 2 IH's     4.  Ok to D/C home today per hospital protocol.  No driving today.  Ok to restart Eliquis on Friday, 7/12/24.  Watch for any bleeding and call.  Repeat colonoscopy in 3 years given greater than 10 polyps, sooner if symptoms.       5.  Follow up as outpatient in office, call [phone] to schedule for appointment.       Repeat screening colonoscopy in 3 yrs (2027), earlier if alarm symptoms (pain, bleeding, weight loss, diarrhea or sudden onset constipation) occur.      1.  Long Segment ~5cm Aguila's from ~34-39cm with biopsies in 3 separate bottles     2.  Mild gastritis with biopsy, no recurrent polyp seen     3.  Normal duodenum with no AVM or blood     4.  Continue PPI daily.  EGD 3 years (2027) pending path, sooner if dysplasia.  NPO 4 hours prior to supine.  10-20lb wt loss.  See colonoscopy report same day for further findings and plan.        Follow up as outpatient in office, call [phone] to schedule for appointment.     Upper GI Endoscopy: What to Expect at Home  Your Recovery  You had an upper GI endoscopy. Your doctor used a thin, lighted tube that bends to look at the inside of your esophagus, your stomach, and the first part of the small intestine, called the duodenum.  After you have an endoscopy, you will stay at the hospital or clinic for 1 to 2 hours. This will allow the medicine to wear off. You will be able to go home after your doctor or nurse checks to make sure that you're not having any problems.  You may have to stay overnight if you had treatment during the test. You may have a sore throat for a day or two after the test.  This care sheet gives you a general idea about what to expect after the test.  How can you care for yourself at home?  Activity   Rest as much as you need to after you go home.  You should be able to go back to your usual

## 2024-07-09 NOTE — ANESTHESIA POSTPROCEDURE EVALUATION
Department of Anesthesiology  Postprocedure Note    Patient: Paul Florez  MRN: [id number]  YOB: 1950  Date of evaluation: 7/9/2024    Procedure Summary       Date: 07/09/24 Room / Location: [place] [address] / [facility]    Anesthesia Start: 1356 Anesthesia Stop: 1429    Procedures:       ESOPHAGOGASTRODUODENOSCOPY BIOPSY      COLONOSCOPY POLYPECTOMY COLD SNARE Diagnosis:       Anemia, unspecified type      (Anemia, unspecified type [D64.9])    Surgeons: Paul Griffin DO Responsible Provider: Ector Mcgregor MD    Anesthesia Type: MAC ASA Status: 3            Anesthesia Type: No value filed.    Lolis Phase I: Lolis Score: 10    Lolis Phase II:      Anesthesia Post Evaluation    Patient location during evaluation: PACU  Patient participation: complete - patient participated  Level of consciousness: awake  Airway patency: patent  Nausea & Vomiting: no nausea and no vomiting  Cardiovascular status: hemodynamically stable  Respiratory status: acceptable  Hydration status: euvolemic  Pain management: adequate    No notable events documented.

## 2024-07-09 NOTE — OP NOTE
Operative Note      Patient: Paul Florez  YOB: 1950  MRN: [id number]    Date of Procedure: 7/9/2024    Pre-Op Diagnosis Codes:     * Anemia, unspecified type [D64.9], H/O Colon Polyps, FmHx CRC Dad 50's    Post-Op Diagnosis: SAME       Procedure(s):  ESOPHAGOGASTRODUODENOSCOPY (CPT 60972)  COLONOSCOPY    Surgeon(s):  Paul Griffin DO    Assistant:   * No surgical staff found *    Anesthesia: Monitor Anesthesia Care    Estimated Blood Loss (mL): < 5cc    Complications: None    Specimens:   * No specimens in log *    Implants:  * No implants in log *      Drains: * No LDAs found *    Detailed Description of Procedure:   Colonoscopy note    Indication: H/O Colon Polyps, FmHx CRC Dad 50's, Anemia    Consent: Informed consent was obtained from the patient including and not limited to risk of perforation, bleeding, infection, dental breakage, ileus, need for surgery, or worst case death.    Sedation  MAC    Estimated Blood Loss -- < 5cc    Endoscope was advanced through anus to cecum and terminal ileum.    The ileocecal valve and appendiceal orifice were identified and pictures were taken.      Preparation is good.  Patient tolerated procedure well.    No fresh or old blood seen on exam    Terminal ileum x5cm is normal  Cecum is normal  Ascending colon is normal other than a 3mm polyp removed by cold snare polypectomy  Transverse colon is normal  Descending colon is normal other than a 6mm polyp removed by cold snare polypectomy and a 2mm polyp removed by forceps  Sigmoid colon with moderate diverticulosis with no bleeding stigmata  Rectum direct views are normal  Retroflexion in rectum shows normal mucosa and dentate line with grade 2 IH's    IMPRESSION AND PLAN:   1.  Colon polyps x3 remove by cold snare polypectomy and forceps    2.  Moderate diverticulosis of sigmoid colon.    3.  Grade 2 IH's    4.  Ok to D/C home today per hospital protocol.  No driving today.  Ok to restart Eliquis on Friday,

## 2024-07-09 NOTE — OP NOTE
Operative Note      Patient: Paul Florez  YOB: 1950  MRN: [id number]    Date of Procedure: 7/9/2024    Pre-Op Diagnosis Codes:     * Anemia, unspecified type [D64.9], GERD, Aguila's, H/O Large Stomach Polyp S/P EMR 2017    Post-Op Diagnosis: SAME       Procedure(s):  ESOPHAGOGASTRODUODENOSCOPY (CPT 20864)  COLONOSCOPY    Surgeon(s):  Paul Griffin DO    Assistant:   * No surgical staff found *    Anesthesia: Monitor Anesthesia Care    Estimated Blood Loss (mL): < 5cc    Complications: None    Specimens:   * No specimens in log *    Implants:  * No implants in log *      Drains: * No LDAs found *    Detailed Description of Procedure:   Procedure:  Esophagogastroduodenoscopy    Indication:  GERD, Aguila's, Anemia H/O Large Stomach Polyp S/P EMR 2017    Consent: Informed consent was obtained from the patient including and not limited to risk of perforation, aspiration of gastric contents or teeth, bleeding, infection, dental breakage, ileus, need for surgery, or worst case death.    Sedation  MAC    Estimated Blood Loss -- < 5cc    Endoscope was advanced easily through mouth to second portion of duodenum      Oropharynx views are limited but grossly normal.    Esophagus:   Mucosa is normal other than long segment Aguila's from ~34cm to 39cm with biopsies in 3 separate bottles.  GEJ at ~39 cm.      Stomach:   Antrum with mild gastritis with biopsy, no recurrent polyp seen    Gastric body is normal.    Retroflexed views showed normal fundus and cardia.    Duodenum: Bulb is normal.    Second portion of duodenum is normal.  No fresh of old blood.  NO AVM's.    IMPRESSION AND PLAN:     1.  Long Segment ~5cm Aguila's from ~34-39cm with biopsies in 3 separate bottles    2.  Mild gastritis with biopsy, no recurrent polyp seen    3.  Normal duodenum with no AVM or blood    4.  Continue PPI daily.  EGD 3 years (2027) pending path, sooner if dysplasia.  NPO 4 hours prior to supine.  10-20lb wt loss.  See

## 2024-07-09 NOTE — H&P
Name:  Paul Florez  :  1950  MRN:  [id number]  Room:  Room/bed info not found  DOS:  2024    [facility]  Dr. Kate Jason M.D.  Dr. Josue Macias M.D.  SHELLEY Jennings.O.  Dr. Barry Johnson M.D.  Dr. Dario Resendez D.O.       PCP:  Eduin Guzman DO  Admitting Physician:  Paul Griffin DO      History of Present Illness  Paul Florez is a 73 y.o. male who presents for EGD and Colonoscopy for evaluation of GERD, Aguila's Esophagus, Anemia, H/O Colon Polyps - tubular adenomas, H/O Large Gastric Antrum polyp that protruded into duodenum S/P EUS/EMR [facility]  with path showing benign inflammatory polyp.      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     We did receive clearance from Dr. Beckett from cardiology.      Histories  Past Medical History:   Diagnosis Date    Anemia     Atrial flutter (HCC)     Aguila's esophagus     Coronary artery disease involving native coronary artery 2022    Diverticulitis     Fatty liver     History of Holter monitoring 2020    Hyperlipidemia     Hypertension     Lipoma     Subcyst    Lumbar spinal stenosis     Osteoarthritis     PUD (peptic ulcer disease)     S/P angioplasty with stent 2022    1st OM    Type 2 diabetes mellitus without complication (HCC)      Past Surgical History:   Procedure Laterality Date    ATRIAL ABLATION SURGERY  2012    CARDIAC DEFIBRILLATOR PLACEMENT Left 2022    [employer] CRT-D Insertion (Dr. Guerrero)    CARDIAC SURGERY      pt states 6 - 7 years ago     COLONOSCOPY  2021    Follow-up 3 years.  Dr. Griffin    ESOPHAGOGASTRODUODENOSCOPY  2021    Follow-up 3 years.  5 cm Aguila's esophagitis    KNEE ARTHROPLASTY Left     LIPOMA RESECTION

## 2024-07-18 LAB — SURGICAL PATHOLOGY REPORT: NORMAL

## 2024-07-22 RX ORDER — AMIODARONE HYDROCHLORIDE 200 MG/1
200 TABLET ORAL DAILY
Qty: 90 TABLET | Refills: 2 | Status: SHIPPED | OUTPATIENT
Start: 2024-07-22

## 2024-07-31 ENCOUNTER — TELEPHONE (OUTPATIENT)
Dept: PRIMARY CARE CLINIC | Age: 74
End: 2024-07-31

## 2024-07-31 DIAGNOSIS — E03.2 HYPOTHYROIDISM DUE TO MEDICATION: ICD-10-CM

## 2024-07-31 RX ORDER — LEVOTHYROXINE SODIUM 0.05 MG/1
50 TABLET ORAL DAILY
Qty: 90 TABLET | Refills: 0 | Status: SHIPPED | OUTPATIENT
Start: 2024-07-31

## 2024-07-31 NOTE — TELEPHONE ENCOUNTER
Refill request      Patient calling in for refill due to prescription direction change.  Patient states he was taking Levothyroxine 25 mcg 1 qd, was given lab results 24 and was told to increase to 25 mcg 2 qd (Lab result note shows: \" Increase levothyroxine to 50 mcg daily.\"      Levothyroxine 25 mcg #180  Si bid?  Or   Levothyroxine 50 mcg #90  Si qd?      [facility] (mail order)        Last Appt: 24  Next Appt: 24

## 2024-07-31 NOTE — TELEPHONE ENCOUNTER
A 90-day prescription was sent to [facility] pharmacy.  I also ordered a TSH to be obtained in September.  Please contact patient to make him aware that labs will be due 9/2024

## 2024-08-01 NOTE — TELEPHONE ENCOUNTER
PC to pt informing Rx sent to pharmacy and order was placed for TSH to be done in September.    Pt voiced understanding and will stop by office to  order.  Goes to [facility] to have labs done.

## 2024-08-02 RX ORDER — OMEPRAZOLE 40 MG/1
40 CAPSULE, DELAYED RELEASE ORAL DAILY
Qty: 90 CAPSULE | Refills: 3 | OUTPATIENT
Start: 2024-08-02

## 2024-08-12 ENCOUNTER — TELEPHONE (OUTPATIENT)
Dept: PRIMARY CARE CLINIC | Age: 74
End: 2024-08-12

## 2024-08-12 DIAGNOSIS — I42.9 CARDIOMYOPATHY, UNSPECIFIED TYPE (HCC): Primary | ICD-10-CM

## 2024-08-12 DIAGNOSIS — I42.9 CARDIOMYOPATHY, UNSPECIFIED TYPE (HCC): ICD-10-CM

## 2024-08-12 RX ORDER — LOSARTAN POTASSIUM 100 MG/1
100 TABLET ORAL DAILY
Qty: 90 TABLET | Refills: 3 | Status: CANCELLED | OUTPATIENT
Start: 2024-08-12

## 2024-08-12 RX ORDER — LOSARTAN POTASSIUM 100 MG/1
100 TABLET ORAL 2 TIMES DAILY
Qty: 180 TABLET | Refills: 3 | OUTPATIENT
Start: 2024-08-12

## 2024-08-12 RX ORDER — LOSARTAN POTASSIUM 100 MG/1
100 TABLET ORAL DAILY
Qty: 30 TABLET | Refills: 1 | Status: SHIPPED | OUTPATIENT
Start: 2024-08-12

## 2024-08-12 NOTE — TELEPHONE ENCOUNTER
Pc to pt informed pt should have only been taking Losartan 50mg bid since being released from the hospital. Pt states he has been taking 100mg bid. Need BMP to evaluate renal function and potassium level ASAP. Pt should only be taking Losartan 100mg daily. Pt verbalized understanding

## 2024-08-12 NOTE — TELEPHONE ENCOUNTER
I reviewed discharge summary from his hospital stay in [place] February 2024.  He was discharged on losartan 50 mg tablets 2 tablets (100 mg) once daily.  The prescription I sent in was for 100 mg daily.  He should not have been taking it twice a day and should have contacted the office sooner if he thought there was a discrepancy.  Need to order a BMP to evaluate his renal function and potassium level.  Lab order placed to be obtained ASAP.

## 2024-08-12 NOTE — TELEPHONE ENCOUNTER
Pt checked his medication will only have enough pills to last til the end of the month. He will would like a 30 day supply sent to [facility]

## 2024-08-12 NOTE — TELEPHONE ENCOUNTER
Patient calling to follow up    States he was seen in office in , received refill of Losartan 100 mg, however, it was supposed to be for 1 bid not 1 qd.  States it was increased when he was in the hospital in [place], [place] and that he discussed during his visit.    States [facility] has not received the corrected amount, so they will not send refill bc it is showing too soon to fill.  Able to send new Rx?      Losartan 100 mg #180  Si bid    [facility]        Last Appt: 24  Next Appt: 25

## 2024-08-13 LAB — TSH SERPL DL<=0.05 MIU/L-ACNC: 5.49 UIU/ML

## 2024-08-14 ENCOUNTER — TELEPHONE (OUTPATIENT)
Dept: PRIMARY CARE CLINIC | Age: 74
End: 2024-08-14

## 2024-08-14 DIAGNOSIS — E03.2 HYPOTHYROIDISM DUE TO MEDICATION: ICD-10-CM

## 2024-08-14 LAB
ALBUMIN: 3.9 G/DL
ALP BLD-CCNC: 19 U/L
ALT SERPL-CCNC: 19 U/L
AST SERPL-CCNC: 27 U/L
BASOPHILS ABSOLUTE: 0.1 /ΜL
BASOPHILS RELATIVE PERCENT: 1 %
BILIRUB SERPL-MCNC: 0.3 MG/DL (ref 0.1–1.4)
BUN BLDV-MCNC: 17 MG/DL
CALCIUM SERPL-MCNC: 9.1 MG/DL
CHLORIDE BLD-SCNC: 105 MMOL/L
CO2: 24 MMOL/L
CREAT SERPL-MCNC: 1.45 MG/DL
EOSINOPHILS ABSOLUTE: 0.2 /ΜL
EOSINOPHILS RELATIVE PERCENT: 3 %
FOLATE: 15.7
GLUCOSE FASTING: 138 MG/DL
HCT VFR BLD CALC: 31.7 % (ref 41–53)
HEMOGLOBIN: 9.4 G/DL (ref 13.5–17.5)
IRON: 35
LYMPHOCYTES ABSOLUTE: 1.3 /ΜL
LYMPHOCYTES RELATIVE PERCENT: 26 %
MCH RBC QN AUTO: 24.5 PG
MCHC RBC AUTO-ENTMCNC: 29.7 G/DL
MCV RBC AUTO: 83 FL
MONOCYTES ABSOLUTE: 0.3 /ΜL
MONOCYTES RELATIVE PERCENT: 6 %
NEUTROPHILS ABSOLUTE: 3.3 /ΜL
NEUTROPHILS RELATIVE PERCENT: 64 %
PLATELET # BLD: 208 K/ΜL
PMV BLD AUTO: ABNORMAL FL
POTASSIUM SERPL-SCNC: 4.5 MMOL/L
RBC # BLD: 3.84 10^6/ΜL
SODIUM BLD-SCNC: 143 MMOL/L
TOTAL IRON BINDING CAPACITY: 382
TOTAL PROTEIN: 7.5 G/DL (ref 6.4–8.2)
VITAMIN B-12: 543
VITAMIN B-12: 543
WBC # BLD: 5.2 10^3/ML

## 2024-08-14 RX ORDER — LEVOTHYROXINE SODIUM 0.07 MG/1
75 TABLET ORAL DAILY
Qty: 90 TABLET | Refills: 0 | Status: SHIPPED | OUTPATIENT
Start: 2024-08-14

## 2024-08-14 NOTE — TELEPHONE ENCOUNTER
----- Message from Dr. Eduin Guzman, DO sent at 8/14/2024 12:48 PM EDT -----  Contact patient.  TSH remains elevated at 5.490.  Increase levothyroxine to 75 mcg daily.  A new prescription will be sent to the pharmacy.  Recheck TSH 2-3 months.

## 2024-08-21 DIAGNOSIS — E03.2 HYPOTHYROIDISM DUE TO MEDICATION: ICD-10-CM

## 2024-08-21 PROCEDURE — 93296 REM INTERROG EVL PM/IDS: CPT | Performed by: INTERNAL MEDICINE

## 2024-08-21 PROCEDURE — 93295 DEV INTERROG REMOTE 1/2/MLT: CPT | Performed by: INTERNAL MEDICINE

## 2024-08-22 DIAGNOSIS — D50.9 IRON DEFICIENCY ANEMIA, UNSPECIFIED IRON DEFICIENCY ANEMIA TYPE: Primary | ICD-10-CM

## 2024-08-22 RX ORDER — SODIUM CHLORIDE 0.9 % (FLUSH) 0.9 %
5-40 SYRINGE (ML) INJECTION PRN
OUTPATIENT
Start: 2024-08-22

## 2024-08-22 RX ORDER — SODIUM CHLORIDE 9 MG/ML
INJECTION, SOLUTION INTRAVENOUS CONTINUOUS
OUTPATIENT
Start: 2024-08-22

## 2024-08-22 RX ORDER — HEPARIN 100 UNIT/ML
500 SYRINGE INTRAVENOUS PRN
OUTPATIENT
Start: 2024-08-22

## 2024-08-22 RX ORDER — DIPHENHYDRAMINE HYDROCHLORIDE 50 MG/ML
50 INJECTION INTRAMUSCULAR; INTRAVENOUS
OUTPATIENT
Start: 2024-08-22

## 2024-08-22 RX ORDER — EPINEPHRINE 1 MG/ML
0.3 INJECTION, SOLUTION, CONCENTRATE INTRAVENOUS PRN
OUTPATIENT
Start: 2024-08-22

## 2024-08-22 RX ORDER — SODIUM CHLORIDE 9 MG/ML
5-250 INJECTION, SOLUTION INTRAVENOUS PRN
OUTPATIENT
Start: 2024-08-22

## 2024-09-18 DIAGNOSIS — I42.9 CARDIOMYOPATHY, UNSPECIFIED TYPE (HCC): ICD-10-CM

## 2024-09-18 RX ORDER — METOPROLOL SUCCINATE 50 MG/1
50 TABLET, EXTENDED RELEASE ORAL DAILY
Qty: 90 TABLET | Refills: 3 | Status: SHIPPED | OUTPATIENT
Start: 2024-09-18

## 2024-10-09 LAB
ALBUMIN URINE, EXTERNAL: NORMAL
CREATININE URINE: NORMAL
CREATININE, URINE, EXTERNAL: NORMAL
MICROALBUMIN/CREAT 24H UR: NORMAL MG/G{CREAT}
MICROALBUMIN/CREAT UR-RTO: 115 MG/G
MICROALBUMIN/CREAT UR: 115 MG/G{CREAT}

## 2024-10-14 RX ORDER — LEVOTHYROXINE SODIUM 50 UG/1
50 TABLET ORAL DAILY
Qty: 90 TABLET | Refills: 3 | OUTPATIENT
Start: 2024-10-14

## 2024-10-17 DIAGNOSIS — E03.2 HYPOTHYROIDISM DUE TO MEDICATION: ICD-10-CM

## 2024-10-17 RX ORDER — LEVOTHYROXINE SODIUM 75 UG/1
75 TABLET ORAL DAILY
Qty: 90 TABLET | Refills: 1 | Status: SHIPPED | OUTPATIENT
Start: 2024-10-17

## 2024-10-22 RX ORDER — OMEPRAZOLE 40 MG/1
40 CAPSULE, DELAYED RELEASE ORAL DAILY
Qty: 90 CAPSULE | Refills: 0 | Status: SHIPPED | OUTPATIENT
Start: 2024-10-22

## 2024-10-29 ENCOUNTER — TELEPHONE (OUTPATIENT)
Dept: PRIMARY CARE CLINIC | Age: 74
End: 2024-10-29

## 2024-10-29 RX ORDER — GLUCOSAMINE HCL/CHONDROITIN SU 500-400 MG
1 CAPSULE ORAL DAILY
Qty: 100 STRIP | Refills: 3 | Status: CANCELLED | OUTPATIENT
Start: 2024-10-29

## 2024-10-29 RX ORDER — GLUCOSAMINE HCL/CHONDROITIN SU 500-400 MG
1 CAPSULE ORAL
COMMUNITY

## 2024-10-29 NOTE — TELEPHONE ENCOUNTER
Refill request      Accu Chek Guide Test Strips #100  Patient tests fasting blood sugar 1 qd      [facility]          Last Appt: 6/24/24  Next Appt: 1/13/25

## 2024-10-30 DIAGNOSIS — E11.9 TYPE 2 DIABETES MELLITUS WITHOUT COMPLICATION, WITHOUT LONG-TERM CURRENT USE OF INSULIN (HCC): Primary | ICD-10-CM

## 2024-10-30 RX ORDER — BLOOD SUGAR DIAGNOSTIC
1 STRIP MISCELLANEOUS DAILY
Qty: 100 EACH | Refills: 3 | Status: SHIPPED | OUTPATIENT
Start: 2024-10-30

## 2024-11-12 NOTE — PROGRESS NOTES
shock, which briefly restored sinus then returned to AF with RVR, which was treated with 41 J shock which restored sinus. A TTE reported LVEF improved to 55-60%. An C reported no obstructive CAD.  He was discharged with amiodarone 200 mg daily.  He presents today, 11/13/2024; for follow-up with my office.  He has not followed with my office since time of implant in 2022.  He complains of shortness of breath, which she reports is chronic since 2020.  He also complains of left arm pain for several months when using the extremity.  He denies any complaints at this time.     Prior cardiac testing:  LHC (2/2/2024 at [facility] in [place]): Mild diffuse ISR of OM1 stent, otherwise normal.  TTE (2/1/2024 at [facility] in [place]): LVEF = 55-60%, moderate LAE, stage I LVDD, mild MR, and mild elevation of PASP.  TTE (2/6/2023): Technically difficult study, LVEF = 45-50% moderate LVH (1.5 cm), stage I LVDD, mild MR, mild TR, epicardial fat pad.  ECG (9/30/22): sinus at 86 bpm, LBBB (QRSd = 162 msec)  TTE (8/25/22): LVEF = 30-35%, LV dilation, mild concentric LVH, stage I LVDD, mild LAE, mild MR, and mild TR.  14 day event monitor (7/28/22): sinus with IVCD at 52 - 137 bpm (mean: 80 bpm), QRS morphology changes were observed, 7 patient events during sinus at  bpm, SVE burden < 1%, 18 brief episodes of SVT (longest: 14.9 sec at 111 bpm), VE burden < 1%, and no AF, VT, 2* type II or 3* AV block, or significant pauses.  LHC (5/27/22): co-dominant circulation (RCA and Lcx), 70-80% proximal OM1 stenosis treated with MARTIN x 1, and 30-40% distal Lcx stenosis.  TTE (5/24/22): technically difficult stufy, LVEF = 30-35% with hypokinetic apex, LV apex dilation, mild asymmetric septal LVH, stage I LVDD, mild MR, and mild TR.  24 hour Holter (5/14/20): sinus at 45 - 105 bpm (mean: 72 bpm), SVE burden < 1%, VE burden < 1%, no AF, SVT, VT, AV block, or significant pauses; and no patient events.  TTE (5/12/20): LVEF = 60%, mild

## 2024-11-13 ENCOUNTER — OFFICE VISIT (OUTPATIENT)
Dept: NON INVASIVE DIAGNOSTICS | Age: 74
End: 2024-11-13

## 2024-11-13 ENCOUNTER — NURSE ONLY (OUTPATIENT)
Dept: NON INVASIVE DIAGNOSTICS | Age: 74
End: 2024-11-13

## 2024-11-13 VITALS
HEIGHT: 69 IN | RESPIRATION RATE: 18 BRPM | TEMPERATURE: 98.1 F | HEART RATE: 75 BPM | WEIGHT: 315 LBS | DIASTOLIC BLOOD PRESSURE: 80 MMHG | SYSTOLIC BLOOD PRESSURE: 140 MMHG | OXYGEN SATURATION: 90 % | BODY MASS INDEX: 46.65 KG/M2

## 2024-11-13 DIAGNOSIS — I48.0 PAROXYSMAL ATRIAL FIBRILLATION (HCC): Primary | ICD-10-CM

## 2024-11-13 DIAGNOSIS — Z95.810 CARDIAC RESYNCHRONIZATION THERAPY DEFIBRILLATOR (CRT-D) IN PLACE: Primary | ICD-10-CM

## 2024-11-13 DIAGNOSIS — E66.01 MORBID OBESITY: ICD-10-CM

## 2024-11-13 DIAGNOSIS — M79.602 LEFT ARM PAIN: ICD-10-CM

## 2024-11-13 NOTE — PATIENT INSTRUCTIONS
No medication changes at this time.  Continue remote monitoring of ICD every 91 days.  You will be contacted to schedule an appointment with weight loss program.  You will be contacted to schedule physical therapy appointment.  Follow-up with this office in ~6 months.

## 2024-11-18 NOTE — TELEPHONE ENCOUNTER
Requested Prescriptions     Pending Prescriptions Disp Refills    atorvastatin (LIPITOR) 40 MG tablet [Pharmacy Med Name: Atorvastatin Calcium Oral Tablet 40 MG] 90 tablet 3     Sig: TAKE 1 TABLET EVERY DAY       Next appt is 1/13/2025  Last appt was 6/24/2024

## 2024-11-19 RX ORDER — ATORVASTATIN CALCIUM 40 MG/1
40 TABLET, FILM COATED ORAL DAILY
Qty: 90 TABLET | Refills: 3 | Status: SHIPPED | OUTPATIENT
Start: 2024-11-19

## 2024-11-19 NOTE — PROGRESS NOTES
[facility]  Physical Therapy Daily Treatment Note    Date: 2024  Patient Name: Paul Florez  : 1950   MRN: [id number]  DOInjury: 2024  DOSx: na   Referring Provider: Naren Guerrero DO  [address] [place] [address]     Medical Diagnosis: Left arm pain (M79.602)     Outcome Measure: QUICK DASH= 21, 23%    S: See eval.   O: Pt given written HEP  Time 0099-7563 2x/wk, 6 weeks    Visit  Repeat outcome measure at mid point and end.    Pain 2/10     ROM Left shoulder flex= 140 DEG     Modalities      Pacer/Defib NO ES     THEREX     UBE      Pulleys shoulder flex      Shrugs      Shld flex      Shldr abd      Overhead press       Pull downs      ROWS: M      ROWS: L      ER      IR       Functional activities To aid in ROM and strength needed for reaching , lifting ,pushing and pulling at home/work    Diag down       Diag up  \"      \"    Wand press up  \"      \"                A:  Tolerated well.  Above added to written HEP.  P: Continue with rehab plan  Paul Roche PT    Treatment Charges: Mins Units   Initial Evaluation 22 1   Re-Evaluation     Ther Exercise         TE 8 1   Manual Therapy     MT     Ther Activities        TA     Gait Training          GT     Neuro Re-education NR     Modalities     Non-Billable Service Time     Other     Total Time/Units 30 2

## 2024-11-19 NOTE — PROGRESS NOTES
[facility] OUTPATIENT REHABILITATION  PHYSICAL THERAPY INITIAL EVALUATION         Date:  2024   Patient: Paul Florez  : 1950  MRN: [id number]  Referring Provider: Naren Guerrero DO  [address] [place] [address]     Medical Diagnosis:  Left arm pain (M79.602)   Onset date: 2024  Mechanism of Injury: Defibrillator went off x5, pain since  Chief complaint: Intermittent left upper arm pain with activity. Right hand dominant     SUBJECTIVE:     Past Medical History  Past Medical History:   Diagnosis Date    Anemia     Atrial flutter (HCC)     Aguila's esophagus     Coronary artery disease involving native coronary artery 2022    Diverticulitis     Fatty liver     History of Holter monitoring 2020    Hyperlipidemia     Hypertension     Lipoma     Subcyst    Lumbar spinal stenosis     Osteoarthritis     PUD (peptic ulcer disease)     S/P angioplasty with stent 2022    1st OM    Type 2 diabetes mellitus without complication (HCC)      Past Surgical History:   Procedure Laterality Date    ATRIAL ABLATION SURGERY  2012    CARDIAC DEFIBRILLATOR PLACEMENT Left 2022    [employer] CRT-D Insertion (Dr. Guerrero)    CARDIAC SURGERY      pt states 6 - 7 years ago     COLONOSCOPY  2021    Follow-up 3 years.  Dr. Griffin    COLONOSCOPY N/A 2024    COLONOSCOPY POLYPECTOMY COLD SNARE performed by Paul Griffin DO at [facility] ENDOSCOPY    ESOPHAGOGASTRODUODENOSCOPY  2021    Follow-up 3 years.  5 cm Aguila's esophagitis    ESOPHAGOGASTRODUODENOSCOPY  2024    KNEE ARTHROPLASTY Left     LIPOMA RESECTION      UPPER GASTROINTESTINAL ENDOSCOPY  2017    Dr. Griffin, Findings: Long segment Barretts, 5cm, Moderate Gastritis, Duodenal bulb/post pyloric channel mass versus severe brunner gland hyperplasia, mild duodenitis distally, biopsies taken    UPPER GASTROINTESTINAL ENDOSCOPY  2017    Dr. Griffin, Findings: 5cm

## 2024-11-20 ENCOUNTER — HOSPITAL ENCOUNTER (OUTPATIENT)
Dept: PHYSICAL THERAPY | Age: 74
Setting detail: THERAPIES SERIES
Discharge: HOME | End: 2024-11-20
Payer: MEDICARE

## 2024-11-20 PROCEDURE — 97161 PT EVAL LOW COMPLEX 20 MIN: CPT | Performed by: PHYSICAL THERAPIST

## 2024-11-20 PROCEDURE — 97110 THERAPEUTIC EXERCISES: CPT | Performed by: PHYSICAL THERAPIST

## 2024-11-21 LAB — DIABETIC RETINOPATHY: NEGATIVE

## 2024-11-22 ENCOUNTER — HOSPITAL ENCOUNTER (OUTPATIENT)
Dept: PHYSICAL THERAPY | Age: 74
Setting detail: THERAPIES SERIES
Discharge: HOME | End: 2024-11-22
Payer: MEDICARE

## 2024-11-22 PROCEDURE — 97110 THERAPEUTIC EXERCISES: CPT

## 2024-11-22 NOTE — PROGRESS NOTES
[facility]  Physical Therapy Daily Treatment Note  Date: 2024  Patient Name: Paul Florez  : 1950   MRN: [id number]  DOInjury: 2024  DOSx: na   Referring Provider: Naren Guerrero DO  [address] [place] [address]     Medical Diagnosis: Left arm pain (M79.602)     Outcome Measure: QUICK DASH= 21, 23%    S: Patient reports performing HEP as able. He is unable to do corner stretch due to unable to stand, patient given alternate stretch seated chest stretch with hands behind head. Patient states a good understanding. He also is hurting and sore after chair stretch, we discussed being less aggressive and more gentle stretch.  O: All exercises performed seated.  Time 1560-2103 2x/wk, 6 weeks   Visit  Repeat outcome measure at mid point and end.   Pain 2/10    ROM Left shoulder flex= 140 DEG    Modalities     MH NO ES Pacer/Defib  MH only, patient deferred    THEREX     UBE L4 x 1 min FWD  L4 x 1 min Retro Increase time NV   Pulleys shoulder flex X 2 min    Shrugs     Shld flex 1# wand x 20    Shldr abd 1# wand x 20 to L    Overhead press 1# wand x 20     Pull downs Red x 20    ROWS: M Red x 20    Punches Red x 20    ROWS: L Red x 20    ER     IR     Functional activities To aid in ROM and strength needed for reaching , lifting ,pushing and pulling at home/work    Diag down      Diag up          A:  Tolerated well.   P: Continue with rehab plan.  Melissa Craig PTA    Treatment Charges: Mins Units   Initial Evaluation     Re-Evaluation     Ther Exercise         TE 29 2   Manual Therapy     MT     Ther Activities        TA     Gait Training          GT     Neuro Re-education NR     Modalities     Non-Billable Service Time     Other     Total Time/Units 29 2

## 2024-11-26 ENCOUNTER — HOSPITAL ENCOUNTER (OUTPATIENT)
Dept: PHYSICAL THERAPY | Age: 74
Setting detail: THERAPIES SERIES
Discharge: HOME | End: 2024-11-26
Payer: MEDICARE

## 2024-11-26 PROCEDURE — 97110 THERAPEUTIC EXERCISES: CPT

## 2024-11-26 NOTE — PROGRESS NOTES
[facility]  Physical Therapy Daily Treatment Note  Date: 2024  Patient Name: Paul Florez  : 1950   MRN: [id number]  DOInjury: 2024  DOSx: na   Referring Provider: Naren Guerrero DO  [address] [place] [address] [place] [address]     Medical Diagnosis: Left arm pain (M79.602)     Outcome Measure: QUICK DASH= 21, 23%    S: Patient reports independent with HEP. Patient reports tolerating last session well. Today 3/10 with movement.   O: All exercises performed seated.  Time 0535-1673 2x/wk, 6 weeks   Visit 3/12 with movement Repeat outcome measure at mid point and end.   Pain 3/10    ROM Left shoulder flex= 140 DEG    Modalities     MH NO ES Pacer/Defib  MH only, patient deferred    THEREX     UBE L4 x 2 min FWD  L4 x 2 min Retro Increase time NV   Pulleys shoulder flex X 2 min    Shrugs     Shld flex 1# wand x 20    Shldr abd 1# wand x 20 to L    Overhead press 1# wand x 30     Pull downs Red x 20    ROWS: M Red x 20    Punches Red x 20    ROWS: L Red x 20    ER Red x 20 left    IR Red x 20 Left    Functional activities To aid in ROM and strength needed for reaching , lifting ,pushing and pulling at home/work    Diag down      Diag up          A:  Tolerated well.   P: Continue with rehab plan.  Melissa Craig PTA    Treatment Charges: Mins Units   Initial Evaluation     Re-Evaluation     Ther Exercise         TE 23 2   Manual Therapy     MT     Ther Activities        TA     Gait Training          GT     Neuro Re-education NR     Modalities     Non-Billable Service Time 3 0   Other     Total Time/Units 26 2

## 2024-11-29 ENCOUNTER — HOSPITAL ENCOUNTER (OUTPATIENT)
Dept: PHYSICAL THERAPY | Age: 74
Setting detail: THERAPIES SERIES
Discharge: HOME | End: 2024-11-29
Payer: MEDICARE

## 2024-11-29 PROCEDURE — 97110 THERAPEUTIC EXERCISES: CPT | Performed by: PHYSICAL THERAPIST

## 2024-11-29 NOTE — PROGRESS NOTES
[facility]  Physical Therapy Daily Treatment Note  Date: 2024  Patient Name: Paul Florez  : 1950   MRN: [id number]  DOInjury: 2024  DOSx: na   Referring Provider: Naren Guerrero DO  [address] [place] [address]     Medical Diagnosis: Left arm pain (M79.602)     Outcome Measure: QUICK DASH= 21, 23%    S: Patient reports independent with HEP. Patient reports tolerating last session well. Today 3/10 with movement.   O: All exercises performed seated.  Time 09- 0959 2x/wk, 6 weeks   Visit  with movement Repeat outcome measure at mid point and end.   Pain 3/10    ROM Left shoulder flex= 140 DEG    Modalities     MH NO ES Pacer/Defib  MH only, patient deferred    THEREX     UBE L4 x 2 min FWD  L4 x 2 min Retro Increase time NV   Pulleys shoulder flex X 2 min    Shrugs     Shld flex 1# wand x 20 seated   Shldr abd 1# wand x 20 to L seated   Overhead press 1# wand x 30 seated    Pull downs Red x 20 seated   ROWS: M Red x 20 seated   Punches Red x 20 seated   ROWS: L Red x 20 seated   ER Red x 20 left seated   IR Red x 20 Left seated   Functional activities To aid in ROM and strength needed for reaching , lifting ,pushing and pulling at home/work    Diag down      Diag up          A:  Tolerated well.   P: Continue with rehab plan.  Paul Roche, PT    Treatment Charges: Mins Units   Initial Evaluation     Re-Evaluation     Ther Exercise         TE  39 3   Manual Therapy     MT     Ther Activities        TA     Gait Training          GT     Neuro Re-education NR     Modalities     Non-Billable Service Time     Other     Total Time/Units 39 3

## 2024-12-03 ENCOUNTER — HOSPITAL ENCOUNTER (OUTPATIENT)
Dept: PHYSICAL THERAPY | Age: 74
Setting detail: THERAPIES SERIES
Discharge: HOME | End: 2024-12-03
Payer: MEDICARE

## 2024-12-03 PROCEDURE — 97110 THERAPEUTIC EXERCISES: CPT | Performed by: PHYSICAL THERAPIST

## 2024-12-03 NOTE — PROGRESS NOTES
[facility]  Physical Therapy Daily Treatment Note  Date: 12/3/2024  Patient Name: Paul Florez  : 1950   MRN: [id number]  DOInjury: 2024  DOSx: na   Referring Provider: Naren Guerrero DO  [address] [place] [address]     Medical Diagnosis: Left arm pain (M79.602)     Outcome Measure: QUICK DASH= 21, 23%    S: Patient reports independent with HEP. Patient reports tolerating last session well.  Pain is now across entire chest and into both shoulders.   O: All exercises performed seated.  Time 7454-3895 2x/wk, 6 weeks   Visit  with movement Repeat outcome measure at mid point and end.   Pain 3/10    ROM Left shoulder flex= 140 DEG    Modalities     MH NO ES Pacer/Defib  MH only, patient deferred    THEREX     UBE L4 x 2 min FWD  L4 x 2 min Retro Increase time NV   Pulleys shoulder flex X 2 min    Shrugs     Shld flex 1# wand x 20 seated   Shldr abd 1# wand x 20 to L seated   Overhead press 1# wand x 30 seated    Pull downs Red x 20 seated   ROWS: M Red x 20 seated   Punches Red x 20 seated   ROWS: L Red x 20 seated   ER Red x 20 left seated   IR Red x 20 Left seated   Functional activities To aid in ROM and strength needed for reaching , lifting ,pushing and pulling at home/work    Diag down      Diag up          A:  Tolerated well.   P: Continue with rehab plan.  Paul Roche, PT    Treatment Charges: Mins Units   Initial Evaluation     Re-Evaluation     Ther Exercise         TE  31 2   Manual Therapy     MT     Ther Activities        TA     Gait Training          GT     Neuro Re-education NR     Modalities     Non-Billable Service Time     Other     Total Time/Units 31 2

## 2024-12-05 ENCOUNTER — HOSPITAL ENCOUNTER (OUTPATIENT)
Dept: PHYSICAL THERAPY | Age: 74
Setting detail: THERAPIES SERIES
Discharge: HOME | End: 2024-12-05
Payer: MEDICARE

## 2024-12-05 NOTE — PROGRESS NOTES
Physical Therapy Progress Note    Date: 2024  Patient Name: Paul Florez  : 1950   MRN: [id number]    Pt called to cancel PT appt  today. Fell while cleaning off snow    Paul Roche PT

## 2024-12-10 ENCOUNTER — TELEPHONE (OUTPATIENT)
Dept: BARIATRICS/WEIGHT MGMT | Age: 74
End: 2024-12-10

## 2024-12-10 ENCOUNTER — HOSPITAL ENCOUNTER (OUTPATIENT)
Dept: PHYSICAL THERAPY | Age: 74
Setting detail: THERAPIES SERIES
Discharge: HOME | End: 2024-12-10
Payer: MEDICARE

## 2024-12-10 PROCEDURE — 97110 THERAPEUTIC EXERCISES: CPT | Performed by: PHYSICAL THERAPIST

## 2024-12-10 NOTE — TELEPHONE ENCOUNTER
A first call was made in an attempt to reach this patient for a referral we received. I spoke with the patient who stated that he did not want to schedule. Referral closed.

## 2024-12-10 NOTE — PROGRESS NOTES
[facility]  Physical Therapy Daily Treatment Note  Date: 12/10/2024  Patient Name: Paul Florez  : 1950   MRN: [id number]  DOInjury: 2024  DOSx: na   Referring Provider: Naren Guerrero DO  [address] [place] [address]     Medical Diagnosis: Left arm pain (M79.602)     Outcome Measure: QUICK DASH= 21, 23%    S: Patient reports independent with HEP. Patient reports tolerating last session well.  Pain is now across entire chest and into both shoulders. Is putting PT on hold due to extreme fatigue from low iron  O: All exercises performed seated.  Time 7318-6996 2x/wk, 6 weeks   Visit  with movement Repeat outcome measure at mid point and end.   Pain 3/10    ROM Left shoulder flex= 140 DEG    Modalities     MH NO ES Pacer/Defib  MH only, patient deferred    THEREX     UBE L4 x 2 min FWD  L4 x 2 min Retro Increase time NV   Pulleys shoulder flex X 2 min    Shrugs     Shld flex 1# wand x 20 seated   Shldr abd 1# wand x 20 to L seated   Overhead press 1# wand x 30 seated    Pull downs Red x 20 seated   ROWS: M Red x 20 seated   Punches Red x 20 seated   ROWS: L Red x 20 seated   ER Red x 20 left seated   IR Red x 20 Left seated   Functional activities To aid in ROM and strength needed for reaching , lifting ,pushing and pulling at home/work    Diag down      Diag up          A:  Tolerated well.   P: Continue with rehab plan.  Paul Roche PT    Treatment Charges: Mins Units   Initial Evaluation     Re-Evaluation     Ther Exercise         TE  38 3   Manual Therapy     MT     Ther Activities        TA     Gait Training          GT     Neuro Re-education NR     Modalities     Non-Billable Service Time     Other     Total Time/Units 38 3

## 2024-12-12 ENCOUNTER — HOSPITAL ENCOUNTER (OUTPATIENT)
Dept: PHYSICAL THERAPY | Age: 74
Setting detail: THERAPIES SERIES
Discharge: HOME | End: 2024-12-12
Payer: MEDICARE

## 2024-12-12 NOTE — PROGRESS NOTES
Pt expressed concern of having no energy due to low H&H. Wishes to put PT on hold until his blood count gets better.   Paul Roche, PT Lic.  #[id number]

## 2024-12-27 RX ORDER — APIXABAN 5 MG/1
5 TABLET, FILM COATED ORAL 2 TIMES DAILY
Qty: 180 TABLET | Refills: 3 | OUTPATIENT
Start: 2024-12-27

## 2025-01-02 NOTE — TELEPHONE ENCOUNTER
Requested Prescriptions     Pending Prescriptions Disp Refills    omeprazole (PRILOSEC) 40 MG delayed release capsule [Pharmacy Med Name: Omeprazole Oral Capsule Delayed Release 40 MG] 90 capsule 3     Sig: TAKE 1 CAPSULE EVERY DAY       Next appt is 1/13/2025  Last appt was 6/24/2024

## 2025-01-04 RX ORDER — OMEPRAZOLE 40 MG/1
40 CAPSULE, DELAYED RELEASE ORAL DAILY
Qty: 90 CAPSULE | Refills: 0 | Status: SHIPPED | OUTPATIENT
Start: 2025-01-04

## 2025-01-10 SDOH — ECONOMIC STABILITY: TRANSPORTATION INSECURITY
IN THE PAST 12 MONTHS, HAS THE LACK OF TRANSPORTATION KEPT YOU FROM MEDICAL APPOINTMENTS OR FROM GETTING MEDICATIONS?: NO

## 2025-01-10 SDOH — ECONOMIC STABILITY: FOOD INSECURITY: WITHIN THE PAST 12 MONTHS, THE FOOD YOU BOUGHT JUST DIDN'T LAST AND YOU DIDN'T HAVE MONEY TO GET MORE.: NEVER TRUE

## 2025-01-10 SDOH — ECONOMIC STABILITY: INCOME INSECURITY: IN THE LAST 12 MONTHS, WAS THERE A TIME WHEN YOU WERE NOT ABLE TO PAY THE MORTGAGE OR RENT ON TIME?: NO

## 2025-01-10 SDOH — ECONOMIC STABILITY: FOOD INSECURITY: WITHIN THE PAST 12 MONTHS, YOU WORRIED THAT YOUR FOOD WOULD RUN OUT BEFORE YOU GOT MONEY TO BUY MORE.: NEVER TRUE

## 2025-01-10 ASSESSMENT — PATIENT HEALTH QUESTIONNAIRE - PHQ9
SUM OF ALL RESPONSES TO PHQ9 QUESTIONS 1 & 2: 3
6. FEELING BAD ABOUT YOURSELF - OR THAT YOU ARE A FAILURE OR HAVE LET YOURSELF OR YOUR FAMILY DOWN: NOT AT ALL
SUM OF ALL RESPONSES TO PHQ QUESTIONS 1-9: 11
2. FEELING DOWN, DEPRESSED OR HOPELESS: NOT AT ALL
SUM OF ALL RESPONSES TO PHQ QUESTIONS 1-9: 11
SUM OF ALL RESPONSES TO PHQ QUESTIONS 1-9: 11
5. POOR APPETITE OR OVEREATING: SEVERAL DAYS
6. FEELING BAD ABOUT YOURSELF - OR THAT YOU ARE A FAILURE OR HAVE LET YOURSELF OR YOUR FAMILY DOWN: NOT AT ALL
SUM OF ALL RESPONSES TO PHQ9 QUESTIONS 1 & 2: 3
10. IF YOU CHECKED OFF ANY PROBLEMS, HOW DIFFICULT HAVE THESE PROBLEMS MADE IT FOR YOU TO DO YOUR WORK, TAKE CARE OF THINGS AT HOME, OR GET ALONG WITH OTHER PEOPLE: SOMEWHAT DIFFICULT
3. TROUBLE FALLING OR STAYING ASLEEP: NEARLY EVERY DAY
7. TROUBLE CONCENTRATING ON THINGS, SUCH AS READING THE NEWSPAPER OR WATCHING TELEVISION: NOT AT ALL
8. MOVING OR SPEAKING SO SLOWLY THAT OTHER PEOPLE COULD HAVE NOTICED. OR THE OPPOSITE, BEING SO FIGETY OR RESTLESS THAT YOU HAVE BEEN MOVING AROUND A LOT MORE THAN USUAL: SEVERAL DAYS
9. THOUGHTS THAT YOU WOULD BE BETTER OFF DEAD, OR OF HURTING YOURSELF: NOT AT ALL
SUM OF ALL RESPONSES TO PHQ QUESTIONS 1-9: 11
8. MOVING OR SPEAKING SO SLOWLY THAT OTHER PEOPLE COULD HAVE NOTICED. OR THE OPPOSITE - BEING SO FIDGETY OR RESTLESS THAT YOU HAVE BEEN MOVING AROUND A LOT MORE THAN USUAL: SEVERAL DAYS
5. POOR APPETITE OR OVEREATING: SEVERAL DAYS
9. THOUGHTS THAT YOU WOULD BE BETTER OFF DEAD, OR OF HURTING YOURSELF: NOT AT ALL
4. FEELING TIRED OR HAVING LITTLE ENERGY: NEARLY EVERY DAY
3. TROUBLE FALLING OR STAYING ASLEEP: NEARLY EVERY DAY
2. FEELING DOWN, DEPRESSED OR HOPELESS: NOT AT ALL
10. IF YOU CHECKED OFF ANY PROBLEMS, HOW DIFFICULT HAVE THESE PROBLEMS MADE IT FOR YOU TO DO YOUR WORK, TAKE CARE OF THINGS AT HOME, OR GET ALONG WITH OTHER PEOPLE: SOMEWHAT DIFFICULT
1. LITTLE INTEREST OR PLEASURE IN DOING THINGS: NEARLY EVERY DAY
4. FEELING TIRED OR HAVING LITTLE ENERGY: NEARLY EVERY DAY
7. TROUBLE CONCENTRATING ON THINGS, SUCH AS READING THE NEWSPAPER OR WATCHING TELEVISION: NOT AT ALL
1. LITTLE INTEREST OR PLEASURE IN DOING THINGS: NEARLY EVERY DAY
SUM OF ALL RESPONSES TO PHQ QUESTIONS 1-9: 11

## 2025-01-13 ENCOUNTER — OFFICE VISIT (OUTPATIENT)
Dept: PRIMARY CARE CLINIC | Age: 75
End: 2025-01-13
Payer: MEDICARE

## 2025-01-13 VITALS
TEMPERATURE: 97.7 F | WEIGHT: 315 LBS | HEART RATE: 80 BPM | OXYGEN SATURATION: 98 % | HEIGHT: 69 IN | BODY MASS INDEX: 46.65 KG/M2 | SYSTOLIC BLOOD PRESSURE: 122 MMHG | DIASTOLIC BLOOD PRESSURE: 76 MMHG

## 2025-01-13 DIAGNOSIS — D64.9 ANEMIA, UNSPECIFIED TYPE: ICD-10-CM

## 2025-01-13 DIAGNOSIS — E03.2 HYPOTHYROIDISM DUE TO MEDICATION: ICD-10-CM

## 2025-01-13 DIAGNOSIS — I48.0 PAROXYSMAL ATRIAL FIBRILLATION (HCC): ICD-10-CM

## 2025-01-13 DIAGNOSIS — E11.9 TYPE 2 DIABETES MELLITUS WITHOUT COMPLICATION, WITHOUT LONG-TERM CURRENT USE OF INSULIN (HCC): Primary | ICD-10-CM

## 2025-01-13 DIAGNOSIS — M65.979 SYNOVITIS OF FOOT: ICD-10-CM

## 2025-01-13 DIAGNOSIS — I50.22 CHRONIC SYSTOLIC (CONGESTIVE) HEART FAILURE (HCC): ICD-10-CM

## 2025-01-13 DIAGNOSIS — I42.9 CARDIOMYOPATHY, UNSPECIFIED TYPE (HCC): ICD-10-CM

## 2025-01-13 DIAGNOSIS — E03.9 ACQUIRED HYPOTHYROIDISM: ICD-10-CM

## 2025-01-13 LAB — HBA1C MFR BLD: 6 %

## 2025-01-13 PROCEDURE — G8417 CALC BMI ABV UP PARAM F/U: HCPCS | Performed by: FAMILY MEDICINE

## 2025-01-13 PROCEDURE — 83036 HEMOGLOBIN GLYCOSYLATED A1C: CPT | Performed by: FAMILY MEDICINE

## 2025-01-13 PROCEDURE — 2022F DILAT RTA XM EVC RTNOPTHY: CPT | Performed by: FAMILY MEDICINE

## 2025-01-13 PROCEDURE — G2211 COMPLEX E/M VISIT ADD ON: HCPCS | Performed by: FAMILY MEDICINE

## 2025-01-13 PROCEDURE — 4004F PT TOBACCO SCREEN RCVD TLK: CPT | Performed by: FAMILY MEDICINE

## 2025-01-13 PROCEDURE — 1159F MED LIST DOCD IN RCRD: CPT | Performed by: FAMILY MEDICINE

## 2025-01-13 PROCEDURE — 36415 COLL VENOUS BLD VENIPUNCTURE: CPT | Performed by: FAMILY MEDICINE

## 2025-01-13 PROCEDURE — 1123F ACP DISCUSS/DSCN MKR DOCD: CPT | Performed by: FAMILY MEDICINE

## 2025-01-13 PROCEDURE — G8427 DOCREV CUR MEDS BY ELIG CLIN: HCPCS | Performed by: FAMILY MEDICINE

## 2025-01-13 PROCEDURE — 3044F HG A1C LEVEL LT 7.0%: CPT | Performed by: FAMILY MEDICINE

## 2025-01-13 PROCEDURE — 1160F RVW MEDS BY RX/DR IN RCRD: CPT | Performed by: FAMILY MEDICINE

## 2025-01-13 PROCEDURE — 99214 OFFICE O/P EST MOD 30 MIN: CPT | Performed by: FAMILY MEDICINE

## 2025-01-13 PROCEDURE — 3017F COLORECTAL CA SCREEN DOC REV: CPT | Performed by: FAMILY MEDICINE

## 2025-01-13 RX ORDER — LEVOTHYROXINE SODIUM 75 UG/1
75 TABLET ORAL DAILY
Qty: 90 TABLET | Refills: 1 | Status: SHIPPED
Start: 2025-01-13 | End: 2025-01-16

## 2025-01-13 ASSESSMENT — ENCOUNTER SYMPTOMS
COUGH: 0
VOMITING: 0
RHINORRHEA: 0
BLOOD IN STOOL: 0
ABDOMINAL DISTENTION: 0
SHORTNESS OF BREATH: 1
FACIAL SWELLING: 0
PHOTOPHOBIA: 0
WHEEZING: 0
COLOR CHANGE: 0
SINUS PRESSURE: 0
ABDOMINAL PAIN: 0
DIARRHEA: 0
EYE ITCHING: 0
EYE DISCHARGE: 0
SORE THROAT: 0
CONSTIPATION: 0
EYE PAIN: 0
NAUSEA: 0

## 2025-01-13 NOTE — PROGRESS NOTES
Venipuncture was obtained from left arm. Patient tolerated the procedure without complications or complaints.  
Negative for confusion, hallucinations and suicidal ideas. The patient is not nervous/anxious.         Immunization History   Administered Date(s) Administered    COVID-19, MODERNA BLUE border, Primary or Immunocompromised, (age 12y+), IM, 100 mcg/0.5mL 04/08/2021, 05/06/2021    COVID-19, PFIZER Bivalent, DO NOT Dilute, (age 12y+), IM, 30 mcg/0.3 mL 02/23/2023    COVID-19, PFIZER, (age 12y+), IM, 30mcg/0.3mL 10/11/2023, 09/19/2024    Influenza, FLUAD, (age 65 y+), IM, Quadv, 0.5mL 09/19/2023    Influenza, FLUZONE High Dose (age 65 y+), IM, Quadv, 0.7mL 09/07/2022    Influenza, FLUZONE High Dose, (age 65 y+), IM, Trivalent PF, 0.5mL 09/19/2024    Pneumococcal, PCV-13, PREVNAR 13, (age 6w+), IM, 0.5mL 12/07/2016    Pneumococcal, PCV20, PREVNAR 20, (age 6w+), IM, 0.5mL 09/07/2022    Pneumococcal, PPSV23, PNEUMOVAX 23, (age 2y+), SC/IM, 0.5mL 04/16/2018    TDaP, ADACEL (age 10y-64y), BOOSTRIX (age 10y+), IM, 0.5mL 08/23/2022    Zoster Recombinant (Shingrix) 02/23/2023, 04/29/2023      Past Medical History:   Diagnosis Date    Anemia     Atrial flutter (HCC)     Aguila's esophagus     Coronary artery disease involving native coronary artery 05/27/2022    Diverticulitis     Fatty liver     History of Holter monitoring 05/12/2020    Hyperlipidemia     Hypertension     Lipoma     Subcyst    Lumbar spinal stenosis     Osteoarthritis     PUD (peptic ulcer disease)     S/P angioplasty with stent 05/27/2022    1st OM    Type 2 diabetes mellitus without complication (HCC)       Past Surgical History:   Procedure Laterality Date    ATRIAL ABLATION SURGERY  2012    CARDIAC DEFIBRILLATOR PLACEMENT Left 11/11/2022    [employer] CRT-D Insertion (Dr. Guerrero)    CARDIAC SURGERY      pt states 6 - 7 years ago     COLONOSCOPY  08/09/2021    Follow-up 3 years.  Dr. Griffin    COLONOSCOPY N/A 07/09/2024    COLONOSCOPY POLYPECTOMY COLD SNARE performed by Paul Griffin DO at [facility] [[email or web address]] ENDOSCOPY    ESOPHAGOGASTRODUODENOSCOPY  08/09/2021

## 2025-01-14 LAB
ALBUMIN: 3.4 G/DL (ref 3.5–5.2)
ALP BLD-CCNC: 89 U/L (ref 40–129)
ALT SERPL-CCNC: 18 U/L (ref 0–40)
ANION GAP SERPL CALCULATED.3IONS-SCNC: 10 MMOL/L (ref 7–16)
AST SERPL-CCNC: 34 U/L (ref 0–39)
BILIRUB SERPL-MCNC: 0.4 MG/DL (ref 0–1.2)
BUN BLDV-MCNC: 13 MG/DL (ref 6–23)
CALCIUM SERPL-MCNC: 9 MG/DL (ref 8.6–10.2)
CHLORIDE BLD-SCNC: 105 MMOL/L (ref 98–107)
CHOLESTEROL, TOTAL: 111 MG/DL
CO2: 27 MMOL/L (ref 22–29)
CREAT SERPL-MCNC: 1.4 MG/DL (ref 0.7–1.2)
FOLATE: >20 NG/ML (ref 4.8–24.2)
GFR, ESTIMATED: 51 ML/MIN/1.73M2
GLUCOSE FASTING: 96 MG/DL (ref 74–99)
HCT VFR BLD CALC: 34.7 % (ref 37–54)
HDLC SERPL-MCNC: 49 MG/DL
HEMOGLOBIN: 9.7 G/DL (ref 12.5–16.5)
IRON % SATURATION: 16 % (ref 20–55)
IRON: 56 UG/DL (ref 59–158)
LDL CHOLESTEROL: 46 MG/DL
MCH RBC QN AUTO: 23.8 PG (ref 26–35)
MCHC RBC AUTO-ENTMCNC: 28 G/DL (ref 32–34.5)
MCV RBC AUTO: 85.3 FL (ref 80–99.9)
PDW BLD-RTO: 18.3 % (ref 11.5–15)
PLATELET, FLUORESCENCE: 186 K/UL (ref 130–450)
PMV BLD AUTO: 13.3 FL (ref 7–12)
POTASSIUM SERPL-SCNC: 5.1 MMOL/L (ref 3.5–5)
RBC # BLD: 4.07 M/UL (ref 3.8–5.8)
SODIUM BLD-SCNC: 142 MMOL/L (ref 132–146)
TOTAL IRON BINDING CAPACITY: 354 UG/DL (ref 250–450)
TOTAL PROTEIN: 7.8 G/DL (ref 6.4–8.3)
TRIGL SERPL-MCNC: 82 MG/DL
TSH SERPL DL<=0.05 MIU/L-ACNC: 4.47 UIU/ML (ref 0.27–4.2)
URIC ACID: 5.8 MG/DL (ref 3.4–7)
VITAMIN B-12: 737 PG/ML (ref 211–946)
VLDLC SERPL CALC-MCNC: 16 MG/DL
WBC # BLD: 5.6 K/UL (ref 4.5–11.5)

## 2025-01-16 DIAGNOSIS — E03.2 HYPOTHYROIDISM DUE TO MEDICATION: ICD-10-CM

## 2025-01-16 RX ORDER — LEVOTHYROXINE SODIUM 88 UG/1
88 TABLET ORAL DAILY
Qty: 90 TABLET | Refills: 0 | Status: SHIPPED | OUTPATIENT
Start: 2025-01-16

## 2025-01-30 ENCOUNTER — TELEPHONE (OUTPATIENT)
Dept: PRIMARY CARE CLINIC | Age: 75
End: 2025-01-30

## 2025-01-30 NOTE — TELEPHONE ENCOUNTER
PC from pts wife.  Stated pt is having nasal congestion, cough with Phlegm (no color to the phlegm), stuffy nose.  No temperature.    Asking what pt can take with the medications he is currently taking.    Pt uses [facility] if a prescription needs sent in.    Please advise.

## 2025-01-30 NOTE — TELEPHONE ENCOUNTER
PC to pts wife informing pt can take Coricidin HBP (OTC) as needed for congestion and cough.    Spouse voiced understanding and will go  some for pt.    Patient presented to her prenatal visit with complaints of decreased fetal movements.  Patient states that usually in the morning the baby was a lot and she has already had her sugary coffee it with orange juice and there was not much of fetal movements.  A biophysical was done with an ultrasound in the office which shows a biophysical of 8/8 and a nonstress test which was category 1 with moderate variability.  Labor precautions were given to the patient patient is scheduled for  section next week on Wednesday.  Due to persistent breech presentation.

## 2025-02-11 ENCOUNTER — TELEPHONE (OUTPATIENT)
Dept: CARDIOLOGY CLINIC | Age: 75
End: 2025-02-11

## 2025-02-11 NOTE — TELEPHONE ENCOUNTER
Patient's annual is not due until July. If patient is able to come in next week, there are openings. Please ensure patient has contacted his PCP in regards to this as well.

## 2025-02-11 NOTE — TELEPHONE ENCOUNTER
Pt called in to schedule his annual check up with Dr Beckett.  He also said that he has been having recent swelling of bilateral legs/feet/and ankles.    I added him on the schedule to the first available appt  (April 1)  please advise if pt should be seen sooner due to the recent swelling that he is having.   He said the swelling began about a week ago.

## 2025-02-14 NOTE — TELEPHONE ENCOUNTER
Lmom for pt to r/c and move up appt, and also to be sure that his PCP is aware of the recent issues with swelling.

## 2025-02-20 ENCOUNTER — OFFICE VISIT (OUTPATIENT)
Dept: CARDIOLOGY CLINIC | Age: 75
End: 2025-02-20

## 2025-02-20 VITALS
WEIGHT: 315 LBS | BODY MASS INDEX: 46.65 KG/M2 | HEIGHT: 69 IN | OXYGEN SATURATION: 85 % | HEART RATE: 62 BPM | SYSTOLIC BLOOD PRESSURE: 124 MMHG | TEMPERATURE: 97.7 F | DIASTOLIC BLOOD PRESSURE: 62 MMHG | RESPIRATION RATE: 22 BRPM

## 2025-02-20 DIAGNOSIS — E11.9 TYPE 2 DIABETES MELLITUS WITHOUT COMPLICATION, WITHOUT LONG-TERM CURRENT USE OF INSULIN (HCC): ICD-10-CM

## 2025-02-20 DIAGNOSIS — R06.02 SOB (SHORTNESS OF BREATH): ICD-10-CM

## 2025-02-20 DIAGNOSIS — R06.09 DOE (DYSPNEA ON EXERTION): ICD-10-CM

## 2025-02-20 DIAGNOSIS — R53.83 OTHER FATIGUE: ICD-10-CM

## 2025-02-20 DIAGNOSIS — E66.813 OBESITY, CLASS 3: ICD-10-CM

## 2025-02-20 DIAGNOSIS — I50.22 CHRONIC SYSTOLIC (CONGESTIVE) HEART FAILURE (HCC): ICD-10-CM

## 2025-02-20 DIAGNOSIS — R06.02 SHORTNESS OF BREATH: ICD-10-CM

## 2025-02-20 DIAGNOSIS — I42.1 HYPERTROPHIC OBSTRUCTIVE CARDIOMYOPATHY (HCC): ICD-10-CM

## 2025-02-20 DIAGNOSIS — G47.33 OSA (OBSTRUCTIVE SLEEP APNEA): ICD-10-CM

## 2025-02-20 DIAGNOSIS — M47.816 LUMBAR SPONDYLOSIS: ICD-10-CM

## 2025-02-20 DIAGNOSIS — I25.10 CAD IN NATIVE ARTERY: Primary | ICD-10-CM

## 2025-02-20 DIAGNOSIS — M48.061 SPINAL STENOSIS OF LUMBAR REGION WITHOUT NEUROGENIC CLAUDICATION: ICD-10-CM

## 2025-02-20 DIAGNOSIS — K22.70 BARRETT'S ESOPHAGUS WITHOUT DYSPLASIA: ICD-10-CM

## 2025-02-20 DIAGNOSIS — R19.7 DIARRHEA, UNSPECIFIED TYPE: ICD-10-CM

## 2025-02-20 DIAGNOSIS — Z48.89 OBSERVATION AFTER SURGERY: ICD-10-CM

## 2025-02-20 DIAGNOSIS — I47.20 VENTRICULAR TACHYCARDIA (HCC): ICD-10-CM

## 2025-02-20 RX ORDER — FUROSEMIDE 20 MG/1
20 TABLET ORAL WEEKLY
Qty: 60 TABLET | Refills: 3 | Status: SHIPPED | OUTPATIENT
Start: 2025-02-20

## 2025-02-20 NOTE — PROGRESS NOTES
dme    Out Patient CARDIOLOGY Follow Up Visit    Name: Paul Florez    Age: 74 y.o.    Date of Admission: No admission date for patient encounter.    Date of Service: 2/20/2025    Reason for Consultation:   Chief Complaint   Patient presents with    Annual Exam    Shortness of Breath    Swelling          Referring Physician: No admitting provider for patient encounter.    History of Present Illness: 74-year-old male with history of Aguila esophagus, duodenal mass, coronary artery disease status post OM stent in May 2022, chronic left bundle branch block, hypertension, obesity, hyperlipidemia, diabetes,prior tobacco abuse, chronic kidney disease who was hospitalized in May 2022 with chest pain and dyspnea on exertion and echocardiogram revealed EF of 30 to 35%.  He underwent invasive coronary angiogram and found to have significant OM disease requiring drug-eluting stent.  He had [employer] CRT-D implantation by  on November 11, 2022.  He present for follow-up visit today and report in February 2024 he went to [facility] to sell his house in order to move permanently to [place] and while he was there he developed diarrhea that led to significant hypomagnesemia and subsequently ventricular tachycardia requiring ICD shock.  Chart review shows patient had echocardiogram in February 2024 revealing EF of 55 to 60%.  He also had cardiac catheterization in [facility] revealed mild in-stent restenosis of prior OM stent otherwise no significant obstructive coronary disease.  On today's visit, patient reports still has ongoing dyspnea on exertion and shortness of breath as well as fatigue and lower extremity edema.  Lasix is resumed to be taken once a week and then obtain BMP and magnesium to monitor electrolyte.  Chest x-ray and repeat echo is also arranged.  Sleep apnea evaluation is arranged by referring patient to the sleep apnea center as well as pulmonary function test is arranged.            Past

## 2025-02-21 ENCOUNTER — HOSPITAL ENCOUNTER (OUTPATIENT)
Age: 75
Discharge: HOME | End: 2025-02-21
Payer: MEDICARE

## 2025-02-21 ENCOUNTER — HOSPITAL ENCOUNTER (OUTPATIENT)
Dept: GENERAL RADIOLOGY | Age: 75
Discharge: HOME | End: 2025-02-23
Payer: MEDICARE

## 2025-02-21 ENCOUNTER — HOSPITAL ENCOUNTER (OUTPATIENT)
Age: 75
Discharge: HOME | End: 2025-02-23
Payer: MEDICARE

## 2025-02-21 DIAGNOSIS — R06.02 SHORTNESS OF BREATH: ICD-10-CM

## 2025-02-21 DIAGNOSIS — G47.33 OSA (OBSTRUCTIVE SLEEP APNEA): ICD-10-CM

## 2025-02-21 DIAGNOSIS — R53.83 OTHER FATIGUE: ICD-10-CM

## 2025-02-21 DIAGNOSIS — R06.02 SOB (SHORTNESS OF BREATH): ICD-10-CM

## 2025-02-21 DIAGNOSIS — I50.22 CHRONIC SYSTOLIC (CONGESTIVE) HEART FAILURE (HCC): ICD-10-CM

## 2025-02-21 DIAGNOSIS — M48.061 SPINAL STENOSIS OF LUMBAR REGION WITHOUT NEUROGENIC CLAUDICATION: ICD-10-CM

## 2025-02-21 DIAGNOSIS — Z48.89 OBSERVATION AFTER SURGERY: ICD-10-CM

## 2025-02-21 DIAGNOSIS — E66.813 OBESITY, CLASS 3: ICD-10-CM

## 2025-02-21 DIAGNOSIS — I47.20 VENTRICULAR TACHYCARDIA (HCC): ICD-10-CM

## 2025-02-21 DIAGNOSIS — I25.10 CAD IN NATIVE ARTERY: ICD-10-CM

## 2025-02-21 DIAGNOSIS — K22.70 BARRETT'S ESOPHAGUS WITHOUT DYSPLASIA: ICD-10-CM

## 2025-02-21 DIAGNOSIS — R06.09 DOE (DYSPNEA ON EXERTION): ICD-10-CM

## 2025-02-21 DIAGNOSIS — M47.816 LUMBAR SPONDYLOSIS: ICD-10-CM

## 2025-02-21 DIAGNOSIS — E11.9 TYPE 2 DIABETES MELLITUS WITHOUT COMPLICATION, WITHOUT LONG-TERM CURRENT USE OF INSULIN (HCC): ICD-10-CM

## 2025-02-21 DIAGNOSIS — I42.1 HYPERTROPHIC OBSTRUCTIVE CARDIOMYOPATHY (HCC): ICD-10-CM

## 2025-02-21 DIAGNOSIS — R19.7 DIARRHEA, UNSPECIFIED TYPE: ICD-10-CM

## 2025-02-21 LAB
ALBUMIN SERPL-MCNC: 3.2 G/DL (ref 3.5–5.2)
ALP SERPL-CCNC: 95 U/L (ref 40–129)
ALT SERPL-CCNC: 15 U/L (ref 0–40)
ANION GAP SERPL CALCULATED.3IONS-SCNC: 8 MMOL/L (ref 7–16)
AST SERPL-CCNC: 30 U/L (ref 0–39)
BASOPHILS # BLD: 0.08 K/UL (ref 0–0.2)
BASOPHILS NFR BLD: 2 % (ref 0–2)
BILIRUB SERPL-MCNC: 0.3 MG/DL (ref 0–1.2)
BNP SERPL-MCNC: 1044 PG/ML (ref 0–450)
BUN SERPL-MCNC: 20 MG/DL (ref 6–23)
CALCIUM SERPL-MCNC: 8.8 MG/DL (ref 8.6–10.2)
CHLORIDE SERPL-SCNC: 107 MMOL/L (ref 98–107)
CHOLEST SERPL-MCNC: 86 MG/DL
CO2 SERPL-SCNC: 27 MMOL/L (ref 22–29)
CREAT SERPL-MCNC: 1.8 MG/DL (ref 0.7–1.2)
EOSINOPHIL # BLD: 0.15 K/UL (ref 0.05–0.5)
EOSINOPHILS RELATIVE PERCENT: 4 % (ref 0–6)
ERYTHROCYTE [DISTWIDTH] IN BLOOD BY AUTOMATED COUNT: 19.2 % (ref 11.5–15)
GFR, ESTIMATED: 38 ML/MIN/1.73M2
GLUCOSE SERPL-MCNC: 53 MG/DL (ref 74–99)
HCT VFR BLD AUTO: 34.2 % (ref 37–54)
HDLC SERPL-MCNC: 42 MG/DL
HGB BLD-MCNC: 9.7 G/DL (ref 12.5–16.5)
LDLC SERPL CALC-MCNC: 27 MG/DL
LYMPHOCYTES NFR BLD: 0.99 K/UL (ref 1.5–4)
LYMPHOCYTES RELATIVE PERCENT: 23 % (ref 20–42)
MAGNESIUM SERPL-MCNC: 1.8 MG/DL (ref 1.6–2.6)
MCH RBC QN AUTO: 24.3 PG (ref 26–35)
MCHC RBC AUTO-ENTMCNC: 28.4 G/DL (ref 32–34.5)
MCV RBC AUTO: 85.5 FL (ref 80–99.9)
MONOCYTES NFR BLD: 0.08 K/UL (ref 0.1–0.95)
MONOCYTES NFR BLD: 2 % (ref 2–12)
NEUTROPHILS NFR BLD: 70 % (ref 43–80)
NEUTS SEG NFR BLD: 3.1 K/UL (ref 1.8–7.3)
PLATELET # BLD AUTO: 206 K/UL (ref 130–450)
PMV BLD AUTO: 11.6 FL (ref 7–12)
POTASSIUM SERPL-SCNC: 5.1 MMOL/L (ref 3.5–5)
PROT SERPL-MCNC: 7.5 G/DL (ref 6.4–8.3)
RBC # BLD AUTO: 4 M/UL (ref 3.8–5.8)
RBC # BLD: ABNORMAL 10*6/UL
SODIUM SERPL-SCNC: 142 MMOL/L (ref 132–146)
TRIGL SERPL-MCNC: 83 MG/DL
TSH SERPL DL<=0.05 MIU/L-ACNC: 6.72 UIU/ML (ref 0.27–4.2)
VLDLC SERPL CALC-MCNC: 17 MG/DL
WBC OTHER # BLD: 4.4 K/UL (ref 4.5–11.5)

## 2025-02-21 PROCEDURE — 83880 ASSAY OF NATRIURETIC PEPTIDE: CPT

## 2025-02-21 PROCEDURE — 71046 X-RAY EXAM CHEST 2 VIEWS: CPT

## 2025-02-21 PROCEDURE — 85025 COMPLETE CBC W/AUTO DIFF WBC: CPT

## 2025-02-21 PROCEDURE — 84443 ASSAY THYROID STIM HORMONE: CPT

## 2025-02-21 PROCEDURE — 80053 COMPREHEN METABOLIC PANEL: CPT

## 2025-02-21 PROCEDURE — 83735 ASSAY OF MAGNESIUM: CPT

## 2025-02-21 PROCEDURE — 80061 LIPID PANEL: CPT

## 2025-02-21 PROCEDURE — 36415 COLL VENOUS BLD VENIPUNCTURE: CPT

## 2025-02-24 ENCOUNTER — TELEPHONE (OUTPATIENT)
Dept: CARDIOLOGY CLINIC | Age: 75
End: 2025-02-24

## 2025-02-24 DIAGNOSIS — N18.9 CKD (CHRONIC KIDNEY DISEASE): Primary | ICD-10-CM

## 2025-02-24 NOTE — TELEPHONE ENCOUNTER
----- Message from Dr. Js Beckett MD sent at 2/23/2025  9:38 AM EST -----  Regarding: Abnormal lab  Please refer TSH abnormality to patient PCP to guide management evaluation  Please arrange to have patient follow-up with me next week to review symptoms and further management and evaluation.  Please arrange to have BNP and BMP done the day prior to follow-up to monitor trend of kidney function and BNP level to determine if Lasix can be continued.  Also, please refer patient to a nephrologist for further evaluation and management if patient does not already have a nephrologist

## 2025-02-24 NOTE — TELEPHONE ENCOUNTER
Spoke with patient and advised of lab results; pt verbalized understanding. Pt states he is not active with Nephrology and is agreeable to referral. Referral placed for Nephrology. Pt advised of repeat BNP and BMP Thursday 2/27 prior to 2/28/25 follow up appointment.

## 2025-02-25 ENCOUNTER — HOSPITAL ENCOUNTER (INPATIENT)
Age: 75
LOS: 10 days | Discharge: HOME HEALTH | End: 2025-03-07
Attending: EMERGENCY MEDICINE | Admitting: INTERNAL MEDICINE
Payer: MEDICARE

## 2025-02-25 ENCOUNTER — APPOINTMENT (OUTPATIENT)
Dept: GENERAL RADIOLOGY | Age: 75
End: 2025-02-25
Payer: MEDICARE

## 2025-02-25 DIAGNOSIS — I50.9 CONGESTIVE HEART FAILURE, UNSPECIFIED HF CHRONICITY, UNSPECIFIED HEART FAILURE TYPE (HCC): ICD-10-CM

## 2025-02-25 DIAGNOSIS — I42.9 CARDIOMYOPATHY, UNSPECIFIED TYPE (HCC): ICD-10-CM

## 2025-02-25 DIAGNOSIS — E16.2 HYPOGLYCEMIA: ICD-10-CM

## 2025-02-25 DIAGNOSIS — E87.5 HYPERKALEMIA: ICD-10-CM

## 2025-02-25 DIAGNOSIS — N17.9 AKI (ACUTE KIDNEY INJURY): ICD-10-CM

## 2025-02-25 DIAGNOSIS — R60.9 EDEMA, UNSPECIFIED TYPE: ICD-10-CM

## 2025-02-25 DIAGNOSIS — J96.01 ACUTE RESPIRATORY FAILURE WITH HYPOXIA (HCC): Primary | ICD-10-CM

## 2025-02-25 LAB
ANION GAP SERPL CALCULATED.3IONS-SCNC: 9 MMOL/L (ref 7–16)
ATYPICAL LYMPHOCYTE ABSOLUTE COUNT: 0.05 K/UL (ref 0–0.46)
ATYPICAL LYMPHOCYTES: 1 % (ref 0–4)
BASOPHILS # BLD: 0 K/UL (ref 0–0.2)
BASOPHILS NFR BLD: 0 % (ref 0–2)
BNP SERPL-MCNC: 1366 PG/ML (ref 0–450)
BUN SERPL-MCNC: 39 MG/DL (ref 6–23)
CALCIUM SERPL-MCNC: 8.3 MG/DL (ref 8.6–10.2)
CHLORIDE SERPL-SCNC: 105 MMOL/L (ref 98–107)
CO2 SERPL-SCNC: 25 MMOL/L (ref 22–29)
CREAT SERPL-MCNC: 3.5 MG/DL (ref 0.7–1.2)
EOSINOPHIL # BLD: 0.1 K/UL (ref 0.05–0.5)
EOSINOPHILS RELATIVE PERCENT: 2 % (ref 0–6)
ERYTHROCYTE [DISTWIDTH] IN BLOOD BY AUTOMATED COUNT: 20.5 % (ref 11.5–15)
FLOW RATE: 3
GFR, ESTIMATED: 18 ML/MIN/1.73M2
GLUCOSE BLD-MCNC: 70 MG/DL (ref 74–99)
GLUCOSE BLD-MCNC: 87 MG/DL (ref 74–99)
GLUCOSE BLD-MCNC: 94 MG/DL (ref 74–99)
GLUCOSE SERPL-MCNC: 78 MG/DL (ref 74–99)
HCT VFR BLD AUTO: 32.8 % (ref 37–54)
HCT VFR BLD AUTO: 32.9 % (ref 37–54)
HCT VFR BLD AUTO: 33 % (ref 37–54)
HGB BLD-MCNC: 7.6 G/DL (ref 12.5–16.5)
HGB BLD-MCNC: 9 G/DL (ref 12.5–16.5)
HGB BLD-MCNC: 9.1 G/DL (ref 12.5–16.5)
LYMPHOCYTES NFR BLD: 1.03 K/UL (ref 1.5–4)
LYMPHOCYTES RELATIVE PERCENT: 21 % (ref 20–42)
MAGNESIUM SERPL-MCNC: 2.1 MG/DL (ref 1.6–2.6)
MCH RBC QN AUTO: 24.3 PG (ref 26–35)
MCHC RBC AUTO-ENTMCNC: 23 G/DL (ref 32–34.5)
MCV RBC AUTO: 105.4 FL (ref 80–99.9)
METAMYELOCYTES ABSOLUTE COUNT: 0.05 K/UL (ref 0–0.12)
METAMYELOCYTES: 1 % (ref 0–1)
MONOCYTES NFR BLD: 0.44 K/UL (ref 0.1–0.95)
MONOCYTES NFR BLD: 9 % (ref 2–12)
NEGATIVE BASE EXCESS, ART: 3.4 MMOL/L
NEUTROPHILS NFR BLD: 66 % (ref 43–80)
NEUTS SEG NFR BLD: 3.23 K/UL (ref 1.8–7.3)
O2 DELIVERY DEVICE: ABNORMAL
PLATELET # BLD AUTO: 179 K/UL (ref 130–450)
PMV BLD AUTO: 12.6 FL (ref 7–12)
POC HCO3: 23.5 MMOL/L (ref 22–26)
POC O2 SATURATION: 95.9 % (ref 92–98.5)
POC PCO2: 49.5 MM HG (ref 35–45)
POC PH: 7.29 (ref 7.35–7.45)
POC PO2: 91.4 MM HG (ref 60–80)
POTASSIUM SERPL-SCNC: 6.1 MMOL/L (ref 3.5–5)
RBC # BLD AUTO: 3.13 M/UL (ref 3.8–5.8)
RBC # BLD: ABNORMAL 10*6/UL
SODIUM SERPL-SCNC: 139 MMOL/L (ref 132–146)
TROPONIN I SERPL HS-MCNC: 25 NG/L (ref 0–11)
WBC # BLD: ABNORMAL 10*3/UL
WBC OTHER # BLD: 4.9 K/UL (ref 4.5–11.5)

## 2025-02-25 PROCEDURE — 82803 BLOOD GASES ANY COMBINATION: CPT

## 2025-02-25 PROCEDURE — 6370000000 HC RX 637 (ALT 250 FOR IP)

## 2025-02-25 PROCEDURE — 82962 GLUCOSE BLOOD TEST: CPT

## 2025-02-25 PROCEDURE — 96368 THER/DIAG CONCURRENT INF: CPT

## 2025-02-25 PROCEDURE — 84484 ASSAY OF TROPONIN QUANT: CPT

## 2025-02-25 PROCEDURE — 80048 BASIC METABOLIC PNL TOTAL CA: CPT

## 2025-02-25 PROCEDURE — 85014 HEMATOCRIT: CPT

## 2025-02-25 PROCEDURE — 85018 HEMOGLOBIN: CPT

## 2025-02-25 PROCEDURE — 96375 TX/PRO/DX INJ NEW DRUG ADDON: CPT

## 2025-02-25 PROCEDURE — 2580000003 HC RX 258

## 2025-02-25 PROCEDURE — 94660 CPAP INITIATION&MGMT: CPT

## 2025-02-25 PROCEDURE — 99285 EMERGENCY DEPT VISIT HI MDM: CPT

## 2025-02-25 PROCEDURE — 71045 X-RAY EXAM CHEST 1 VIEW: CPT

## 2025-02-25 PROCEDURE — 6360000002 HC RX W HCPCS

## 2025-02-25 PROCEDURE — 2500000003 HC RX 250 WO HCPCS

## 2025-02-25 PROCEDURE — 83735 ASSAY OF MAGNESIUM: CPT

## 2025-02-25 PROCEDURE — 96365 THER/PROPH/DIAG IV INF INIT: CPT

## 2025-02-25 PROCEDURE — 96366 THER/PROPH/DIAG IV INF ADDON: CPT

## 2025-02-25 PROCEDURE — 85025 COMPLETE CBC W/AUTO DIFF WBC: CPT

## 2025-02-25 PROCEDURE — 2000000000 HC ICU R&B

## 2025-02-25 PROCEDURE — 83880 ASSAY OF NATRIURETIC PEPTIDE: CPT

## 2025-02-25 PROCEDURE — 93005 ELECTROCARDIOGRAM TRACING: CPT

## 2025-02-25 RX ORDER — DEXTROSE MONOHYDRATE 25 G/50ML
25 INJECTION, SOLUTION INTRAVENOUS ONCE
Status: COMPLETED | OUTPATIENT
Start: 2025-02-25 | End: 2025-02-25

## 2025-02-25 RX ORDER — DEXTROSE MONOHYDRATE 100 MG/ML
INJECTION, SOLUTION INTRAVENOUS CONTINUOUS
Status: DISCONTINUED | OUTPATIENT
Start: 2025-02-25 | End: 2025-02-26

## 2025-02-25 RX ORDER — CALCIUM GLUCONATE 20 MG/ML
1000 INJECTION, SOLUTION INTRAVENOUS ONCE
Status: COMPLETED | OUTPATIENT
Start: 2025-02-25 | End: 2025-02-25

## 2025-02-25 RX ORDER — 0.9 % SODIUM CHLORIDE 0.9 %
500 INTRAVENOUS SOLUTION INTRAVENOUS ONCE
Status: COMPLETED | OUTPATIENT
Start: 2025-02-25 | End: 2025-02-25

## 2025-02-25 RX ORDER — SODIUM CHLORIDE 9 MG/ML
INJECTION, SOLUTION INTRAVENOUS
Status: COMPLETED
Start: 2025-02-25 | End: 2025-02-25

## 2025-02-25 RX ADMIN — DEXTROSE MONOHYDRATE: 100 INJECTION, SOLUTION INTRAVENOUS at 20:39

## 2025-02-25 RX ADMIN — Medication 500 ML: at 21:20

## 2025-02-25 RX ADMIN — CALCIUM GLUCONATE 1000 MG: 20 INJECTION, SOLUTION INTRAVENOUS at 22:03

## 2025-02-25 RX ADMIN — DEXTROSE MONOHYDRATE 25 G: 25 INJECTION, SOLUTION INTRAVENOUS at 19:08

## 2025-02-25 RX ADMIN — SODIUM CHLORIDE 500 ML: 0.9 INJECTION, SOLUTION INTRAVENOUS at 21:20

## 2025-02-25 RX ADMIN — DEXTROSE MONOHYDRATE 25 G: 25 INJECTION, SOLUTION INTRAVENOUS at 22:51

## 2025-02-25 RX ADMIN — INSULIN HUMAN 6 UNITS: 100 INJECTION, SOLUTION PARENTERAL at 23:10

## 2025-02-25 ASSESSMENT — PAIN - FUNCTIONAL ASSESSMENT: PAIN_FUNCTIONAL_ASSESSMENT: NONE - DENIES PAIN

## 2025-02-25 NOTE — ED PROVIDER NOTES
74-year-old male with congestive heart failure, ICD, anticoagulated on Eliquis for A-fib also on amiodarone, type 2 diabetes on glipizide and metformin, hypertension on losartan presents to the emergency department for the concerns of hypoglycemia.  Patient was called EMS for low blood glucose level.  He states that he has been generally fatigued for the past few days and is also been having shortness of breath for the past 2 to 3 days along with increased leg swelling for the past couple weeks he said he came about 6 pounds in the past week and a half.  He also reports baseline orthopnea.  Stated that he is compliant on his medication and has not taken increased dosages of his diabetes medication.  Denies the following: Chest pain, Heri pain, GI bleed, fever, chills, body aches, nausea, vomiting, headache, vision changes.        Chief Complaint   Patient presents with    Hypoglycemia    Shortness of Breath     Pt comes to the ED with c/o hypoglycemia and sob. Pt arrives on cpap and upon arrival is RR low on glucometer. GCS of 15.        Review of Systems   Pertinent see HPI above  Physical Exam  Vitals reviewed.   Constitutional:       General: He is not in acute distress.     Appearance: He is not ill-appearing.   HENT:      Head: Normocephalic.      Right Ear: External ear normal.      Left Ear: External ear normal.      Nose: Nose normal.      Mouth/Throat:      Mouth: Mucous membranes are moist.   Eyes:      General:         Right eye: No discharge.         Left eye: No discharge.      Conjunctiva/sclera: Conjunctivae normal.   Cardiovascular:      Rate and Rhythm: Normal rate and regular rhythm.      Heart sounds:      No friction rub. No gallop.   Pulmonary:      Effort: No respiratory distress.      Breath sounds: No stridor. No wheezing, rhonchi or rales.      Comments: Diminished breath sounds diffusely  Abdominal:      General: There is no distension.      Tenderness: There is no abdominal tenderness.

## 2025-02-26 ENCOUNTER — APPOINTMENT (OUTPATIENT)
Dept: GENERAL RADIOLOGY | Age: 75
End: 2025-02-26
Payer: MEDICARE

## 2025-02-26 ENCOUNTER — APPOINTMENT (OUTPATIENT)
Dept: INTERVENTIONAL RADIOLOGY/VASCULAR | Age: 75
End: 2025-02-26
Payer: MEDICARE

## 2025-02-26 ENCOUNTER — APPOINTMENT (OUTPATIENT)
Age: 75
End: 2025-02-26
Attending: INTERNAL MEDICINE
Payer: MEDICARE

## 2025-02-26 PROBLEM — I50.9 CONGESTIVE HEART FAILURE (HCC): Status: ACTIVE | Noted: 2025-02-26

## 2025-02-26 PROBLEM — J96.01 ACUTE RESPIRATORY FAILURE WITH HYPOXIA (HCC): Status: ACTIVE | Noted: 2025-02-26

## 2025-02-26 PROBLEM — I48.0 PAF (PAROXYSMAL ATRIAL FIBRILLATION) (HCC): Status: ACTIVE | Noted: 2025-02-26

## 2025-02-26 PROBLEM — N17.9 AKI (ACUTE KIDNEY INJURY): Status: ACTIVE | Noted: 2025-02-26

## 2025-02-26 PROBLEM — I50.21 ACUTE SYSTOLIC CHF (CONGESTIVE HEART FAILURE) (HCC): Status: ACTIVE | Noted: 2025-02-26

## 2025-02-26 LAB
AADO2: 137.5 MMHG
AADO2: 199 MMHG
AADO2: 280.9 MMHG
ANION GAP SERPL CALCULATED.3IONS-SCNC: 10 MMOL/L (ref 7–16)
ANION GAP SERPL CALCULATED.3IONS-SCNC: 7 MMOL/L (ref 7–16)
ANION GAP SERPL CALCULATED.3IONS-SCNC: 7 MMOL/L (ref 7–16)
ANION GAP SERPL CALCULATED.3IONS-SCNC: 8 MMOL/L (ref 7–16)
APPEARANCE FLD: NORMAL
B PARAP IS1001 DNA NPH QL NAA+NON-PROBE: NOT DETECTED
B PERT DNA SPEC QL NAA+PROBE: NOT DETECTED
B.E.: -5.3 MMOL/L (ref -3–3)
B.E.: -7.5 MMOL/L (ref -3–3)
B.E.: -7.6 MMOL/L (ref -3–3)
BASOPHILS # BLD: 0 K/UL (ref 0–0.2)
BASOPHILS NFR BLD: 0 % (ref 0–2)
BILIRUB UR QL STRIP: NEGATIVE
BODY FLD TYPE: NORMAL
BUN SERPL-MCNC: 41 MG/DL (ref 6–23)
BUN SERPL-MCNC: 41 MG/DL (ref 6–23)
BUN SERPL-MCNC: 44 MG/DL (ref 6–23)
BUN SERPL-MCNC: 44 MG/DL (ref 6–23)
C PNEUM DNA NPH QL NAA+NON-PROBE: NOT DETECTED
CALCIUM SERPL-MCNC: 8.1 MG/DL (ref 8.6–10.2)
CALCIUM SERPL-MCNC: 8.2 MG/DL (ref 8.6–10.2)
CALCIUM SERPL-MCNC: 8.2 MG/DL (ref 8.6–10.2)
CALCIUM SERPL-MCNC: 8.4 MG/DL (ref 8.6–10.2)
CHLORIDE SERPL-SCNC: 103 MMOL/L (ref 98–107)
CHLORIDE SERPL-SCNC: 105 MMOL/L (ref 98–107)
CHLORIDE SERPL-SCNC: 106 MMOL/L (ref 98–107)
CHLORIDE SERPL-SCNC: 109 MMOL/L (ref 98–107)
CK SERPL-CCNC: 104 U/L (ref 20–200)
CLARITY UR: CLEAR
CLOT CHECK: NORMAL
CO2 SERPL-SCNC: 23 MMOL/L (ref 22–29)
CO2 SERPL-SCNC: 23 MMOL/L (ref 22–29)
CO2 SERPL-SCNC: 24 MMOL/L (ref 22–29)
CO2 SERPL-SCNC: 24 MMOL/L (ref 22–29)
COHB: 0.9 % (ref 0–1.5)
COHB: 1.3 % (ref 0–1.5)
COHB: 1.6 % (ref 0–1.5)
COLOR FLD: NORMAL
COLOR UR: YELLOW
COMMENT: ABNORMAL
CREAT SERPL-MCNC: 3.6 MG/DL (ref 0.7–1.2)
CREAT SERPL-MCNC: 3.9 MG/DL (ref 0.7–1.2)
CREAT SERPL-MCNC: 3.9 MG/DL (ref 0.7–1.2)
CREAT SERPL-MCNC: 4 MG/DL (ref 0.7–1.2)
CRITICAL: ABNORMAL
CRITICAL: NORMAL
DATE ANALYZED: ABNORMAL
DATE ANALYZED: NORMAL
DATE OF COLLECTION: ABNORMAL
DATE OF COLLECTION: NORMAL
ECHO AO ASC DIAM: 3.2 CM
ECHO AO ASCENDING AORTA INDEX: 1.25 CM/M2
ECHO AV AREA PEAK VELOCITY: 2.7 CM2
ECHO AV AREA VTI: 2.9 CM2
ECHO AV AREA/BSA PEAK VELOCITY: 1.1 CM2/M2
ECHO AV AREA/BSA VTI: 1.1 CM2/M2
ECHO AV CUSP MM: 1.5 CM
ECHO AV MEAN GRADIENT: 9 MMHG
ECHO AV MEAN VELOCITY: 1.5 M/S
ECHO AV PEAK GRADIENT: 16 MMHG
ECHO AV PEAK VELOCITY: 2 M/S
ECHO AV VELOCITY RATIO: 0.5
ECHO AV VTI: 43.1 CM
ECHO BSA: 2.71 M2
ECHO LA DIAMETER INDEX: 2.02 CM/M2
ECHO LA DIAMETER: 5.2 CM
ECHO LA VOL A-L A4C: 73 ML (ref 18–58)
ECHO LA VOL MOD A4C: 69 ML (ref 18–58)
ECHO LA VOLUME INDEX A-L A4C: 28 ML/M2 (ref 16–34)
ECHO LA VOLUME INDEX MOD A4C: 27 ML/M2 (ref 16–34)
ECHO LV EDV A2C: 158 ML
ECHO LV EDV A4C: 254 ML
ECHO LV EDV BP: 218 ML (ref 67–155)
ECHO LV EDV INDEX A4C: 99 ML/M2
ECHO LV EDV INDEX BP: 85 ML/M2
ECHO LV EDV NDEX A2C: 61 ML/M2
ECHO LV EF PHYSICIAN: 55 %
ECHO LV EJECTION FRACTION A2C: 57 %
ECHO LV EJECTION FRACTION A4C: 62 %
ECHO LV ESV A2C: 68 ML
ECHO LV ESV A4C: 97 ML
ECHO LV ESV BP: 93 ML (ref 22–58)
ECHO LV ESV INDEX A2C: 26 ML/M2
ECHO LV ESV INDEX A4C: 38 ML/M2
ECHO LV ESV INDEX BP: 36 ML/M2
ECHO LV FRACTIONAL SHORTENING: 26 % (ref 28–44)
ECHO LV INTERNAL DIMENSION DIASTOLE INDEX: 2.06 CM/M2
ECHO LV INTERNAL DIMENSION DIASTOLIC: 5.3 CM (ref 4.2–5.9)
ECHO LV INTERNAL DIMENSION SYSTOLIC INDEX: 1.52 CM/M2
ECHO LV INTERNAL DIMENSION SYSTOLIC: 3.9 CM
ECHO LV ISOVOLUMETRIC RELAXATION TIME (IVRT): 99 MS
ECHO LVOT AREA: 5.3 CM2
ECHO LVOT AV VTI INDEX: 0.55
ECHO LVOT DIAM: 2.6 CM
ECHO LVOT MEAN GRADIENT: 2 MMHG
ECHO LVOT PEAK GRADIENT: 4 MMHG
ECHO LVOT PEAK VELOCITY: 1 M/S
ECHO LVOT STROKE VOLUME INDEX: 49.3 ML/M2
ECHO LVOT SV: 126.8 ML
ECHO LVOT VTI: 23.9 CM
ECHO MV "A" WAVE DURATION: 163.7 MSEC
ECHO MV A VELOCITY: 1.02 M/S
ECHO MV AREA PHT: 2.1 CM2
ECHO MV AREA VTI: 2.9 CM2
ECHO MV E DECELERATION TIME (DT): 295.8 MS
ECHO MV E VELOCITY: 0.75 M/S
ECHO MV E/A RATIO: 0.74
ECHO MV LVOT VTI INDEX: 1.85
ECHO MV MAX VELOCITY: 1.1 M/S
ECHO MV MEAN GRADIENT: 2 MMHG
ECHO MV MEAN VELOCITY: 0.6 M/S
ECHO MV PEAK GRADIENT: 5 MMHG
ECHO MV PRESSURE HALF TIME (PHT): 106.9 MS
ECHO MV VTI: 44.2 CM
ECHO PV MAX VELOCITY: 1 M/S
ECHO PV MEAN GRADIENT: 2 MMHG
ECHO PV MEAN VELOCITY: 0.7 M/S
ECHO PV PEAK GRADIENT: 4 MMHG
ECHO PV VTI: 22 CM
ECHO RV INTERNAL DIMENSION: 4.2 CM
ECHO RV LONGITUDINAL DIMENSION: 8.1 CM
ECHO RV MID DIMENSION: 3.5 CM
ECHO RV TAPSE: 2 CM (ref 1.7–?)
ECHO TV REGURGITANT MAX VELOCITY: 3.24 M/S
ECHO TV REGURGITANT PEAK GRADIENT: 42 MMHG
EKG ATRIAL RATE: 41 BPM
EKG ATRIAL RATE: 53 BPM
EKG P-R INTERVAL: 190 MS
EKG Q-T INTERVAL: 504 MS
EKG Q-T INTERVAL: 514 MS
EKG QRS DURATION: 150 MS
EKG QRS DURATION: 160 MS
EKG QTC CALCULATION (BAZETT): 504 MS
EKG QTC CALCULATION (BAZETT): 517 MS
EKG R AXIS: -17 DEGREES
EKG R AXIS: -60 DEGREES
EKG T AXIS: 41 DEGREES
EKG T AXIS: 56 DEGREES
EKG VENTRICULAR RATE: 60 BPM
EKG VENTRICULAR RATE: 61 BPM
EOSINOPHIL # BLD: 0.05 K/UL (ref 0.05–0.5)
EOSINOPHILS PERCENT, URINE: 2 % (ref 0–1)
EOSINOPHILS RELATIVE PERCENT: 1 % (ref 0–6)
ERYTHROCYTE [DISTWIDTH] IN BLOOD BY AUTOMATED COUNT: 19.5 % (ref 11.5–15)
FERRITIN SERPL-MCNC: 22 NG/ML
FIO2: 40 %
FIO2: 50 %
FIO2: 65
FIO2: 65 %
FLUAV RNA NPH QL NAA+NON-PROBE: NOT DETECTED
FLUBV RNA NPH QL NAA+NON-PROBE: NOT DETECTED
FOLATE SERPL-MCNC: 16.1 NG/ML (ref 4.8–24.2)
GFR, ESTIMATED: 15 ML/MIN/1.73M2
GFR, ESTIMATED: 15 ML/MIN/1.73M2
GFR, ESTIMATED: 16 ML/MIN/1.73M2
GFR, ESTIMATED: 17 ML/MIN/1.73M2
GLUCOSE BLD-MCNC: 103 MG/DL (ref 74–99)
GLUCOSE BLD-MCNC: 117 MG/DL (ref 74–99)
GLUCOSE BLD-MCNC: 225 MG/DL (ref 74–99)
GLUCOSE BLD-MCNC: 64 MG/DL (ref 74–99)
GLUCOSE BLD-MCNC: 89 MG/DL (ref 74–99)
GLUCOSE BLD-MCNC: 90 MG/DL (ref 74–99)
GLUCOSE BLD-MCNC: 96 MG/DL (ref 74–99)
GLUCOSE FLD-MCNC: 114 MG/DL
GLUCOSE SERPL-MCNC: 128 MG/DL (ref 74–99)
GLUCOSE SERPL-MCNC: 76 MG/DL (ref 74–99)
GLUCOSE SERPL-MCNC: 82 MG/DL (ref 74–99)
GLUCOSE SERPL-MCNC: 96 MG/DL (ref 74–99)
GLUCOSE UR STRIP-MCNC: 100 MG/DL
HADV DNA NPH QL NAA+NON-PROBE: NOT DETECTED
HBA1C MFR BLD: 5.7 % (ref 4–5.6)
HCO3: 19.5 MMOL/L (ref 22–26)
HCO3: 20 MMOL/L (ref 22–26)
HCO3: 23.7 MMOL/L (ref 22–26)
HCOV 229E RNA NPH QL NAA+NON-PROBE: NOT DETECTED
HCOV HKU1 RNA NPH QL NAA+NON-PROBE: NOT DETECTED
HCOV NL63 RNA NPH QL NAA+NON-PROBE: NOT DETECTED
HCOV OC43 RNA NPH QL NAA+NON-PROBE: NOT DETECTED
HCT VFR BLD AUTO: 26.1 % (ref 37–54)
HCT VFR BLD AUTO: 29.8 % (ref 37–54)
HCT VFR BLD AUTO: 30.5 % (ref 37–54)
HCT VFR BLD AUTO: 30.8 % (ref 37–54)
HGB BLD-MCNC: 7.3 G/DL (ref 12.5–16.5)
HGB BLD-MCNC: 8.3 G/DL (ref 12.5–16.5)
HGB BLD-MCNC: 8.6 G/DL (ref 12.5–16.5)
HGB BLD-MCNC: 8.6 G/DL (ref 12.5–16.5)
HGB UR QL STRIP.AUTO: ABNORMAL
HHB: 4.2 % (ref 0–5)
HHB: 4.3 % (ref 0–5)
HHB: 4.3 % (ref 0–5)
HMPV RNA NPH QL NAA+NON-PROBE: NOT DETECTED
HPIV1 RNA NPH QL NAA+NON-PROBE: NOT DETECTED
HPIV2 RNA NPH QL NAA+NON-PROBE: NOT DETECTED
HPIV3 RNA NPH QL NAA+NON-PROBE: NOT DETECTED
HPIV4 RNA NPH QL NAA+NON-PROBE: NOT DETECTED
IMM RETICS NFR: 21.1 % (ref 2.3–13.4)
INR PPP: 1.9
IRON SATN MFR SERPL: 18 % (ref 20–55)
IRON SERPL-MCNC: 55 UG/DL (ref 59–158)
KETONES UR STRIP-MCNC: ABNORMAL MG/DL
LAB: ABNORMAL
LAB: NORMAL
LACTATE BLDV-SCNC: 1.1 MMOL/L (ref 0.5–2.2)
LDH FLD L TO P-CCNC: 135 U/L
LDH SERPL-CCNC: 220 U/L (ref 135–225)
LEFT VENTRICULAR EJECTION FRACTION HIGH VALUE: 55 %
LEFT VENTRICULAR EJECTION FRACTION MODE: NORMAL
LEUKOCYTE ESTERASE UR QL STRIP: ABNORMAL
LV EF: 50 %
LYMPHOCYTES NFR BLD: 0.94 K/UL (ref 1.5–4)
LYMPHOCYTES RELATIVE PERCENT: 17 % (ref 20–42)
Lab: 1036
Lab: 1400
Lab: 1550
Lab: 530
M PNEUMO DNA NPH QL NAA+NON-PROBE: NOT DETECTED
MCH RBC QN AUTO: 23.7 PG (ref 26–35)
MCHC RBC AUTO-ENTMCNC: 27.9 G/DL (ref 32–34.5)
MCV RBC AUTO: 85.1 FL (ref 80–99.9)
METHB: 0 % (ref 0–1.5)
METHB: 0.1 % (ref 0–1.5)
METHB: 0.1 % (ref 0–1.5)
MODE: ABNORMAL
MONOCYTES NFR BLD: 0.3 K/UL (ref 0.1–0.95)
MONOCYTES NFR BLD: 5 % (ref 2–12)
MONOCYTES NFR FLD: 79 %
NEGATIVE BASE EXCESS, ART: 0.6 MMOL/L
NEUTROPHILS NFR BLD: 77 % (ref 43–80)
NEUTROPHILS NFR FLD: 22 %
NEUTS SEG NFR BLD: 4.41 K/UL (ref 1.8–7.3)
NITRITE UR QL STRIP: POSITIVE
O2 CONTENT: 12.2 ML/DL
O2 CONTENT: 12.6 ML/DL
O2 CONTENT: 12.9 ML/DL
O2 DELIVERY DEVICE: ABNORMAL
O2 SATURATION: 95.6 % (ref 92–98.5)
O2 SATURATION: 95.6 % (ref 92–98.5)
O2 SATURATION: 95.8 % (ref 92–98.5)
O2HB: 94 % (ref 94–97)
O2HB: 94.3 % (ref 94–97)
O2HB: 94.9 % (ref 94–97)
OPERATOR ID: 2323
OPERATOR ID: 5134
OPERATOR ID: 514
OPERATOR ID: 797
PATIENT TEMP: 37 C
PCO2: 47.3 MMHG (ref 35–45)
PCO2: 50.1 MMHG (ref 35–45)
PCO2: 67 MMHG (ref 35–45)
PEEP/CPAP: 10 CMH2O
PEEP/CPAP: 10 CMH2O
PEEP/CPAP: 8 CMH2O
PEEP: 8
PFO2: 1.44 MMHG/%
PFO2: 1.77 MMHG/%
PFO2: 2.08 MMHG/%
PH BLOOD GAS: 7.17 (ref 7.35–7.45)
PH BLOOD GAS: 7.22 (ref 7.35–7.45)
PH BLOOD GAS: 7.23 (ref 7.35–7.45)
PH FLUID: 7.12
PH UR STRIP: 7 [PH] (ref 5–8)
PIP: 15 CMH2O
PLATELET # BLD AUTO: 194 K/UL (ref 130–450)
PMV BLD AUTO: 12.1 FL (ref 7–12)
PO2: 83.3 MMHG (ref 75–100)
PO2: 88.6 MMHG (ref 75–100)
PO2: 93.4 MMHG (ref 75–100)
POC HCO3: 27.2 MMOL/L (ref 22–26)
POC O2 SATURATION: 95.7 % (ref 92–98.5)
POC PCO2: 61.3 MM HG (ref 35–45)
POC PH: 7.25 (ref 7.35–7.45)
POC PO2: 94 MM HG (ref 60–80)
POTASSIUM SERPL-SCNC: 5.9 MMOL/L (ref 3.5–5)
POTASSIUM SERPL-SCNC: 6.2 MMOL/L (ref 3.5–5)
PROCALCITONIN SERPL-MCNC: 0.1 NG/ML (ref 0–0.08)
PROCALCITONIN SERPL-MCNC: 0.12 NG/ML (ref 0–0.08)
PROT FLD-MCNC: 3.8 G/DL
PROT UR STRIP-MCNC: >=300 MG/DL
PROTHROMBIN TIME: 20.3 SEC (ref 9.3–12.4)
PS: 14 CMH20
PS: 15 CMH20
RBC # BLD AUTO: 3.5 M/UL (ref 3.8–5.8)
RBC # BLD: ABNORMAL 10*6/UL
RBC # FLD: NORMAL CELLS/UL
RBC #/AREA URNS HPF: ABNORMAL /HPF
RETIC HEMOGLOBIN: 17.8 PG (ref 28.2–36.6)
RETICS # AUTO: 0.09 M/UL
RETICS/RBC NFR AUTO: 2.4 % (ref 0.4–1.9)
RI(T): 1.65
RI(T): 2.25
RI(T): 3.01
RSV RNA NPH QL NAA+NON-PROBE: NOT DETECTED
RV+EV RNA NPH QL NAA+NON-PROBE: NOT DETECTED
SARS-COV-2 RNA NPH QL NAA+NON-PROBE: NOT DETECTED
SET RATE, POC: 28
SODIUM SERPL-SCNC: 136 MMOL/L (ref 132–146)
SODIUM SERPL-SCNC: 136 MMOL/L (ref 132–146)
SODIUM SERPL-SCNC: 137 MMOL/L (ref 132–146)
SODIUM SERPL-SCNC: 140 MMOL/L (ref 132–146)
SOURCE, BLOOD GAS: ABNORMAL
SOURCE, BLOOD GAS: NORMAL
SP GR UR STRIP: 1.02 (ref 1–1.03)
SPECIMEN DESCRIPTION: NORMAL
SPECIMEN TYPE: NORMAL
T4 FREE SERPL-MCNC: 1.2 NG/DL (ref 0.9–1.7)
THB: 9.1 G/DL (ref 11.5–16.5)
THB: 9.4 G/DL (ref 11.5–16.5)
THB: 9.6 G/DL (ref 11.5–16.5)
TIBC SERPL-MCNC: 303 UG/DL (ref 250–450)
TIDAL VOLUME: 500
TIME ANALYZED: 1039
TIME ANALYZED: 1406
TIME ANALYZED: 1552
TIME ANALYZED: 536
TROPONIN I SERPL HS-MCNC: 27 NG/L (ref 0–11)
TROPONIN I SERPL HS-MCNC: 28 NG/L (ref 0–11)
TSH SERPL DL<=0.05 MIU/L-ACNC: 6.82 UIU/ML (ref 0.27–4.2)
UROBILINOGEN UR STRIP-ACNC: 0.2 EU/DL (ref 0–1)
VIT B12 SERPL-MCNC: 814 PG/ML (ref 211–946)
WBC # FLD: 815 CELLS/UL
WBC #/AREA URNS HPF: ABNORMAL /HPF
WBC OTHER # BLD: 5.7 K/UL (ref 4.5–11.5)

## 2025-02-26 PROCEDURE — 85018 HEMOGLOBIN: CPT

## 2025-02-26 PROCEDURE — 36556 INSERT NON-TUNNEL CV CATH: CPT

## 2025-02-26 PROCEDURE — 80074 ACUTE HEPATITIS PANEL: CPT

## 2025-02-26 PROCEDURE — 87102 FUNGUS ISOLATION CULTURE: CPT

## 2025-02-26 PROCEDURE — 5A09457 ASSISTANCE WITH RESPIRATORY VENTILATION, 24-96 CONSECUTIVE HOURS, CONTINUOUS POSITIVE AIRWAY PRESSURE: ICD-10-PCS | Performed by: INTERNAL MEDICINE

## 2025-02-26 PROCEDURE — 82550 ASSAY OF CK (CPK): CPT

## 2025-02-26 PROCEDURE — 83550 IRON BINDING TEST: CPT

## 2025-02-26 PROCEDURE — 89051 BODY FLUID CELL COUNT: CPT

## 2025-02-26 PROCEDURE — 3E043XZ INTRODUCTION OF VASOPRESSOR INTO CENTRAL VEIN, PERCUTANEOUS APPROACH: ICD-10-PCS | Performed by: INTERNAL MEDICINE

## 2025-02-26 PROCEDURE — 99291 CRITICAL CARE FIRST HOUR: CPT | Performed by: INTERNAL MEDICINE

## 2025-02-26 PROCEDURE — 82746 ASSAY OF FOLIC ACID SERUM: CPT

## 2025-02-26 PROCEDURE — 87449 NOS EACH ORGANISM AG IA: CPT

## 2025-02-26 PROCEDURE — 99223 1ST HOSP IP/OBS HIGH 75: CPT | Performed by: INTERNAL MEDICINE

## 2025-02-26 PROCEDURE — 2500000003 HC RX 250 WO HCPCS: Performed by: INTERNAL MEDICINE

## 2025-02-26 PROCEDURE — 84484 ASSAY OF TROPONIN QUANT: CPT

## 2025-02-26 PROCEDURE — 93005 ELECTROCARDIOGRAM TRACING: CPT | Performed by: INTERNAL MEDICINE

## 2025-02-26 PROCEDURE — 82805 BLOOD GASES W/O2 SATURATION: CPT

## 2025-02-26 PROCEDURE — 86923 COMPATIBILITY TEST ELECTRIC: CPT

## 2025-02-26 PROCEDURE — 94664 DEMO&/EVAL PT USE INHALER: CPT

## 2025-02-26 PROCEDURE — 93306 TTE W/DOPPLER COMPLETE: CPT

## 2025-02-26 PROCEDURE — 51700 IRRIGATION OF BLADDER: CPT

## 2025-02-26 PROCEDURE — 86317 IMMUNOASSAY INFECTIOUS AGENT: CPT

## 2025-02-26 PROCEDURE — 87205 SMEAR GRAM STAIN: CPT

## 2025-02-26 PROCEDURE — 88112 CYTOPATH CELL ENHANCE TECH: CPT

## 2025-02-26 PROCEDURE — 83986 ASSAY PH BODY FLUID NOS: CPT

## 2025-02-26 PROCEDURE — 86901 BLOOD TYPING SEROLOGIC RH(D): CPT

## 2025-02-26 PROCEDURE — C1729 CATH, DRAINAGE: HCPCS

## 2025-02-26 PROCEDURE — 84443 ASSAY THYROID STIM HORMONE: CPT

## 2025-02-26 PROCEDURE — 6360000002 HC RX W HCPCS: Performed by: INTERNAL MEDICINE

## 2025-02-26 PROCEDURE — 84157 ASSAY OF PROTEIN OTHER: CPT

## 2025-02-26 PROCEDURE — 2580000003 HC RX 258: Performed by: INTERNAL MEDICINE

## 2025-02-26 PROCEDURE — 0W993ZZ DRAINAGE OF RIGHT PLEURAL CAVITY, PERCUTANEOUS APPROACH: ICD-10-PCS | Performed by: INTERNAL MEDICINE

## 2025-02-26 PROCEDURE — 83036 HEMOGLOBIN GLYCOSYLATED A1C: CPT

## 2025-02-26 PROCEDURE — APPSS60 APP SPLIT SHARED TIME 46-60 MINUTES

## 2025-02-26 PROCEDURE — 87081 CULTURE SCREEN ONLY: CPT

## 2025-02-26 PROCEDURE — 87070 CULTURE OTHR SPECIMN AEROBIC: CPT

## 2025-02-26 PROCEDURE — P9047 ALBUMIN (HUMAN), 25%, 50ML: HCPCS | Performed by: INTERNAL MEDICINE

## 2025-02-26 PROCEDURE — 94660 CPAP INITIATION&MGMT: CPT

## 2025-02-26 PROCEDURE — 93010 ELECTROCARDIOGRAM REPORT: CPT | Performed by: INTERNAL MEDICINE

## 2025-02-26 PROCEDURE — 86900 BLOOD TYPING SEROLOGIC ABO: CPT

## 2025-02-26 PROCEDURE — 80048 BASIC METABOLIC PNL TOTAL CA: CPT

## 2025-02-26 PROCEDURE — 87899 AGENT NOS ASSAY W/OPTIC: CPT

## 2025-02-26 PROCEDURE — 87040 BLOOD CULTURE FOR BACTERIA: CPT

## 2025-02-26 PROCEDURE — 6370000000 HC RX 637 (ALT 250 FOR IP): Performed by: INTERNAL MEDICINE

## 2025-02-26 PROCEDURE — 2500000003 HC RX 250 WO HCPCS: Performed by: NURSE PRACTITIONER

## 2025-02-26 PROCEDURE — 36592 COLLECT BLOOD FROM PICC: CPT

## 2025-02-26 PROCEDURE — 83540 ASSAY OF IRON: CPT

## 2025-02-26 PROCEDURE — 51702 INSERT TEMP BLADDER CATH: CPT

## 2025-02-26 PROCEDURE — 87086 URINE CULTURE/COLONY COUNT: CPT

## 2025-02-26 PROCEDURE — 83605 ASSAY OF LACTIC ACID: CPT

## 2025-02-26 PROCEDURE — 85045 AUTOMATED RETICULOCYTE COUNT: CPT

## 2025-02-26 PROCEDURE — 81001 URINALYSIS AUTO W/SCOPE: CPT

## 2025-02-26 PROCEDURE — 85610 PROTHROMBIN TIME: CPT

## 2025-02-26 PROCEDURE — 76937 US GUIDE VASCULAR ACCESS: CPT

## 2025-02-26 PROCEDURE — 5A1D70Z PERFORMANCE OF URINARY FILTRATION, INTERMITTENT, LESS THAN 6 HOURS PER DAY: ICD-10-PCS | Performed by: INTERNAL MEDICINE

## 2025-02-26 PROCEDURE — 86850 RBC ANTIBODY SCREEN: CPT

## 2025-02-26 PROCEDURE — 82803 BLOOD GASES ANY COMBINATION: CPT

## 2025-02-26 PROCEDURE — 83615 LACTATE (LD) (LDH) ENZYME: CPT

## 2025-02-26 PROCEDURE — 02HV33Z INSERTION OF INFUSION DEVICE INTO SUPERIOR VENA CAVA, PERCUTANEOUS APPROACH: ICD-10-PCS | Performed by: INTERNAL MEDICINE

## 2025-02-26 PROCEDURE — 82607 VITAMIN B-12: CPT

## 2025-02-26 PROCEDURE — 82945 GLUCOSE OTHER FLUID: CPT

## 2025-02-26 PROCEDURE — 94640 AIRWAY INHALATION TREATMENT: CPT

## 2025-02-26 PROCEDURE — 84439 ASSAY OF FREE THYROXINE: CPT

## 2025-02-26 PROCEDURE — 77001 FLUOROGUIDE FOR VEIN DEVICE: CPT

## 2025-02-26 PROCEDURE — 2000000000 HC ICU R&B

## 2025-02-26 PROCEDURE — 51798 US URINE CAPACITY MEASURE: CPT

## 2025-02-26 PROCEDURE — 85014 HEMATOCRIT: CPT

## 2025-02-26 PROCEDURE — 88305 TISSUE EXAM BY PATHOLOGIST: CPT

## 2025-02-26 PROCEDURE — 71045 X-RAY EXAM CHEST 1 VIEW: CPT

## 2025-02-26 PROCEDURE — 2580000003 HC RX 258: Performed by: NURSE PRACTITIONER

## 2025-02-26 PROCEDURE — 82728 ASSAY OF FERRITIN: CPT

## 2025-02-26 PROCEDURE — 32555 ASPIRATE PLEURA W/ IMAGING: CPT

## 2025-02-26 PROCEDURE — 82962 GLUCOSE BLOOD TEST: CPT

## 2025-02-26 PROCEDURE — 93306 TTE W/DOPPLER COMPLETE: CPT | Performed by: INTERNAL MEDICINE

## 2025-02-26 PROCEDURE — 84145 PROCALCITONIN (PCT): CPT

## 2025-02-26 PROCEDURE — 0202U NFCT DS 22 TRGT SARS-COV-2: CPT

## 2025-02-26 PROCEDURE — 85025 COMPLETE CBC W/AUTO DIFF WBC: CPT

## 2025-02-26 PROCEDURE — C1769 GUIDE WIRE: HCPCS

## 2025-02-26 RX ORDER — MAGNESIUM OXIDE 400 MG/1
200 TABLET ORAL DAILY
Status: DISCONTINUED | OUTPATIENT
Start: 2025-02-26 | End: 2025-03-07 | Stop reason: HOSPADM

## 2025-02-26 RX ORDER — FUROSEMIDE 20 MG/1
20 TABLET ORAL WEEKLY
Status: DISCONTINUED | OUTPATIENT
Start: 2025-02-26 | End: 2025-03-07 | Stop reason: HOSPADM

## 2025-02-26 RX ORDER — DEXTROSE, SODIUM CHLORIDE, SODIUM LACTATE, POTASSIUM CHLORIDE, AND CALCIUM CHLORIDE 5; .6; .31; .03; .02 G/100ML; G/100ML; G/100ML; G/100ML; G/100ML
INJECTION, SOLUTION INTRAVENOUS CONTINUOUS
Status: DISCONTINUED | OUTPATIENT
Start: 2025-02-26 | End: 2025-02-28

## 2025-02-26 RX ORDER — LIDOCAINE HYDROCHLORIDE 20 MG/ML
JELLY TOPICAL
Status: DISCONTINUED
Start: 2025-02-26 | End: 2025-02-26

## 2025-02-26 RX ORDER — TAMSULOSIN HYDROCHLORIDE 0.4 MG/1
0.4 CAPSULE ORAL DAILY
Status: DISCONTINUED | OUTPATIENT
Start: 2025-02-26 | End: 2025-03-07 | Stop reason: HOSPADM

## 2025-02-26 RX ORDER — ENOXAPARIN SODIUM 100 MG/ML
40 INJECTION SUBCUTANEOUS DAILY
Status: DISCONTINUED | OUTPATIENT
Start: 2025-02-26 | End: 2025-03-06

## 2025-02-26 RX ORDER — DEXTROSE MONOHYDRATE 25 G/50ML
25 INJECTION, SOLUTION INTRAVENOUS PRN
Status: DISCONTINUED | OUTPATIENT
Start: 2025-02-26 | End: 2025-03-07 | Stop reason: HOSPADM

## 2025-02-26 RX ORDER — AMIODARONE HYDROCHLORIDE 200 MG/1
200 TABLET ORAL DAILY
Status: DISCONTINUED | OUTPATIENT
Start: 2025-02-26 | End: 2025-03-07 | Stop reason: HOSPADM

## 2025-02-26 RX ORDER — INSULIN LISPRO 100 [IU]/ML
0-8 INJECTION, SOLUTION INTRAVENOUS; SUBCUTANEOUS
Status: DISCONTINUED | OUTPATIENT
Start: 2025-02-26 | End: 2025-03-07 | Stop reason: HOSPADM

## 2025-02-26 RX ORDER — ASPIRIN 81 MG/1
81 TABLET ORAL DAILY
Status: DISCONTINUED | OUTPATIENT
Start: 2025-02-26 | End: 2025-03-07 | Stop reason: HOSPADM

## 2025-02-26 RX ORDER — ACETAMINOPHEN 650 MG/1
650 SUPPOSITORY RECTAL EVERY 6 HOURS PRN
Status: DISCONTINUED | OUTPATIENT
Start: 2025-02-26 | End: 2025-03-07 | Stop reason: HOSPADM

## 2025-02-26 RX ORDER — NOREPINEPHRINE BITARTRATE 0.06 MG/ML
1-100 INJECTION, SOLUTION INTRAVENOUS CONTINUOUS
Status: DISCONTINUED | OUTPATIENT
Start: 2025-02-26 | End: 2025-03-01

## 2025-02-26 RX ORDER — GLIPIZIDE 5 MG/1
5 TABLET ORAL
Status: DISCONTINUED | OUTPATIENT
Start: 2025-02-26 | End: 2025-03-07 | Stop reason: HOSPADM

## 2025-02-26 RX ORDER — IPRATROPIUM BROMIDE AND ALBUTEROL SULFATE 2.5; .5 MG/3ML; MG/3ML
1 SOLUTION RESPIRATORY (INHALATION)
Status: DISCONTINUED | OUTPATIENT
Start: 2025-02-26 | End: 2025-03-05

## 2025-02-26 RX ORDER — ACETAMINOPHEN 325 MG/1
650 TABLET ORAL EVERY 6 HOURS PRN
Status: DISCONTINUED | OUTPATIENT
Start: 2025-02-26 | End: 2025-03-07 | Stop reason: HOSPADM

## 2025-02-26 RX ORDER — ATORVASTATIN CALCIUM 40 MG/1
40 TABLET, FILM COATED ORAL NIGHTLY
Status: DISCONTINUED | OUTPATIENT
Start: 2025-02-26 | End: 2025-03-07 | Stop reason: HOSPADM

## 2025-02-26 RX ORDER — SODIUM CHLORIDE 0.9 % (FLUSH) 0.9 %
5-40 SYRINGE (ML) INJECTION PRN
Status: DISCONTINUED | OUTPATIENT
Start: 2025-02-26 | End: 2025-03-07 | Stop reason: HOSPADM

## 2025-02-26 RX ORDER — POLYETHYLENE GLYCOL 3350 17 G/17G
17 POWDER, FOR SOLUTION ORAL DAILY PRN
Status: DISCONTINUED | OUTPATIENT
Start: 2025-02-26 | End: 2025-03-07 | Stop reason: HOSPADM

## 2025-02-26 RX ORDER — METOPROLOL SUCCINATE 50 MG/1
50 TABLET, EXTENDED RELEASE ORAL DAILY
Status: DISCONTINUED | OUTPATIENT
Start: 2025-02-26 | End: 2025-03-07 | Stop reason: HOSPADM

## 2025-02-26 RX ORDER — DEXTROSE MONOHYDRATE 100 MG/ML
INJECTION, SOLUTION INTRAVENOUS CONTINUOUS PRN
Status: DISCONTINUED | OUTPATIENT
Start: 2025-02-26 | End: 2025-02-26 | Stop reason: SDUPTHER

## 2025-02-26 RX ORDER — LEVOTHYROXINE SODIUM 88 UG/1
88 TABLET ORAL DAILY
Status: DISCONTINUED | OUTPATIENT
Start: 2025-02-26 | End: 2025-02-28

## 2025-02-26 RX ORDER — GLUCAGON 1 MG/ML
1 KIT INJECTION PRN
Status: DISCONTINUED | OUTPATIENT
Start: 2025-02-26 | End: 2025-03-07 | Stop reason: HOSPADM

## 2025-02-26 RX ORDER — SODIUM CHLORIDE 0.9 % (FLUSH) 0.9 %
5-40 SYRINGE (ML) INJECTION EVERY 12 HOURS SCHEDULED
Status: DISCONTINUED | OUTPATIENT
Start: 2025-02-26 | End: 2025-03-07 | Stop reason: HOSPADM

## 2025-02-26 RX ORDER — LOSARTAN POTASSIUM 50 MG/1
100 TABLET ORAL DAILY
Status: DISCONTINUED | OUTPATIENT
Start: 2025-02-26 | End: 2025-03-07

## 2025-02-26 RX ORDER — DEXTROSE MONOHYDRATE 100 MG/ML
INJECTION, SOLUTION INTRAVENOUS CONTINUOUS PRN
Status: DISCONTINUED | OUTPATIENT
Start: 2025-02-26 | End: 2025-03-07 | Stop reason: HOSPADM

## 2025-02-26 RX ORDER — SODIUM CHLORIDE 9 MG/ML
INJECTION, SOLUTION INTRAVENOUS PRN
Status: DISCONTINUED | OUTPATIENT
Start: 2025-02-26 | End: 2025-03-07 | Stop reason: HOSPADM

## 2025-02-26 RX ORDER — GLUCAGON 1 MG/ML
1 KIT INJECTION PRN
Status: DISCONTINUED | OUTPATIENT
Start: 2025-02-26 | End: 2025-02-26 | Stop reason: SDUPTHER

## 2025-02-26 RX ORDER — PANTOPRAZOLE SODIUM 40 MG/1
40 TABLET, DELAYED RELEASE ORAL
Status: DISCONTINUED | OUTPATIENT
Start: 2025-02-26 | End: 2025-02-28

## 2025-02-26 RX ORDER — ALBUMIN (HUMAN) 12.5 G/50ML
25 SOLUTION INTRAVENOUS ONCE
Status: COMPLETED | OUTPATIENT
Start: 2025-02-26 | End: 2025-02-26

## 2025-02-26 RX ADMIN — SODIUM CHLORIDE, SODIUM LACTATE, POTASSIUM CHLORIDE, CALCIUM CHLORIDE AND DEXTROSE MONOHYDRATE: 5; 600; 310; 30; 20 INJECTION, SOLUTION INTRAVENOUS at 23:30

## 2025-02-26 RX ADMIN — APIXABAN 5 MG: 5 TABLET, FILM COATED ORAL at 01:22

## 2025-02-26 RX ADMIN — FUROSEMIDE 20 MG: 10 INJECTION, SOLUTION INTRAMUSCULAR; INTRAVENOUS at 04:39

## 2025-02-26 RX ADMIN — IPRATROPIUM BROMIDE AND ALBUTEROL SULFATE 1 DOSE: .5; 2.5 SOLUTION RESPIRATORY (INHALATION) at 19:04

## 2025-02-26 RX ADMIN — IPRATROPIUM BROMIDE AND ALBUTEROL SULFATE 1 DOSE: .5; 2.5 SOLUTION RESPIRATORY (INHALATION) at 05:48

## 2025-02-26 RX ADMIN — ALBUMIN (HUMAN) 25 G: 0.25 INJECTION, SOLUTION INTRAVENOUS at 04:40

## 2025-02-26 RX ADMIN — INSULIN LISPRO 2 UNITS: 100 INJECTION, SOLUTION INTRAVENOUS; SUBCUTANEOUS at 17:52

## 2025-02-26 RX ADMIN — Medication 10 ML: at 20:39

## 2025-02-26 RX ADMIN — IPRATROPIUM BROMIDE AND ALBUTEROL SULFATE 1 DOSE: .5; 2.5 SOLUTION RESPIRATORY (INHALATION) at 09:02

## 2025-02-26 RX ADMIN — IPRATROPIUM BROMIDE AND ALBUTEROL SULFATE 1 DOSE: .5; 2.5 SOLUTION RESPIRATORY (INHALATION) at 22:29

## 2025-02-26 RX ADMIN — IPRATROPIUM BROMIDE AND ALBUTEROL SULFATE 1 DOSE: .5; 2.5 SOLUTION RESPIRATORY (INHALATION) at 02:39

## 2025-02-26 RX ADMIN — DEXTROSE 250 ML: 10 SOLUTION INTRAVENOUS at 05:53

## 2025-02-26 RX ADMIN — DEXTROSE MONOHYDRATE 125 ML: 100 INJECTION, SOLUTION INTRAVENOUS at 09:20

## 2025-02-26 RX ADMIN — WATER 1000 MG: 1 INJECTION INTRAMUSCULAR; INTRAVENOUS; SUBCUTANEOUS at 13:28

## 2025-02-26 RX ADMIN — DEXTROSE MONOHYDRATE 25 G: 25 INJECTION, SOLUTION INTRAVENOUS at 13:46

## 2025-02-26 RX ADMIN — INSULIN HUMAN 6 UNITS: 100 INJECTION, SOLUTION PARENTERAL at 13:40

## 2025-02-26 RX ADMIN — INSULIN HUMAN 4 UNITS: 100 INJECTION, SOLUTION PARENTERAL at 05:54

## 2025-02-26 RX ADMIN — SODIUM ZIRCONIUM CYCLOSILICATE 5 G: 5 POWDER, FOR SUSPENSION ORAL at 04:43

## 2025-02-26 RX ADMIN — NOREPINEPHRINE BITARTRATE 5 MCG/MIN: 64 SOLUTION INTRAVENOUS at 09:30

## 2025-02-26 RX ADMIN — SODIUM CHLORIDE, SODIUM LACTATE, POTASSIUM CHLORIDE, CALCIUM CHLORIDE AND DEXTROSE MONOHYDRATE: 5; 600; 310; 30; 20 INJECTION, SOLUTION INTRAVENOUS at 08:58

## 2025-02-26 RX ADMIN — DOXYCYCLINE 100 MG: 100 INJECTION, POWDER, LYOPHILIZED, FOR SOLUTION INTRAVENOUS at 13:36

## 2025-02-26 ASSESSMENT — PAIN SCALES - GENERAL
PAINLEVEL_OUTOF10: 0

## 2025-02-26 NOTE — ACP (ADVANCE CARE PLANNING)
Advance Care Planning   Healthcare Decision Maker:    Primary Decision Maker: Vivi Florez - Spouse - [phone]    Secondary Decision Maker: Paul Florez - Child - [phone]    Supplemental (Other) Decision Maker: Tiago Florez - Child - [phone]      Today we documented Decision Maker(s) consistent with ACP documents on file.      Electronically signed by Akosua Burkett RN-BC on 2/26/2025 at 2:00 PM

## 2025-02-26 NOTE — CONSULTS
Value Date    CALCIUM 8.1 (L) 02/26/2025    CALCIUM 8.2 (L) 02/26/2025    CALCIUM 8.3 (L) 02/25/2025    PHOS 3.2 05/25/2022    MG 2.1 02/25/2025    MG 1.8 02/21/2025    MG 1.5 (L) 03/05/2024         BMP:   Recent Labs     02/25/25  2042 02/26/25  0109 02/26/25  0739    140 137   K 6.1* 5.9* 5.9*    109* 103   CO2 25 24 24   BUN 39* 41* 41*   CREATININE 3.5* 3.6* 3.9*         ABG 2/26 5:30 AM: pH 7.166, pCO2 67          Assessment: / Plan:      Acute kidney injury.  Acute kidney injury secondary multiple factors including renal hypoperfusion in the setting of hypotension and use of angiotensin II septal blocking agent in the form of losartan 100 mg once a day.  Will check urine studies.  Urinary obstruction is a possibility as the patient did have brisk urine output after draining of the blood clots in the Merritt catheter after irrigating the Merritt.  Patient has been on Eliquis at home that may explain the marked on the gross hematuria.  He will hydrate gently.  Check urine studies.  If renal function does not improve then we may need to intervene with renal placement therapy and this was discussed with the patient and his wife.  We will check urine electrolytes and urea.  Continue hydration.  Check imaging study when stable.    Hypotension/shock.  Hypotension of undetermined etiology.  Echocardiogram is pending.  Will panculture.  Support with volume and pressors as needed.    Hyperkalemia.  Hyperkalemia this patient secondary acute kidney injury coupled with use of angiotensin II receptor blocking agent along with diet rich in potassium content as the patient was eating bananas daily.  Hyperkalemia is marginally improved.  Will repeat labs and if needed will treat with dextrose and insulin.  If able to take orally start the patient on Lokelma.  Limit dietary potassium.  If obviously hyperkalemia is uncontrolled we may need to intervene with renal placement therapy.       Respiratory acidosis.  Patient  now on a BiPAP mask.      Anemia of unknown etiology.  Recent iron studies reflect iron deficiency.  Will repeat iron studies.  Follow hemoglobin and hematocrit.         Hypocalcemia.  Hypocalcemia of undetermined etiology.  Etiology.  Will check ionized calcium vitamin D and PTH levels..       Volume overload/edema.  Patient to be given intermittent loop diuretic as allowed by hemodynamics.  Echocardiogram pending.  Will follow.        Chronic kidney disease stage G3 a secondary to microvascular disease with diabetic nephropathy and/or hypertensive kidney disease with a baseline serum creatinine of  1.4  mg/dL to  1.6 mg/dL.        Patient's condition discussed in detail with the patient, his wife.  Also discussed with intensivist.        Thank you Chandler Dinero DO for allowing us to participate in care of Paul Palacios MD  Nephrology  Electronically signed by Alex Palacios MD on 2/26/2025 at 10:32 AM      Note: This report was completed utilizing computer voice recognition software. Every effort has been made to ensure accuracy, however; inadvertent computerized transcription errors may be present.

## 2025-02-26 NOTE — PROGRESS NOTES
Spiritual Health History and Assessment/Progress Note  [facility]    (P) Spiritual/Emotional Needs,  ,  ,      Name: Paul Florez MRN: [id number]    Age: 74 y.o.     Sex: male   Language: English   Cheondoism: Spiritism   Acute systolic CHF (congestive heart failure) (HCC)     Date: 2/26/2025                           Spiritual Assessment began in [facility] 2 ICU        Referral/Consult From: (P) Rounding   Encounter Overview/Reason: (P) Spiritual/Emotional Needs  Service Provided For: (P) Patient and family together    Yahaira, Belief, Meaning:   Patient has beliefs or practices that help with coping during difficult times  Family/Friends have beliefs or practices that help with coping during difficult times      Importance and Influence:  Patient has spiritual/personal beliefs that influence decisions regarding their health  Family/Friends have spiritual/personal beliefs that influence decisions regarding the patient's health    Community:  Patient feels well-supported. Support system includes: Extended family  Family/Friends feel well-supported. Support system includes: Parent/s and Extended family    Assessment and Plan of Care:     Patient Interventions include: Facilitated expression of thoughts and feelings, Engaged in theological reflection, Affirmed coping skills/support systems, and Facilitated life review and/ or legacy.  affirmed patient's celebrating a stack of small victories experienced in this hospital stay.  Family/Friends Interventions include: Facilitated expression of thoughts and feelings and Affirmed coping skills/support systems    Patient Plan of Care: No spiritual needs identified for follow-up  Family/Friends Plan of Care: No spiritual needs identified for follow-up    Electronically signed by Chaplain Martin on 2/26/2025 at 2:56 PM

## 2025-02-26 NOTE — PROGRESS NOTES
4 Eyes Skin Assessment     NAME:  Paul Florez  YOB: 1950  MEDICAL RECORD NUMBER:  [id number]    The patient is being assessed for  Admission    Bilateral bruising on back, bruising to abdmen, toes mottled and purple      I agree that at least one RN has performed a thorough Head to Toe Skin Assessment on the patient. ALL assessment sites listed below have been assessed.      Areas assessed by both nurses:    Head, Face, Ears, Shoulders, Back, Chest, Arms, Elbows, Hands, Sacrum. Buttock, Coccyx, Ischium, and Legs. Feet and Heels        Does the Patient have a Wound? No noted wound(s)       Bryce Prevention initiated by RN: Yes  Wound Care Orders initiated by RN: No    Pressure Injury (Stage 3,4, Unstageable, DTI, NWPT, and Complex wounds) if present, place Wound referral order by RN under : No    New Ostomies, if present place, Ostomy referral order under : No     Nurse 1 eSignature: Electronically signed by David Fuentes RN on 2/26/25 at 11:27 AM EST    **SHARE this note so that the co-signing nurse can place an eSignature**    Nurse 2 eSignature: Electronically signed by Johanna Santos RN on 2/26/25 at 8:07 PM EST

## 2025-02-26 NOTE — PROGRESS NOTES
Patient came down to Special Procedures for ultrasound guided right  thoracentesis.    Procedure was explained, questions were answered.    1535   Starting procedure /100 HR 61 , RR 18 , 95 % ,40 %  Bipap    1545   Ending procedure /49, HR 62 , RR 25 , O2 92 %  Bipap  40 %     800  cc of cherry jayme color color pleural fluid drained from patient, petrolatum dressing folded 4 x 4 and tegaderm applied to right back.     Patients DSD can be removed in 24 hours    Patient tolerated procedure    Post procedure chest xray taken    Respiratory came took specimen for PH    Specimen sent to lab, floor nurse to print labels and send to lab    Patient transported back to floor.      Patient came down to special procedures for temporary dialysis catheter placement.  IN right IJ    Procedure was explained and questions were answered.  Consent signed.  Patient was educated about the amount of radiation used with today's procedure.        Patient transferred from be to x ray  table   Emotional support given     1610 - Procedure start VS Right IJ supine  137/67 HR 63 RR 20, 90%  Bipap 40 %       1620- Procedure end /63 , HR 64, RR 18 supine , Bipap 40 %     Temporary dialysis catheter to Right  IJ. Catheter sutured in place. Tegaderm with CHG gel applied. CDI     nurse to nurse called, spoke with Isidra RN, nurse notified of above information    Patient transported back to the floor.

## 2025-02-26 NOTE — CONSULTS
Pulmonary/Critical Care Consult Note    CHIEF COMPLAINT: Acute hypoxemic and hypercapnic respiratory failure, metabolic acidosis, acute kidney injury, probable CKD, cor pulmonale, right ventricular enlargement, likely severe obstructive sleep apnea, status post pacemaker placement, diastolic dysfunction, hematuria with clots, chronic atrial fibrillation on amiodarone and apixaban    HISTORY OF PRESENT ILLNESS: The patient is a 74-year-old male who lives at home with his wife and has a known history of \"heart disease\" with a previous pacemaker, is anticoagulated because of atrial fibrillation, and whose breathing has become worse over the last 1 to 2 weeks.  His legs have become much more swollen and has become short of breath to the point where he can no longer walk even several feet without extreme weakness and dyspnea.    He came to the emergency room this morning which is where I initially saw him.    The patient has developed marked leg edema and has a right pleural effusion of significant size on chest x-ray.  Echocardiography done today shows diastolic dysfunction and right ventricular enlargement, the latter is likely to be from obstructive sleep apnea and cor pulmonale.    The patient knows that he has obstructive sleep apnea but has refused a sleep study because he does not like the idea of wearing a BiPAP mask which she is on now.    The patient's renal failure is adding a component of metabolic acidosis due to his already severe respiratory acidosis with original pH being 7.166 pCO2 67 pO2 93 on an AVAPS setting with tidal volume set at 500.    Since those blood gases, we have changed the patient to BiPAP with an increase in the EPAP.    Since that time, the patient has become somewhat more awake and alert.  He has been very cooperative.  Most recently, an arterial blood gas at 10:36 AM showed a pH 7.22 pCO2 50 pO2 88.6 with an FiO2 of 50%.    We are planning on lowering the FiO2 in order to drive the  Medical History:   Diagnosis Date    Anemia     Atrial flutter (HCC)     Aguila's esophagus     Coronary artery disease involving native coronary artery 05/27/2022    Diverticulitis     Fatty liver     History of Holter monitoring 05/12/2020    Hyperlipidemia     Hypertension     Lipoma     Subcyst    Lumbar spinal stenosis     Osteoarthritis     PUD (peptic ulcer disease)     S/P angioplasty with stent 05/27/2022    1st OM    Type 2 diabetes mellitus without complication (HCC)        MEDICATIONS:   sodium chloride flush  5-40 mL IntraVENous 2 times per day    amiodarone  200 mg Oral Daily    [Held by provider] apixaban  5 mg Oral BID    [Held by provider] aspirin  81 mg Oral Daily    levothyroxine  88 mcg Oral Daily    [Held by provider] losartan  100 mg Oral Daily    magnesium oxide  200 mg Oral Daily    [Held by provider] metoprolol succinate  50 mg Oral Daily    [Held by provider] tamsulosin  0.4 mg Oral Daily    ipratropium 0.5 mg-albuterol 2.5 mg  1 Dose Inhalation Q4H RT    atorvastatin  40 mg Oral Nightly    [Held by provider] furosemide  20 mg Oral Weekly    [Held by provider] glipiZIDE  5 mg Oral BID AC    pantoprazole  40 mg Oral QAM AC    [Held by provider] enoxaparin  40 mg SubCUTAneous Daily      sodium chloride      dextrose      dextrose 5% in lactated ringers 75 mL/hr at 02/26/25 0858    norepinephrine 7 mcg/min (02/26/25 0958)     sodium chloride flush, sodium chloride, polyethylene glycol, acetaminophen **OR** acetaminophen, glucose, dextrose bolus **OR** dextrose bolus, glucagon (rDNA), dextrose    REVIEW OF SYSTEMS:  Constitutional: Denies fever, weight loss, night sweats, and fatigue  Skin: Denies pigmentation, dark lesions, and rashes   HEENT: Denies hearing loss, tinnitus, ear drainage, epistaxis, sore throat, and hoarseness.  Cardiovascular: Denies palpitations, chest pain, and chest pressure.  Respiratory: Denies cough, dyspnea at rest, hemoptysis, apnea, and choking.  Gastrointestinal:

## 2025-02-26 NOTE — PROGRESS NOTES
time was spent at the bedside counseling/coordinating care with the patient and/or family with face to face contact.  This time was spent reviewing notes and laboratory data as well as instructing and counseling the patient. Time I spent with the family or surrogate(s) is included only if the patient was incapable of providing the necessary information or participating in medical decisions. I also discussed the differential diagnosis and all of the proposed management plans with the patient and individuals accompanying the patientEdmundo Miller requires this high level of physician care and nursing on the IMC/Telemetry unit due the complexity of decision management and chance of rapid decline or death.  Continued cardiac monitoring and higher level of nursing are required. I am readily available for any further decision-making and intervention.     Due to high hospital inpatient census and bed limitations, along with staff shortages, we have had a rey discussion with the patient/family regarding the likelihood of prolonged ER boarding status and potential delays/disruptions in care related to this.  They understand and agree to maintain hospitalization at this facility while accepting these risks.  We have made every attempt to coordinate care with the ER nursing staff as well to avoid any disruptions in care.    Greater than 40 minutes of critical care time was spent with the patient.  This time included chart review, , and discussion with those consultants involved in the patient's care.      JEROME House CNP  2/26/2025  6:20 AM

## 2025-02-26 NOTE — CONSULTS
2/26/2025 3:08 PM  Paul Florez  [id number]     Chief Complaint:    Gross hematuria    History of Present Illness:      The patient is a 74 y.o. male patient who presented to the hospital with complaints of shortness of breath and fatigue.  He was admitted to the ICU for acute hypoxemic and hypercapnic respiratory failure.    Urology is asked to evaluate for gross hematuria.  He had a Garner catheter placed in the emergency department for intake and output monitoring.  Per the ICU when he arrived to the unit his Garner catheter was grossly bloody and full of of clots.  This was exchanged for three-way Garner catheter by the ICU nurse and he was started on CBI.    Upon presentation patient is on CPAP. He has had kidney stones in the past. He denies any history of gross hematuria. He did have  a 3 way simplastic garner that was blown up in the prostatic fossa. This was removed.   He has had multiple catheter attempts.     At that time the patient was prepped and drapped in sterile condition.     Flexible cystoscopy was then performed there was a posterior false passage in the bulbar urethra. A 0.035 wire was placed into the bladder. A 3 way 24 F garner was inserted. The patient was immediate relief and CBI was restarted      Past Medical History:   Diagnosis Date    Anemia     Atrial flutter (HCC)     Aguila's esophagus     Congestive heart failure (HCC) 2/26/2025    Coronary artery disease involving native coronary artery 05/27/2022    Diverticulitis     Fatty liver     History of Holter monitoring 05/12/2020    Hyperlipidemia     Hypertension     Lipoma     Subcyst    Lumbar spinal stenosis     Osteoarthritis     PUD (peptic ulcer disease)     S/P angioplasty with stent 05/27/2022    1st OM    Type 2 diabetes mellitus without complication (HCC)          Past Surgical History:   Procedure Laterality Date    ATRIAL ABLATION SURGERY  2012    CARDIAC DEFIBRILLATOR PLACEMENT Left 11/11/2022    eigital CRT-D    .4*  --   --  85.1  --    MCH 24.3*  --   --  23.7*  --    MCHC 23.0*  --   --  27.9*  --    RDW 20.5*  --   --  19.5*  --      --   --  194  --    MPV 12.6*  --   --  12.1*  --     < > = values in this interval not displayed.         Recent Labs     02/26/25  0109 02/26/25  0739 02/26/25  1229   CREATININE 3.6* 3.9* 4.0*       Lab Results   Component Value Date    PSA 1.65 06/24/2024    PSA 1.62 03/05/2024    PSA 1.58 04/30/2013       Imaging:     None    Assessment/plan:    73 yo M with malpositioned garner and gross hematuria.     Garner was cystoscopically guided into the bladder over a wire.   His hematuria is much improved   Irrigate manually every 3-4 hours.   CBI titrate to light pink   Care per ICU.   Will give void trial in 1-2 weeks.   Will follow     If SCr does not improve I recommend NAKUL to ensure no outlet obstruction       Lauro Kemp MD

## 2025-02-26 NOTE — H&P
Department of Internal Medicine  History and Physical    PCP: Eduin Guzman DO  Admitting Physician: Dr. Abebe/Sonny  Consultants:   Date of Service: 2/25/2025    CHIEF COMPLAINT:  sob fatigue and hypoglycemia    HISTORY OF PRESENT ILLNESS:    Patient is 74-year-old male who presented to the ED with shortness of breath, fatigue and hypoglycemia.  Patient has been experiencing increased lower extremity edema over the last few weeks.  He was started on IV p.o. Lasix for the last few weeks.  He takes it once weekly.  He does have increased wheezing as well as nonproductive cough.  He denies any subjective fever or chills.  Denies any chest pain.  He does admit to loose stools.        PAST MEDICAL Hx:  Past Medical History:   Diagnosis Date    Anemia     Atrial flutter (HCC)     Aguila's esophagus     Coronary artery disease involving native coronary artery 05/27/2022    Diverticulitis     Fatty liver     History of Holter monitoring 05/12/2020    Hyperlipidemia     Hypertension     Lipoma     Subcyst    Lumbar spinal stenosis     Osteoarthritis     PUD (peptic ulcer disease)     S/P angioplasty with stent 05/27/2022    1st OM    Type 2 diabetes mellitus without complication (HCC)        PAST SURGICAL Hx:   Past Surgical History:   Procedure Laterality Date    ATRIAL ABLATION SURGERY  2012    CARDIAC DEFIBRILLATOR PLACEMENT Left 11/11/2022    [employer] CRT-D Insertion (Dr. Guerrero)    CARDIAC SURGERY      pt states 6 - 7 years ago     COLONOSCOPY  08/09/2021    Follow-up 3 years.  Dr. Griffin    COLONOSCOPY N/A 07/09/2024    COLONOSCOPY POLYPECTOMY COLD SNARE performed by Paul Griffin DO at [facility] ENDOSCOPY    ESOPHAGOGASTRODUODENOSCOPY  08/09/2021    Follow-up 3 years.  5 cm Aguila's esophagitis    ESOPHAGOGASTRODUODENOSCOPY  07/09/2024    KNEE ARTHROPLASTY Left     LIPOMA RESECTION      UPPER GASTROINTESTINAL ENDOSCOPY  03/27/2017    Dr. Griffin, Findings: Long segment Barretts, 5cm, Moderate Gastritis,  BASOSABS 0.00 02/25/2025 06:50 PM     CMP:    Lab Results   Component Value Date/Time     02/25/2025 08:42 PM    K 6.1 02/25/2025 08:42 PM    K 4.3 05/28/2022 05:18 AM     02/25/2025 08:42 PM    CO2 25 02/25/2025 08:42 PM    BUN 39 02/25/2025 08:42 PM    CREATININE 3.5 02/25/2025 08:42 PM    GFRAA >60 05/28/2022 05:18 AM    LABGLOM 18 02/25/2025 08:42 PM    LABGLOM >60 03/05/2024 02:38 PM    GLUCOSE 78 02/25/2025 08:42 PM    GLUCOSE 177 08/30/2011 08:21 AM    CALCIUM 8.3 02/25/2025 08:42 PM    BILITOT 0.3 02/21/2025 09:54 AM    ALKPHOS 95 02/21/2025 09:54 AM    AST 30 02/21/2025 09:54 AM    ALT 15 02/21/2025 09:54 AM       ASSESSMENT/PLAN:  Acute Hypoxic respiratory failure  Acute on Chronic diastolic congestive heart failure  KEN on CKD  Hyperkalemia  Persistent hypoglycemia  Acute on chronic anemia  Hypotension  Hypercapnia  Atrial flutter/atrial fibrillation status post ablation  ICD  Coronary artery disease  History of peptic ulcer disease  History of angioplasty status post stent placement  Diabetes mellitus type 2  Aguila's esophagus  BPH  Hyperlipidemia  Hypertension  Current tobacco abuse      Patient presented to the ED with shortness of breath.  Patient found to be hypoxic and placed on BiPAP due to work of breathing.  Imaging revealed right-sided pleural effusion.  He has bilateral lower extremity edema.  Patient will be treated for congestive heart failure.  Patient will be given IV Lasix and albumin.  Monitor intake and output.  Will obtain echocardiogram.  Additionally patient has KEN and hyperkalemia.  Nephrology has been consulted.  Patient given treatment for hyperkalemia.  We will recheck potassium level after treatment.  Continue to monitor pulse ox and wean off supplemental oxygen as tolerate in terms of the hypoglycemia patient placed on D10 drip.      Sage Thompson DO  1:46 AM  2/26/2025    Electronically signed by Sage Thompson DO on 2/26/25 at 1:46 AM EST

## 2025-02-26 NOTE — PROGRESS NOTES
Following urology putting in new 3-way catheter, new garner needed to be irrigated due to pressure and no urine coming out.  30cc irrigated and very large clot removed.

## 2025-02-26 NOTE — CONSULTS
non-tender to light palpation. Bowel sounds present. No palpable masses no organomegaly; no abdominal bruit  MS: Good muscle strength and tone. No atrophy or abnormal movements.   : Deferred  SKIN: Warm and dry no statis dermatitis or ulcers   NEURO / PSYCH: Oriented to person, place and time. Speech clear and appropriate. Follows all commands. Pleasant affect     DATA:    ECG: V paced rhythm.  Vent rate 61  Tele strips: V paced 60 bpm  Diagnostic:      Labs:   CBC:   Recent Labs     02/25/25 1850 02/25/25 1959 02/26/25 0119 02/26/25  0739   WBC 4.9  --   --  5.7   HGB 7.6*   < > 8.6* 8.3*   HCT 33.0*   < > 30.8* 29.8*     --   --  194    < > = values in this interval not displayed.     BMP:   Recent Labs     02/26/25 0109 02/26/25  0739    137   K 5.9* 5.9*   CO2 24 24   BUN 41* 41*   CREATININE 3.6* 3.9*   LABGLOM 17* 15*   CALCIUM 8.2* 8.1*     Mag:   Recent Labs     02/25/25 2042   MG 2.1       HgA1c:   Lab Results   Component Value Date    LABA1C 6.0 01/13/2025       CARDIAC ENZYMES:  Recent Labs     02/25/25 2042 02/26/25  0119 02/26/25  0739 02/26/25  1101   CKTOTAL  --  104  --   --    TROPHS 25*  --  27* 28*     FASTING LIPID PANEL:  Lab Results   Component Value Date/Time    CHOL 86 02/21/2025 09:54 AM    HDL 42 02/21/2025 09:54 AM    TRIG 83 02/21/2025 09:54 AM         Assessment:  Volume overload  Acute on chronic HFrecEF  KEN on CKD, nephrology following (Cr 1.2-1.4 --> 1.8 --> 3.6 --> 3.9 --> 4.0)  Mildly elevated hs-troponin -- multifactorial  Hyperkalemia, recurrent  Acute hypoxic/hypercapnic respiratory failure.  Requiring BiPAP.  Hypotension requiring IV pressors  Right-sided pleural effusion  Hematuria, Eliquis and aspirin on hold.  Urology consulted.  UA positive for nitrates and large leukocytes.  Being covered with ATB  JOSE MANUEL, patient has refused treatment in the past  CAD: History of MARTIN to OM 5/2022 with C 2/2024 showing mild diffuse ISR with no other concern for  is dilated. Normal systolic function. TAPSE is 2.0 cm. RV pealk S' is > 10 cm/s.    Atrium: Bi-atrial enlargement.    Mitral Valve: Mild regurgitation. No stenosis noted. MV mean gradient is 2 mmHg.    Tricuspid Valve: Moderate regurgitation. RV-RA gradient is estimated at 42 mmHg.    Echocardiogram ordered performed today --> EF 50-55%, stage I DD, dilated RV with normal systolic function, mild MR, moderate TR, and RV-RA gradient 42 mmHg  Hold eliquis/aspirin until resolution of hematuria and no need for further procedures  Wean pressors as able  GDMT currently on hold  Most recent device interrogation (2/2025) reviewed today  Treatment of JOSE MANUEL as indicated  Telemetry reviewed ()  Follow-up repeat labs  Care per ICU team and nephrology      Paul Mcdonald MD  [facility] Cardiology

## 2025-02-27 ENCOUNTER — APPOINTMENT (OUTPATIENT)
Dept: GENERAL RADIOLOGY | Age: 75
End: 2025-02-27
Payer: MEDICARE

## 2025-02-27 ENCOUNTER — APPOINTMENT (OUTPATIENT)
Dept: CT IMAGING | Age: 75
End: 2025-02-27
Payer: MEDICARE

## 2025-02-27 LAB
AADO2: 111.2 MMHG
ALBUMIN SERPL-MCNC: 2.8 G/DL (ref 3.5–5.2)
ALP SERPL-CCNC: 76 U/L (ref 40–129)
ALT SERPL-CCNC: 11 U/L (ref 0–40)
ANION GAP SERPL CALCULATED.3IONS-SCNC: 4 MMOL/L (ref 7–16)
AST SERPL-CCNC: 29 U/L (ref 0–39)
B.E.: 0.6 MMOL/L (ref -3–3)
BASOPHILS # BLD: 0 K/UL (ref 0–0.2)
BASOPHILS NFR BLD: 0 % (ref 0–2)
BILIRUB DIRECT SERPL-MCNC: 0.2 MG/DL (ref 0–0.3)
BILIRUB SERPL-MCNC: 0.5 MG/DL (ref 0–1.2)
BUN SERPL-MCNC: 28 MG/DL (ref 6–23)
CALCIUM SERPL-MCNC: 8 MG/DL (ref 8.6–10.2)
CHLORIDE SERPL-SCNC: 105 MMOL/L (ref 98–107)
CO2 SERPL-SCNC: 28 MMOL/L (ref 22–29)
COHB: 2.5 % (ref 0–1.5)
CREAT SERPL-MCNC: 2.6 MG/DL (ref 0.7–1.2)
CRITICAL: ABNORMAL
DATE ANALYZED: ABNORMAL
DATE OF COLLECTION: ABNORMAL
EOSINOPHIL # BLD: 0 K/UL (ref 0.05–0.5)
EOSINOPHILS RELATIVE PERCENT: 0 % (ref 0–6)
ERYTHROCYTE [DISTWIDTH] IN BLOOD BY AUTOMATED COUNT: 18.8 % (ref 11.5–15)
FIO2: 35 %
GFR, ESTIMATED: 25 ML/MIN/1.73M2
GLUCOSE BLD-MCNC: 133 MG/DL (ref 74–99)
GLUCOSE BLD-MCNC: 140 MG/DL (ref 74–99)
GLUCOSE BLD-MCNC: 150 MG/DL (ref 74–99)
GLUCOSE SERPL-MCNC: 129 MG/DL (ref 74–99)
HAV IGM SERPL QL IA: NONREACTIVE
HBV CORE IGM SERPL QL IA: NONREACTIVE
HBV SURFACE AB SERPL IA-ACNC: <3.1 MIU/ML (ref 0–9.99)
HBV SURFACE AG SERPL QL IA: NONREACTIVE
HCO3: 26.6 MMOL/L (ref 22–26)
HCT VFR BLD AUTO: 22.6 % (ref 37–54)
HCT VFR BLD AUTO: 24.7 % (ref 37–54)
HCT VFR BLD AUTO: 25.2 % (ref 37–54)
HCV AB SERPL QL IA: NONREACTIVE
HGB BLD-MCNC: 6.6 G/DL (ref 12.5–16.5)
HGB BLD-MCNC: 7.1 G/DL (ref 12.5–16.5)
HGB BLD-MCNC: 7.3 G/DL (ref 12.5–16.5)
HHB: 6.1 % (ref 0–5)
L PNEUMO1 AG UR QL IA.RAPID: NEGATIVE
LAB: ABNORMAL
LYMPHOCYTES NFR BLD: 0.35 K/UL (ref 1.5–4)
LYMPHOCYTES RELATIVE PERCENT: 4 % (ref 20–42)
Lab: 510
MAGNESIUM SERPL-MCNC: 1.8 MG/DL (ref 1.6–2.6)
MCH RBC QN AUTO: 23.8 PG (ref 26–35)
MCHC RBC AUTO-ENTMCNC: 28.7 G/DL (ref 32–34.5)
MCV RBC AUTO: 82.9 FL (ref 80–99.9)
METHB: 0.1 % (ref 0–1.5)
MICROORGANISM SPEC CULT: NO GROWTH
MICROORGANISM SPEC CULT: NORMAL
MONOCYTES NFR BLD: 0.27 K/UL (ref 0.1–0.95)
MONOCYTES NFR BLD: 3 % (ref 2–12)
NEUTROPHILS NFR BLD: 94 % (ref 43–80)
NEUTS SEG NFR BLD: 9.58 K/UL (ref 1.8–7.3)
O2 CONTENT: 10.2 ML/DL
O2 SATURATION: 93.7 % (ref 92–98.5)
O2HB: 91.3 % (ref 94–97)
OPERATOR ID: 2420
PATIENT TEMP: 37 C
PCO2: 50.6 MMHG (ref 35–45)
PFO2: 2.02 MMHG/%
PH BLOOD GAS: 7.34 (ref 7.35–7.45)
PHOSPHATE SERPL-MCNC: 4.7 MG/DL (ref 2.5–4.5)
PLATELET # BLD AUTO: 237 K/UL (ref 130–450)
PMV BLD AUTO: 11.6 FL (ref 7–12)
PO2: 70.8 MMHG (ref 75–100)
POTASSIUM SERPL-SCNC: 5.1 MMOL/L (ref 3.5–5)
PROT SERPL-MCNC: 6.2 G/DL (ref 6.4–8.3)
RBC # BLD AUTO: 2.98 M/UL (ref 3.8–5.8)
RBC # BLD: ABNORMAL 10*6/UL
RI(T): 1.57
S PNEUM AG SPEC QL: NEGATIVE
SERVICE CMNT-IMP: NORMAL
SODIUM SERPL-SCNC: 137 MMOL/L (ref 132–146)
SOURCE, BLOOD GAS: ABNORMAL
SPECIMEN DESCRIPTION: NORMAL
SPECIMEN DESCRIPTION: NORMAL
SPECIMEN SOURCE: NORMAL
THB: 7.9 G/DL (ref 11.5–16.5)
TIME ANALYZED: 513
WBC OTHER # BLD: 10.2 K/UL (ref 4.5–11.5)

## 2025-02-27 PROCEDURE — 2700000000 HC OXYGEN THERAPY PER DAY

## 2025-02-27 PROCEDURE — 2580000003 HC RX 258: Performed by: INTERNAL MEDICINE

## 2025-02-27 PROCEDURE — 82248 BILIRUBIN DIRECT: CPT

## 2025-02-27 PROCEDURE — 85025 COMPLETE CBC W/AUTO DIFF WBC: CPT

## 2025-02-27 PROCEDURE — 2000000000 HC ICU R&B

## 2025-02-27 PROCEDURE — 30233K1 TRANSFUSION OF NONAUTOLOGOUS FROZEN PLASMA INTO PERIPHERAL VEIN, PERCUTANEOUS APPROACH: ICD-10-PCS | Performed by: INTERNAL MEDICINE

## 2025-02-27 PROCEDURE — 88112 CYTOPATH CELL ENHANCE TECH: CPT

## 2025-02-27 PROCEDURE — 5A1D70Z PERFORMANCE OF URINARY FILTRATION, INTERMITTENT, LESS THAN 6 HOURS PER DAY: ICD-10-PCS | Performed by: INTERNAL MEDICINE

## 2025-02-27 PROCEDURE — P9017 PLASMA 1 DONOR FRZ W/IN 8 HR: HCPCS

## 2025-02-27 PROCEDURE — 85018 HEMOGLOBIN: CPT

## 2025-02-27 PROCEDURE — 83735 ASSAY OF MAGNESIUM: CPT

## 2025-02-27 PROCEDURE — 99291 CRITICAL CARE FIRST HOUR: CPT | Performed by: INTERNAL MEDICINE

## 2025-02-27 PROCEDURE — P9016 RBC LEUKOCYTES REDUCED: HCPCS

## 2025-02-27 PROCEDURE — 71045 X-RAY EXAM CHEST 1 VIEW: CPT

## 2025-02-27 PROCEDURE — 6370000000 HC RX 637 (ALT 250 FOR IP): Performed by: NURSE PRACTITIONER

## 2025-02-27 PROCEDURE — 90935 HEMODIALYSIS ONE EVALUATION: CPT

## 2025-02-27 PROCEDURE — 6370000000 HC RX 637 (ALT 250 FOR IP): Performed by: INTERNAL MEDICINE

## 2025-02-27 PROCEDURE — 86927 PLASMA FRESH FROZEN: CPT

## 2025-02-27 PROCEDURE — 82805 BLOOD GASES W/O2 SATURATION: CPT

## 2025-02-27 PROCEDURE — 6360000004 HC RX CONTRAST MEDICATION: Performed by: RADIOLOGY

## 2025-02-27 PROCEDURE — 94640 AIRWAY INHALATION TREATMENT: CPT

## 2025-02-27 PROCEDURE — 94660 CPAP INITIATION&MGMT: CPT

## 2025-02-27 PROCEDURE — 84100 ASSAY OF PHOSPHORUS: CPT

## 2025-02-27 PROCEDURE — 2500000003 HC RX 250 WO HCPCS: Performed by: INTERNAL MEDICINE

## 2025-02-27 PROCEDURE — 5A0945A ASSISTANCE WITH RESPIRATORY VENTILATION, 24-96 CONSECUTIVE HOURS, HIGH NASAL FLOW/VELOCITY: ICD-10-PCS | Performed by: INTERNAL MEDICINE

## 2025-02-27 PROCEDURE — 71275 CT ANGIOGRAPHY CHEST: CPT

## 2025-02-27 PROCEDURE — 36430 TRANSFUSION BLD/BLD COMPNT: CPT

## 2025-02-27 PROCEDURE — 74177 CT ABD & PELVIS W/CONTRAST: CPT

## 2025-02-27 PROCEDURE — 99233 SBSQ HOSP IP/OBS HIGH 50: CPT | Performed by: INTERNAL MEDICINE

## 2025-02-27 PROCEDURE — 30233N1 TRANSFUSION OF NONAUTOLOGOUS RED BLOOD CELLS INTO PERIPHERAL VEIN, PERCUTANEOUS APPROACH: ICD-10-PCS | Performed by: INTERNAL MEDICINE

## 2025-02-27 PROCEDURE — 6360000002 HC RX W HCPCS: Performed by: INTERNAL MEDICINE

## 2025-02-27 PROCEDURE — 85014 HEMATOCRIT: CPT

## 2025-02-27 PROCEDURE — 80053 COMPREHEN METABOLIC PANEL: CPT

## 2025-02-27 PROCEDURE — 82962 GLUCOSE BLOOD TEST: CPT

## 2025-02-27 PROCEDURE — 30233L1 TRANSFUSION OF NONAUTOLOGOUS FRESH PLASMA INTO PERIPHERAL VEIN, PERCUTANEOUS APPROACH: ICD-10-PCS | Performed by: INTERNAL MEDICINE

## 2025-02-27 PROCEDURE — 36592 COLLECT BLOOD FROM PICC: CPT

## 2025-02-27 RX ORDER — SODIUM CHLORIDE 9 MG/ML
INJECTION, SOLUTION INTRAVENOUS PRN
Status: DISCONTINUED | OUTPATIENT
Start: 2025-02-27 | End: 2025-02-28

## 2025-02-27 RX ORDER — IOPAMIDOL 755 MG/ML
75 INJECTION, SOLUTION INTRAVASCULAR
Status: COMPLETED | OUTPATIENT
Start: 2025-02-27 | End: 2025-02-27

## 2025-02-27 RX ADMIN — DOXYCYCLINE 100 MG: 100 INJECTION, POWDER, LYOPHILIZED, FOR SOLUTION INTRAVENOUS at 00:02

## 2025-02-27 RX ADMIN — Medication 10 ML: at 22:37

## 2025-02-27 RX ADMIN — IPRATROPIUM BROMIDE AND ALBUTEROL SULFATE 1 DOSE: .5; 2.5 SOLUTION RESPIRATORY (INHALATION) at 10:12

## 2025-02-27 RX ADMIN — AMIODARONE HYDROCHLORIDE 200 MG: 200 TABLET ORAL at 09:27

## 2025-02-27 RX ADMIN — DOXYCYCLINE 100 MG: 100 INJECTION, POWDER, LYOPHILIZED, FOR SOLUTION INTRAVENOUS at 16:44

## 2025-02-27 RX ADMIN — WATER 1000 MG: 1 INJECTION INTRAMUSCULAR; INTRAVENOUS; SUBCUTANEOUS at 12:35

## 2025-02-27 RX ADMIN — MAGNESIUM OXIDE 200 MG: 400 TABLET ORAL at 09:26

## 2025-02-27 RX ADMIN — IPRATROPIUM BROMIDE AND ALBUTEROL SULFATE 1 DOSE: .5; 2.5 SOLUTION RESPIRATORY (INHALATION) at 13:49

## 2025-02-27 RX ADMIN — ATORVASTATIN CALCIUM 40 MG: 40 TABLET, FILM COATED ORAL at 22:37

## 2025-02-27 RX ADMIN — Medication 10 ML: at 09:27

## 2025-02-27 RX ADMIN — IPRATROPIUM BROMIDE AND ALBUTEROL SULFATE 1 DOSE: .5; 2.5 SOLUTION RESPIRATORY (INHALATION) at 06:26

## 2025-02-27 RX ADMIN — IOPAMIDOL 75 ML: 755 INJECTION, SOLUTION INTRAVENOUS at 13:34

## 2025-02-27 RX ADMIN — IPRATROPIUM BROMIDE AND ALBUTEROL SULFATE 1 DOSE: .5; 2.5 SOLUTION RESPIRATORY (INHALATION) at 21:50

## 2025-02-27 RX ADMIN — IPRATROPIUM BROMIDE AND ALBUTEROL SULFATE 1 DOSE: .5; 2.5 SOLUTION RESPIRATORY (INHALATION) at 01:24

## 2025-02-27 ASSESSMENT — PAIN SCALES - GENERAL: PAINLEVEL_OUTOF10: 0

## 2025-02-27 NOTE — PROGRESS NOTES
Urology exchanged garner catheter with CBI, intake and output was lost during exchange. New I&O's started for accurate measurements

## 2025-02-27 NOTE — PROGRESS NOTES
Perfectserv to dr palacios with BMP results. Dr Palacios returned call and would like to do hemodialysis. Dr Palacios would also like a repeat BMP 3 hours after hemodialysis.    Hemodialysis nurse called but did not answer, left a voice message.

## 2025-02-27 NOTE — PLAN OF CARE
Problem: Chronic Conditions and Co-morbidities  Goal: Patient's chronic conditions and co-morbidity symptoms are monitored and maintained or improved  Outcome: Progressing     Problem: Discharge Planning  Goal: Discharge to home or other facility with appropriate resources  Outcome: Progressing     Problem: Pain  Goal: Verbalizes/displays adequate comfort level or baseline comfort level  2/27/2025 1647 by Marty Gannon RN  Outcome: Progressing     Problem: Safety - Adult  Goal: Free from fall injury  2/27/2025 1647 by Marty Gannon RN  Outcome: Progressing     Problem: Skin/Tissue Integrity  Goal: Skin integrity remains intact  Description: 1.  Monitor for areas of redness and/or skin breakdown  2.  Assess vascular access sites hourly  3.  Every 4-6 hours minimum:  Change oxygen saturation probe site  4.  Every 4-6 hours:  If on nasal continuous positive airway pressure, respiratory therapy assess nares and determine need for appliance change or resting period  2/27/2025 1647 by Marty Gannon RN  Outcome: Progressing     Problem: Respiratory - Adult  Goal: Achieves optimal ventilation and oxygenation  2/27/2025 1647 by Marty Gannon RN  Outcome: Progressing     Problem: Cardiovascular - Adult  Goal: Maintains optimal cardiac output and hemodynamic stability  Outcome: Progressing     Problem: Cardiovascular - Adult  Goal: Absence of cardiac dysrhythmias or at baseline  Outcome: Progressing     Problem: Skin/Tissue Integrity - Adult  Goal: Skin integrity remains intact  Description: 1.  Monitor for areas of redness and/or skin breakdown  2.  Assess vascular access sites hourly  3.  Every 4-6 hours minimum:  Change oxygen saturation probe site  4.  Every 4-6 hours:  If on nasal continuous positive airway pressure, respiratory therapy assess nares and determine need for appliance change or resting period  2/27/2025 1647 by Marty Gannon RN  Outcome: Progressing     Problem: Gastrointestinal -  Adult  Goal: Maintains or returns to baseline bowel function  Outcome: Progressing     Problem: Gastrointestinal - Adult  Goal: Maintains adequate nutritional intake  Outcome: Progressing     Problem: Genitourinary - Adult  Goal: Absence of urinary retention  Outcome: Progressing     Problem: Genitourinary - Adult  Goal: Urinary catheter remains patent  Outcome: Progressing     Problem: Infection - Adult  Goal: Absence of infection at discharge  Outcome: Progressing     Problem: Metabolic/Fluid and Electrolytes - Adult  Goal: Electrolytes maintained within normal limits  Outcome: Progressing     Problem: Metabolic/Fluid and Electrolytes - Adult  Goal: Hemodynamic stability and optimal renal function maintained  Outcome: Progressing     Problem: Metabolic/Fluid and Electrolytes - Adult  Goal: Glucose maintained within prescribed range  Outcome: Progressing     Problem: Hematologic - Adult  Goal: Maintains hematologic stability  Outcome: Progressing

## 2025-02-27 NOTE — PLAN OF CARE
Problem: Pain  Goal: Verbalizes/displays adequate comfort level or baseline comfort level  Outcome: Progressing     Problem: Safety - Adult  Goal: Free from fall injury  Outcome: Progressing     Problem: Skin/Tissue Integrity  Goal: Skin integrity remains intact  Description: 1.  Monitor for areas of redness and/or skin breakdown  2.  Assess vascular access sites hourly  3.  Every 4-6 hours minimum:  Change oxygen saturation probe site  4.  Every 4-6 hours:  If on nasal continuous positive airway pressure, respiratory therapy assess nares and determine need for appliance change or resting period  Outcome: Progressing  Flowsheets (Taken 2/26/2025 2000)  Skin Integrity Remains Intact: Monitor for areas of redness and/or skin breakdown     Problem: Respiratory - Adult  Goal: Achieves optimal ventilation and oxygenation  Outcome: Progressing     Problem: Skin/Tissue Integrity - Adult  Goal: Skin integrity remains intact  Description: 1.  Monitor for areas of redness and/or skin breakdown  2.  Assess vascular access sites hourly  3.  Every 4-6 hours minimum:  Change oxygen saturation probe site  4.  Every 4-6 hours:  If on nasal continuous positive airway pressure, respiratory therapy assess nares and determine need for appliance change or resting period  Outcome: Progressing  Flowsheets (Taken 2/26/2025 2000)  Skin Integrity Remains Intact: Monitor for areas of redness and/or skin breakdown

## 2025-02-27 NOTE — FLOWSHEET NOTE
02/27/25 0328   Vital Signs   BP (!) 116/42   Pulse 78   Respirations 20   SpO2 96 %   Weight - Scale (!) 155.8 kg (343 lb 7.6 oz)   Weight Method Bed scale   Percent Weight Change 2.23   Post-Hemodialysis Assessment   Post-Treatment Procedures Blood returned;Catheter capped, clamped and heparinized x 2 ports   Machine Disinfection Process Acid/Vinegar Clean;Exterior Machine Disinfection;Bleach;Machine Absence of Bleach Machine   Rinseback Volume (ml) 300 ml   Blood Volume Processed (Liters) 35.9 L   Dialyzer Clearance Lightly streaked   Duration of Treatment (minutes) 150 minutes   Heparin Amount Administered During Treatment (mL) 0 mL   Hemodialysis Intake (ml) 300 ml   Hemodialysis Output (ml) 800 ml   NET Removed (ml) 500   Tolerated Treatment Good   Patient Response to Treatment treatment complete, patient tolerated well   Bilateral Breath Sounds Diminished   RLE Edema +3   LLE Edema +3   Physician Notified No   Time Off 0312   Patient Disposition Remain in ICU/ED   Observations & Evaluations   Level of Consciousness 0   Heart Rhythm Regular   Respiratory Quality/Effort Unlabored   O2 Device PAP (positive airway pressure)

## 2025-02-27 NOTE — PROGRESS NOTES
92%     FiO2 : 35 %  O2 Flow Rate (L/min): 7 L/min  O2 Device: High flow nasal cannula    PHYSICAL EXAM:  Constitutional: No fever, chills, diaphoresis  Skin: No skin rash, no skin breakdown.  Still bleeding at the site of the dialysis catheter incision  HEENT: Large neck circumference; no JVD or lymphadenopathy  Neck: No hepatojugular reflux  Cardiovascular: S1, S2 regular.  No S3 or rubs present  Respiratory: Few crackles at both bases  Gastrointestinal: Soft, markedly obese, nontender  Genitourinary: Undergoing bladder irrigation  Extremities: Trace edema in ankles, some chronic venous stasis changes are present  Neurological: Awake alert oriented x 3.  No confusion.  No evidence of asterixis  Psychological: In fairly good spirits considering his overall condition    LABS:  WBC   Date Value Ref Range Status   02/27/2025 10.2 4.5 - 11.5 k/uL Final   02/26/2025 5.7 4.5 - 11.5 k/uL Final   02/25/2025 4.9 4.5 - 11.5 k/uL Final     Hemoglobin   Date Value Ref Range Status   02/27/2025 7.1 (L) 12.5 - 16.5 g/dL Final   02/26/2025 7.3 (L) 12.5 - 16.5 g/dL Final   02/26/2025 8.6 (L) 12.5 - 16.5 g/dL Final     Hematocrit   Date Value Ref Range Status   02/27/2025 24.7 (L) 37.0 - 54.0 % Final   02/26/2025 26.1 (L) 37.0 - 54.0 % Final   02/26/2025 30.5 (L) 37.0 - 54.0 % Final     MCV   Date Value Ref Range Status   02/27/2025 82.9 80.0 - 99.9 fL Final   02/26/2025 85.1 80.0 - 99.9 fL Final   02/25/2025 105.4 (H) 80.0 - 99.9 fL Final     Platelets   Date Value Ref Range Status   02/27/2025 237 130 - 450 k/uL Final   02/26/2025 194 130 - 450 k/uL Final   02/25/2025 179 130 - 450 k/uL Final     Sodium   Date Value Ref Range Status   02/27/2025 137 132 - 146 mmol/L Final   02/26/2025 136 132 - 146 mmol/L Final   02/26/2025 136 132 - 146 mmol/L Final     Potassium   Date Value Ref Range Status   02/27/2025 5.1 (H) 3.5 - 5.0 mmol/L Final   02/26/2025 5.9 (H) 3.5 - 5.0 mmol/L Final   02/26/2025 6.2 (H) 3.5 - 5.0 mmol/L Final  apixaban  Bleeding from IJ dialysis catheter site  Hematuria on continuous bladder irrigation  Diabetes mellitus.    PLAN:  CBI per urology who were nice enough to reposition the patient's intra prostate Merritt catheter  Exudative pleural effusion with low pH concerning for malignancy  Acute and chronic respiratory failure with hypoxemia and hypercapnia  Diabetes mellitus  Hold all anticoagulants  Significant bleeding from dialysis catheter suggest the patient may benefit from reversing the effects of Eliquis so we will give 1 unit of fresh frozen plasma  Fluids per nephrology  Dialysis per nephrology  Continue empiric Rocephin and doxycycline for now although we may need to stop this tomorrow  Important to obtain a CT of the chest and abdomen with IV contrast prior to the next dialysis because of a concern for malignancies as noted above.    ATTESTATION:  ICU Staff Physician note of personal involvement in Care  As the attending physician, I certify that I personally reviewed the patient’s history and personnally examined the patient to confirm the physical findings described above,  And that I reviewed the relevant imaging studies and available reports.  I also discussed the differential diagnosis and all of the proposed management plans with the patient and individuals accompanying the patient to this visit.  They had the opportunity to ask questions about the proposed management plans and to have those questions answered.     This patient has a high probability of sudden, clinically significant deterioration, which requires the highest level of physician preparedness to intervene urgently.  I managed/supervised life or organ supporting interventions that required frequent physician assessment.   I devoted my full attention to the direct care of this patient for the amount of time indicated below.  Time I spent with the family or surrogate(s) is included only if the patient was incapable of providing the necessary

## 2025-02-27 NOTE — PROGRESS NOTES
2/27/2025 11:35 AM  Paul Florez  [id number]    Subjective:  he remains in the ICU   Urine is yellow  CBI at slow rate  Wife present       Review of Systems  Constitutional: No fever or chills , weak, tired   Respiratory: positive for shortness of breath   Cardiovascular: negative for chest pain and dyspnea  Gastrointestinal: negative for abdominal pain, diarrhea, nausea and vomiting   : See above  Derm: negative for rash and skin lesion(s)  Neurological: negative for seizures and tremors  Musculoskeletal: Negative    Psychiatric: Negative   All other reviews are negative      Scheduled Meds:   sodium chloride flush  5-40 mL IntraVENous 2 times per day    amiodarone  200 mg Oral Daily    [Held by provider] apixaban  5 mg Oral BID    [Held by provider] aspirin  81 mg Oral Daily    levothyroxine  88 mcg Oral Daily    [Held by provider] losartan  100 mg Oral Daily    magnesium oxide  200 mg Oral Daily    [Held by provider] metoprolol succinate  50 mg Oral Daily    [Held by provider] tamsulosin  0.4 mg Oral Daily    ipratropium 0.5 mg-albuterol 2.5 mg  1 Dose Inhalation Q4H RT    atorvastatin  40 mg Oral Nightly    [Held by provider] furosemide  20 mg Oral Weekly    [Held by provider] glipiZIDE  5 mg Oral BID AC    pantoprazole  40 mg Oral QAM AC    [Held by provider] enoxaparin  40 mg SubCUTAneous Daily    cefTRIAXone (ROCEPHIN) IV  1,000 mg IntraVENous Q24H    doxycycline (VIBRAMYCIN) IV  100 mg IntraVENous Q12H    insulin lispro  0-8 Units SubCUTAneous 4x Daily AC & HS       Objective:  Vitals:    02/27/25 1100   BP: (!) 101/47   Pulse: 75   Resp: 20   Temp:    SpO2: 92%         Allergies: Patient has no known allergies.    General Appearance: alert and oriented to person, place and time and in no acute distress  Skin: no rash or erythema  Head: normocephalic and atraumatic  Pulmonary/Chest: normal air movement, no respiratory distress  Abdomen: soft, non-tender, non-distended  Genitourinary: garner with yellow  urine   Extremities: no cyanosis, clubbing or edema         Labs:     Recent Labs     02/27/25  0515      K 5.1*      CO2 28   BUN 28*   CREATININE 2.6*   GLUCOSE 129*   CALCIUM 8.0*       Lab Results   Component Value Date/Time    HGB 7.1 02/27/2025 05:15 AM    HCT 24.7 02/27/2025 05:15 AM       Lab Results   Component Value Date    PSA 1.65 06/24/2024    PSA 1.62 03/05/2024    PSA 1.58 04/30/2013         Assessment/Plan:  Malpositioned garner catheter   Traumatic hematuria   Difficult garner requiring bedside cysto for placement     Continue the ICU care  Continue to watch the creatinine   CT abdomen pelvis is ordered and pending, will ensure no obstructive uropathy   I did hand irrigate the garner and got no clots   We will stop the CBI today   We will continue to irrigate every 4 hours today   By tomorrow, if remains clear and clot free with manual irrigations we will stop irrigations all together  Continue the garner   Garner should not be removed at all  He will need to have this left indwelling for at least 1 to 2 weeks and undergo a voiding trial outpatient   Will follow       JEROME Franklin - CNP   [facility]  Urology

## 2025-02-27 NOTE — PROGRESS NOTES
Internal Medicine Progress Note     [facility]     Chandler Abebe D.O., DIEGOI.                         Curry Dennis D.O., MARIA R Dillon D.O.     Gauri Clement, MSN, APRN, NP-C  Adonis Gary, MSN, APRN-CNP  Rambo Mccloud, MSN, APRN, NP-C  Leona Collins, MSN, APRN-CNP  Lily Isaac, MSN, APRN, NP-C     Primary Care Physician: Eduin Guzman DO   Admitting Physician:  Chandler Abebe DO  Admission date and time: 2/25/2025  6:39 PM    Room:  [place] [address]  Admitting diagnosis: Hyperkalemia [E87.5]  Hypoglycemia [E16.2]  KEN (acute kidney injury) [N17.9]  Acute respiratory failure with hypoxia (HCC) [J96.01]  Congestive heart failure, unspecified HF chronicity, unspecified heart failure type (HCC) [I50.9]    Patient Name: Paul Florez  MRN: [id number]    Date of Service: 2/27/2025     Subjective:  Paul is a 74 y.o. male who was seen and examined today,2/27/2025, at the bedside.  Patient currently on BiPAP 16 over 1135% FiO2.  Patient maintaining sinus rhythm at 72 and blood pressure 128/44 on Levophed which is being titrated.  Merritt catheter in place with some urine output.  Bladder irrigation is being stopped.  Patient does have some evidence of peripheral edema        Review of System: Somewhat limited due to acuity  Constitutional:   Does not disclose fever or chills, weight loss or gain, positive for fatigue and malaise.  HEENT:   Does not disclose ear pain, sore throat, sinus or eye problems.  Cardiovascular:   Does not disclose any chest pain or palpitations.  Positive for history of atrial fibrillation on chronic Eliquis therapy.  Respiratory:   Positive for shortness of breath, coughing, but does not report sputum production, hemoptysis, or wheezing.  Gastrointestinal:   Does not disclose nausea, vomiting, diarrhea, or constipation.  Does not disclose any abdominal pain.  Genitourinary:    Does not disclose any urgency, frequency, hematuria. Voiding  without  regular, even and nonlabored on continuous NIPPV.  Abdomen:   Soft.  Rounded and obese.  Non-tender. Non-distended. Bowel sounds positive.  No appreciable ascites present.  Extremities:    Peripheral pulses present.  There is some chronic appearing lower extremity swelling with stasis dermatitis related changes.    Neurologic:    Alert x 3.  Ill and generally weak with no focal deficit.  Cranial nerves grossly intact. No focal weakness.  Psych:   Behavior is normal. Mood appears normal. Speech is not rapid and/or pressured.  Musculoskeletal:   No unilateral joint edema, erythema or warmth. Gait not assessed.  Integumentary:  No rashes  Skin normal color and texture.  Genitalia/Breast:  Deferred      Medication:  Scheduled Meds:   sodium chloride flush  5-40 mL IntraVENous 2 times per day    amiodarone  200 mg Oral Daily    [Held by provider] apixaban  5 mg Oral BID    [Held by provider] aspirin  81 mg Oral Daily    levothyroxine  88 mcg Oral Daily    [Held by provider] losartan  100 mg Oral Daily    magnesium oxide  200 mg Oral Daily    [Held by provider] metoprolol succinate  50 mg Oral Daily    [Held by provider] tamsulosin  0.4 mg Oral Daily    ipratropium 0.5 mg-albuterol 2.5 mg  1 Dose Inhalation Q4H RT    atorvastatin  40 mg Oral Nightly    [Held by provider] furosemide  20 mg Oral Weekly    [Held by provider] glipiZIDE  5 mg Oral BID AC    pantoprazole  40 mg Oral QAM AC    [Held by provider] enoxaparin  40 mg SubCUTAneous Daily    cefTRIAXone (ROCEPHIN) IV  1,000 mg IntraVENous Q24H    doxycycline (VIBRAMYCIN) IV  100 mg IntraVENous Q12H    insulin lispro  0-8 Units SubCUTAneous 4x Daily AC & HS     Continuous Infusions:   sodium chloride      sodium chloride      sodium chloride      dextrose      dextrose 5% in lactated ringers 75 mL/hr at 02/27/25 1123    norepinephrine Stopped (02/27/25 0829)       Objective Data:  CBC with Differential:    Lab Results   Component Value Date/Time    WBC 10.2 02/27/2025

## 2025-02-27 NOTE — PROGRESS NOTES
INPATIENT CARDIOLOGY FOLLOW-UP    Name: Paul Florez    Age: 74 y.o.    Date of Admission: 2/25/2025  6:39 PM    Date of Service: 2/27/2025    Chief Complaint: Follow-up for CAD, CHF, hypotension, AF    Interim History:  Currently on BiPAP / no chest pain.  on EKG and telemetry. No new overnight cardiac complaints. Currently on low dose levophed.    Review of Systems:   Unable to adequately obtain (currently on BiPAP)     Problem List:  Patient Active Problem List   Diagnosis    Aguila's esophagus without dysplasia    Duodenal mass    Cardiomyopathy (HCC)    Coronary artery disease involving native coronary artery of native heart without angina pectoris    BMI 40.0-44.9, adult    Spinal stenosis of lumbar region without neurogenic claudication    Lumbar spondylosis    Lumbar facet arthropathy    Lumbar disc disorder    Chronic pain syndrome [G89.4 (ICD-10-CM)]    Observation after surgery    Ventricular tachycardia (HCC)    Iron deficiency anemia, unspecified    Chronic systolic (congestive) heart failure (HCC)    Type 2 diabetes mellitus without complication, without long-term current use of insulin (HCC)    Shortness of breath    Other fatigue    JOSE MANUEL (obstructive sleep apnea)    Diarrhea    Obesity, class 3 (E66.813)    Body mass index [BMI] 45.0-49.9, adult (Z68.42)    Hypoglycemia    Acute HFrEF (heart failure with reduced ejection fraction) (HCC)    Congestive heart failure (HCC)    Acute respiratory failure with hypoxia (HCC)    KEN (acute kidney injury)    PAF (paroxysmal atrial fibrillation) (HCC)       Allergies:  No Known Allergies    Current Medications:  Current Facility-Administered Medications   Medication Dose Route Frequency Provider Last Rate Last Admin    0.9 % sodium chloride infusion   IntraVENous PRLauro Allen MD        0.9 % sodium chloride infusion   IntraVENous PRLauro Allen MD        sodium chloride flush 0.9 % injection 5-40 mL  5-40 mL IntraVENous 2 times per day Jay

## 2025-02-27 NOTE — PROGRESS NOTES
Nephrology Note  Alex Palacios MD          Patient seen and examined.  Patient's wife is present at the bedside.  Chart reviewed.  Hematuria has cleared.  Continuous bladder irrigation has been stopped.  Patient is feeling better.  He is less short of breath.  He has decreased oxygen demands.  Appetite is slowly improving.  No nausea or dysguesia.   Awake and alert .   In no acute distress.    Vital SignsBP (!) 101/46   Pulse 81   Temp 98.7 °F (37.1 °C) (Axillary)   Resp 22   Ht 1.753 m (5' 9\")   Wt (!) 155.8 kg (343 lb 7.6 oz)   SpO2 90%   BMI 50.72 kg/m²   24HR INTAKE/OUTPUT:    Intake/Output Summary (Last 24 hours) at 2/27/2025 1510  Last data filed at 2/27/2025 1241  Gross per 24 hour   Intake 2696.53 ml   Output 1850 ml   Net 846.53 ml         PHYSICAL EXAM        Neck: No JVD  Lungs: Breath sounds decreased at the bases. No rales or ronchi.  Heart: Regular rate and rhythm. No S3 gallop. No  murmrur.  Abdomen: Soft non distended, non tender and normal bowel sounds.  Extremeties:   +1 bipedal edema    Current Facility-Administered Medications   Medication Dose Route Frequency Provider Last Rate Last Admin    0.9 % sodium chloride infusion   IntraVENous PRN Lauro Franklin MD        0.9 % sodium chloride infusion   IntraVENous PRN Lauro Franklin MD        sodium chloride flush 0.9 % injection 5-40 mL  5-40 mL IntraVENous 2 times per day Jay, Ismail U, DO   10 mL at 02/27/25 0927    sodium chloride flush 0.9 % injection 5-40 mL  5-40 mL IntraVENous PRN Jay, Ismail U, DO        0.9 % sodium chloride infusion   IntraVENous PRN Jay, Ismail U, DO        polyethylene glycol (GLYCOLAX) packet 17 g  17 g Oral Daily PRN Jay, Ismail U, DO        acetaminophen (TYLENOL) tablet 650 mg  650 mg Oral Q6H PRN Jay, Ismail U, DO        Or    acetaminophen (TYLENOL) suppository 650 mg  650 mg Rectal Q6H PRN Jay, Ismail U, DO        amiodarone (CORDARONE) tablet 200 mg  200 mg Oral Daily  less than 7 g/dL.  Repeat serum iron studies.  Follow hemoglobin and hematocrit.         Hypocalcemia.  Hypocalcemia of undetermined etiology.  Await  ionized calcium vitamin D and PTH levels.     Volume overload/edema.  Will attempt ultrafiltration as allowed by hemodynamics.    Chronic kidney disease stage G3 a secondary to microvascular disease with diabetic nephropathy and/or hypertensive kidney disease with a baseline serum creatinine of  1.4  mg/dL to  1.6 mg/dL.      Patient's condition discussed in detail with the patient and his wife.  Also discussed with Dr. Franklin intensivist.                Alex Palacios MD  Nephrology  Electronically signed by Alex Palacios MD on 2/27/2025 at 3:09 PM      Note: This report was completed utilizing computer voice recognition software. Every effort has been made to ensure accuracy, however; inadvertent computerized transcription errors may be present.

## 2025-02-27 NOTE — CARE COORDINATION
Case Management Assessment  Initial Evaluation    Date/Time of Evaluation: 2/27/2025 11:13 AM  Assessment Completed by: Akosua Burkett RN    If patient is discharged prior to next notation, then this note serves as note for discharge by case management.    Patient Name: Paul Florez                   YOB: 1950  Diagnosis: Hyperkalemia [E87.5]  Hypoglycemia [E16.2]  KEN (acute kidney injury) [N17.9]  Acute respiratory failure with hypoxia (HCC) [J96.01]  Congestive heart failure, unspecified HF chronicity, unspecified heart failure type (HCC) [I50.9]                   Date / Time: 2/25/2025  6:39 PM    Patient Admission Status: Inpatient   Readmission Risk (Low < 19, Mod (19-27), High > 27): Readmission Risk Score: 18.8    Current PCP: Eduin Guzman, DO  PCP verified by CM? Yes    Chart Reviewed: Yes      History Provided by: Patient  Patient Orientation: Alert and Oriented, Person, Place, Situation, Self    Patient Cognition: Alert    Hospitalization in the last 30 days (Readmission):  No      Advance Directives:      Code Status: Full Code   Patient's Primary Decision Maker is: Legal Next of Kin    Primary Decision Maker: VikkiGagandeep zhongsa - Spouse - [phone]    Secondary Decision Maker: Paul Florez AEdmundo - Child - [phone]    Supplemental (Other) Decision Maker: Tiago Florez - Child - [phone]    Discharge Planning:    Patient lives with: Spouse/Significant Other, Children Type of Home: House  Primary Care Giver: Self  Patient Support Systems include: Spouse/Significant Other, Children, Family Members     Current services prior to admission: None            Current DME:  Cane, Walker            Type of Home Care services:  None    ADLS  Prior functional level: Assistance with the following:, Mobility (walker versus cane)  Current functional level: Assistance with the following:, Mobility (walker cane)      Family can provide assistance at DC: Yes  Would you like Case Management to  discuss the discharge plan with any other family members/significant others, and if so, who? Yes (wife answering questions along with patient)  Plans to Return to Present Housing: Yes  Other Identified Issues/Barriers to RETURNING to current housing:   Potential Assistance needed at discharge: N/A            Potential DME:  unsure  Patient expects to discharge to: Unknown  Plan for transportation at discharge:  hoping home with family     Financial    Payor: HU[employer] MEDICARE / Plan: HU[employer] CHOICE-PPO MEDICARE / Product Type: *No Product type* /       [facility] - [place], [place] - [address] - P [phone] - F [phone]  [address] [address]  Phone: [phone] Fax: [phone]    [id number] - [place], [place] - [address] - P [phone] - F [phone]  [address] [place] [address]  [place] [address]  Phone: [phone] Fax: [phone]      Notes:    Factors facilitating achievement of predicted outcomes: Family support, Cooperative, and Pleasant    Barriers to discharge: Lower extremity weakness and Long standing deficits    Additional Case Management Notes: 2/27/2025 ICU, Levophed gtt, Rocephin, Vibramycin, CPAP at bedside, currently NC- no home oxygen or cpap. HD NEW- watch for discharge needs. Pt from home with wife and son. Uses a cane or a walker. Wife will provide a ride at discharge. Pastoral care and CM following. quincy Burkett RN-BC  Case Management Department  Ph: [phone]

## 2025-02-28 ENCOUNTER — APPOINTMENT (OUTPATIENT)
Dept: GENERAL RADIOLOGY | Age: 75
End: 2025-02-28
Payer: MEDICARE

## 2025-02-28 LAB
AADO2: 100.4 MMHG
ALBUMIN SERPL-MCNC: 2.4 G/DL (ref 3.5–5.2)
ALP SERPL-CCNC: 79 U/L (ref 40–129)
ALT SERPL-CCNC: 10 U/L (ref 0–40)
ANION GAP SERPL CALCULATED.3IONS-SCNC: 3 MMOL/L (ref 7–16)
ARM BAND NUMBER: NORMAL
AST SERPL-CCNC: 24 U/L (ref 0–39)
B.E.: -0.5 MMOL/L (ref -3–3)
BASOPHILS # BLD: 0.02 K/UL (ref 0–0.2)
BASOPHILS NFR BLD: 0 % (ref 0–2)
BILIRUB DIRECT SERPL-MCNC: <0.2 MG/DL (ref 0–0.3)
BILIRUB SERPL-MCNC: 0.4 MG/DL (ref 0–1.2)
BLOOD BANK BLOOD PRODUCT EXPIRATION DATE: NORMAL
BLOOD BANK DISPENSE STATUS: NORMAL
BLOOD BANK ISBT PRODUCT BLOOD TYPE: 600
BLOOD BANK PRODUCT CODE: NORMAL
BLOOD BANK UNIT TYPE AND RH: NORMAL
BPU ID: NORMAL
BUN SERPL-MCNC: 21 MG/DL (ref 6–23)
CALCIUM SERPL-MCNC: 8.4 MG/DL (ref 8.6–10.2)
CHLORIDE SERPL-SCNC: 107 MMOL/L (ref 98–107)
CO2 SERPL-SCNC: 29 MMOL/L (ref 22–29)
COHB: 2.1 % (ref 0–1.5)
COMPONENT: NORMAL
CREAT SERPL-MCNC: 2.2 MG/DL (ref 0.7–1.2)
CRITICAL: ABNORMAL
DATE ANALYZED: ABNORMAL
DATE OF COLLECTION: ABNORMAL
EOSINOPHIL # BLD: 0.05 K/UL (ref 0.05–0.5)
EOSINOPHILS RELATIVE PERCENT: 1 % (ref 0–6)
ERYTHROCYTE [DISTWIDTH] IN BLOOD BY AUTOMATED COUNT: 18.6 % (ref 11.5–15)
FERRITIN SERPL-MCNC: 54 NG/ML
FIO2: 35 %
GFR, ESTIMATED: 30 ML/MIN/1.73M2
GLUCOSE BLD-MCNC: 134 MG/DL (ref 74–99)
GLUCOSE BLD-MCNC: 147 MG/DL (ref 74–99)
GLUCOSE BLD-MCNC: 152 MG/DL (ref 74–99)
GLUCOSE BLD-MCNC: 187 MG/DL (ref 74–99)
GLUCOSE SERPL-MCNC: 139 MG/DL (ref 74–99)
HCO3: 26.2 MMOL/L (ref 22–26)
HCT VFR BLD AUTO: 23.5 % (ref 37–54)
HCT VFR BLD AUTO: 24 % (ref 37–54)
HCT VFR BLD AUTO: 24.5 % (ref 37–54)
HCT VFR BLD AUTO: 25.7 % (ref 37–54)
HGB BLD-MCNC: 6.9 G/DL (ref 12.5–16.5)
HGB BLD-MCNC: 7.2 G/DL (ref 12.5–16.5)
HGB BLD-MCNC: 7.4 G/DL (ref 12.5–16.5)
HGB BLD-MCNC: 7.6 G/DL (ref 12.5–16.5)
HHB: 5.6 % (ref 0–5)
IMM GRANULOCYTES # BLD AUTO: <0.03 K/UL (ref 0–0.58)
IMM GRANULOCYTES NFR BLD: 0 % (ref 0–5)
INR PPP: 1.4
IRON SATN MFR SERPL: 23 % (ref 20–55)
IRON SERPL-MCNC: 52 UG/DL (ref 59–158)
LAB: ABNORMAL
LYMPHOCYTES NFR BLD: 0.83 K/UL (ref 1.5–4)
LYMPHOCYTES RELATIVE PERCENT: 12 % (ref 20–42)
Lab: 407
MAGNESIUM SERPL-MCNC: 1.8 MG/DL (ref 1.6–2.6)
MCH RBC QN AUTO: 24.7 PG (ref 26–35)
MCHC RBC AUTO-ENTMCNC: 29.4 G/DL (ref 32–34.5)
MCV RBC AUTO: 84.2 FL (ref 80–99.9)
METHB: 0.3 % (ref 0–1.5)
MODE: ABNORMAL
MONOCYTES NFR BLD: 0.64 K/UL (ref 0.1–0.95)
MONOCYTES NFR BLD: 9 % (ref 2–12)
NEUTROPHILS NFR BLD: 78 % (ref 43–80)
NEUTS SEG NFR BLD: 5.67 K/UL (ref 1.8–7.3)
NON-GYN CYTOLOGY REPORT: NORMAL
O2 CONTENT: 10 ML/DL
O2 SATURATION: 94.3 % (ref 92–98.5)
O2HB: 92 % (ref 94–97)
OPERATOR ID: 2323
PATIENT TEMP: 37 C
PCO2: 55.1 MMHG (ref 35–45)
PEEP/CPAP: 11 CMH2O
PFO2: 2.18 MMHG/%
PH BLOOD GAS: 7.29 (ref 7.35–7.45)
PHOSPHATE SERPL-MCNC: 4.2 MG/DL (ref 2.5–4.5)
PLATELET # BLD AUTO: 145 K/UL (ref 130–450)
PMV BLD AUTO: 11 FL (ref 7–12)
PO2: 76.3 MMHG (ref 75–100)
POTASSIUM SERPL-SCNC: 4.9 MMOL/L (ref 3.5–5)
PROCALCITONIN SERPL-MCNC: 0.4 NG/ML (ref 0–0.08)
PROT SERPL-MCNC: 5.9 G/DL (ref 6.4–8.3)
PROTHROMBIN TIME: 15.8 SEC (ref 9.3–12.4)
PS: 15 CMH20
RBC # BLD AUTO: 2.79 M/UL (ref 3.8–5.8)
REPORT STATUS: NORMAL
RI(T): 1.32
SODIUM SERPL-SCNC: 139 MMOL/L (ref 132–146)
SOURCE, BLOOD GAS: ABNORMAL
THB: 7.6 G/DL (ref 11.5–16.5)
TIBC SERPL-MCNC: 222 UG/DL (ref 250–450)
TIME ANALYZED: 415
TRANSFUSION STATUS: NORMAL
UNIT DIVISION: 0
UNIT ISSUE DATE/TIME: NORMAL
WBC OTHER # BLD: 7.2 K/UL (ref 4.5–11.5)

## 2025-02-28 PROCEDURE — 36592 COLLECT BLOOD FROM PICC: CPT

## 2025-02-28 PROCEDURE — 6370000000 HC RX 637 (ALT 250 FOR IP): Performed by: INTERNAL MEDICINE

## 2025-02-28 PROCEDURE — 2580000003 HC RX 258: Performed by: INTERNAL MEDICINE

## 2025-02-28 PROCEDURE — 85018 HEMOGLOBIN: CPT

## 2025-02-28 PROCEDURE — 82728 ASSAY OF FERRITIN: CPT

## 2025-02-28 PROCEDURE — 2000000000 HC ICU R&B

## 2025-02-28 PROCEDURE — 83540 ASSAY OF IRON: CPT

## 2025-02-28 PROCEDURE — 94660 CPAP INITIATION&MGMT: CPT

## 2025-02-28 PROCEDURE — 83735 ASSAY OF MAGNESIUM: CPT

## 2025-02-28 PROCEDURE — 84145 PROCALCITONIN (PCT): CPT

## 2025-02-28 PROCEDURE — 6360000002 HC RX W HCPCS

## 2025-02-28 PROCEDURE — 2500000003 HC RX 250 WO HCPCS: Performed by: INTERNAL MEDICINE

## 2025-02-28 PROCEDURE — 82248 BILIRUBIN DIRECT: CPT

## 2025-02-28 PROCEDURE — 94640 AIRWAY INHALATION TREATMENT: CPT

## 2025-02-28 PROCEDURE — 85014 HEMATOCRIT: CPT

## 2025-02-28 PROCEDURE — 6370000000 HC RX 637 (ALT 250 FOR IP): Performed by: NURSE PRACTITIONER

## 2025-02-28 PROCEDURE — 90935 HEMODIALYSIS ONE EVALUATION: CPT

## 2025-02-28 PROCEDURE — 82805 BLOOD GASES W/O2 SATURATION: CPT

## 2025-02-28 PROCEDURE — P9016 RBC LEUKOCYTES REDUCED: HCPCS

## 2025-02-28 PROCEDURE — 99291 CRITICAL CARE FIRST HOUR: CPT | Performed by: INTERNAL MEDICINE

## 2025-02-28 PROCEDURE — 2580000003 HC RX 258

## 2025-02-28 PROCEDURE — 83550 IRON BINDING TEST: CPT

## 2025-02-28 PROCEDURE — 80053 COMPREHEN METABOLIC PANEL: CPT

## 2025-02-28 PROCEDURE — 82962 GLUCOSE BLOOD TEST: CPT

## 2025-02-28 PROCEDURE — 2700000000 HC OXYGEN THERAPY PER DAY

## 2025-02-28 PROCEDURE — 71045 X-RAY EXAM CHEST 1 VIEW: CPT

## 2025-02-28 PROCEDURE — 36430 TRANSFUSION BLD/BLD COMPNT: CPT

## 2025-02-28 PROCEDURE — 99233 SBSQ HOSP IP/OBS HIGH 50: CPT | Performed by: INTERNAL MEDICINE

## 2025-02-28 PROCEDURE — 84100 ASSAY OF PHOSPHORUS: CPT

## 2025-02-28 PROCEDURE — 6360000002 HC RX W HCPCS: Performed by: INTERNAL MEDICINE

## 2025-02-28 PROCEDURE — 85025 COMPLETE CBC W/AUTO DIFF WBC: CPT

## 2025-02-28 PROCEDURE — 85610 PROTHROMBIN TIME: CPT

## 2025-02-28 RX ORDER — LEVOTHYROXINE SODIUM 88 UG/1
88 TABLET ORAL DAILY
Status: DISCONTINUED | OUTPATIENT
Start: 2025-03-01 | End: 2025-03-07 | Stop reason: HOSPADM

## 2025-02-28 RX ORDER — SODIUM CHLORIDE 9 MG/ML
INJECTION, SOLUTION INTRAVENOUS PRN
Status: DISCONTINUED | OUTPATIENT
Start: 2025-02-28 | End: 2025-02-28

## 2025-02-28 RX ORDER — SODIUM CHLORIDE 9 MG/ML
INJECTION, SOLUTION INTRAVENOUS PRN
Status: DISCONTINUED | OUTPATIENT
Start: 2025-02-28 | End: 2025-03-07 | Stop reason: HOSPADM

## 2025-02-28 RX ORDER — MAGNESIUM SULFATE 1 G/100ML
1000 INJECTION INTRAVENOUS ONCE
Status: COMPLETED | OUTPATIENT
Start: 2025-02-28 | End: 2025-02-28

## 2025-02-28 RX ADMIN — IPRATROPIUM BROMIDE AND ALBUTEROL SULFATE 1 DOSE: .5; 2.5 SOLUTION RESPIRATORY (INHALATION) at 09:27

## 2025-02-28 RX ADMIN — PANTOPRAZOLE SODIUM 40 MG: 40 INJECTION, POWDER, FOR SOLUTION INTRAVENOUS at 08:35

## 2025-02-28 RX ADMIN — AMIODARONE HYDROCHLORIDE 200 MG: 200 TABLET ORAL at 08:35

## 2025-02-28 RX ADMIN — DOXYCYCLINE 100 MG: 100 INJECTION, POWDER, LYOPHILIZED, FOR SOLUTION INTRAVENOUS at 04:19

## 2025-02-28 RX ADMIN — IPRATROPIUM BROMIDE AND ALBUTEROL SULFATE 1 DOSE: .5; 2.5 SOLUTION RESPIRATORY (INHALATION) at 17:53

## 2025-02-28 RX ADMIN — WATER 1000 MG: 1 INJECTION INTRAMUSCULAR; INTRAVENOUS; SUBCUTANEOUS at 12:34

## 2025-02-28 RX ADMIN — LEVOTHYROXINE SODIUM 88 MCG: 0.09 TABLET ORAL at 06:02

## 2025-02-28 RX ADMIN — MAGNESIUM SULFATE 1000 MG: 1 INJECTION INTRAVENOUS at 08:38

## 2025-02-28 RX ADMIN — MAGNESIUM OXIDE 200 MG: 400 TABLET ORAL at 08:35

## 2025-02-28 RX ADMIN — Medication 10 ML: at 08:36

## 2025-02-28 RX ADMIN — ATORVASTATIN CALCIUM 40 MG: 40 TABLET, FILM COATED ORAL at 20:52

## 2025-02-28 RX ADMIN — IPRATROPIUM BROMIDE AND ALBUTEROL SULFATE 1 DOSE: .5; 2.5 SOLUTION RESPIRATORY (INHALATION) at 01:52

## 2025-02-28 RX ADMIN — IPRATROPIUM BROMIDE AND ALBUTEROL SULFATE 1 DOSE: .5; 2.5 SOLUTION RESPIRATORY (INHALATION) at 21:41

## 2025-02-28 RX ADMIN — DOXYCYCLINE 100 MG: 100 INJECTION, POWDER, LYOPHILIZED, FOR SOLUTION INTRAVENOUS at 16:14

## 2025-02-28 RX ADMIN — Medication 10 ML: at 20:52

## 2025-02-28 RX ADMIN — SODIUM CHLORIDE, SODIUM LACTATE, POTASSIUM CHLORIDE, CALCIUM CHLORIDE AND DEXTROSE MONOHYDRATE: 5; 600; 310; 30; 20 INJECTION, SOLUTION INTRAVENOUS at 04:19

## 2025-02-28 RX ADMIN — INSULIN LISPRO 2 UNITS: 100 INJECTION, SOLUTION INTRAVENOUS; SUBCUTANEOUS at 21:17

## 2025-02-28 RX ADMIN — IPRATROPIUM BROMIDE AND ALBUTEROL SULFATE 1 DOSE: .5; 2.5 SOLUTION RESPIRATORY (INHALATION) at 04:01

## 2025-02-28 RX ADMIN — PANTOPRAZOLE SODIUM 40 MG: 40 TABLET, DELAYED RELEASE ORAL at 06:02

## 2025-02-28 RX ADMIN — PANTOPRAZOLE SODIUM 40 MG: 40 INJECTION, POWDER, FOR SOLUTION INTRAVENOUS at 20:52

## 2025-02-28 RX ADMIN — IPRATROPIUM BROMIDE AND ALBUTEROL SULFATE 1 DOSE: .5; 2.5 SOLUTION RESPIRATORY (INHALATION) at 13:56

## 2025-02-28 ASSESSMENT — PAIN SCALES - GENERAL: PAINLEVEL_OUTOF10: 0

## 2025-02-28 NOTE — FLOWSHEET NOTE
02/28/25 1830   Vital Signs   /64   Pulse 90   Respirations (!) 34   SpO2 93 %   Weight - Scale (!) 143.4 kg (316 lb 2.2 oz)   Percent Weight Change -1.04   Post-Hemodialysis Assessment   Rinseback Volume (ml) 300 ml   Blood Volume Processed (Liters) 59.2 L   Dialyzer Clearance Lightly streaked   Duration of Treatment (minutes) 210 minutes   Heparin Amount Administered During Treatment (mL) 0 mL   Hemodialysis Intake (ml) 300 ml   Hemodialysis Output (ml) 1800 ml   NET Removed (ml) 1500   Tolerated Treatment Good   Patient Response to Treatment tolerated tx well   Bilateral Breath Sounds Diminished   Observations & Evaluations   Level of Consciousness 0   Oriented X 3   Heart Rhythm Regular   Respiratory Quality/Effort Unlabored

## 2025-02-28 NOTE — FLOWSHEET NOTE
02/27/25 1936   Vital Signs   BP (!) 130/46   Temp 98.7 °F (37.1 °C)   Pulse 86   Respirations 20   Weight - Scale (!) 154.8 kg (341 lb 4.4 oz)   Weight Method Bed scale   Percent Weight Change -0.64   Pain Assessment   Pain Assessment None - Denies Pain   Post-Hemodialysis Assessment   Post-Treatment Procedures Blood returned;Catheter capped, clamped and heparinized x 2 ports   Machine Disinfection Process Acid/Vinegar Clean;Exterior Machine Disinfection;Heat Disinfect   Rinseback Volume (ml) 300 ml   Blood Volume Processed (Liters) 52.3 L   Dialyzer Clearance Lightly streaked   Duration of Treatment (minutes) 210 minutes   Hemodialysis Intake (ml) 1050 ml   Hemodialysis Output (ml) 2050 ml   NET Removed (ml) 1000   Tolerated Treatment Good   Bilateral Breath Sounds Diminished   Edema Generalized   Edema Generalized +1   Time Off 1921   Patient Disposition Remain in ICU/ED   Observations & Evaluations   Level of Consciousness 0   Oriented X 3   Heart Rhythm Regular   Respiratory Quality/Effort Unlabored   O2 Device Nasal cannula   Skin Color Pink   Skin Condition/Temp Dry;Warm   Abdomen Inspection Pannus;Obese   Comments Pt tolerated well, net UF 1 L.

## 2025-02-28 NOTE — PROGRESS NOTES
INPATIENT CARDIOLOGY FOLLOW-UP    Name: Paul Florez    Age: 74 y.o.    Date of Admission: 2/25/2025  6:39 PM    Date of Service: 2/28/2025    Chief Complaint: Follow-up for CAD, CHF, hypotension, AF    Interim History:  Currently on BiPAP / no chest pain.  on EKG and telemetry. Off levophed since 2/27/25 AM. Currently receiving PRBC's.    Review of Systems:   Unable to adequately obtain (currently on BiPAP)     Problem List:  Patient Active Problem List   Diagnosis    Aguila's esophagus without dysplasia    Duodenal mass    Cardiomyopathy (HCC)    Coronary artery disease involving native coronary artery of native heart without angina pectoris    BMI 40.0-44.9, adult    Spinal stenosis of lumbar region without neurogenic claudication    Lumbar spondylosis    Lumbar facet arthropathy    Lumbar disc disorder    Chronic pain syndrome [G89.4 (ICD-10-CM)]    Observation after surgery    Ventricular tachycardia (HCC)    Iron deficiency anemia, unspecified    Chronic systolic (congestive) heart failure (HCC)    Type 2 diabetes mellitus without complication, without long-term current use of insulin (HCC)    Shortness of breath    Other fatigue    JOSE MANUEL (obstructive sleep apnea)    Diarrhea    Obesity, class 3 (E66.813)    Body mass index [BMI] 45.0-49.9, adult (Z68.42)    Hypoglycemia    Acute HFrEF (heart failure with reduced ejection fraction) (HCC)    Congestive heart failure (HCC)    Acute respiratory failure with hypoxia (HCC)    KEN (acute kidney injury)    PAF (paroxysmal atrial fibrillation) (HCC)       Allergies:  No Known Allergies    Current Medications:  Current Facility-Administered Medications   Medication Dose Route Frequency Provider Last Rate Last Admin    0.9 % sodium chloride infusion   IntraVENous PRN Rogelio Han APRN - CNP        pantoprazole (PROTONIX) 40 mg in sodium chloride (PF) 0.9 % 10 mL injection  40 mg IntraVENous Q12H Rogelio Han APRN - CNP   40 mg at 02/28/25 0806     T4FREE 1.2 02/26/2025      Lab Results   Component Value Date    LABA1C 5.7 (H) 02/26/2025     No results found for: \"EAG\"  Lab Results   Component Value Date    CHOL 86 02/21/2025    CHOL 111 01/13/2025    CHOL 127 06/24/2024     Lab Results   Component Value Date    TRIG 83 02/21/2025    TRIG 82 01/13/2025    TRIG 105 06/24/2024     Lab Results   Component Value Date    HDL 42 02/21/2025    HDL 49 01/13/2025    HDL 53 06/24/2024     No components found for: \"LDLCALC\", \"LDLCHOLESTEROL\"  Lab Results   Component Value Date    VLDL 17 02/21/2025    VLDL 16 01/13/2025    VLDL 21 06/24/2024     No results found for: \"CHOLHDLRATIO\"  Recent Labs     02/25/25 2042   PROBNP 1,366*     No results found for: \"SEDRATE\"  No results found for: \"CRP\"    Cardiac Tests:  EKG personally reviewed: , rate 61, QTc 517 msec     Telemetry personally reviewed (date: 2/28/2025): , rate 60     CXR: 2/25/25  Mild to moderate right-sided pleural effusion.  Can be manifestation of decompensated congestive heart failure.  Please correlate clinically.  Echocardiogram reviewed: 2/26/25       Left Ventricle: EF by visual approximation is 50-55%. Left ventricle size is normal. Mild concentric hypertrophy. Stage 1 diastolic dysfunction.    Right Ventricle: Right ventricle is dilated. Normal systolic function. TAPSE is 2.0 cm. RV pealk S' is > 10 cm/s.    Atrium: Bi-atrial enlargement.    Mitral Valve: Mild regurgitation. No stenosis noted. MV mean gradient is 2 mmHg.    Tricuspid Valve: Moderate regurgitation. RV-RA gradient is estimated at 42 mmHg.       ASSESSMENT / PLAN:  Volume overload -- improved  Acute on chronic HFrecEF  KEN on CKD, nephrology following (Cr 1.2-1.4 --> 1.8 --> 3.6 --> 3.9 --> 4.0 --> 3.9 --> 2.6 --> 2.2) -- dialysis started this admission  Mildly elevated hs-troponin -- multifactorial  Hyperkalemia -- improved (on dialysis now)  Acute hypoxic/hypercapnic respiratory failure.  Requiring BiPAP.  Hypotension requiring IV

## 2025-02-28 NOTE — PROGRESS NOTES
2/28/2025 9:42 AM  Paul Florez  [id number]    Subjective:  he remains in the ICU   Urine is yellow  CBI off  No family present      Review of Systems  Constitutional: No fever or chills , weak, tired   Respiratory: positive for shortness of breath   Cardiovascular: negative for chest pain and dyspnea  Gastrointestinal: negative for abdominal pain, diarrhea, nausea and vomiting   : See above  Derm: negative for rash and skin lesion(s)  Neurological: negative for seizures and tremors  Musculoskeletal: Negative    Psychiatric: Negative   All other reviews are negative      Scheduled Meds:   pantoprazole (PROTONIX) 40 mg in sodium chloride (PF) 0.9 % 10 mL injection  40 mg IntraVENous Q12H    [START ON 3/1/2025] levothyroxine  88 mcg Oral Daily    sodium chloride flush  5-40 mL IntraVENous 2 times per day    amiodarone  200 mg Oral Daily    [Held by provider] apixaban  5 mg Oral BID    [Held by provider] aspirin  81 mg Oral Daily    [Held by provider] losartan  100 mg Oral Daily    magnesium oxide  200 mg Oral Daily    [Held by provider] metoprolol succinate  50 mg Oral Daily    [Held by provider] tamsulosin  0.4 mg Oral Daily    ipratropium 0.5 mg-albuterol 2.5 mg  1 Dose Inhalation Q4H RT    atorvastatin  40 mg Oral Nightly    [Held by provider] furosemide  20 mg Oral Weekly    [Held by provider] glipiZIDE  5 mg Oral BID AC    [Held by provider] enoxaparin  40 mg SubCUTAneous Daily    cefTRIAXone (ROCEPHIN) IV  1,000 mg IntraVENous Q24H    doxycycline (VIBRAMYCIN) IV  100 mg IntraVENous Q12H    insulin lispro  0-8 Units SubCUTAneous 4x Daily AC & HS       Objective:  Vitals:    02/28/25 0700   BP: 116/68   Pulse: 75   Resp: 24   Temp:    SpO2: 96%         Allergies: Patient has no known allergies.    General Appearance: alert and oriented to person, place and time and in no acute distress  Skin: no rash or erythema  Head: normocephalic and atraumatic  Pulmonary/Chest: normal air movement, no respiratory  distress  Abdomen: soft, non-tender, non-distended  Genitourinary: garner with dark yellow urine   Extremities: no cyanosis, clubbing or edema         Labs:     Recent Labs     02/28/25  0357      K 4.9      CO2 29   BUN 21   CREATININE 2.2*   GLUCOSE 139*   CALCIUM 8.4*       Lab Results   Component Value Date/Time    HGB 6.9 02/28/2025 03:57 AM    HCT 23.5 02/28/2025 03:57 AM       Lab Results   Component Value Date    PSA 1.65 06/24/2024    PSA 1.62 03/05/2024    PSA 1.58 04/30/2013         Assessment/Plan:  Malpositioned garner catheter   Traumatic hematuria   Difficult garner requiring bedside cysto for placement     Continue to watch the creatinine   CT abdomen pelvis:  IMPRESSION:  1. Cholelithiasis.  2. Findings suspicious for cirrhosis of the liver.  3. Moderate right basilar pleural effusion with right lower lobe  consolidative airspace disease which could reflect dependent and or  compressive atelectasis with pneumonia not excluded.  4. Left-sided nephrolithiasis including 2.4 cm calculus in the renal pelvis  without hydronephrosis.  5. Sigmoid diverticulosis with no evidence of diverticulitis.  Irrigate garner PRN  Continue the garner   Garner should not be removed at all  He will need to have this left indwelling for at least 1 to 2 weeks and undergo a voiding trial outpatient   Will follow       Paul Serrano MD   [facility]  Urology

## 2025-02-28 NOTE — PROGRESS NOTES
199.7 % IBW. Weight Source: Bed scale (02/28/25)  Current BMI (kg/m2): 47.2  Usual Body Weight: 146.6 kg (323 lb 3.1 oz) (11/13/24 not specified)     % Weight Change (Calculated): -1.2  Weight Adjustment For: No Adjustment        Estimated Daily Nutrient Needs:  Energy Requirements Based On: Kcal/kg  Weight Used for Energy Requirements: Current  Energy (kcal/day): 0777-2522 kcals/day (CBW 11-15 kcals/kg Obese BMI 40-50)  Weight Used for Protein Requirements: Ideal  Protein (g/day): 145-180 g/day (IBW 2-2.5 g/kg Obese BMI 40-50)  Method Used for Fluid Requirements: Defer to provider  Fluid (ml/day): 2000mls per MD    Nutrition Diagnosis:   Inadequate oral intake related to impaired respiratory function as evidenced by intake 0-25%, other (Bi-Pap)    Nutrition Interventions:   Food and/or Nutrient Delivery: Continue Current Diet, Start Oral Nutrition Supplement  Nutrition Education/Counseling: Education/Counseling not indicated  Coordination of Nutrition Care: Continue to monitor while inpatient       Goals:  Goals: Meet at least 75% of estimated needs, by next RD assessment  Type of Goal: New goal       Nutrition Monitoring and Evaluation:   Behavioral-Environmental Outcomes: None Identified  Food/Nutrient Intake Outcomes: Food and Nutrient Intake, Supplement Intake  Physical Signs/Symptoms Outcomes: Biochemical Data, GI Status, Fluid Status or Edema, Weight, Skin, Nutrition Focused Physical Findings    Discharge Planning:    Too soon to determine     Rachael Owusu RD, LD  Contact: [phone]

## 2025-02-28 NOTE — PLAN OF CARE
Problem: Pain  Goal: Verbalizes/displays adequate comfort level or baseline comfort level  2/28/2025 0613 by Yann Cho RN  Outcome: Progressing     Problem: Safety - Adult  Goal: Free from fall injury  2/28/2025 0613 by Yann Cho RN  Outcome: Progressing     Problem: Skin/Tissue Integrity  Goal: Skin integrity remains intact  Description: 1.  Monitor for areas of redness and/or skin breakdown  2.  Assess vascular access sites hourly  3.  Every 4-6 hours minimum:  Change oxygen saturation probe site  4.  Every 4-6 hours:  If on nasal continuous positive airway pressure, respiratory therapy assess nares and determine need for appliance change or resting period  2/28/2025 0613 by Yann Cho RN  Outcome: Progressing  Flowsheets (Taken 2/27/2025 2000)  Skin Integrity Remains Intact: Monitor for areas of redness and/or skin breakdown     Problem: Respiratory - Adult  Goal: Achieves optimal ventilation and oxygenation  2/28/2025 0613 by Yann Cho RN  Outcome: Progressing     Problem: Genitourinary - Adult  Goal: Absence of urinary retention  2/28/2025 0613 by Yann Cho RN  Outcome: Progressing     Problem: Genitourinary - Adult  Goal: Urinary catheter remains patent  2/28/2025 0613 by Yann Cho RN  Outcome: Progressing

## 2025-02-28 NOTE — PROGRESS NOTES
Pulmonary/Critical Care Progress Note    We are following patient for acute hypoxemic and hypercapnic respiratory failure superimposed on chronic respiratory failure related to morbid obesity and untreated obstructive sleep apnea, right pleural effusion (exudate but cytology is negative), chronic atrial fibrillation,, anemia      SUBJECTIVE:  The patient spent last night on BiPAP and did well.  However, his hemoglobin was 6.9 and we had to increase his Protonix to twice daily and make it intravenous.  He is off all anticoagulants.  I asked for a GI consult but I do not think the patient has been seen.    Transfusion of 1 unit is increased the hemoglobin to 7.2 which is not ideal.  We will need to continue to monitor H&H.  Gastroenterology consult has been requested.    The patient is currently being dialyzed and is tolerating this without difficulty so far.    The cytology of the right pleural effusion that was tapped is negative for malignant cells but is an exudate suggestive of possibly a parapneumonic effusion.    He will continue on doxycycline, ceftriaxone, aerosolized bronchodilators, levothyroxine but this will need to be given IV and the dose increased because of an elevated TSH, and dialysis will continue.  The patient is on a full liquid diet and perhaps may be able to be advanced further tomorrow.    MEDICATIONS:   pantoprazole (PROTONIX) 40 mg in sodium chloride (PF) 0.9 % 10 mL injection  40 mg IntraVENous Q12H    [START ON 3/1/2025] levothyroxine  88 mcg Oral Daily    sodium chloride flush  5-40 mL IntraVENous 2 times per day    amiodarone  200 mg Oral Daily    [Held by provider] apixaban  5 mg Oral BID    [Held by provider] aspirin  81 mg Oral Daily    [Held by provider] losartan  100 mg Oral Daily    magnesium oxide  200 mg Oral Daily    [Held by provider] metoprolol succinate  50 mg Oral Daily    [Held by provider] tamsulosin  0.4 mg Oral Daily    ipratropium 0.5 mg-albuterol 2.5 mg  1 Dose

## 2025-02-28 NOTE — PROGRESS NOTES
Internal Medicine Progress Note     [facility]     Chandler Abebe D.O., DIEGOI.                         Curry Dennis D.O., MARIA R Dillon D.O.     Gauri Clement, MSN, APRN, NP-C  Adonis Gray, MSN, APRN-CNP  Rambo Mccloud, MSN, APRN, NP-C  Leona Collins, MSN, APRN-CNP  Lily Isaac, MSN, APRN, NP-C     Primary Care Physician: Eduin Guzman DO   Admitting Physician:  Chandler Abebe DO  Admission date and time: 2/25/2025  6:39 PM    Room:  [place] [address]  Admitting diagnosis: Hyperkalemia [E87.5]  Hypoglycemia [E16.2]  KEN (acute kidney injury) [N17.9]  Acute respiratory failure with hypoxia (HCC) [J96.01]  Congestive heart failure, unspecified HF chronicity, unspecified heart failure type (HCC) [I50.9]    Patient Name: Paul Florez  MRN: [id number]    Date of Service: 2/28/2025     Subjective:  Paul is a 74 y.o. male who was seen and examined today,2/28/2025, at the bedside.  Patient currently on BiPAP of 15/11.  FiO2 is 55%.  Patient appear hemodynamically stable.  Patient receiving 1 unit of packed cells.  Patient appeared to be breathing slightly better today.  Urine appeared to be more concentrated.  Currently being followed by multiple subspecialists        Review of System: Somewhat limited due to acuity  Constitutional:   Does not disclose fever or chills, weight loss or gain, positive for fatigue and malaise.  HEENT:   Does not disclose ear pain, sore throat, sinus or eye problems.  Cardiovascular:   Does not disclose any chest pain or palpitations.  Positive for history of atrial fibrillation on chronic Eliquis therapy.  Respiratory:   Positive for shortness of breath, coughing, but does not report sputum production, hemoptysis, or wheezing.  Gastrointestinal:   Does not disclose nausea, vomiting, diarrhea, or constipation.  Does not disclose any abdominal pain.  Genitourinary:    Does not disclose any urgency, frequency, hematuria. Voiding  without  49.18 kg/m² with likely obstructive sleep apnea and obesity hypoventilation  GERD/peptic ulcer disease  Thoracentesis on February 26 showed no malignancy      Plan:     Hemoglobin 6.9 currently receiving transfusion  Wean oxygen as tolerated  Currently being followed by nephrology and other subspecialists  Check stools for occult blood.  Continue PPI  Breathing better since thoracentesis    More than 50% of my  time was spent at the bedside counseling/coordinating care with the patient and/or family with face to face contact.  This time was spent reviewing notes and laboratory data as well as instructing and counseling the patient. Time I spent with the family or surrogate(s) is included only if the patient was incapable of providing the necessary information or participating in medical decisions. I also discussed the differential diagnosis and all of the proposed management plans with the patient and individuals accompanying the patient.    Paul requires this high level of physician care and nursing on the ICU unit due the complexity of decision management and chance of rapid decline or death.  Continued cardiac monitoring and higher level of nursing are required. I am readily available for any further decision-making and intervention.         Greater than 40 minutes of critical care time was spent with the patient.  This time included chart review, , and discussion with those consultants involved in the patient's care.      Chandler Abebe DO  2/28/2025  3:37 PM

## 2025-02-28 NOTE — PROGRESS NOTES
Oral Q6H PRN Jay, Ismail U, DO        Or    acetaminophen (TYLENOL) suppository 650 mg  650 mg Rectal Q6H PRN Jay, Ismail U, DO        amiodarone (CORDARONE) tablet 200 mg  200 mg Oral Daily Jay, Ismail U, DO   200 mg at 02/28/25 0835    [Held by provider] apixaban (ELIQUIS) tablet 5 mg  5 mg Oral BID Jay, Ismail U, DO   5 mg at 02/26/25 0122    [Held by provider] aspirin EC tablet 81 mg  81 mg Oral Daily Jay, Ismail U, DO        [Held by provider] losartan (COZAAR) tablet 100 mg  100 mg Oral Daily Jay, Ismail U, DO        magnesium oxide (MAG-OX) tablet 200 mg  200 mg Oral Daily Jay, Ismail U, DO   200 mg at 02/28/25 0835    [Held by provider] metoprolol succinate (TOPROL XL) extended release tablet 50 mg  50 mg Oral Daily Jay, Ismail U, DO        [Held by provider] tamsulosin (FLOMAX) capsule 0.4 mg  0.4 mg Oral Daily Jay, Ismail U, DO        ipratropium 0.5 mg-albuterol 2.5 mg (DUONEB) nebulizer solution 1 Dose  1 Dose Inhalation Q4H RT Jay, Ismail U, DO   1 Dose at 02/28/25 1356    glucose chewable tablet 16 g  4 tablet Oral PRN Jay, Ismail U, DO        dextrose bolus 10% 125 mL  125 mL IntraVENous PRN Jay, Ismail U, DO   Stopped at 02/26/25 0936    Or    dextrose bolus 10% 250 mL  250 mL IntraVENous PRN Jay, Ismail U, DO        glucagon injection 1 mg  1 mg SubCUTAneous PRN Jay, Ismail U, DO        dextrose 10 % infusion   IntraVENous Continuous PRN Jay, Ismail U, DO        atorvastatin (LIPITOR) tablet 40 mg  40 mg Oral Nightly Adonis Gray APRN - CNP   40 mg at 02/27/25 2237    [Held by provider] furosemide (LASIX) tablet 20 mg  20 mg Oral Weekly Adonis Gray APRN - CNP        [Held by provider] glipiZIDE (GLUCOTROL) tablet 5 mg  5 mg Oral BID AC Adonis Gray APRN - CNP        [Held by provider] enoxaparin (LOVENOX) injection 40 mg  40 mg SubCUTAneous Daily Adonis Gray APRN - CNP        norepinephrine (LEVOPHED) 16 mg in sodium chloride 0.9 % 250 mL  less than 7 g/dL.  Repeat serum iron studies.  Follow hemoglobin and hematocrit.         Hypocalcemia.  Hypocalcemia of undetermined etiology.  Await  ionized calcium vitamin D and PTH levels.     Volume overload/edema.  Will attempt ultrafiltration as allowed by hemodynamics.    Chronic kidney disease stage G3 a secondary to microvascular disease with diabetic nephropathy and/or hypertensive kidney disease with a baseline serum creatinine of  1.4  mg/dL to  1.6 mg/dL.      Patient's condition discussed in detail with the patient and his wife.  Also discussed with Dr. Franklin intensivist.                Alex Palacios MD  Nephrology  Electronically signed by Alex Palacios MD on 2/28/2025 at 2:16 PM      Note: This report was completed utilizing computer voice recognition software. Every effort has been made to ensure accuracy, however; inadvertent computerized transcription errors may be present.

## 2025-02-28 NOTE — PROGRESS NOTES
Spiritual Health History and Assessment/Progress Note  [facility]    Initial Encounter, Rituals, Rites and Sacraments, Spiritual/Emotional Needs,  Encounter (Fr. Blanco),  ,  ,      Name: Paul Florez MRN: [id number]    Age: 74 y.o.     Sex: male   Language: English   Restorationist: Scientologist   Acute HFrEF (heart failure with reduced ejection fraction) ([facility])     Date: 2/28/2025                           Spiritual Assessment began in [facility] 2 ICU        Referral/Consult From: Rounding   Encounter Overview/Reason: Initial Encounter, Rituals, Rites and Sacraments, Spiritual/Emotional Needs,  Encounter (Fr. Blanco)  Service Provided For: Patient and family together    Yahaira, Belief, Meaning:   Patient unable to assess at this time  Family/Friends identify as spiritual      Importance and Influence:  Patient unable to assess at this time  Family/Friends Other: unknown    Community:  Patient Other: unknown  Family/Friends Other: unknown    Assessment and Plan of Care:     Patient Interventions include: Provided sacramental/Anabaptism ritual  Family/Friends Interventions include: Facilitated expression of thoughts and feelings    Patient Plan of Care: No spiritual needs identified for follow-up and Spiritual Care available upon further referral  Family/Friends Plan of Care: No spiritual needs identified for follow-up and Spiritual Care available upon further referral    Electronically signed by Chaplain Edwar on 2/28/2025 at 2:16 PM

## 2025-02-28 NOTE — PLAN OF CARE
Problem: Skin/Tissue Integrity  Goal: Skin integrity remains intact  Description: 1.  Monitor for areas of redness and/or skin breakdown  2.  Assess vascular access sites hourly  3.  Every 4-6 hours minimum:  Change oxygen saturation probe site  4.  Every 4-6 hours:  If on nasal continuous positive airway pressure, respiratory therapy assess nares and determine need for appliance change or resting period  2/28/2025 1404 by Marty Gannon RN  Outcome: Not Progressing     Problem: Skin/Tissue Integrity - Adult  Goal: Skin integrity remains intact  Description: 1.  Monitor for areas of redness and/or skin breakdown  2.  Assess vascular access sites hourly  3.  Every 4-6 hours minimum:  Change oxygen saturation probe site  4.  Every 4-6 hours:  If on nasal continuous positive airway pressure, respiratory therapy assess nares and determine need for appliance change or resting period  2/28/2025 1404 by Marty Gannon RN  Outcome: Not Progressing     Problem: Chronic Conditions and Co-morbidities  Goal: Patient's chronic conditions and co-morbidity symptoms are monitored and maintained or improved  Outcome: Progressing     Problem: Discharge Planning  Goal: Discharge to home or other facility with appropriate resources  Outcome: Progressing     Problem: Pain  Goal: Verbalizes/displays adequate comfort level or baseline comfort level  2/28/2025 1404 by Marty Gannon RN  Outcome: Progressing     Problem: Safety - Adult  Goal: Free from fall injury  2/28/2025 1404 by Marty Gannon RN  Outcome: Progressing     Problem: Respiratory - Adult  Goal: Achieves optimal ventilation and oxygenation  2/28/2025 1404 by Marty Gannon RN  Outcome: Progressing     Problem: Cardiovascular - Adult  Goal: Maintains optimal cardiac output and hemodynamic stability  Outcome: Progressing     Problem: Cardiovascular - Adult  Goal: Absence of cardiac dysrhythmias or at baseline  Outcome: Progressing     Problem: Gastrointestinal

## 2025-02-28 NOTE — CARE COORDINATION
2/28/2025 ICU, IVF, Rocephin, Protonix, Vibramycin. Wife in room feeding/assisting pt this morning. Pt from home with wife and son. Uses a cane or a walker. PT/OT to evaluate and treat for discharge care planning. NEW HD watch. Wife will provide a ride at discharge. Electronically signed by LESLI Garcia on 2/28/2025 at 12:38 PM

## 2025-03-01 ENCOUNTER — APPOINTMENT (OUTPATIENT)
Dept: GENERAL RADIOLOGY | Age: 75
End: 2025-03-01
Payer: MEDICARE

## 2025-03-01 LAB
25(OH)D3 SERPL-MCNC: 28.8 NG/ML (ref 30–100)
ALBUMIN SERPL-MCNC: 2.8 G/DL (ref 3.5–5.2)
ALP SERPL-CCNC: 109 U/L (ref 40–129)
ALT SERPL-CCNC: 16 U/L (ref 0–40)
ANION GAP SERPL CALCULATED.3IONS-SCNC: 3 MMOL/L (ref 7–16)
AST SERPL-CCNC: 34 U/L (ref 0–39)
BASOPHILS # BLD: 0.04 K/UL (ref 0–0.2)
BASOPHILS NFR BLD: 1 % (ref 0–2)
BILIRUB SERPL-MCNC: 0.5 MG/DL (ref 0–1.2)
BUN SERPL-MCNC: 15 MG/DL (ref 6–23)
CA-I BLD-SCNC: 1.22 MMOL/L (ref 1.15–1.33)
CALCIUM SERPL-MCNC: 8.7 MG/DL (ref 8.6–10.2)
CHLORIDE SERPL-SCNC: 102 MMOL/L (ref 98–107)
CO2 SERPL-SCNC: 31 MMOL/L (ref 22–29)
CREAT SERPL-MCNC: 1.9 MG/DL (ref 0.7–1.2)
EOSINOPHIL # BLD: 0.16 K/UL (ref 0.05–0.5)
EOSINOPHILS RELATIVE PERCENT: 2 % (ref 0–6)
ERYTHROCYTE [DISTWIDTH] IN BLOOD BY AUTOMATED COUNT: 18.6 % (ref 11.5–15)
GFR, ESTIMATED: 37 ML/MIN/1.73M2
GLUCOSE BLD-MCNC: 130 MG/DL (ref 74–99)
GLUCOSE BLD-MCNC: 134 MG/DL (ref 74–99)
GLUCOSE BLD-MCNC: 139 MG/DL (ref 74–99)
GLUCOSE BLD-MCNC: 186 MG/DL (ref 74–99)
GLUCOSE SERPL-MCNC: 133 MG/DL (ref 74–99)
HCT VFR BLD AUTO: 26.7 % (ref 37–54)
HGB BLD-MCNC: 7.9 G/DL (ref 12.5–16.5)
IMM GRANULOCYTES # BLD AUTO: 0.03 K/UL (ref 0–0.58)
IMM GRANULOCYTES NFR BLD: 0 % (ref 0–5)
LYMPHOCYTES NFR BLD: 1.01 K/UL (ref 1.5–4)
LYMPHOCYTES RELATIVE PERCENT: 14 % (ref 20–42)
MAGNESIUM SERPL-MCNC: 2 MG/DL (ref 1.6–2.6)
MCH RBC QN AUTO: 24.9 PG (ref 26–35)
MCHC RBC AUTO-ENTMCNC: 29.6 G/DL (ref 32–34.5)
MCV RBC AUTO: 84.2 FL (ref 80–99.9)
MONOCYTES NFR BLD: 0.71 K/UL (ref 0.1–0.95)
MONOCYTES NFR BLD: 10 % (ref 2–12)
NEUTROPHILS NFR BLD: 73 % (ref 43–80)
NEUTS SEG NFR BLD: 5.2 K/UL (ref 1.8–7.3)
PHOSPHATE SERPL-MCNC: 3.1 MG/DL (ref 2.5–4.5)
PLATELET # BLD AUTO: 163 K/UL (ref 130–450)
PMV BLD AUTO: 11 FL (ref 7–12)
POTASSIUM SERPL-SCNC: 4.3 MMOL/L (ref 3.5–5)
PROT SERPL-MCNC: 6.6 G/DL (ref 6.4–8.3)
PTH-INTACT SERPL-MCNC: 114.7 PG/ML (ref 15–65)
RBC # BLD AUTO: 3.17 M/UL (ref 3.8–5.8)
SODIUM SERPL-SCNC: 136 MMOL/L (ref 132–146)
WBC OTHER # BLD: 7.2 K/UL (ref 4.5–11.5)

## 2025-03-01 PROCEDURE — 82962 GLUCOSE BLOOD TEST: CPT

## 2025-03-01 PROCEDURE — 2700000000 HC OXYGEN THERAPY PER DAY

## 2025-03-01 PROCEDURE — 99233 SBSQ HOSP IP/OBS HIGH 50: CPT | Performed by: INTERNAL MEDICINE

## 2025-03-01 PROCEDURE — 6370000000 HC RX 637 (ALT 250 FOR IP)

## 2025-03-01 PROCEDURE — 2060000000 HC ICU INTERMEDIATE R&B

## 2025-03-01 PROCEDURE — 83970 ASSAY OF PARATHORMONE: CPT

## 2025-03-01 PROCEDURE — 90945 DIALYSIS ONE EVALUATION: CPT | Performed by: INTERNAL MEDICINE

## 2025-03-01 PROCEDURE — 6370000000 HC RX 637 (ALT 250 FOR IP): Performed by: INTERNAL MEDICINE

## 2025-03-01 PROCEDURE — 83735 ASSAY OF MAGNESIUM: CPT

## 2025-03-01 PROCEDURE — 94669 MECHANICAL CHEST WALL OSCILL: CPT

## 2025-03-01 PROCEDURE — 2580000003 HC RX 258: Performed by: INTERNAL MEDICINE

## 2025-03-01 PROCEDURE — 71045 X-RAY EXAM CHEST 1 VIEW: CPT

## 2025-03-01 PROCEDURE — 2500000003 HC RX 250 WO HCPCS: Performed by: INTERNAL MEDICINE

## 2025-03-01 PROCEDURE — 82248 BILIRUBIN DIRECT: CPT

## 2025-03-01 PROCEDURE — 2580000003 HC RX 258: Performed by: HOSPITALIST

## 2025-03-01 PROCEDURE — 36592 COLLECT BLOOD FROM PICC: CPT

## 2025-03-01 PROCEDURE — 80053 COMPREHEN METABOLIC PANEL: CPT

## 2025-03-01 PROCEDURE — 84100 ASSAY OF PHOSPHORUS: CPT

## 2025-03-01 PROCEDURE — 82306 VITAMIN D 25 HYDROXY: CPT

## 2025-03-01 PROCEDURE — 6370000000 HC RX 637 (ALT 250 FOR IP): Performed by: NURSE PRACTITIONER

## 2025-03-01 PROCEDURE — 2000000000 HC ICU R&B

## 2025-03-01 PROCEDURE — 6360000002 HC RX W HCPCS: Performed by: INTERNAL MEDICINE

## 2025-03-01 PROCEDURE — 94640 AIRWAY INHALATION TREATMENT: CPT

## 2025-03-01 PROCEDURE — 85025 COMPLETE CBC W/AUTO DIFF WBC: CPT

## 2025-03-01 PROCEDURE — 94660 CPAP INITIATION&MGMT: CPT

## 2025-03-01 PROCEDURE — 6360000002 HC RX W HCPCS: Performed by: HOSPITALIST

## 2025-03-01 PROCEDURE — 3E1M39Z IRRIGATION OF PERITONEAL CAVITY USING DIALYSATE, PERCUTANEOUS APPROACH: ICD-10-PCS | Performed by: INTERNAL MEDICINE

## 2025-03-01 PROCEDURE — 82330 ASSAY OF CALCIUM: CPT

## 2025-03-01 RX ORDER — VITAMIN B COMPLEX
1000 TABLET ORAL DAILY
Status: DISCONTINUED | OUTPATIENT
Start: 2025-03-01 | End: 2025-03-07 | Stop reason: HOSPADM

## 2025-03-01 RX ADMIN — IPRATROPIUM BROMIDE AND ALBUTEROL SULFATE 1 DOSE: .5; 2.5 SOLUTION RESPIRATORY (INHALATION) at 01:39

## 2025-03-01 RX ADMIN — IPRATROPIUM BROMIDE AND ALBUTEROL SULFATE 1 DOSE: .5; 2.5 SOLUTION RESPIRATORY (INHALATION) at 13:31

## 2025-03-01 RX ADMIN — IPRATROPIUM BROMIDE AND ALBUTEROL SULFATE 1 DOSE: .5; 2.5 SOLUTION RESPIRATORY (INHALATION) at 17:16

## 2025-03-01 RX ADMIN — LEVOTHYROXINE SODIUM 88 MCG: 0.09 TABLET ORAL at 06:39

## 2025-03-01 RX ADMIN — Medication 10 ML: at 19:25

## 2025-03-01 RX ADMIN — PANTOPRAZOLE SODIUM 40 MG: 40 INJECTION, POWDER, FOR SOLUTION INTRAVENOUS at 19:24

## 2025-03-01 RX ADMIN — Medication 10 ML: at 08:04

## 2025-03-01 RX ADMIN — WATER 1000 MG: 1 INJECTION INTRAMUSCULAR; INTRAVENOUS; SUBCUTANEOUS at 12:09

## 2025-03-01 RX ADMIN — IPRATROPIUM BROMIDE AND ALBUTEROL SULFATE 1 DOSE: .5; 2.5 SOLUTION RESPIRATORY (INHALATION) at 22:47

## 2025-03-01 RX ADMIN — ATORVASTATIN CALCIUM 40 MG: 40 TABLET, FILM COATED ORAL at 19:24

## 2025-03-01 RX ADMIN — IPRATROPIUM BROMIDE AND ALBUTEROL SULFATE 1 DOSE: .5; 2.5 SOLUTION RESPIRATORY (INHALATION) at 09:21

## 2025-03-01 RX ADMIN — Medication 1000 UNITS: at 19:24

## 2025-03-01 RX ADMIN — AMIODARONE HYDROCHLORIDE 200 MG: 200 TABLET ORAL at 08:04

## 2025-03-01 RX ADMIN — DOXYCYCLINE 100 MG: 100 INJECTION, POWDER, LYOPHILIZED, FOR SOLUTION INTRAVENOUS at 03:02

## 2025-03-01 RX ADMIN — INSULIN LISPRO 2 UNITS: 100 INJECTION, SOLUTION INTRAVENOUS; SUBCUTANEOUS at 12:17

## 2025-03-01 RX ADMIN — SODIUM CHLORIDE 100 MG: 9 INJECTION, SOLUTION INTRAVENOUS at 09:25

## 2025-03-01 RX ADMIN — MAGNESIUM OXIDE 200 MG: 400 TABLET ORAL at 08:04

## 2025-03-01 RX ADMIN — IPRATROPIUM BROMIDE AND ALBUTEROL SULFATE 1 DOSE: .5; 2.5 SOLUTION RESPIRATORY (INHALATION) at 04:53

## 2025-03-01 RX ADMIN — PANTOPRAZOLE SODIUM 40 MG: 40 INJECTION, POWDER, FOR SOLUTION INTRAVENOUS at 08:04

## 2025-03-01 RX ADMIN — DOXYCYCLINE 100 MG: 100 INJECTION, POWDER, LYOPHILIZED, FOR SOLUTION INTRAVENOUS at 14:36

## 2025-03-01 RX ADMIN — SODIUM CHLORIDE 25 MG: 900 INJECTION, SOLUTION INTRAVENOUS at 08:10

## 2025-03-01 RX ADMIN — PANTOPRAZOLE SODIUM 40 MG: 40 INJECTION, POWDER, FOR SOLUTION INTRAVENOUS at 14:34

## 2025-03-01 ASSESSMENT — PAIN SCALES - GENERAL
PAINLEVEL_OUTOF10: 0

## 2025-03-01 NOTE — PROGRESS NOTES
catheter via the right internal   jugular vein.         IR GUIDED THORACENTESIS PLEURAL   Final Result   Successful ultrasound guided thoracentesis.         XR CHEST PORTABLE   Final Result   Cardiomegaly with no change in the bilateral pleural effusions right greater   than left or bibasilar atelectasis.         XR CHEST PORTABLE   Final Result   Mild to moderate right-sided pleural effusion.  Can be manifestation of   decompensated congestive heart failure.  Please correlate clinically.         XR CHEST PORTABLE    (Results Pending)         LAB DATA     BMP, Mg, Phos    Lab Results   Component Value Date    CREATININE 1.9 (H) 03/01/2025    CREATININE 2.2 (H) 02/28/2025    CREATININE 2.6 (H) 02/27/2025    CREATININE 3.9 (H) 02/26/2025    CREATININE 4.0 (H) 02/26/2025    CREATININE 3.9 (H) 02/26/2025    CREATININE 3.6 (H) 02/26/2025       CBC:   Recent Labs     02/27/25  0515 02/27/25  1158 02/27/25  1832 02/28/25  0357 02/28/25  0954 02/28/25  1630 02/28/25  2246 03/01/25  0612   WBC 10.2  --   --  7.2  --   --   --  7.2   HGB 7.1*   < > 7.3* 6.9* 7.2* 7.6* 7.4* 7.9*     --   --  145  --   --   --  163    < > = values in this interval not displayed.        Lab Results   Component Value Date    IRON 52 (L) 02/28/2025    TIBC 222 (L) 02/28/2025    FERRITIN 54 02/28/2025       Lab Results   Component Value Date    IPTH 114.7 (H) 03/01/2025       Lab Results   Component Value Date    VITD25 28.8 (L) 03/01/2025       Lab Results   Component Value Date    CALCIUM 8.7 03/01/2025    CALCIUM 8.4 (L) 02/28/2025    CALCIUM 8.0 (L) 02/27/2025    CAION 1.22 03/01/2025    PHOS 3.1 03/01/2025    PHOS 4.2 02/28/2025    PHOS 4.7 (H) 02/27/2025    MG 2.0 03/01/2025    MG 1.8 02/28/2025    MG 1.8 02/27/2025         BMP:   Recent Labs     02/26/25  1855 02/27/25  0515 02/28/25  0357 03/01/25  0612    137 139 136   K 5.9* 5.1* 4.9 4.3    105 107 102   CO2 23 28 29 31*   BUN 44* 28* 21 15   CREATININE 3.9* 2.6* 2.2*  1.9*       Serum Osmolality:   No results for input(s): \"OSMOS\" in the last 72 hours.      Urine Chemisrty:    No results for input(s): \"CLUR\", \"NAUR\", \"KUR\", \"LABCREAU\", \"UREAUR\", \"OSMOU\" in the last 72 hours.                                               No results for input(s): \"MALBCR\", \"LABPROT\" in the last 72 hours.           Assessment: / Plan:      Acute kidney injury.   Acute kidney injury secondary multiple factors including renal hypoperfusion in the setting of hypotension and use of angiotensin II receptor blocking agent in the form of losartan 100 mg once a day.   Urinary obstruction was considered as a possible significant etiology of acute kidney injury  as the patient did have brisk urine output after draining of the blood clots in the [place] catheter after irrigating the [place].  Patient patient subsequently had persistent hyperkalemia and decline in the urinary output and patient underwent hemodialysis after insertion of temporary hemodialysis catheter.  This morning his urine output is low again.  Will continue dialytic support. For isolate UF today    Hypotension/shock.  Hypotension of undetermined etiology.  Patient is off pressor support.  On midodrine.   Will support with volume as needed..       Hyperkalemia.  Hyperkalemia this patient secondary acute kidney injury coupled with use of angiotensin II receptor blocking agent along with diet rich in potassium content as the patient was eating bananas daily.  Hyperkalemia is improved.  Patient on low potassium dialysate.     Anemia of unknown etiology.  Recent iron studies reflect iron deficiency.  Hemoglobin is lower.  Transfuse for hemoglobin less than 7 g/dL.  Repeat serum iron studies.  Follow hemoglobin and hematocrit.         Hypocalcemia.  Hypocalcemia of undetermined etiology.  PTH appropriately elevated, vitamin D low.  Will replace.  Continue to monitor calcium.    Volume overload/edema.  Will attempt ultrafiltration as allowed by

## 2025-03-01 NOTE — CONSENT
Informed Consent for Blood Component Transfusion Note    I have discussed with the nursing attendant the rationale for blood component transfusion; its benefits in treating or preventing fatigue, organ damage, or death; and its risk which includes mild transfusion reactions, rare risk of blood borne infection, or more serious but rare reactions. I have discussed the alternatives to transfusion, including the risk and consequences of not receiving transfusion. The nursing attendant had an opportunity to ask questions and had agreed to proceed with transfusion of blood components.    Electronically signed by Chandler Abebe DO on 3/1/25 at 3:27 PM EST

## 2025-03-01 NOTE — PROGRESS NOTES
Spiritual Health History and Assessment/Progress Note  [facility]    (P) Follow-up,  ,  ,      Name: Paul Florez MRN: [id number]    Age: 74 y.o.     Sex: male   Language: English   Pentecostalism: Worship   Acute HFrEF (heart failure with reduced ejection fraction) ([facility])     Date: 3/1/2025                           Spiritual Assessment began in [facility] 2 ICU        Referral/Consult From: (P) Rounding   Encounter Overview/Reason: (P) Follow-up  Service Provided For: (P) Patient    Yahaira, Belief, Meaning:   Patient is connected with a yahaira tradition or spiritual practice  Family/Friends No family/friends present      Importance and Influence:  Patient has spiritual/personal beliefs that influence decisions regarding their health  Family/Friends No family/friends present    Community:  Patient is connected with a spiritual community  Family/Friends No family/friends present    Assessment and Plan of Care:     Patient Interventions include: Engaged in theological reflection  Family/Friends Interventions include: No family/friends present    Patient Plan of Care: Spiritual Care available upon further referral  Family/Friends Plan of Care: No family/friends present    Electronically signed by Chaplain Myrna on 3/1/2025 at 9:47 AM

## 2025-03-01 NOTE — CONSULTS
CONSULT  Anuj Butler M.D.  [facility]  Dr. Kate Jason M.D.,  Dr. Josue Macias M.D.,  TONI JenningsO.,  Dr. Dario Resendez D.O. ,  Dr. Barry Johnson M.D.      Paul Florez  74 y.o.  male      Re:\"Rule out GI bleed\"  Requesting physician:Dr Franklin/02/28/25 04:30 PM   Date:7:03 AM 3/1/2025      HPI: 74-year-old male patient seen in the hospital for above described issue.  He was admitted initially on 25 February complaining of shortness of breath, fatigue and hypoglycemia.  Patient has been treated for acute respiratory failure with acute on chronic diastolic congestive heart failure and acute renal failure with electrolyte abnormalities.  Patient has past medical history of atrial fibrillation with chronic oral anticoagulation with Eliquis, anemia, Aguila's esophagus, coronary artery disease, diverticulosis and diverticulitis, fatty liver, hyperlipidemia, hypertension, lumbar stenosis, osteoarthritis, peptic ulcer disease, diabetes mellitus and obesity.  Most recently he had upper endoscopy and colonoscopy in July 2024.  EGD has revealed long segment Aguila's esophagus but otherwise no significant pathology including no bleeding source.  Colonoscopy has revealed colonic polyps x 3, diverticular disease of the sigmoid colon and grade 2 internal hemorrhoids.  Biopsies from above described polyps reported to be consistent with tubular adenoma.  Biopsies from the esophagus were negative for dysplasia and negative for intestinal metaplasia.  Gastric biopsies consistent with chronic active gastritis but negative for H. pylori.  Laboratory work since admission reveals anemia with decreasing H&H requiring blood transfusion with hemoglobin as low as 6.6 on the 27th and recurrent transfusion on 28.  According to nursing 3 or 4 large loose brown bowel movements overnight.  Fecal occult blood test negative.   Patient himself denies abdominal pain.  He denies nausea vomiting.  He denies dysphagia  noted.  Pt with volume overload, CHF, CKD, and respiratory failure with BIPAP.  Will continue IV Protonix.  Extensive cardiac issues reviewed on Cardiology note.  Consider EGD once respiratory condition improved and cleared from cardiology standpoint for sedation, sooner if signs of bleeding which currently there are not.  Our service will follow.  See orders.    Paul Griffin D.O.  3/1/25

## 2025-03-01 NOTE — PROGRESS NOTES
Pulmonary/Critical Care Progress Note    We are following patient for acute hypoxemic and hypercapnic respiratory failure superimposed on chronic respiratory failure related to morbid obesity and untreated obstructive sleep apnea, right pleural effusion (exudate but cytology is negative), chronic atrial fibrillation,, anemia    IMPRESSION / PLAN:    Acute on Chronic Hypercapneic Respiratory Failure   Morbid obesity / Obstructive Sleep Apnea / Obesity Hypoventilation Syndrome (presumed)  BiPAP qhs and PRN  Maintained on NC      HFpEF  Pulmonary HTN     Acute Kidney Injury on CKD  Started on hemodialysis   Appreciate nephrology guidance    Hematuria  Anticoag stopped   Resolved  Urology following    Pleural effusion   Exudative  Cytology negative     Atrial Fibrillation  Anticoag on hold    Anemia  Transfuse for Hgb < 7  Seen by GI - no active GIB  Appreciate input    DM II  Insulin sliding scale   Glu under good control     Up in chair  PT/ OT    OK to transfer out of ICU      Code Status: Full Code    __________________________________________    Subjective  Feeling better  Mouth dry    Events last 24 hr  Using BiPAP QHS       MEDICATIONS:   ferric gluconate (FERRLECIT) 25 mg in sodium chloride 0.9 % 50 mL (test dose) IVPB  25 mg IntraVENous Once    Followed by    ferric gluconate (FERRLECIT) 100 mg in sodium chloride 0.9 % 100 mL IVPB  100 mg IntraVENous Once    Followed by    [START ON 3/2/2025] ferric gluconate (FERRLECIT) 125 mg in sodium chloride 0.9 % 100 mL IVPB  125 mg IntraVENous Once    pantoprazole (PROTONIX) 40 mg in sodium chloride (PF) 0.9 % 10 mL injection  40 mg IntraVENous Q6H    levothyroxine  88 mcg Oral Daily    sodium chloride flush  5-40 mL IntraVENous 2 times per day    amiodarone  200 mg Oral Daily    [Held by provider] apixaban  5 mg Oral BID    [Held by provider] aspirin  81 mg Oral Daily    [Held by provider] losartan  100 mg Oral Daily    magnesium oxide  200 mg Oral Daily    [Held by  g/dL Final     Hematocrit   Date Value Ref Range Status   03/01/2025 26.7 (L) 37.0 - 54.0 % Final   02/28/2025 24.5 (L) 37.0 - 54.0 % Final   02/28/2025 25.7 (L) 37.0 - 54.0 % Final     MCV   Date Value Ref Range Status   03/01/2025 84.2 80.0 - 99.9 fL Final   02/28/2025 84.2 80.0 - 99.9 fL Final   02/27/2025 82.9 80.0 - 99.9 fL Final     Platelets   Date Value Ref Range Status   03/01/2025 163 130 - 450 k/uL Final   02/28/2025 145 130 - 450 k/uL Final   02/27/2025 237 130 - 450 k/uL Final     Sodium   Date Value Ref Range Status   03/01/2025 136 132 - 146 mmol/L Final   02/28/2025 139 132 - 146 mmol/L Final   02/27/2025 137 132 - 146 mmol/L Final     Potassium   Date Value Ref Range Status   03/01/2025 4.3 3.5 - 5.0 mmol/L Final   02/28/2025 4.9 3.5 - 5.0 mmol/L Final   02/27/2025 5.1 (H) 3.5 - 5.0 mmol/L Final     Potassium reflex Magnesium   Date Value Ref Range Status   05/28/2022 4.3 3.5 - 5.0 mmol/L Final   05/24/2022 4.2 3.5 - 5.0 mmol/L Final     Chloride   Date Value Ref Range Status   03/01/2025 102 98 - 107 mmol/L Final   02/28/2025 107 98 - 107 mmol/L Final   02/27/2025 105 98 - 107 mmol/L Final     CO2   Date Value Ref Range Status   03/01/2025 31 (H) 22 - 29 mmol/L Final   02/28/2025 29 22 - 29 mmol/L Final   02/27/2025 28 22 - 29 mmol/L Final     BUN   Date Value Ref Range Status   03/01/2025 15 6 - 23 mg/dL Final   02/28/2025 21 6 - 23 mg/dL Final   02/27/2025 28 (H) 6 - 23 mg/dL Final     Creatinine   Date Value Ref Range Status   03/01/2025 1.9 (H) 0.70 - 1.20 mg/dL Final   02/28/2025 2.2 (H) 0.70 - 1.20 mg/dL Final   02/27/2025 2.6 (H) 0.70 - 1.20 mg/dL Final     Glucose   Date Value Ref Range Status   03/01/2025 133 (H) 74 - 99 mg/dL Final   02/28/2025 139 (H) 74 - 99 mg/dL Final   02/27/2025 129 (H) 74 - 99 mg/dL Final   08/30/2011 177 (H) 70 - 110 mg/dL Final     Calcium   Date Value Ref Range Status   03/01/2025 8.7 8.6 - 10.2 mg/dL Final   02/28/2025 8.4 (L) 8.6 - 10.2 mg/dL Final

## 2025-03-01 NOTE — PROGRESS NOTES
Internal Medicine Progress Note     [facility]     Chandler Abebe D.O., SHIRAOEdmundoI.                         Curry Dennis D.O., SHIRAOEdmundoIEdmundo Dillon D.O.     Gauri Clement, MSN, APRN, NP-C  Adonis Gray, MSN, APRN-CNP  Rambo Mccloud, MSN, APRN, NP-C  Leona Collins, MSN, APRN-CNP  Lily Isaac, MSN, APRN, NP-C     Primary Care Physician: Eduin Guzman DO   Admitting Physician:  Chandler Abebe DO  Admission date and time: 2/25/2025  6:39 PM    Room:  [place] [address]  Admitting diagnosis: Hyperkalemia [E87.5]  Hypoglycemia [E16.2]  KEN (acute kidney injury) [N17.9]  Acute respiratory failure with hypoxia (HCC) [J96.01]  Congestive heart failure, unspecified HF chronicity, unspecified heart failure type (HCC) [I50.9]    Patient Name: Paul Florez  MRN: [id number]    Date of Service: 3/1/2025     Subjective:  Paul is a 74 y.o. male who was seen and examined today,3/1/2025, at the bedside.  Patient continues to slowly improve.  Patient wore BiPAP last evening.  Patient appear to be hemodynamically stable.  Patient did receive 1 unit of packed cells yesterday and stools negative for occult blood.  Patient alert and interactive but lethargic    No family member present    Review of System: Somewhat limited due to acuity  Constitutional:   Resting comfortably  HEENT:   Does not disclose ear pain, sore throat, sinus or eye problems.  Cardiovascular:   Does not disclose any chest pain or palpitations.  Positive for history of atrial fibrillation on chronic Eliquis therapy.  Respiratory:   Positive for shortness of breath, coughing, but does not report sputum production, hemoptysis, or wheezing.  Gastrointestinal:   Does not disclose nausea, vomiting, diarrhea, or constipation.  Does not disclose any abdominal pain.  Genitourinary:    Does not disclose any urgency, frequency, hematuria. Voiding  without difficulty.  Extremities:   Positive for some degree of chronic lower extremity  obstructive sleep apnea and obesity hypoventilation  GERD/peptic ulcer disease  Thoracentesis on February 26 showed no malignancy      Plan:     Patient been transfused.  Stools negative for occult blood  Continue to follow multiple subspecialists  Increase activity  Continue work with physical therapy    More than 50% of my  time was spent at the bedside counseling/coordinating care with the patient and/or family with face to face contact.  This time was spent reviewing notes and laboratory data as well as instructing and counseling the patient. Time I spent with the family or surrogate(s) is included only if the patient was incapable of providing the necessary information or participating in medical decisions. I also discussed the differential diagnosis and all of the proposed management plans with the patient and individuals accompanying the patient.    Paul requires this high level of physician care and nursing on the ICU unit due the complexity of decision management and chance of rapid decline or death.  Continued cardiac monitoring and higher level of nursing are required. I am readily available for any further decision-making and intervention.         Greater than 40 minutes of critical care time was spent with the patient.  This time included chart review, , and discussion with those consultants involved in the patient's care.      Chandler Abebe DO  3/1/2025  3:27 PM

## 2025-03-01 NOTE — PROGRESS NOTES
INPATIENT CARDIOLOGY FOLLOW-UP    Name: Paul Florez    Age: 74 y.o.    Date of Admission: 2/25/2025  6:39 PM    Date of Service: 3/1/2025    Chief Complaint: Follow-up for CAD, CHF, hypotension, AF    Interim History:  Currently off BiPAP (no respiratory distress) / no chest pain.  on EKG and telemetry. Off levophed since 2/27/25 AM.    Review of Systems:   Cardiac: As per HPI  General: No fever, chills  Pulmonary: As per HPI  HEENT: No visual disturbances, difficult swallowing  GI: No nausea, vomiting  : No dysuria, hematuria  Endocrine: +hypothyroidism, +DM  Musculoskeletal: MERIDA x 4, no focal motor deficits  Skin: Intact, no rashes  Neuro: No headache, seizures  Psych: Currently with no depression, anxiety    Problem List:  Patient Active Problem List   Diagnosis    Aguila's esophagus without dysplasia    Duodenal mass    Cardiomyopathy (HCC)    Coronary artery disease involving native coronary artery of native heart without angina pectoris    BMI 40.0-44.9, adult    Spinal stenosis of lumbar region without neurogenic claudication    Lumbar spondylosis    Lumbar facet arthropathy    Lumbar disc disorder    Chronic pain syndrome [G89.4 (ICD-10-CM)]    Observation after surgery    Ventricular tachycardia (HCC)    Iron deficiency anemia, unspecified    Chronic systolic (congestive) heart failure (HCC)    Type 2 diabetes mellitus without complication, without long-term current use of insulin (HCC)    Shortness of breath    Other fatigue    JOSE MANUEL (obstructive sleep apnea)    Diarrhea    Obesity, class 3 (E66.813)    Body mass index [BMI] 45.0-49.9, adult (Z68.42)    Hypoglycemia    Acute HFrEF (heart failure with reduced ejection fraction) (HCC)    Congestive heart failure (HCC)    Acute respiratory failure with hypoxia (HCC)    KEN (acute kidney injury)    PAF (paroxysmal atrial fibrillation) (HCC)       Allergies:  No Known Allergies    Current Medications:  Current Facility-Administered Medications

## 2025-03-01 NOTE — PLAN OF CARE
Problem: Chronic Conditions and Co-morbidities  Goal: Patient's chronic conditions and co-morbidity symptoms are monitored and maintained or improved  Outcome: Progressing     Problem: Discharge Planning  Goal: Discharge to home or other facility with appropriate resources  Outcome: Progressing     Problem: Pain  Goal: Verbalizes/displays adequate comfort level or baseline comfort level  Outcome: Progressing     Problem: Safety - Adult  Goal: Free from fall injury  Outcome: Progressing     Problem: Skin/Tissue Integrity  Goal: Skin integrity remains intact  Description: 1.  Monitor for areas of redness and/or skin breakdown  2.  Assess vascular access sites hourly  3.  Every 4-6 hours minimum:  Change oxygen saturation probe site  4.  Every 4-6 hours:  If on nasal continuous positive airway pressure, respiratory therapy assess nares and determine need for appliance change or resting period  Outcome: Progressing     Problem: Respiratory - Adult  Goal: Achieves optimal ventilation and oxygenation  Outcome: Progressing     Problem: Cardiovascular - Adult  Goal: Maintains optimal cardiac output and hemodynamic stability  Outcome: Progressing  Goal: Absence of cardiac dysrhythmias or at baseline  Outcome: Progressing     Problem: Skin/Tissue Integrity - Adult  Goal: Skin integrity remains intact  Description: 1.  Monitor for areas of redness and/or skin breakdown  2.  Assess vascular access sites hourly  3.  Every 4-6 hours minimum:  Change oxygen saturation probe site  4.  Every 4-6 hours:  If on nasal continuous positive airway pressure, respiratory therapy assess nares and determine need for appliance change or resting period  Outcome: Progressing     Problem: Gastrointestinal - Adult  Goal: Maintains or returns to baseline bowel function  Outcome: Progressing  Goal: Maintains adequate nutritional intake  Outcome: Progressing     Problem: Genitourinary - Adult  Goal: Absence of urinary retention  Outcome:

## 2025-03-01 NOTE — PROGRESS NOTES
Occult stool done on 2/28/25 at 2030 and the results were negative. Results were verified with Vivi Beauchamp.

## 2025-03-02 ENCOUNTER — APPOINTMENT (OUTPATIENT)
Dept: GENERAL RADIOLOGY | Age: 75
End: 2025-03-02
Payer: MEDICARE

## 2025-03-02 LAB
ABO/RH: NORMAL
ALBUMIN SERPL-MCNC: 2.6 G/DL (ref 3.5–5.2)
ALP SERPL-CCNC: 106 U/L (ref 40–129)
ALT SERPL-CCNC: 12 U/L (ref 0–40)
ANION GAP SERPL CALCULATED.3IONS-SCNC: 5 MMOL/L (ref 7–16)
ANTIBODY SCREEN: NEGATIVE
ARM BAND NUMBER: NORMAL
AST SERPL-CCNC: 27 U/L (ref 0–39)
B.E.: 3.3 MMOL/L (ref -3–3)
BASOPHILS # BLD: 0.04 K/UL (ref 0–0.2)
BASOPHILS NFR BLD: 1 % (ref 0–2)
BILIRUB SERPL-MCNC: 0.4 MG/DL (ref 0–1.2)
BLOOD BANK BLOOD PRODUCT EXPIRATION DATE: NORMAL
BLOOD BANK BLOOD PRODUCT EXPIRATION DATE: NORMAL
BLOOD BANK DISPENSE STATUS: NORMAL
BLOOD BANK ISBT PRODUCT BLOOD TYPE: 6200
BLOOD BANK ISBT PRODUCT BLOOD TYPE: 6200
BLOOD BANK PRODUCT CODE: NORMAL
BLOOD BANK PRODUCT CODE: NORMAL
BLOOD BANK SAMPLE EXPIRATION: NORMAL
BLOOD BANK UNIT TYPE AND RH: NORMAL
BLOOD BANK UNIT TYPE AND RH: NORMAL
BPU ID: NORMAL
BUN SERPL-MCNC: 17 MG/DL (ref 6–23)
CALCIUM SERPL-MCNC: 8.6 MG/DL (ref 8.6–10.2)
CHLORIDE SERPL-SCNC: 103 MMOL/L (ref 98–107)
CO2 SERPL-SCNC: 29 MMOL/L (ref 22–29)
COHB: 1.2 % (ref 0–1.5)
COMPONENT: NORMAL
CREAT SERPL-MCNC: 2 MG/DL (ref 0.7–1.2)
CRITICAL: ABNORMAL
CROSSMATCH RESULT: NORMAL
DATE ANALYZED: ABNORMAL
DATE OF COLLECTION: ABNORMAL
EOSINOPHIL # BLD: 0.24 K/UL (ref 0.05–0.5)
EOSINOPHILS RELATIVE PERCENT: 4 % (ref 0–6)
ERYTHROCYTE [DISTWIDTH] IN BLOOD BY AUTOMATED COUNT: 18.9 % (ref 11.5–15)
GFR, ESTIMATED: 35 ML/MIN/1.73M2
GLUCOSE BLD-MCNC: 128 MG/DL (ref 74–99)
GLUCOSE BLD-MCNC: 141 MG/DL (ref 74–99)
GLUCOSE BLD-MCNC: 146 MG/DL (ref 74–99)
GLUCOSE BLD-MCNC: 147 MG/DL (ref 74–99)
GLUCOSE SERPL-MCNC: 135 MG/DL (ref 74–99)
HCO3: 29.4 MMOL/L (ref 22–26)
HCT VFR BLD AUTO: 25.9 % (ref 37–54)
HCT VFR BLD AUTO: 26.7 % (ref 37–54)
HCT VFR BLD AUTO: 27 % (ref 37–54)
HGB BLD-MCNC: 7.8 G/DL (ref 12.5–16.5)
HGB BLD-MCNC: 7.9 G/DL (ref 12.5–16.5)
HGB BLD-MCNC: 8 G/DL (ref 12.5–16.5)
HHB: 6.1 % (ref 0–5)
IMM GRANULOCYTES # BLD AUTO: 0.03 K/UL (ref 0–0.58)
IMM GRANULOCYTES NFR BLD: 1 % (ref 0–5)
IMM RETICS NFR: 34.6 % (ref 2.3–13.4)
LAB: ABNORMAL
LYMPHOCYTES NFR BLD: 0.99 K/UL (ref 1.5–4)
LYMPHOCYTES RELATIVE PERCENT: 15 % (ref 20–42)
Lab: 540
MAGNESIUM SERPL-MCNC: 1.9 MG/DL (ref 1.6–2.6)
MCH RBC QN AUTO: 24.7 PG (ref 26–35)
MCHC RBC AUTO-ENTMCNC: 29.3 G/DL (ref 32–34.5)
MCV RBC AUTO: 84.4 FL (ref 80–99.9)
METHB: 0.1 % (ref 0–1.5)
MICROORGANISM SPEC CULT: NO GROWTH
MICROORGANISM/AGENT SPEC: NORMAL
MODE: ABNORMAL
MONOCYTES NFR BLD: 0.63 K/UL (ref 0.1–0.95)
MONOCYTES NFR BLD: 10 % (ref 2–12)
NEUTROPHILS NFR BLD: 71 % (ref 43–80)
NEUTS SEG NFR BLD: 4.63 K/UL (ref 1.8–7.3)
O2 CONTENT: 11.5 ML/DL
O2 SATURATION: 93.8 % (ref 92–98.5)
O2HB: 92.6 % (ref 94–97)
OPERATOR ID: ABNORMAL
PATIENT TEMP: 37 C
PCO2: 53.9 MMHG (ref 35–45)
PH BLOOD GAS: 7.36 (ref 7.35–7.45)
PHOSPHATE SERPL-MCNC: 3.6 MG/DL (ref 2.5–4.5)
PLATELET # BLD AUTO: 185 K/UL (ref 130–450)
PMV BLD AUTO: 11.1 FL (ref 7–12)
PO2: 70.3 MMHG (ref 75–100)
POTASSIUM SERPL-SCNC: 4.2 MMOL/L (ref 3.5–5)
PROT SERPL-MCNC: 6.5 G/DL (ref 6.4–8.3)
RBC # BLD AUTO: 3.2 M/UL (ref 3.8–5.8)
RETIC HEMOGLOBIN: 29.3 PG (ref 28.2–36.6)
RETICS # AUTO: 0.08 M/UL
RETICS/RBC NFR AUTO: 2.6 % (ref 0.4–1.9)
SERVICE CMNT-IMP: NORMAL
SODIUM SERPL-SCNC: 137 MMOL/L (ref 132–146)
SOURCE, BLOOD GAS: ABNORMAL
SPECIMEN DESCRIPTION: NORMAL
THB: 8.8 G/DL (ref 11.5–16.5)
TIME ANALYZED: 542
TRANSFUSION STATUS: NORMAL
UNIT DIVISION: 0
UNIT ISSUE DATE/TIME: NORMAL
UNIT ISSUE DATE/TIME: NORMAL
WBC OTHER # BLD: 6.6 K/UL (ref 4.5–11.5)

## 2025-03-02 PROCEDURE — 99233 SBSQ HOSP IP/OBS HIGH 50: CPT | Performed by: INTERNAL MEDICINE

## 2025-03-02 PROCEDURE — 6360000002 HC RX W HCPCS

## 2025-03-02 PROCEDURE — 2700000000 HC OXYGEN THERAPY PER DAY

## 2025-03-02 PROCEDURE — 85018 HEMOGLOBIN: CPT

## 2025-03-02 PROCEDURE — 6370000000 HC RX 637 (ALT 250 FOR IP): Performed by: INTERNAL MEDICINE

## 2025-03-02 PROCEDURE — 2500000003 HC RX 250 WO HCPCS: Performed by: INTERNAL MEDICINE

## 2025-03-02 PROCEDURE — 83735 ASSAY OF MAGNESIUM: CPT

## 2025-03-02 PROCEDURE — 84100 ASSAY OF PHOSPHORUS: CPT

## 2025-03-02 PROCEDURE — 2060000000 HC ICU INTERMEDIATE R&B

## 2025-03-02 PROCEDURE — 82805 BLOOD GASES W/O2 SATURATION: CPT

## 2025-03-02 PROCEDURE — 6370000000 HC RX 637 (ALT 250 FOR IP)

## 2025-03-02 PROCEDURE — 85045 AUTOMATED RETICULOCYTE COUNT: CPT

## 2025-03-02 PROCEDURE — 85025 COMPLETE CBC W/AUTO DIFF WBC: CPT

## 2025-03-02 PROCEDURE — 36592 COLLECT BLOOD FROM PICC: CPT

## 2025-03-02 PROCEDURE — 94660 CPAP INITIATION&MGMT: CPT

## 2025-03-02 PROCEDURE — 85014 HEMATOCRIT: CPT

## 2025-03-02 PROCEDURE — 82962 GLUCOSE BLOOD TEST: CPT

## 2025-03-02 PROCEDURE — 6360000002 HC RX W HCPCS: Performed by: INTERNAL MEDICINE

## 2025-03-02 PROCEDURE — 94640 AIRWAY INHALATION TREATMENT: CPT

## 2025-03-02 PROCEDURE — 71045 X-RAY EXAM CHEST 1 VIEW: CPT

## 2025-03-02 PROCEDURE — 80053 COMPREHEN METABOLIC PANEL: CPT

## 2025-03-02 PROCEDURE — 2580000003 HC RX 258: Performed by: INTERNAL MEDICINE

## 2025-03-02 PROCEDURE — 2000000000 HC ICU R&B

## 2025-03-02 PROCEDURE — 6370000000 HC RX 637 (ALT 250 FOR IP): Performed by: NURSE PRACTITIONER

## 2025-03-02 PROCEDURE — 6360000002 HC RX W HCPCS: Performed by: HOSPITALIST

## 2025-03-02 PROCEDURE — 97116 GAIT TRAINING THERAPY: CPT | Performed by: PHYSICAL THERAPIST

## 2025-03-02 PROCEDURE — 97161 PT EVAL LOW COMPLEX 20 MIN: CPT | Performed by: PHYSICAL THERAPIST

## 2025-03-02 PROCEDURE — 2580000003 HC RX 258: Performed by: HOSPITALIST

## 2025-03-02 RX ORDER — LANOLIN ALCOHOL/MO/W.PET/CERES
100 CREAM (GRAM) TOPICAL DAILY
Status: DISCONTINUED | OUTPATIENT
Start: 2025-03-02 | End: 2025-03-07 | Stop reason: HOSPADM

## 2025-03-02 RX ORDER — FUROSEMIDE 10 MG/ML
80 INJECTION INTRAMUSCULAR; INTRAVENOUS ONCE
Status: COMPLETED | OUTPATIENT
Start: 2025-03-02 | End: 2025-03-02

## 2025-03-02 RX ORDER — MAGNESIUM SULFATE 1 G/100ML
1000 INJECTION INTRAVENOUS ONCE
Status: COMPLETED | OUTPATIENT
Start: 2025-03-02 | End: 2025-03-02

## 2025-03-02 RX ADMIN — DOXYCYCLINE 100 MG: 100 INJECTION, POWDER, LYOPHILIZED, FOR SOLUTION INTRAVENOUS at 02:57

## 2025-03-02 RX ADMIN — MAGNESIUM SULFATE 1000 MG: 1 INJECTION INTRAVENOUS at 07:36

## 2025-03-02 RX ADMIN — PANTOPRAZOLE SODIUM 40 MG: 40 INJECTION, POWDER, FOR SOLUTION INTRAVENOUS at 13:17

## 2025-03-02 RX ADMIN — PANTOPRAZOLE SODIUM 40 MG: 40 INJECTION, POWDER, FOR SOLUTION INTRAVENOUS at 01:20

## 2025-03-02 RX ADMIN — Medication 10 ML: at 20:59

## 2025-03-02 RX ADMIN — LEVOTHYROXINE SODIUM 88 MCG: 0.09 TABLET ORAL at 05:51

## 2025-03-02 RX ADMIN — MAGNESIUM OXIDE 200 MG: 400 TABLET ORAL at 10:29

## 2025-03-02 RX ADMIN — FUROSEMIDE 80 MG: 10 INJECTION, SOLUTION INTRAMUSCULAR; INTRAVENOUS at 14:26

## 2025-03-02 RX ADMIN — PANTOPRAZOLE SODIUM 40 MG: 40 INJECTION, POWDER, FOR SOLUTION INTRAVENOUS at 20:59

## 2025-03-02 RX ADMIN — IPRATROPIUM BROMIDE AND ALBUTEROL SULFATE 1 DOSE: .5; 2.5 SOLUTION RESPIRATORY (INHALATION) at 18:59

## 2025-03-02 RX ADMIN — DOXYCYCLINE 100 MG: 100 INJECTION, POWDER, LYOPHILIZED, FOR SOLUTION INTRAVENOUS at 14:32

## 2025-03-02 RX ADMIN — IPRATROPIUM BROMIDE AND ALBUTEROL SULFATE 1 DOSE: .5; 2.5 SOLUTION RESPIRATORY (INHALATION) at 13:09

## 2025-03-02 RX ADMIN — IPRATROPIUM BROMIDE AND ALBUTEROL SULFATE 1 DOSE: .5; 2.5 SOLUTION RESPIRATORY (INHALATION) at 02:28

## 2025-03-02 RX ADMIN — Medication 1000 UNITS: at 10:29

## 2025-03-02 RX ADMIN — IPRATROPIUM BROMIDE AND ALBUTEROL SULFATE 1 DOSE: .5; 2.5 SOLUTION RESPIRATORY (INHALATION) at 22:31

## 2025-03-02 RX ADMIN — ATORVASTATIN CALCIUM 40 MG: 40 TABLET, FILM COATED ORAL at 20:59

## 2025-03-02 RX ADMIN — AMIODARONE HYDROCHLORIDE 200 MG: 200 TABLET ORAL at 10:29

## 2025-03-02 RX ADMIN — PANTOPRAZOLE SODIUM 40 MG: 40 INJECTION, POWDER, FOR SOLUTION INTRAVENOUS at 07:21

## 2025-03-02 RX ADMIN — WATER 1000 MG: 1 INJECTION INTRAMUSCULAR; INTRAVENOUS; SUBCUTANEOUS at 13:14

## 2025-03-02 RX ADMIN — Medication 10 ML: at 10:39

## 2025-03-02 RX ADMIN — PYRIDOXINE HCL TAB 50 MG 100 MG: 50 TAB at 10:28

## 2025-03-02 RX ADMIN — IPRATROPIUM BROMIDE AND ALBUTEROL SULFATE 1 DOSE: .5; 2.5 SOLUTION RESPIRATORY (INHALATION) at 05:38

## 2025-03-02 RX ADMIN — SODIUM CHLORIDE 125 MG: 9 INJECTION, SOLUTION INTRAVENOUS at 07:25

## 2025-03-02 RX ADMIN — IPRATROPIUM BROMIDE AND ALBUTEROL SULFATE 1 DOSE: .5; 2.5 SOLUTION RESPIRATORY (INHALATION) at 09:26

## 2025-03-02 ASSESSMENT — PAIN SCALES - GENERAL
PAINLEVEL_OUTOF10: 0

## 2025-03-02 NOTE — PROGRESS NOTES
PROGRESS NOTE  By Anuj Butler M.D.    [facility]  Dr. Kate Jason M.D.,  Dr. Josue Macias M.D.,   TONI JenningsO.,  Dr. Barry Johnson M.D.,  Dr. Dario Resendez D.O.,          Paul Florez  74 y.o.  male    SUBJECTIVE:  Denies abdominal pain.  Denies nausea vomiting    OBJECTIVE:    /62   Pulse 88   Temp 98.2 °F (36.8 °C) (Oral)   Resp 25   Ht 1.753 m (5' 9.02\")   Wt (!) 152.4 kg (335 lb 15.7 oz)   SpO2 92%   BMI 49.59 kg/m²     General: NAD/older adult obese  male.  AAOx3  HEENT: Anicteric sclera/moist oral mucosa  Neck: Supple with trachea midline  Chest: Symmetric excursion/CTAB.  Nasal cannula supplemental oxygen 6 L/min saturating at 92%  Cor: Regular/S1-S2  Abd.: Soft and obese.  Nontender  Extr.:  BLE edema.  Decreased muscle tone and bulk throughout  Skin: Warm and dry/anicteric      DATA:    Monitor data reviewed -sinus rhythm noted.       Lab Results   Component Value Date/Time    WBC 6.6 03/02/2025 06:27 AM    RBC 3.20 03/02/2025 06:27 AM    HGB 7.9 03/02/2025 06:27 AM    HCT 27.0 03/02/2025 06:27 AM    MCV 84.4 03/02/2025 06:27 AM    MCH 24.7 03/02/2025 06:27 AM    MCHC 29.3 03/02/2025 06:27 AM    RDW 18.9 03/02/2025 06:27 AM     03/02/2025 06:27 AM    MPV 11.1 03/02/2025 06:27 AM     Lab Results   Component Value Date/Time     03/02/2025 06:27 AM    K 4.2 03/02/2025 06:27 AM    K 4.3 05/28/2022 05:18 AM     03/02/2025 06:27 AM    CO2 29 03/02/2025 06:27 AM    BUN 17 03/02/2025 06:27 AM    CREATININE 2.0 03/02/2025 06:27 AM    CALCIUM 8.6 03/02/2025 06:27 AM    BILITOT 0.4 03/02/2025 06:27 AM    ALKPHOS 106 03/02/2025 06:27 AM    AST 27 03/02/2025 06:27 AM    ALT 12 03/02/2025 06:27 AM     No results found for: \"LIPASE\"  No results found for: \"AMYLASE\"      ASSESSMENT/PLAN:  Patient Active Problem List   Diagnosis    Aguila's esophagus without dysplasia    Duodenal mass    Cardiomyopathy (HCC)    Coronary artery disease involving  anticoagulation  -Eliquis currently held  -Per admitting/cardiology     6.  Comorbidities -diabetes mellitus, obstructive sleep apnea, morbid obesity, hypertension, etc.  -Per admitting/pertinent consultants       Consider upper endoscopy tomorrow pending respiratory status  Above has been discussed with the patient and all questions answered to his satisfaction.    Anuj Butler MD  3/2/2025  7:14 AM    NOTE:  This report was transcribed using voice recognition software.  Every effort was made to ensure accuracy; however, inadvertent computerized transcription errors may be present.

## 2025-03-02 NOTE — PROGRESS NOTES
INPATIENT CARDIOLOGY FOLLOW-UP    Name: Paul Florez    Age: 74 y.o.    Date of Admission: 2/25/2025  6:39 PM    Date of Service: 3/2/2025    Chief Complaint: Follow-up for CAD, CHF, hypotension, AF    Interim History:  Currently off BiPAP (no respiratory distress) / no chest pain.  on EKG and telemetry. Off levophed since 2/27/25 AM.    Review of Systems:   Cardiac: As per HPI  General: No fever, chills  Pulmonary: As per HPI  HEENT: No visual disturbances, difficult swallowing  GI: No nausea, vomiting  : No dysuria, hematuria  Endocrine: +hypothyroidism, +DM  Musculoskeletal: MERIDA x 4, no focal motor deficits  Skin: Intact, no rashes  Neuro: No headache, seizures  Psych: Currently with no depression, anxiety    Problem List:  Patient Active Problem List   Diagnosis    Aguila's esophagus without dysplasia    Duodenal mass    Cardiomyopathy (HCC)    Coronary artery disease involving native coronary artery of native heart without angina pectoris    BMI 40.0-44.9, adult    Spinal stenosis of lumbar region without neurogenic claudication    Lumbar spondylosis    Lumbar facet arthropathy    Lumbar disc disorder    Chronic pain syndrome [G89.4 (ICD-10-CM)]    Observation after surgery    Ventricular tachycardia (HCC)    Iron deficiency anemia, unspecified    Chronic systolic (congestive) heart failure (HCC)    Type 2 diabetes mellitus without complication, without long-term current use of insulin (HCC)    Shortness of breath    Other fatigue    JOSE MANUEL (obstructive sleep apnea)    Diarrhea    Obesity, class 3 (E66.813)    Body mass index [BMI] 45.0-49.9, adult (Z68.42)    Hypoglycemia    Acute HFrEF (heart failure with reduced ejection fraction) (HCC)    Congestive heart failure (HCC)    Acute respiratory failure with hypoxia (HCC)    KEN (acute kidney injury)    PAF (paroxysmal atrial fibrillation) (HCC)       Allergies:  No Known Allergies    Current Medications:  Current Facility-Administered Medications  reviewed ()  Follow-up repeat labs  Care per ICU team, nephrology, GI, and urology  Case discussed with the patient and his wife today    Greater than 50 minutes was spent counseling the patient, reviewing the rationale for the above recommendations and reviewing the patient's current medication list, problem list and results of all previously ordered testing.    Paul Mcdonald MD  [facility]

## 2025-03-02 NOTE — PROGRESS NOTES
Pulmonary/Critical Care Progress Note    We are following patient for acute hypoxemic and hypercapnic respiratory failure superimposed on chronic respiratory failure related to morbid obesity and untreated obstructive sleep apnea, right pleural effusion (exudate but cytology is negative), chronic atrial fibrillation,, anemia    IMPRESSION / PLAN:    Acute on Chronic Hypercapneic Respiratory Failure   Morbid obesity / Obstructive Sleep Apnea / Obesity Hypoventilation Syndrome (presumed)  BiPAP qhs and PRN  Maintained on NC      HFpEF  Pulmonary HTN     Acute Kidney Injury on CKD  Started on hemodialysis   Appreciate nephrology guidance    Hematuria  Anticoag stopped   Resolved  Urology following  Remove garner when OK with urology    Pleural effusion   Exudative  Cytology negative     Atrial Fibrillation  Anticoag on hold    Anemia  Transfuse for Hgb < 7  Seen by GI - no active GIB  Appreciate input    DM II  Insulin sliding scale   Glu under good control     Up in chair      Steady improvement overall  Continue PT/ OT  Continue present management           Code Status: Full Code    __________________________________________    Subjective  Feeling better      Events last 24 hr  Using BiPAP QHS   Up in chair    MEDICATIONS:   vitamin B-6  100 mg Oral Daily    pantoprazole (PROTONIX) 40 mg in sodium chloride (PF) 0.9 % 10 mL injection  40 mg IntraVENous Q6H    Vitamin D  1,000 Units Oral Daily    levothyroxine  88 mcg Oral Daily    sodium chloride flush  5-40 mL IntraVENous 2 times per day    amiodarone  200 mg Oral Daily    [Held by provider] apixaban  5 mg Oral BID    [Held by provider] aspirin  81 mg Oral Daily    [Held by provider] losartan  100 mg Oral Daily    magnesium oxide  200 mg Oral Daily    [Held by provider] metoprolol succinate  50 mg Oral Daily    [Held by provider] tamsulosin  0.4 mg Oral Daily    ipratropium 0.5 mg-albuterol 2.5 mg  1 Dose Inhalation Q4H RT    atorvastatin  40 mg Oral Nightly    [Held  in patients with extremes of muscle mass, extra-renal   metabolism of creatine, excessive creatine ingestion, or following therapy that affects   renal tubular secretion.     03/05/2024 >60 >60 mL/min/1.73m2 Final     Comment:       These results are not intended for use in patients <18 years of age.    eGFR results are calculated without a race factor using the 2021 CKD-EPI equation.  Careful clinical correlation is recommended, particularly when comparing to results   calculated using previous equations.  The CKD-EPI equation is less accurate in patients with extremes of muscle mass, extra-renal   metabolism of creatine, excessive creatine ingestion, or following therapy that affects   renal tubular secretion.     08/16/2023 47 (L) >60 mL/min/1.73m2 Final     Comment:       These results are not intended for use in patients <18 years of age.    eGFR results are calculated without a race factor using the 2021 CKD-EPI equation.  Careful clinical correlation is recommended, particularly when comparing to results   calculated using previous equations.  The CKD-EPI equation is less accurate in patients with extremes of muscle mass, extra-renal   metabolism of creatine, excessive creatine ingestion, or following therapy that affects   renal tubular secretion.     04/25/2023 53 >=60 mL/min/1.73 Final     Comment:     Pediatric calculator link  https://www.kidney.org/professionals/kdoqi/gfr_calculatorped  Effective Oct 3, 2022  These results are not intended for use in patients  <18 years of age.  eGFR results are calculated without  a race factor using the 2021 CKD-EPI equation.  Careful  clinical correlation is recommended, particularly when  comparing to results calculated using previous equations.  The CKD-EPI equation is less accurate in patients with  extremes of muscle mass, extra-renal metabolism of  creatinine, excessive creatinine ingestion, or following  therapy that affects renal tubular secretion.       GFR

## 2025-03-02 NOTE — PLAN OF CARE
Problem: Cardiovascular - Adult  Goal: Maintains optimal cardiac output and hemodynamic stability  3/2/2025 0011 by Leona Yin RN  Outcome: Progressing         Problem: Gastrointestinal - Adult  Goal: Maintains adequate nutritional intake  3/2/2025 0011 by Leona Yin RN  Outcome: Progressing     Problem: Genitourinary - Adult  Goal: Urinary catheter remains patent  3/2/2025 0011 by Leona Yin RN  Outcome: Progressing

## 2025-03-02 NOTE — PROGRESS NOTES
Nephrology Note  3/2/2025: Seen and examined.  No issues noted overnight.  No issues today.  Edema improved.  Tolerated ultrafiltration well yesterday.        Vital SignsBP 125/62   Pulse 88   Temp 98.2 °F (36.8 °C) (Oral)   Resp 25   Ht 1.753 m (5' 9.02\")   Wt (!) 152.4 kg (335 lb 15.7 oz)   SpO2 92%   BMI 49.59 kg/m²   24HR INTAKE/OUTPUT:    Intake/Output Summary (Last 24 hours) at 3/2/2025 1333  Last data filed at 3/2/2025 0600  Gross per 24 hour   Intake 509.38 ml   Output 3725 ml   Net -3215.62 ml         PHYSICAL EXAM        Neck: No JVD  Lungs: Breath sounds decreased at the bases. No rales or ronchi.  Heart: Regular rate and rhythm. No S3 gallop. No  murmrur.  Abdomen: Soft non distended, non tender and normal bowel sounds.  Extremeties:   +2 bipedal edema    Current Facility-Administered Medications   Medication Dose Route Frequency Provider Last Rate Last Admin    vitamin B-6 (PYRIDOXINE) tablet 100 mg  100 mg Oral Daily Rambo Mccloud APRN - CNP   100 mg at 03/02/25 1028    pantoprazole (PROTONIX) 40 mg in sodium chloride (PF) 0.9 % 10 mL injection  40 mg IntraVENous Q6H Anuj Butler MD   40 mg at 03/02/25 1317    Vitamin D (CHOLECALCIFEROL) tablet 1,000 Units  1,000 Units Oral Daily Chandler Membreno MD   1,000 Units at 03/02/25 1029    0.9 % sodium chloride infusion   IntraVENous PRN Rogelio Han APRN - CNP        levothyroxine (SYNTHROID) tablet 88 mcg  88 mcg Oral Daily Rambo Mccloud APRN - CNP   88 mcg at 03/02/25 0551    sodium chloride flush 0.9 % injection 5-40 mL  5-40 mL IntraVENous 2 times per day Jay Isgordo U, DO   10 mL at 03/02/25 1039    sodium chloride flush 0.9 % injection 5-40 mL  5-40 mL IntraVENous PRN Jay, Ismail U, DO        0.9 % sodium chloride infusion   IntraVENous PRN Jay, Ismail U, DO        polyethylene glycol (GLYCOLAX) packet 17 g  17 g Oral Daily PRN Jay, Ismail U, DO        acetaminophen (TYLENOL) tablet 650 mg  650 mg  1,000 mg IntraVENous Q24H Lauro Franklin MD   1,000 mg at 03/02/25 1314    doxycycline (VIBRAMYCIN) 100 mg in sodium chloride 0.9 % 100 mL IVPB ([id number])  100 mg IntraVENous Q12H Lauro Franklin MD   Stopped at 03/02/25 0406    insulin lispro (HUMALOG,ADMELOG) injection vial 0-8 Units  0-8 Units SubCUTAneous 4x Daily AC & HS Lauro Franklin MD   2 Units at 03/01/25 1217    dextrose 50 % IV solution  25 g IntraVENous PRN Alex Palacios MD   25 g at 02/26/25 1346       XR CHEST PORTABLE   Final Result   Cardiomegaly with small bilateral pleural effusions and bibasilar atelectatic   changes.  No new focal parenchymal opacification present.         XR CHEST PORTABLE   Final Result   No acute process.         XR CHEST PORTABLE   Final Result   Cardiomegaly. No obvious infiltrates or effusions.  No interval change         CTA CHEST W CONTRAST   Final Result      No CT evidence pulmonary embolism within main, right, and left pulmonary   arteries and bilateral intralobar branches.  Segmental and subsegmental   branches bilaterally cannot be assessed as described.      Cardiomegaly.      Right lower lobe atelectasis/pneumonia.      Small to moderate right and small left pleural effusions.         CT ABDOMEN PELVIS W IV CONTRAST Additional Contrast? Radiologist Recommendation   Final Result   1. Cholelithiasis.   2. Findings suspicious for cirrhosis of the liver.   3. Moderate right basilar pleural effusion with right lower lobe   consolidative airspace disease which could reflect dependent and or   compressive atelectasis with pneumonia not excluded.   4. Left-sided nephrolithiasis including 2.4 cm calculus in the renal pelvis   without hydronephrosis.   5. Sigmoid diverticulosis with no evidence of diverticulitis.         XR CHEST PORTABLE   Final Result   Cardiomegaly with stable mild increased interstitial markings throughout the   lung fields with small bilateral pleural effusions.  No new focal parenchymal

## 2025-03-02 NOTE — PROGRESS NOTES
Internal Medicine Progress Note     [facility]     Chandler Abebe D.O., SHIRAOEdmundoI.                         Curry Dennis D.O., MARIA R Dillon D.O.     Gauri Clement, MSN, APRN, NP-C  Adonis Gray, MSN, APRN-CNP  Rambo Mccloud, MSN, APRN, NP-C  Leona Collins, MSN, APRN-CNP  Lily Isaac, MSN, APRN, NP-C     Primary Care Physician: Eduin Guzman DO   Admitting Physician:  Chandler Abebe DO  Admission date and time: 2/25/2025  6:39 PM    Room:  [place] [address]  Admitting diagnosis: Hyperkalemia [E87.5]  Hypoglycemia [E16.2]  KEN (acute kidney injury) [N17.9]  Acute respiratory failure with hypoxia (HCC) [J96.01]  Congestive heart failure, unspecified HF chronicity, unspecified heart failure type (HCC) [I50.9]    Patient Name: Paul Florez  MRN: [id number]    Date of Service: 3/2/2025     Subjective:  Paul is a 74 y.o. male who was seen and examined today,3/2/2025, at the bedside.  Patient resting comfortably at the present time.  Blood pressure 120/62 with sinus rhythm.  Hemoglobin has been fluctuating continue to monitor.  Increase activity    No family member present    Review of System: Somewhat limited due to acuity  Constitutional:   Resting comfortably  HEENT:   Does not disclose ear pain, sore throat, sinus or eye problems.  Cardiovascular:   Does not disclose any chest pain or palpitations.  Positive for history of atrial fibrillation on chronic Eliquis therapy.  Respiratory:   Positive for shortness of breath, coughing, but does not report sputum production, hemoptysis, or wheezing.  Gastrointestinal:   Does not disclose nausea, vomiting, diarrhea, or constipation.  Does not disclose any abdominal pain.  Genitourinary:    Does not disclose any urgency, frequency, hematuria. Voiding  without difficulty.  Extremities:   Positive for some degree of chronic lower extremity swelling, but does not report any acute worsening.   Neurology:    Does not disclose any headache  occult blood.  Iron studies not significantly low B12 and folate satisfactory.  Check reticulocyte count and add B6  Increase activity  Continue to follow other subspecialists    More than 50% of my  time was spent at the bedside counseling/coordinating care with the patient and/or family with face to face contact.  This time was spent reviewing notes and laboratory data as well as instructing and counseling the patient. Time I spent with the family or surrogate(s) is included only if the patient was incapable of providing the necessary information or participating in medical decisions. I also discussed the differential diagnosis and all of the proposed management plans with the patient and individuals accompanying the patient.    Paul requires this high level of physician care and nursing on the ICU unit due the complexity of decision management and chance of rapid decline or death.  Continued cardiac monitoring and higher level of nursing are required. I am readily available for any further decision-making and intervention.         Greater than 40 minutes of critical care time was spent with the patient.  This time included chart review, , and discussion with those consultants involved in the patient's care.      DO ALVIN Arciniega  3/2/2025  12:26 PM

## 2025-03-02 NOTE — FLOWSHEET NOTE
03/01/25 2218   Vital Signs   BP (!) 150/77   Temp 98.3 °F (36.8 °C)   Pulse 88   Respirations 21   Weight - Scale (!) 150.8 kg (332 lb 7.3 oz)   Percent Weight Change 14.64   Post-Hemodialysis Assessment   Post-Treatment Procedures Blood returned;Catheter capped, clamped and heparinized x 2 ports   Machine Disinfection Process Acid/Vinegar Clean;Heat Disinfect;Exterior Machine Disinfection   Rinseback Volume (ml) 300 ml   Blood Volume Processed (Liters) 0 L   Dialyzer Clearance Lightly streaked   Duration of Treatment (minutes) 180 minutes   Heparin Amount Administered During Treatment (mL) 0 mL   Hemodialysis Intake (ml) 300 ml   Hemodialysis Output (ml) 3000 ml   NET Removed (ml) 2700   Tolerated Treatment Good   Patient Response to Treatment Completed prescribed Ultrafiltration w/o complications. No cramping or hypotension with fluid removal. Alert/responsive at tx's end, no c/o. Report given to ICU RN.   Patient Disposition Remain in ICU/ED

## 2025-03-02 NOTE — PROGRESS NOTES
Physical Therapy Initial Evaluation/Plan of Care    Room #:  IC06/IC06-01  Patient Name: Paul Florez  YOB: 1950  MRN: [id number]    Date of Service: 3/2/2025     Tentative placement recommendation: Home with Home Health Physical Therapy  Equipment recommendation: Patient has needed equipment       Evaluating Physical Therapist: Andrzej Head, PT  #[id number]      Specific Provider Orders/Date/Referring Provider :     PT eval and treat  Start:  02/28/25 1245,   End:  02/28/25 1245,   ONE TIME,   Standing Count:  1 Occurrences,   R       Mis, Chandler GARAY DO    Admitting Diagnosis:   Hyperkalemia [E87.5]  Hypoglycemia [E16.2]  KEN (acute kidney injury) [N17.9]  Acute respiratory failure with hypoxia (HCC) [J96.01]  Congestive heart failure, unspecified HF chronicity, unspecified heart failure type (HCC) [I50.9]    Admitted with    shortness of breath low blood sugar, bilateral lower extremities swelling , hypoxic ,   Surgery:     Visit Diagnoses         Codes    Hyperkalemia     E87.5            Patient Active Problem List   Diagnosis    Aguila's esophagus without dysplasia    Duodenal mass    Cardiomyopathy (HCC)    Coronary artery disease involving native coronary artery of native heart without angina pectoris    BMI 40.0-44.9, adult    Spinal stenosis of lumbar region without neurogenic claudication    Lumbar spondylosis    Lumbar facet arthropathy    Lumbar disc disorder    Chronic pain syndrome [G89.4 (ICD-10-CM)]    Observation after surgery    Ventricular tachycardia (HCC)    Iron deficiency anemia, unspecified    Chronic systolic (congestive) heart failure (HCC)    Type 2 diabetes mellitus without complication, without long-term current use of insulin (HCC)    Shortness of breath    Other fatigue    JOSE MANUEL (obstructive sleep apnea)    Diarrhea    Obesity, class 3 (E66.813)    Body mass index [BMI] 45.0-49.9, adult (Z68.42)    Hypoglycemia    Acute HFrEF (heart failure with reduced ejection fraction)

## 2025-03-03 ENCOUNTER — APPOINTMENT (OUTPATIENT)
Dept: GENERAL RADIOLOGY | Age: 75
End: 2025-03-03
Payer: MEDICARE

## 2025-03-03 ENCOUNTER — ANESTHESIA (OUTPATIENT)
Dept: ENDOSCOPY | Age: 75
End: 2025-03-03
Payer: MEDICARE

## 2025-03-03 ENCOUNTER — ANESTHESIA EVENT (OUTPATIENT)
Dept: ENDOSCOPY | Age: 75
End: 2025-03-03
Payer: MEDICARE

## 2025-03-03 LAB
ALBUMIN SERPL-MCNC: 2.6 G/DL (ref 3.5–5.2)
ALP SERPL-CCNC: 102 U/L (ref 40–129)
ALT SERPL-CCNC: 12 U/L (ref 0–40)
ANION GAP SERPL CALCULATED.3IONS-SCNC: 8 MMOL/L (ref 7–16)
AST SERPL-CCNC: 28 U/L (ref 0–39)
BASOPHILS # BLD: 0.04 K/UL (ref 0–0.2)
BASOPHILS NFR BLD: 1 % (ref 0–2)
BILIRUB DIRECT SERPL-MCNC: <0.2 MG/DL (ref 0–0.3)
BILIRUB SERPL-MCNC: 0.4 MG/DL (ref 0–1.2)
BUN SERPL-MCNC: 20 MG/DL (ref 6–23)
CALCIUM SERPL-MCNC: 8.8 MG/DL (ref 8.6–10.2)
CHLORIDE SERPL-SCNC: 103 MMOL/L (ref 98–107)
CO2 SERPL-SCNC: 30 MMOL/L (ref 22–29)
CREAT SERPL-MCNC: 2.2 MG/DL (ref 0.7–1.2)
EOSINOPHIL # BLD: 0.26 K/UL (ref 0.05–0.5)
EOSINOPHILS RELATIVE PERCENT: 5 % (ref 0–6)
ERYTHROCYTE [DISTWIDTH] IN BLOOD BY AUTOMATED COUNT: 19.3 % (ref 11.5–15)
GFR, ESTIMATED: 31 ML/MIN/1.73M2
GLUCOSE BLD-MCNC: 107 MG/DL (ref 74–99)
GLUCOSE BLD-MCNC: 116 MG/DL (ref 74–99)
GLUCOSE BLD-MCNC: 132 MG/DL (ref 74–99)
GLUCOSE SERPL-MCNC: 114 MG/DL (ref 74–99)
HCT VFR BLD AUTO: 26.2 % (ref 37–54)
HCT VFR BLD AUTO: 26.6 % (ref 37–54)
HCT VFR BLD AUTO: 26.8 % (ref 37–54)
HGB BLD-MCNC: 7.9 G/DL (ref 12.5–16.5)
HGB BLD-MCNC: 7.9 G/DL (ref 12.5–16.5)
HGB BLD-MCNC: 8 G/DL (ref 12.5–16.5)
IMM GRANULOCYTES # BLD AUTO: 0.04 K/UL (ref 0–0.58)
IMM GRANULOCYTES NFR BLD: 1 % (ref 0–5)
LYMPHOCYTES NFR BLD: 1.07 K/UL (ref 1.5–4)
LYMPHOCYTES RELATIVE PERCENT: 19 % (ref 20–42)
MAGNESIUM SERPL-MCNC: 1.9 MG/DL (ref 1.6–2.6)
MCH RBC QN AUTO: 25.5 PG (ref 26–35)
MCHC RBC AUTO-ENTMCNC: 29.7 G/DL (ref 32–34.5)
MCV RBC AUTO: 85.8 FL (ref 80–99.9)
MICROORGANISM SPEC CULT: NORMAL
MICROORGANISM SPEC CULT: NORMAL
MONOCYTES NFR BLD: 0.6 K/UL (ref 0.1–0.95)
MONOCYTES NFR BLD: 11 % (ref 2–12)
NEUTROPHILS NFR BLD: 65 % (ref 43–80)
NEUTS SEG NFR BLD: 3.7 K/UL (ref 1.8–7.3)
NON-GYN CYTOLOGY REPORT: NORMAL
PHOSPHATE SERPL-MCNC: 4.1 MG/DL (ref 2.5–4.5)
PLATELET # BLD AUTO: 184 K/UL (ref 130–450)
PMV BLD AUTO: 10.7 FL (ref 7–12)
POTASSIUM SERPL-SCNC: 4.3 MMOL/L (ref 3.5–5)
PROT SERPL-MCNC: 6.6 G/DL (ref 6.4–8.3)
RBC # BLD AUTO: 3.1 M/UL (ref 3.8–5.8)
SERVICE CMNT-IMP: NORMAL
SERVICE CMNT-IMP: NORMAL
SODIUM SERPL-SCNC: 141 MMOL/L (ref 132–146)
SPECIMEN DESCRIPTION: NORMAL
SPECIMEN DESCRIPTION: NORMAL
WBC OTHER # BLD: 5.7 K/UL (ref 4.5–11.5)

## 2025-03-03 PROCEDURE — 6370000000 HC RX 637 (ALT 250 FOR IP)

## 2025-03-03 PROCEDURE — 80053 COMPREHEN METABOLIC PANEL: CPT

## 2025-03-03 PROCEDURE — 85025 COMPLETE CBC W/AUTO DIFF WBC: CPT

## 2025-03-03 PROCEDURE — 6370000000 HC RX 637 (ALT 250 FOR IP): Performed by: NURSE PRACTITIONER

## 2025-03-03 PROCEDURE — 3700000000 HC ANESTHESIA ATTENDED CARE: Performed by: INTERNAL MEDICINE

## 2025-03-03 PROCEDURE — 6370000000 HC RX 637 (ALT 250 FOR IP): Performed by: INTERNAL MEDICINE

## 2025-03-03 PROCEDURE — 2700000000 HC OXYGEN THERAPY PER DAY

## 2025-03-03 PROCEDURE — 94669 MECHANICAL CHEST WALL OSCILL: CPT

## 2025-03-03 PROCEDURE — 99232 SBSQ HOSP IP/OBS MODERATE 35: CPT | Performed by: INTERNAL MEDICINE

## 2025-03-03 PROCEDURE — 2580000003 HC RX 258: Performed by: NURSE ANESTHETIST, CERTIFIED REGISTERED

## 2025-03-03 PROCEDURE — 82962 GLUCOSE BLOOD TEST: CPT

## 2025-03-03 PROCEDURE — 2580000003 HC RX 258: Performed by: HOSPITALIST

## 2025-03-03 PROCEDURE — 83735 ASSAY OF MAGNESIUM: CPT

## 2025-03-03 PROCEDURE — 6360000002 HC RX W HCPCS: Performed by: HOSPITALIST

## 2025-03-03 PROCEDURE — 3700000001 HC ADD 15 MINUTES (ANESTHESIA): Performed by: INTERNAL MEDICINE

## 2025-03-03 PROCEDURE — 85018 HEMOGLOBIN: CPT

## 2025-03-03 PROCEDURE — 6360000002 HC RX W HCPCS: Performed by: INTERNAL MEDICINE

## 2025-03-03 PROCEDURE — 2580000003 HC RX 258: Performed by: INTERNAL MEDICINE

## 2025-03-03 PROCEDURE — 2720000010 HC SURG SUPPLY STERILE: Performed by: INTERNAL MEDICINE

## 2025-03-03 PROCEDURE — 94640 AIRWAY INHALATION TREATMENT: CPT

## 2025-03-03 PROCEDURE — 71045 X-RAY EXAM CHEST 1 VIEW: CPT

## 2025-03-03 PROCEDURE — 97165 OT EVAL LOW COMPLEX 30 MIN: CPT

## 2025-03-03 PROCEDURE — 36592 COLLECT BLOOD FROM PICC: CPT

## 2025-03-03 PROCEDURE — 0W3P8ZZ CONTROL BLEEDING IN GASTROINTESTINAL TRACT, VIA NATURAL OR ARTIFICIAL OPENING ENDOSCOPIC: ICD-10-PCS | Performed by: INTERNAL MEDICINE

## 2025-03-03 PROCEDURE — 2500000003 HC RX 250 WO HCPCS: Performed by: INTERNAL MEDICINE

## 2025-03-03 PROCEDURE — 94660 CPAP INITIATION&MGMT: CPT

## 2025-03-03 PROCEDURE — 2060000000 HC ICU INTERMEDIATE R&B

## 2025-03-03 PROCEDURE — 85014 HEMATOCRIT: CPT

## 2025-03-03 PROCEDURE — 2709999900 HC NON-CHARGEABLE SUPPLY: Performed by: INTERNAL MEDICINE

## 2025-03-03 PROCEDURE — 97535 SELF CARE MNGMENT TRAINING: CPT

## 2025-03-03 PROCEDURE — 84100 ASSAY OF PHOSPHORUS: CPT

## 2025-03-03 PROCEDURE — 3609013000 HC EGD TRANSORAL CONTROL BLEEDING ANY METHOD: Performed by: INTERNAL MEDICINE

## 2025-03-03 PROCEDURE — 6360000002 HC RX W HCPCS: Performed by: NURSE ANESTHETIST, CERTIFIED REGISTERED

## 2025-03-03 RX ORDER — SODIUM CHLORIDE 9 MG/ML
INJECTION, SOLUTION INTRAVENOUS
Status: DISCONTINUED | OUTPATIENT
Start: 2025-03-03 | End: 2025-03-03 | Stop reason: SDUPTHER

## 2025-03-03 RX ORDER — PANTOPRAZOLE SODIUM 40 MG/1
40 TABLET, DELAYED RELEASE ORAL
Status: DISCONTINUED | OUTPATIENT
Start: 2025-03-03 | End: 2025-03-07 | Stop reason: HOSPADM

## 2025-03-03 RX ORDER — PROPOFOL 10 MG/ML
INJECTION, EMULSION INTRAVENOUS
Status: DISCONTINUED | OUTPATIENT
Start: 2025-03-03 | End: 2025-03-03 | Stop reason: SDUPTHER

## 2025-03-03 RX ADMIN — SODIUM CHLORIDE: 9 INJECTION, SOLUTION INTRAVENOUS at 14:49

## 2025-03-03 RX ADMIN — DOXYCYCLINE 100 MG: 100 INJECTION, POWDER, LYOPHILIZED, FOR SOLUTION INTRAVENOUS at 02:46

## 2025-03-03 RX ADMIN — IPRATROPIUM BROMIDE AND ALBUTEROL SULFATE 1 DOSE: .5; 2.5 SOLUTION RESPIRATORY (INHALATION) at 02:32

## 2025-03-03 RX ADMIN — MAGNESIUM OXIDE 200 MG: 400 TABLET ORAL at 08:41

## 2025-03-03 RX ADMIN — ATORVASTATIN CALCIUM 40 MG: 40 TABLET, FILM COATED ORAL at 20:39

## 2025-03-03 RX ADMIN — IPRATROPIUM BROMIDE AND ALBUTEROL SULFATE 1 DOSE: .5; 2.5 SOLUTION RESPIRATORY (INHALATION) at 16:13

## 2025-03-03 RX ADMIN — WATER 1000 MG: 1 INJECTION INTRAMUSCULAR; INTRAVENOUS; SUBCUTANEOUS at 13:05

## 2025-03-03 RX ADMIN — IPRATROPIUM BROMIDE AND ALBUTEROL SULFATE 1 DOSE: .5; 2.5 SOLUTION RESPIRATORY (INHALATION) at 12:27

## 2025-03-03 RX ADMIN — PANTOPRAZOLE SODIUM 40 MG: 40 INJECTION, POWDER, FOR SOLUTION INTRAVENOUS at 13:04

## 2025-03-03 RX ADMIN — PROPOFOL 210 MG: 10 INJECTION, EMULSION INTRAVENOUS at 15:04

## 2025-03-03 RX ADMIN — DOXYCYCLINE 100 MG: 100 INJECTION, POWDER, LYOPHILIZED, FOR SOLUTION INTRAVENOUS at 13:56

## 2025-03-03 RX ADMIN — PANTOPRAZOLE SODIUM 40 MG: 40 INJECTION, POWDER, FOR SOLUTION INTRAVENOUS at 01:27

## 2025-03-03 RX ADMIN — PYRIDOXINE HCL TAB 50 MG 100 MG: 50 TAB at 08:42

## 2025-03-03 RX ADMIN — Medication 10 ML: at 20:43

## 2025-03-03 RX ADMIN — AMIODARONE HYDROCHLORIDE 200 MG: 200 TABLET ORAL at 08:40

## 2025-03-03 RX ADMIN — IPRATROPIUM BROMIDE AND ALBUTEROL SULFATE 1 DOSE: .5; 2.5 SOLUTION RESPIRATORY (INHALATION) at 09:04

## 2025-03-03 RX ADMIN — Medication 10 ML: at 08:49

## 2025-03-03 RX ADMIN — IPRATROPIUM BROMIDE AND ALBUTEROL SULFATE 1 DOSE: .5; 2.5 SOLUTION RESPIRATORY (INHALATION) at 21:12

## 2025-03-03 RX ADMIN — IPRATROPIUM BROMIDE AND ALBUTEROL SULFATE 1 DOSE: .5; 2.5 SOLUTION RESPIRATORY (INHALATION) at 05:30

## 2025-03-03 RX ADMIN — PANTOPRAZOLE SODIUM 40 MG: 40 TABLET, DELAYED RELEASE ORAL at 16:28

## 2025-03-03 RX ADMIN — Medication 1000 UNITS: at 08:42

## 2025-03-03 ASSESSMENT — PAIN SCALES - GENERAL
PAINLEVEL_OUTOF10: 0
PAINLEVEL_OUTOF10: 0

## 2025-03-03 NOTE — ANESTHESIA PRE PROCEDURE
05/27/2022    1st OM    Type 2 diabetes mellitus without complication (HCC)        Past Surgical History:        Procedure Laterality Date    ATRIAL ABLATION SURGERY  2012    CARDIAC DEFIBRILLATOR PLACEMENT Left 11/11/2022    [employer] CRT-D Insertion (Dr. Guerrero)    CARDIAC SURGERY      pt states 6 - 7 years ago     COLONOSCOPY  08/09/2021    Follow-up 3 years.  Dr. Griffin    COLONOSCOPY N/A 07/09/2024    COLONOSCOPY POLYPECTOMY COLD SNARE performed by Paul Griffin DO at [facility] ENDOSCOPY    ESOPHAGOGASTRODUODENOSCOPY  08/09/2021    Follow-up 3 years.  5 cm Aguila's esophagitis    ESOPHAGOGASTRODUODENOSCOPY  07/09/2024    KNEE ARTHROPLASTY Left     LIPOMA RESECTION      UPPER GASTROINTESTINAL ENDOSCOPY  03/27/2017    Dr. Griffin, Findings: Long segment Barretts, 5cm, Moderate Gastritis, Duodenal bulb/post pyloric channel mass versus severe brunner gland hyperplasia, mild duodenitis distally, biopsies taken    UPPER GASTROINTESTINAL ENDOSCOPY  07/17/2017    Dr. Griffin, Findings: 5cm segment of Aguila's esiphagitis from 34 to 39 cm with no targeted lesions, Mild gastritis, Stomach mass in the antral pyloric channel area,    UPPER GASTROINTESTINAL ENDOSCOPY N/A 07/09/2024    ESOPHAGOGASTRODUODENOSCOPY BIOPSY performed by Paul Griffin DO at [facility] ENDOSCOPY       Social History:    Social History     Tobacco Use    Smoking status: Some Days     Types: Cigars    Smokeless tobacco: Never    Tobacco comments:     occassional cigar    Substance Use Topics    Alcohol use: No                                Ready to quit: Not Answered  Counseling given: Not Answered  Tobacco comments: occassional cigar       Vital Signs (Current):   Vitals:    03/03/25 1100 03/03/25 1200 03/03/25 1300 03/03/25 1400   BP:  (!) 147/71 (!) 142/70 (!) 162/73   Pulse: 90 80 80 82   Resp: 17 17 23 21   Temp:  98.3 °F (36.8 °C)     TempSrc:  Oral     SpO2: 94% 93% 95% 93%   Weight:       Height:

## 2025-03-03 NOTE — PROGRESS NOTES
Immediately prior to the procedure the patient's History and Physical was reviewed- there are no changes with the current vitals.  BP (!) 162/73   Pulse 82   Temp 98.3 °F (36.8 °C) (Oral)   Resp 21   Ht 1.753 m (5' 9.02\")   Wt (!) 148.3 kg (326 lb 15.1 oz)   SpO2 93%   BMI 48.26 kg/m²     No CP/SOB.  Risks/benefits d/w pt.  All questions answered.  Proceed with EGD.    MAURICIO HENDERSON,   3/3/2025  2:47 PM

## 2025-03-03 NOTE — PROGRESS NOTES
OCCUPATIONAL THERAPY INITIAL EVALUATION    [facility]  [address] [place]. [place]        Date:3/3/2025                                                  Patient Name: Paul Florez    MRN: [id number]    : 1950    Room: [place] [address]      Evaluating OT: Rogelio Amaya OTR/L; #[id number]     Referring Provider and Specific Provider Orders/Date:      25  OT eval and treat  Start:  25,   End:  25 161,   ONE TIME,   Standing Count:  1 Occurrences,   R         Lauro Franklin MD      Placement Recommendation:  Subacute Rehab      Diagnosis:   1. Acute respiratory failure with hypoxia (HCC)    2. Congestive heart failure, unspecified HF chronicity, unspecified heart failure type (HCC)    3. Hypoglycemia    4. KEN (acute kidney injury)    5. Hyperkalemia         Surgery: None      Pertinent Medical History:       Past Medical History:   Diagnosis Date    Anemia     Atrial flutter (HCC)     Aguila's esophagus     Congestive heart failure (HCC) 2025    Coronary artery disease involving native coronary artery 2022    Diverticulitis     Fatty liver     History of Holter monitoring 2020    Hyperlipidemia     Hypertension     Lipoma     Subcyst    Lumbar spinal stenosis     Osteoarthritis     PUD (peptic ulcer disease)     S/P angioplasty with stent 2022    1st OM    Type 2 diabetes mellitus without complication (HCC)          Past Surgical History:   Procedure Laterality Date    ATRIAL ABLATION SURGERY  2012    CARDIAC DEFIBRILLATOR PLACEMENT Left 2022    [employer] CRT-D Insertion (Dr. Guerrero)    CARDIAC SURGERY      pt states 6 - 7 years ago     COLONOSCOPY  2021    Follow-up 3 years.  Dr. Griffin    COLONOSCOPY N/A 2024    COLONOSCOPY POLYPECTOMY COLD SNARE performed by Paul Griffin DO at [facility] ENDOSCOPY    ESOPHAGOGASTRODUODENOSCOPY  2021    Follow-up 3 years.  5 cm Aguila's esophagitis  additionally includes thorough review of current medical information, gathering information on past medical history/social history and prior level of function, interpretation of standardized testing/informal observation of tasks, assessment of data and development of plan of care and goals.        Evaluating OT: Rogelio Amaya OTR/L; #[id number]

## 2025-03-03 NOTE — PLAN OF CARE
Problem: Respiratory - Adult  Goal: Achieves optimal ventilation and oxygenation  3/3/2025 0031 by Leona Yin RN  Outcome: Progressing     Problem: Gastrointestinal - Adult  Goal: Maintains or returns to baseline bowel function  Outcome: Progressing     Problem: Hematologic - Adult  Goal: Maintains hematologic stability  Outcome: Progressing

## 2025-03-03 NOTE — PROGRESS NOTES
Pulmonary/Critical Care Progress Note    We are following patient for acute hypoxemic and hypercapnic respiratory failure superimposed on chronic respiratory failure related to morbid obesity and untreated obstructive sleep apnea, right pleural effusion (exudate but cytology is negative), chronic atrial fibrillation,, anemia    IMPRESSION / PLAN:    Acute on Chronic Hypercapneic Respiratory Failure   Morbid obesity / Obstructive Sleep Apnea / Obesity Hypoventilation Syndrome (presumed)  BiPAP qhs and PRN  Maintained on NC      HFpEF  Pulmonary HTN     Acute Kidney Injury on CKD  Started on hemodialysis - one today  Appreciate nephrology guidance    Hematuria  Anticoag stopped   Resolved  Urology following  Remove garner when OK with urology    Pleural effusion   Exudative  Cytology negative     Atrial Fibrillation  Anticoag on hold    Anemia  Transfuse for Hgb < 7  Seen by GI - no active GIB  Planned EGD  Appreciate input    DM II  Insulin sliding scale   Glu under good control     Up in chair      Steady improvement overall  Continue PT/ OT  Continue present management     3/3/2025   Doing well  Going for EGD  Continue present management     Code Status: Full Code    __________________________________________    Subjective  Feeling better      Events last 24 hr  Using BiPAP QHS   Up in chair    MEDICATIONS:   vitamin B-6  100 mg Oral Daily    pantoprazole (PROTONIX) 40 mg in sodium chloride (PF) 0.9 % 10 mL injection  40 mg IntraVENous Q6H    Vitamin D  1,000 Units Oral Daily    levothyroxine  88 mcg Oral Daily    sodium chloride flush  5-40 mL IntraVENous 2 times per day    amiodarone  200 mg Oral Daily    [Held by provider] apixaban  5 mg Oral BID    [Held by provider] aspirin  81 mg Oral Daily    [Held by provider] losartan  100 mg Oral Daily    magnesium oxide  200 mg Oral Daily    [Held by provider] metoprolol succinate  50 mg Oral Daily    [Held by provider] tamsulosin  0.4 mg Oral Daily    ipratropium 0.5  following  therapy that affects renal tubular secretion.       GFR    Date Value Ref Range Status   05/28/2022 >60  Final   05/27/2022 >60  Final   05/26/2022 >60  Final     Magnesium   Date Value Ref Range Status   03/03/2025 1.9 1.6 - 2.6 mg/dL Final   03/02/2025 1.9 1.6 - 2.6 mg/dL Final   03/01/2025 2.0 1.6 - 2.6 mg/dL Final     Phosphorus   Date Value Ref Range Status   03/03/2025 4.1 2.5 - 4.5 mg/dL Final   03/02/2025 3.6 2.5 - 4.5 mg/dL Final   03/01/2025 3.1 2.5 - 4.5 mg/dL Final     Recent Labs     03/02/25  0540   PH 7.355   PO2 70.3*   PCO2 53.9*   HCO3 29.4*   BE 3.3*   O2SAT 93.8       RADIOLOGY:  XR CHEST PORTABLE   Final Result   Cardiomegaly with mild central congestion.         XR CHEST PORTABLE   Final Result   Cardiomegaly with small bilateral pleural effusions and bibasilar atelectatic   changes.  No new focal parenchymal opacification present.         XR CHEST PORTABLE   Final Result   No acute process.         XR CHEST PORTABLE   Final Result   Cardiomegaly. No obvious infiltrates or effusions.  No interval change         CTA CHEST W CONTRAST   Final Result      No CT evidence pulmonary embolism within main, right, and left pulmonary   arteries and bilateral intralobar branches.  Segmental and subsegmental   branches bilaterally cannot be assessed as described.      Cardiomegaly.      Right lower lobe atelectasis/pneumonia.      Small to moderate right and small left pleural effusions.         CT ABDOMEN PELVIS W IV CONTRAST Additional Contrast? Radiologist Recommendation   Final Result   1. Cholelithiasis.   2. Findings suspicious for cirrhosis of the liver.   3. Moderate right basilar pleural effusion with right lower lobe   consolidative airspace disease which could reflect dependent and or   compressive atelectasis with pneumonia not excluded.   4. Left-sided nephrolithiasis including 2.4 cm calculus in the renal pelvis   without hydronephrosis.   5. Sigmoid diverticulosis

## 2025-03-03 NOTE — PROGRESS NOTES
Internal Medicine Progress Note     [facility]     Chandler Abebe D.O., DIEGOI.                         Curry Dennis D.O., MARIA R Dillon D.O.     Gauri Clement, MSN, APRN, NP-C  Adonis Gray, MSN, APRN-CNP  Rambo Mccloud, MSN, APRN, NP-C  Leona Collins, MSN, APRN-CNP  Lily Isaac, MSN, APRN, NP-C     Primary Care Physician: Eduin Guzman DO   Admitting Physician:  Chandler Abebe DO  Admission date and time: 2/25/2025  6:39 PM    Room:  [place] [address]  Admitting diagnosis: Hyperkalemia [E87.5]  Hypoglycemia [E16.2]  KEN (acute kidney injury) [N17.9]  Acute respiratory failure with hypoxia (HCC) [J96.01]  Congestive heart failure, unspecified HF chronicity, unspecified heart failure type (HCC) [I50.9]    Patient Name: Paul Florez  MRN: [id number]    Date of Service: 3/3/2025     Subjective:  Paul is a 74 y.o. male who was seen and examined today,3/3/2025, at the bedside.  Paul is awake and alert during my examination today and is requesting that BiPAP be removed.  He admits to an extremely dry mouth.  Events over the hospitalization have been noted and I appreciate the input from the multiple subspecialists providing care.  No family members are present during my examination.    Review of System:   Constitutional:   Describes dry mouth and is hopeful for removal of BiPAP mask  HEENT:   Irritation with the BiPAP mask.  Cardiovascular:   No current chest pain or palpitations.  Respiratory:   Less shortness of breath overall.  No current coughing.  Gastrointestinal:   Decreased appetite with decreased oral intake.  No overt abdominal pain.  No diarrhea or constipation.  Genitourinary:    Does not disclose any urgency, frequency, hematuria.  Voiding with the use of a [place] catheter.  Extremities:   Chronic upper and lower extremity edema.  Neurology:    Profound weakness and deconditioning without focal neurologic deficits.   Psch:   Anxiety currently.  Musculoskeletal:     Diffuse muscle aches and pains.  Integumentary:   Does not disclose any rashes, ulcers, or excoriations.  Denies bruising.  Hematologic/Lymphatic:  Thin skin with easy bruisability.      Physical Exam:  I/O this shift:  In: 131.9 [IV Piggyback:131.9]  Out: 1700 [Urine:1700]    Intake/Output Summary (Last 24 hours) at 3/3/2025 0634  Last data filed at 3/3/2025 0600  Gross per 24 hour   Intake 1011.13 ml   Output 3400 ml   Net -2388.87 ml   I/O last 3 completed shifts:  In: 1870 [P.O.:960; I.V.:122.9; IV Piggyback:487.1]  Out: 5425 [Urine:2425]  Patient Vitals for the past 96 hrs (Last 3 readings):   Weight   03/03/25 0600 (!) 148.3 kg (326 lb 15.1 oz)   03/02/25 0400 (!) 152.4 kg (335 lb 15.7 oz)   03/01/25 2218 (!) 150.8 kg (332 lb 7.3 oz)     Vital Signs:   Blood pressure 133/68, pulse 81, temperature 98.7 °F (37.1 °C), temperature source Axillary, resp. rate 17, height 1.753 m (5' 9.02\"), weight (!) 148.3 kg (326 lb 15.1 oz), SpO2 94%.      General appearance:  Awake and alert.  Mildly agitated.  Head:  Normocephalic. No masses, lesions or tenderness.  Eyes:  PERRLA.  EOMI.  Sclera clear.    ENT:  Ears normal. Mucosa normal.  BiPAP mask is in place.  Neck:    Supple. Trachea midline. No thyromegaly. No JVD. No bruits.  Heart:    Irregular rhythm with controlled rate.  S1 and S2.  Temporary dialysis catheter in place.  Lungs:    Decent aeration throughout.  Decent synchrony with BiPAP.  Abdomen:   Soft.  Rounded and morbidly obese.  Non-tender. Non-distended. Bowel sounds positive.  No appreciable ascites present.  Extremities:    Chronic appearing lower extremity edema.  Neurologic:    Alert x 3.  No focal neurologic deficits.  Answering all questions accordingly.  Psych:   Behavior is normal. Mood appears normal. Speech is not rapid and/or pressured.  Musculoskeletal:   No unilateral joint edema, erythema or warmth. Gait not assessed.  Integumentary:  No rashes  Skin normal color and

## 2025-03-03 NOTE — PLAN OF CARE
Problem: Chronic Conditions and Co-morbidities  Goal: Patient's chronic conditions and co-morbidity symptoms are monitored and maintained or improved  Outcome: Progressing     Problem: Discharge Planning  Goal: Discharge to home or other facility with appropriate resources  Outcome: Progressing     Problem: Pain  Goal: Verbalizes/displays adequate comfort level or baseline comfort level  Outcome: Progressing     Problem: Safety - Adult  Goal: Free from fall injury  Outcome: Progressing     Problem: Skin/Tissue Integrity  Goal: Skin integrity remains intact  Description: 1.  Monitor for areas of redness and/or skin breakdown  2.  Assess vascular access sites hourly  3.  Every 4-6 hours minimum:  Change oxygen saturation probe site  4.  Every 4-6 hours:  If on nasal continuous positive airway pressure, respiratory therapy assess nares and determine need for appliance change or resting period  Outcome: Progressing     Problem: Respiratory - Adult  Goal: Achieves optimal ventilation and oxygenation  3/3/2025 1339 by Nataly Jiang RN  Outcome: Progressing  3/3/2025 0031 by Leona Yin RN  Outcome: Progressing     Problem: Cardiovascular - Adult  Goal: Maintains optimal cardiac output and hemodynamic stability  Outcome: Progressing  Goal: Absence of cardiac dysrhythmias or at baseline  Outcome: Progressing     Problem: Skin/Tissue Integrity - Adult  Goal: Skin integrity remains intact  Description: 1.  Monitor for areas of redness and/or skin breakdown  2.  Assess vascular access sites hourly  3.  Every 4-6 hours minimum:  Change oxygen saturation probe site  4.  Every 4-6 hours:  If on nasal continuous positive airway pressure, respiratory therapy assess nares and determine need for appliance change or resting period  Outcome: Progressing     Problem: Gastrointestinal - Adult  Goal: Maintains or returns to baseline bowel function  3/3/2025 1339 by Nataly Jiang RN  Outcome: Progressing  3/3/2025

## 2025-03-03 NOTE — OP NOTE
Operative Note      Patient: Paul Florez  YOB: 1950  MRN: [id number]    Date of Procedure: 3/3/2025    Pre-Op Diagnosis Codes:      * Anemia, unspecified type [D64.9]    Post-Op Diagnosis: SAME       Procedure(s):  ESOPHAGOGASTRODUODENOSCOPY    Surgeon(s):  Paul Griffin DO    Assistant:   Surgical Assistant: Kianna Norton RN    Anesthesia: Monitor Anesthesia Care    Estimated Blood Loss (mL): ~ 20cc    Complications: None    Specimens:   * No specimens in log *    Implants:  * No implants in log *      Drains:   Urinary Catheter 02/26/25 3 Way (Active)   $ Urethral catheter insertion $ Not inserted for procedure 02/26/25 1238   Catheter Indications Urinary retention (acute or chronic), continuous bladder irrigation or bladder outlet obstruction 03/03/25 1400   Site Assessment Bleeding 03/03/25 1400   Urine Color Yellow 03/03/25 1400   Urine Appearance Cloudy 03/03/25 1400   Collection Container Standard 03/03/25 1400   Securement Method Securing device (Describe) 03/03/25 1400   Catheter Care  Soap and water 03/03/25 0800   Catheter Best Practices  Drainage tube clipped to bed;Catheter secured to thigh;Tamper seal intact;Bag not on floor;Lack of dependent loop in tubing;Drainage bag less than half full;Bag below bladder 03/03/25 1400   Status Draining;Patent 03/03/25 1400   $ Bladder irrigation simple- 1X PER DAY $ Yes 02/26/25 1600   Rate Moderate 02/26/25 1600   Irrigant Normal saline 02/26/25 1600   CBI Irrigation Intake (mL) 0 mL 02/27/25 1400   CBI Merritt Output (mL) 350 mL 02/27/25 1200   CBI Net Output (mL) 200 mL 02/27/25 1200   Manual Irrigation Volume Input (mL) 30 mL 03/01/25 0200   Output (mL) 600 mL 03/03/25 0600   Discontinuation Reason Per provider order 02/27/25 1200       [REMOVED] Urinary Catheter 02/26/25 (Removed)   $ Urethral catheter insertion $ Not inserted for procedure 02/26/25 1005   Catheter Indications Urinary retention (acute or chronic), continuous bladder

## 2025-03-03 NOTE — PROGRESS NOTES
PROGRESS NOTE  By Anuj Butler M.D.    [facility]  Dr. Kate Jason M.D.,  Dr. Josue Macias M.D.,   TONI JenningsO.,  Dr. Barry Johnson M.D.,  Dr. Dario Resendez D.O.,          Paul Florez  74 y.o.  male    SUBJECTIVE:  No new complaints    OBJECTIVE:    /73   Pulse 80   Temp 98.2 °F (36.8 °C) (Oral)   Resp 20   Ht 1.753 m (5' 9.02\")   Wt (!) 148.3 kg (326 lb 15.1 oz)   SpO2 91%   BMI 48.26 kg/m²     General: NAD/older adult obese  male  HEENT: Anicteric sclera/moist oral mucosa  Neck: Supple/trachea midline  Chest: Symmetric excursion/CTAB  Cor: Regular  Abd.: Soft and obese.  Nontender  Extr.:  BLE edema  Skin: Warm and dry.  Anicteric    DATA:    Monitor data reviewed -sinus rhythm noted.       Lab Results   Component Value Date/Time    WBC 5.7 03/03/2025 06:30 AM    RBC 3.10 03/03/2025 06:30 AM    HGB 7.9 03/03/2025 06:30 AM    HCT 26.6 03/03/2025 06:30 AM    MCV 85.8 03/03/2025 06:30 AM    MCH 25.5 03/03/2025 06:30 AM    MCHC 29.7 03/03/2025 06:30 AM    RDW 19.3 03/03/2025 06:30 AM     03/03/2025 06:30 AM    MPV 10.7 03/03/2025 06:30 AM     Lab Results   Component Value Date/Time     03/03/2025 06:30 AM    K 4.3 03/03/2025 06:30 AM    K 4.3 05/28/2022 05:18 AM     03/03/2025 06:30 AM    CO2 30 03/03/2025 06:30 AM    BUN 20 03/03/2025 06:30 AM    CREATININE 2.2 03/03/2025 06:30 AM    CALCIUM 8.8 03/03/2025 06:30 AM    BILITOT 0.4 03/03/2025 06:30 AM    ALKPHOS 102 03/03/2025 06:30 AM    AST 28 03/03/2025 06:30 AM    ALT 12 03/03/2025 06:30 AM     No results found for: \"LIPASE\"  No results found for: \"AMYLASE\"      ASSESSMENT/PLAN:  Patient Active Problem List   Diagnosis    Aguila's esophagus without dysplasia    Duodenal mass    Cardiomyopathy (HCC)    Coronary artery disease involving native coronary artery of native heart without angina pectoris    BMI 40.0-44.9, adult    Spinal stenosis of lumbar region without neurogenic claudication     Lumbar spondylosis    Lumbar facet arthropathy    Lumbar disc disorder    Chronic pain syndrome [G89.4 (ICD-10-CM)]    Observation after surgery    Ventricular tachycardia (HCC)    Iron deficiency anemia, unspecified    Chronic systolic (congestive) heart failure (HCC)    Type 2 diabetes mellitus without complication, without long-term current use of insulin (HCC)    Shortness of breath    Other fatigue    JOSE MANUEL (obstructive sleep apnea)    Diarrhea    Obesity, class 3 (E66.813)    Body mass index [BMI] 45.0-49.9, adult (Z68.42)    Hypoglycemia    Acute HFrEF (heart failure with reduced ejection fraction) (HCC)    Congestive heart failure (HCC)    Acute respiratory failure with hypoxia (HCC)    KEN (acute kidney injury)    PAF (paroxysmal atrial fibrillation) ([facility])     1.  Anemia/GI bleed  -Acute on chronic anemia  -Normocytic/iron deficient  -EGD/colonoscopy July 2024 -Aguila's/diverticular disease/polyps  -Stabilized H&H after transfusion  -No evidence of overt bleed with negative FOBT and brown stool  -No hypotension or tachycardia  -High-dose IV PPI  -IV iron supplementation  -Recommend liberal transfusion goals given CAD/respiratory failure -defer transfusion to admitting team holding diuresis given CHF  -Consider further evaluation with upper endoscopy depending on clinical course/H&H dynamics and respiratory status  -Recommendations regarding repeat colonoscopy to follow and depend on EGD findings/H&H dynamics and overall clinical course     2.  Respiratory failure  -Acute on chronic  -Remains on supplemental oxygen 6 L saturating 94%  -Per admitting/pulmonology     3.  CHF  -Acute on chronic  -Per admitting/cardiology     4.  Renal failure  -Acute on chronic  -Improving creatinine  -Per admitting/nephrology     5.  Atrial fibrillation/oral anticoagulation  -Eliquis currently held  -Per admitting/cardiology     6.  Comorbidities -diabetes mellitus, obstructive sleep apnea, morbid obesity, hypertension,

## 2025-03-03 NOTE — PROGRESS NOTES
Nephrology Note  Alex Palacios MD          Patient seen and examined.  No family member is present at bedside.  Chart reviewed.  Patient is sitting up in the chair.  He is complaining about the liquid diet.  He is presently denying any shortness of breath.  He has been on BiPAP intermittently.     Awake and alert .   In no acute distress.    Vital SignsBP 137/73   Pulse 80   Temp 98.2 °F (36.8 °C) (Oral)   Resp 20   Ht 1.753 m (5' 9.02\")   Wt (!) 148.3 kg (326 lb 15.1 oz)   SpO2 91%   BMI 48.26 kg/m²   24HR INTAKE/OUTPUT:    Intake/Output Summary (Last 24 hours) at 3/3/2025 1222  Last data filed at 3/3/2025 0600  Gross per 24 hour   Intake 771.13 ml   Output 3400 ml   Net -2628.87 ml         PHYSICAL EXAM        Neck: No JVD  Lungs: Breath sounds decreased at the bases. No rales or ronchi.  Heart: Regular rate and rhythm. No S3 gallop. No  murmrur.  Abdomen: Soft non distended, non tender and normal bowel sounds.  Extremeties:   +1 bipedal edema           Current Facility-Administered Medications   Medication Dose Route Frequency Provider Last Rate Last Admin    vitamin B-6 (PYRIDOXINE) tablet 100 mg  100 mg Oral Daily Rambo Mccloud APRN - CNP   100 mg at 03/03/25 0842    pantoprazole (PROTONIX) 40 mg in sodium chloride (PF) 0.9 % 10 mL injection  40 mg IntraVENous Q6H Anuj Butler MD   40 mg at 03/03/25 0127    Vitamin D (CHOLECALCIFEROL) tablet 1,000 Units  1,000 Units Oral Daily Chandler Membreno MD   1,000 Units at 03/03/25 0842    0.9 % sodium chloride infusion   IntraVENous PRN Rogelio Han APRN - CNP        levothyroxine (SYNTHROID) tablet 88 mcg  88 mcg Oral Daily Rambo Mccloud APRN - CNP   88 mcg at 03/02/25 0551    sodium chloride flush 0.9 % injection 5-40 mL  5-40 mL IntraVENous 2 times per day Sage Thompson U, DO   10 mL at 03/03/25 0849    sodium chloride flush 0.9 % injection 5-40 mL  5-40 mL IntraVENous PRN Jay Ismail U, DO        0.9 % sodium chloride  03/02/2025    CALCIUM 8.7 03/01/2025    CAION 1.22 03/01/2025    PHOS 4.1 03/03/2025    PHOS 3.6 03/02/2025    PHOS 3.1 03/01/2025    MG 1.9 03/03/2025    MG 1.9 03/02/2025    MG 2.0 03/01/2025         BMP:   Recent Labs     03/01/25  0612 03/02/25  0627 03/03/25  0630    137 141   K 4.3 4.2 4.3    103 103   CO2 31* 29 30*   BUN 15 17 20   CREATININE 1.9* 2.0* 2.2*       Serum Osmolality:   No results for input(s): \"OSMOS\" in the last 72 hours.      Urine Chemisrty:    No results for input(s): \"CLUR\", \"NAUR\", \"KUR\", \"LABCREAU\", \"UREAUR\", \"OSMOU\" in the last 72 hours.                                               No results for input(s): \"MALBCR\", \"LABPROT\" in the last 72 hours.           Assessment: / Plan:      Acute kidney injury.  Acute kidney injury secondary multiple factors including renal hypoperfusion in the setting of hypotension and use of angiotensin II receptor blocking agent in the form of losartan 100 mg once a day.   Urinary obstruction was considered as a possible significant etiology of acute kidney injury  as the patient did have brisk urine output after draining of the blood clots in the Merritt catheter after irrigating the Merritt.  Patient patient subsequently had persistent hyperkalemia and decline in the urinary output and patient underwent hemodialysis after insertion of temporary hemodialysis catheter.  T patient's last hemodialysis treatment was 2 days ago.  Patient has had urine output of 3.4 L over the last 24 hours.  Serum creatinine is at a plateau.  Will hold off on dialyzing the patient today.    Hypotension with history of hypertension with chronic kidney disease stage G1 to stage G4.  Hypotension is resolved.  Patient no longer on midodrine.  Will continue to hold angiotensin II receptor blocking agent.  Blood pressure is at target of  less than 130/80 mmHg.    Anemia with mild iron deficiency.  Studies reflect mild iron deficiency.  Supplement iron once acute illness is

## 2025-03-04 LAB
ALBUMIN SERPL-MCNC: 2.5 G/DL (ref 3.5–5.2)
ALP SERPL-CCNC: 102 U/L (ref 40–129)
ALT SERPL-CCNC: 13 U/L (ref 0–40)
ANION GAP SERPL CALCULATED.3IONS-SCNC: 6 MMOL/L (ref 7–16)
AST SERPL-CCNC: 31 U/L (ref 0–39)
BASOPHILS # BLD: 0.05 K/UL (ref 0–0.2)
BASOPHILS NFR BLD: 1 % (ref 0–2)
BILIRUB DIRECT SERPL-MCNC: <0.2 MG/DL (ref 0–0.3)
BILIRUB INDIRECT SERPL-MCNC: ABNORMAL MG/DL (ref 0–1)
BILIRUB SERPL-MCNC: 0.3 MG/DL (ref 0–1.2)
BUN SERPL-MCNC: 22 MG/DL (ref 6–23)
CALCIUM SERPL-MCNC: 8.5 MG/DL (ref 8.6–10.2)
CHLORIDE SERPL-SCNC: 103 MMOL/L (ref 98–107)
CO2 SERPL-SCNC: 31 MMOL/L (ref 22–29)
CREAT SERPL-MCNC: 2.1 MG/DL (ref 0.7–1.2)
EOSINOPHIL # BLD: 0.16 K/UL (ref 0.05–0.5)
EOSINOPHILS RELATIVE PERCENT: 3 % (ref 0–6)
ERYTHROCYTE [DISTWIDTH] IN BLOOD BY AUTOMATED COUNT: 19.6 % (ref 11.5–15)
GFR, ESTIMATED: 33 ML/MIN/1.73M2
GLUCOSE BLD-MCNC: 117 MG/DL (ref 74–99)
GLUCOSE BLD-MCNC: 127 MG/DL (ref 74–99)
GLUCOSE BLD-MCNC: 145 MG/DL (ref 74–99)
GLUCOSE BLD-MCNC: 163 MG/DL (ref 74–99)
GLUCOSE SERPL-MCNC: 101 MG/DL (ref 74–99)
HCT VFR BLD AUTO: 24.6 % (ref 37–54)
HCT VFR BLD AUTO: 25.1 % (ref 37–54)
HCT VFR BLD AUTO: 26.5 % (ref 37–54)
HGB BLD-MCNC: 7.3 G/DL (ref 12.5–16.5)
HGB BLD-MCNC: 7.5 G/DL (ref 12.5–16.5)
HGB BLD-MCNC: 8 G/DL (ref 12.5–16.5)
IMM GRANULOCYTES # BLD AUTO: <0.03 K/UL (ref 0–0.58)
IMM GRANULOCYTES NFR BLD: 0 % (ref 0–5)
LYMPHOCYTES NFR BLD: 1.01 K/UL (ref 1.5–4)
LYMPHOCYTES RELATIVE PERCENT: 22 % (ref 20–42)
MAGNESIUM SERPL-MCNC: 1.9 MG/DL (ref 1.6–2.6)
MCH RBC QN AUTO: 25 PG (ref 26–35)
MCHC RBC AUTO-ENTMCNC: 29.7 G/DL (ref 32–34.5)
MCV RBC AUTO: 84.2 FL (ref 80–99.9)
MONOCYTES NFR BLD: 0.45 K/UL (ref 0.1–0.95)
MONOCYTES NFR BLD: 10 % (ref 2–12)
NEUTROPHILS NFR BLD: 64 % (ref 43–80)
NEUTS SEG NFR BLD: 2.97 K/UL (ref 1.8–7.3)
PHOSPHATE SERPL-MCNC: 3.6 MG/DL (ref 2.5–4.5)
PLATELET # BLD AUTO: 170 K/UL (ref 130–450)
PMV BLD AUTO: 11 FL (ref 7–12)
POTASSIUM SERPL-SCNC: 3.6 MMOL/L (ref 3.5–5)
PROT SERPL-MCNC: 6.3 G/DL (ref 6.4–8.3)
RBC # BLD AUTO: 2.92 M/UL (ref 3.8–5.8)
SODIUM SERPL-SCNC: 140 MMOL/L (ref 132–146)
WBC OTHER # BLD: 4.7 K/UL (ref 4.5–11.5)

## 2025-03-04 PROCEDURE — 6370000000 HC RX 637 (ALT 250 FOR IP): Performed by: INTERNAL MEDICINE

## 2025-03-04 PROCEDURE — 97530 THERAPEUTIC ACTIVITIES: CPT

## 2025-03-04 PROCEDURE — 85025 COMPLETE CBC W/AUTO DIFF WBC: CPT

## 2025-03-04 PROCEDURE — 2060000000 HC ICU INTERMEDIATE R&B

## 2025-03-04 PROCEDURE — 2700000000 HC OXYGEN THERAPY PER DAY

## 2025-03-04 PROCEDURE — 97110 THERAPEUTIC EXERCISES: CPT

## 2025-03-04 PROCEDURE — 80053 COMPREHEN METABOLIC PANEL: CPT

## 2025-03-04 PROCEDURE — 83735 ASSAY OF MAGNESIUM: CPT

## 2025-03-04 PROCEDURE — 36592 COLLECT BLOOD FROM PICC: CPT

## 2025-03-04 PROCEDURE — 94640 AIRWAY INHALATION TREATMENT: CPT

## 2025-03-04 PROCEDURE — 6360000002 HC RX W HCPCS: Performed by: INTERNAL MEDICINE

## 2025-03-04 PROCEDURE — 2500000003 HC RX 250 WO HCPCS: Performed by: INTERNAL MEDICINE

## 2025-03-04 PROCEDURE — 82248 BILIRUBIN DIRECT: CPT

## 2025-03-04 PROCEDURE — 2580000003 HC RX 258: Performed by: INTERNAL MEDICINE

## 2025-03-04 PROCEDURE — 6370000000 HC RX 637 (ALT 250 FOR IP): Performed by: NURSE PRACTITIONER

## 2025-03-04 PROCEDURE — 94660 CPAP INITIATION&MGMT: CPT

## 2025-03-04 PROCEDURE — 84100 ASSAY OF PHOSPHORUS: CPT

## 2025-03-04 PROCEDURE — 99232 SBSQ HOSP IP/OBS MODERATE 35: CPT | Performed by: INTERNAL MEDICINE

## 2025-03-04 PROCEDURE — 82962 GLUCOSE BLOOD TEST: CPT

## 2025-03-04 PROCEDURE — 6370000000 HC RX 637 (ALT 250 FOR IP)

## 2025-03-04 RX ADMIN — DOXYCYCLINE 100 MG: 100 INJECTION, POWDER, LYOPHILIZED, FOR SOLUTION INTRAVENOUS at 02:56

## 2025-03-04 RX ADMIN — IPRATROPIUM BROMIDE AND ALBUTEROL SULFATE 1 DOSE: .5; 2.5 SOLUTION RESPIRATORY (INHALATION) at 01:27

## 2025-03-04 RX ADMIN — Medication 1000 UNITS: at 07:57

## 2025-03-04 RX ADMIN — ATORVASTATIN CALCIUM 40 MG: 40 TABLET, FILM COATED ORAL at 21:20

## 2025-03-04 RX ADMIN — WATER 1000 MG: 1 INJECTION INTRAMUSCULAR; INTRAVENOUS; SUBCUTANEOUS at 12:23

## 2025-03-04 RX ADMIN — PYRIDOXINE HCL TAB 50 MG 100 MG: 50 TAB at 07:49

## 2025-03-04 RX ADMIN — IPRATROPIUM BROMIDE AND ALBUTEROL SULFATE 1 DOSE: .5; 2.5 SOLUTION RESPIRATORY (INHALATION) at 05:08

## 2025-03-04 RX ADMIN — IPRATROPIUM BROMIDE AND ALBUTEROL SULFATE 1 DOSE: .5; 2.5 SOLUTION RESPIRATORY (INHALATION) at 18:46

## 2025-03-04 RX ADMIN — IPRATROPIUM BROMIDE AND ALBUTEROL SULFATE 1 DOSE: .5; 2.5 SOLUTION RESPIRATORY (INHALATION) at 22:46

## 2025-03-04 RX ADMIN — IPRATROPIUM BROMIDE AND ALBUTEROL SULFATE 1 DOSE: .5; 2.5 SOLUTION RESPIRATORY (INHALATION) at 10:16

## 2025-03-04 RX ADMIN — MAGNESIUM OXIDE 200 MG: 400 TABLET ORAL at 07:49

## 2025-03-04 RX ADMIN — Medication 10 ML: at 21:20

## 2025-03-04 RX ADMIN — DOXYCYCLINE 100 MG: 100 INJECTION, POWDER, LYOPHILIZED, FOR SOLUTION INTRAVENOUS at 15:38

## 2025-03-04 RX ADMIN — IPRATROPIUM BROMIDE AND ALBUTEROL SULFATE 1 DOSE: .5; 2.5 SOLUTION RESPIRATORY (INHALATION) at 14:38

## 2025-03-04 RX ADMIN — AMIODARONE HYDROCHLORIDE 200 MG: 200 TABLET ORAL at 07:49

## 2025-03-04 RX ADMIN — Medication 10 ML: at 07:50

## 2025-03-04 RX ADMIN — PANTOPRAZOLE SODIUM 40 MG: 40 TABLET, DELAYED RELEASE ORAL at 06:06

## 2025-03-04 RX ADMIN — LEVOTHYROXINE SODIUM 88 MCG: 0.09 TABLET ORAL at 06:06

## 2025-03-04 RX ADMIN — PANTOPRAZOLE SODIUM 40 MG: 40 TABLET, DELAYED RELEASE ORAL at 17:45

## 2025-03-04 RX ADMIN — ACETAMINOPHEN 650 MG: 325 TABLET ORAL at 07:49

## 2025-03-04 ASSESSMENT — PAIN DESCRIPTION - LOCATION
LOCATION: SHOULDER
LOCATION: ARM

## 2025-03-04 ASSESSMENT — PAIN DESCRIPTION - ORIENTATION
ORIENTATION: LEFT
ORIENTATION: LEFT

## 2025-03-04 ASSESSMENT — PAIN SCALES - GENERAL
PAINLEVEL_OUTOF10: 8
PAINLEVEL_OUTOF10: 8
PAINLEVEL_OUTOF10: 2
PAINLEVEL_OUTOF10: 8

## 2025-03-04 ASSESSMENT — PAIN DESCRIPTION - DESCRIPTORS
DESCRIPTORS: DISCOMFORT
DESCRIPTORS: THROBBING

## 2025-03-04 NOTE — PLAN OF CARE
Problem: Chronic Conditions and Co-morbidities  Goal: Patient's chronic conditions and co-morbidity symptoms are monitored and maintained or improved  3/4/2025 0005 by Daksha Stewart RN  Outcome: Progressing     Problem: Pain  Goal: Verbalizes/displays adequate comfort level or baseline comfort level  3/4/2025 0005 by Daksha Stewart RN  Outcome: Progressing     Problem: Safety - Adult  Goal: Free from fall injury  3/4/2025 0005 by Daksha Stewart RN  Outcome: Progressing

## 2025-03-04 NOTE — PROGRESS NOTES
Nephrology Note  Alex Palacios MD          Patient seen and examined.  Patient's wife is present at the bedside.  Chart reviewed.  Patient is sitting up in the chair.  He is feeling much better.  His diet has been advanced and he is tolerating it well.  He is presently denying any shortness of breath.      Awake and alert .   In no acute distress.    Vital SignsBP (!) 158/74   Pulse 82   Temp 98.1 °F (36.7 °C) (Oral)   Resp 20   Ht 1.753 m (5' 9.02\")   Wt (!) 148.3 kg (326 lb 15.1 oz)   SpO2 94%   BMI 48.26 kg/m²   24HR INTAKE/OUTPUT:    Intake/Output Summary (Last 24 hours) at 3/4/2025 1207  Last data filed at 3/3/2025 1800  Gross per 24 hour   Intake 440.42 ml   Output 400 ml   Net 40.42 ml         PHYSICAL EXAM        Neck: No JVD  Lungs: Breath sounds decreased at the bases. No rales or ronchi.  Heart: Regular rate and rhythm. No S3 gallop. No  murmrur.  Abdomen: Soft non distended, non tender and normal bowel sounds.  Extremeties:   +1 bipedal edema           Current Facility-Administered Medications   Medication Dose Route Frequency Provider Last Rate Last Admin    pantoprazole (PROTONIX) tablet 40 mg  40 mg Oral BID AC Paul Griffin,    40 mg at 03/04/25 0606    vitamin B-6 (PYRIDOXINE) tablet 100 mg  100 mg Oral Daily Rambo Mccloud APRN - CNP   100 mg at 03/04/25 0749    Vitamin D (CHOLECALCIFEROL) tablet 1,000 Units  1,000 Units Oral Daily Chandler Membreno MD   1,000 Units at 03/04/25 0757    0.9 % sodium chloride infusion   IntraVENous PRN Rogelio Han APRN - CNP        levothyroxine (SYNTHROID) tablet 88 mcg  88 mcg Oral Daily Rambo Mccloud APRN - CNP   88 mcg at 03/04/25 0606    sodium chloride flush 0.9 % injection 5-40 mL  5-40 mL IntraVENous 2 times per day Jay, Isgordo U, DO   10 mL at 03/04/25 0750    sodium chloride flush 0.9 % injection 5-40 mL  5-40 mL IntraVENous PRN Jay, Ismail U, DO        0.9 % sodium chloride infusion   IntraVENous PRN Jay, Ismail U, DO

## 2025-03-04 NOTE — PROGRESS NOTES
Pulmonary/Critical Care Progress Note    We are following patient for acute hypoxemic and hypercapnic respiratory failure superimposed on chronic respiratory failure related to morbid obesity and untreated obstructive sleep apnea, right pleural effusion (exudate but cytology is negative), chronic atrial fibrillation,, anemia    IMPRESSION / PLAN:    Acute on Chronic Hypercapneic Respiratory Failure   Morbid obesity / Obstructive Sleep Apnea / Obesity Hypoventilation Syndrome (presumed)  BiPAP qhs and PRN  Maintained on NC      HFpEF  Pulmonary HTN     Acute Kidney Injury on CKD  Started on hemodialysis - one today  Appreciate nephrology guidance    Hematuria  Anticoag stopped   Resolved  Urology following  Remove garner when OK with urology    Pleural effusion   Exudative  Cytology negative     Atrial Fibrillation  Anticoag on hold    Anemia  Transfuse for Hgb < 7  Seen by GI - no active GIB  Planned EGD  Appreciate input    DM II  Insulin sliding scale   Glu under good control     Up in chair      Steady improvement overall  Continue PT/ OT  Continue present management     3/4/2025   Doing well - not very compliant with BiPAP - refusing  EGD revealing AVMs/Aguila's     Try to find different  - alternate mask fit        Code Status: Full Code    __________________________________________    Subjective  Feeling better      Events last 24 hr  Not using bipap    MEDICATIONS:   pantoprazole  40 mg Oral BID AC    vitamin B-6  100 mg Oral Daily    Vitamin D  1,000 Units Oral Daily    levothyroxine  88 mcg Oral Daily    sodium chloride flush  5-40 mL IntraVENous 2 times per day    amiodarone  200 mg Oral Daily    [Held by provider] apixaban  5 mg Oral BID    [Held by provider] aspirin  81 mg Oral Daily    [Held by provider] losartan  100 mg Oral Daily    magnesium oxide  200 mg Oral Daily    [Held by provider] metoprolol succinate  50 mg Oral Daily    [Held by provider] tamsulosin  0.4 mg Oral Daily    ipratropium 0.5  diverticulosis with no evidence of diverticulitis.         XR CHEST PORTABLE   Final Result   Cardiomegaly with stable mild increased interstitial markings throughout the   lung fields with small bilateral pleural effusions.  No new focal parenchymal   opacification present.         IR FLUORO GUIDED CVA DEVICE PLMT/REPLACE/REMOVAL   Final Result   Successful placement of a temporary dialysis catheter via the right internal   jugular vein.         IR ULTRASOUND GUIDANCE VASCULAR ACCESS   Final Result   Successful placement of a temporary dialysis catheter via the right internal   jugular vein.         IR GUIDED THORACENTESIS PLEURAL   Final Result   Successful ultrasound guided thoracentesis.         XR CHEST PORTABLE   Final Result   Cardiomegaly with no change in the bilateral pleural effusions right greater   than left or bibasilar atelectasis.         XR CHEST PORTABLE   Final Result   Mild to moderate right-sided pleural effusion.  Can be manifestation of   decompensated congestive heart failure.  Please correlate clinically.             Lauro Mayo MD  Pulmonary & Critical Care  3/4/2025 8:22 AM

## 2025-03-04 NOTE — PROGRESS NOTES
Physical Therapy Treatment Note/Plan of Care    Room #:  IC06/IC06-01  Patient Name: Paul Florez  YOB: 1950  MRN: [id number]    Date of Service: 3/4/2025     Tentative placement recommendation: Home with Home Health Physical Therapy  Equipment recommendation: Patient has needed equipment       Evaluating Physical Therapist: Andrzej Head, PT  #[id number]      Specific Provider Orders/Date/Referring Provider :     PT eval and treat  Start:  02/28/25 1245,   End:  02/28/25 1245,   ONE TIME,   Standing Count:  1 Occurrences,   R       Mis, Chandler GARAY DO    Admitting Diagnosis:   Hyperkalemia [E87.5]  Hypoglycemia [E16.2]  KEN (acute kidney injury) [N17.9]  Acute respiratory failure with hypoxia (HCC) [J96.01]  Congestive heart failure, unspecified HF chronicity, unspecified heart failure type (HCC) [I50.9]    Admitted with    shortness of breath low blood sugar, bilateral lower extremities swelling , hypoxic ,   Surgery:     Visit Diagnoses         Codes    Hyperkalemia     E87.5            Patient Active Problem List   Diagnosis    Aguila's esophagus without dysplasia    Duodenal mass    Cardiomyopathy (HCC)    Coronary artery disease involving native coronary artery of native heart without angina pectoris    BMI 40.0-44.9, adult    Spinal stenosis of lumbar region without neurogenic claudication    Lumbar spondylosis    Lumbar facet arthropathy    Lumbar disc disorder    Chronic pain syndrome [G89.4 (ICD-10-CM)]    Observation after surgery    Ventricular tachycardia (HCC)    Iron deficiency anemia, unspecified    Chronic systolic (congestive) heart failure (HCC)    Type 2 diabetes mellitus without complication, without long-term current use of insulin (HCC)    Shortness of breath    Other fatigue    JOSE MANUEL (obstructive sleep apnea)    Diarrhea    Obesity, class 3 (E66.813)    Body mass index [BMI] 45.0-49.9, adult (Z68.42)    Hypoglycemia    Acute HFrEF (heart failure with reduced ejection fraction)

## 2025-03-04 NOTE — PROGRESS NOTES
Internal Medicine Progress Note     [facility]     Chandler Abebe D.O., SHIRAOEdmundoI.                         Curry Dennis D.O., SHIRAOEdmundoIEdmundo Dillon D.O.     Gauri Clement, MSN, APRN, NP-C  Adonis Gray, MSN, APRN-CNP  Rambo Mccloud, MSN, APRN, NP-C  Leona Collins, MSN, APRN-CNP  Lily Isaac, MSN, APRN, NP-C     Primary Care Physician: Eduin Guzman DO   Admitting Physician:  Chandler Abebe DO  Admission date and time: 2/25/2025  6:39 PM    Room:  [place] [address]  Admitting diagnosis: Hyperkalemia [E87.5]  Hypoglycemia [E16.2]  KEN (acute kidney injury) [N17.9]  Acute respiratory failure with hypoxia (HCC) [J96.01]  Congestive heart failure, unspecified HF chronicity, unspecified heart failure type (HCC) [I50.9]    Patient Name: Paul Florez  MRN: [id number]    Date of Service: 3/4/2025     Subjective:  Paul is a 74 y.o. male who was seen and examined today,3/4/2025, at the bedside.  Paul appears far more comfortable on examination today.  He is maintained on nasal cannula oxygen with far less respiratory lability.  Nursing reports diminished urine output overnight at approximately 400 cc when compared to the previous night.  The nephrology team is following and dialysis was held yesterday.  EGD results have been reviewed.  No family members were present during my examination.    Review of System:   Constitutional:   Feeling much better today  HEENT:   Will cannula oxygen was in place.  Cardiovascular:   No current chest pain or palpitations.  Respiratory:   Improving shortness of breath.  Gastrointestinal:   Admits to an intact appetite but admits to decreased oral intake as he does not particularly care for the food.  Genitourinary:    Does not disclose any urgency, frequency, hematuria.  Voiding with the use of a Merritt catheter.  Dark-colored urine is appreciated.  Extremities:   Chronic upper and lower extremity edema.  Neurology:    Improving weakness and  03/04/2025 04:23 AM     BMP:    Lab Results   Component Value Date/Time     03/04/2025 04:23 AM    K 3.6 03/04/2025 04:23 AM    K 4.3 05/28/2022 05:18 AM     03/04/2025 04:23 AM    CO2 31 03/04/2025 04:23 AM    BUN 22 03/04/2025 04:23 AM    CREATININE 2.1 03/04/2025 04:23 AM    CALCIUM 8.5 03/04/2025 04:23 AM    GFRAA >60 05/28/2022 05:18 AM    LABGLOM 33 03/04/2025 04:23 AM    LABGLOM >60 03/05/2024 02:38 PM    GLUCOSE 101 03/04/2025 04:23 AM    GLUCOSE 177 08/30/2011 08:21 AM       Assessment:  Acutely decompensated diastolic congestive heart failure with preserved ejection fraction  Large pleural effusion status-post thoracentesis with pleural fluid analysis suggesting exudative effusion currently maintained on antibiotic support  Acute on chronic kidney disease with persistent hyperkalemia necessitating the institution of temporary hemodialysis (presumably)  Relatively refractory hypoglycemia with underlying non-insulin-dependent diabetes mellitus type 2  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  Urine output/creatinine has leveled off at this point in dialysis is currently on hold.  We are monitoring urinary output closely.  EGD results have been reviewed and diet is being advanced.  Hemoglobin is being monitored closely with the eventual need to resume anticoagulation therapy.  Antibiotics will be completed today.  I have encouraged increased activity today and he voiced understanding and

## 2025-03-04 NOTE — PROGRESS NOTES
PROGRESS NOTE  By Anuj Butler M.D.    [facility]  Dr. Kate Jason M.D.,  Dr. Josue Macias M.D.,   TONI JenningsO.,  Dr. Barry Johnson M.D.,  Dr. Dario Resendez D.O.,          Paul Florez  74 y.o.  male    SUBJECTIVE:  Denies abdominal pain.  Denies nausea vomiting    OBJECTIVE:    /68   Pulse 74   Temp 98.1 °F (36.7 °C) (Oral)   Resp 19   Ht 1.753 m (5' 9.02\")   Wt (!) 148.3 kg (326 lb 15.1 oz)   SpO2 94%   BMI 48.26 kg/m²     General: NAD/older adult  male.  AAOx3  HEENT: Anicteric sclera/moist oral mucosa  Neck: Supple with trachea midline  Chest: Symmetric excursion/CTAB  Cor: Regular  Abd.: Abdomen obese/soft/nontender  Extr.:  Bilateral lower extremity edema.  Skin: Warm and dry/anicteric      DATA:    Monitor data reviewed - sinus rhythm noted.       Lab Results   Component Value Date/Time    WBC 4.7 03/04/2025 04:23 AM    RBC 2.92 03/04/2025 04:23 AM    HGB 7.3 03/04/2025 04:23 AM    HCT 24.6 03/04/2025 04:23 AM    MCV 84.2 03/04/2025 04:23 AM    MCH 25.0 03/04/2025 04:23 AM    MCHC 29.7 03/04/2025 04:23 AM    RDW 19.6 03/04/2025 04:23 AM     03/04/2025 04:23 AM    MPV 11.0 03/04/2025 04:23 AM     Lab Results   Component Value Date/Time     03/04/2025 04:23 AM    K 3.6 03/04/2025 04:23 AM    K 4.3 05/28/2022 05:18 AM     03/04/2025 04:23 AM    CO2 31 03/04/2025 04:23 AM    BUN 22 03/04/2025 04:23 AM    CREATININE 2.1 03/04/2025 04:23 AM    CALCIUM 8.5 03/04/2025 04:23 AM    BILITOT 0.3 03/04/2025 04:23 AM    ALKPHOS 102 03/04/2025 04:23 AM    AST 31 03/04/2025 04:23 AM    ALT 13 03/04/2025 04:23 AM     No results found for: \"LIPASE\"  No results found for: \"AMYLASE\"      ASSESSMENT/PLAN:  Patient Active Problem List   Diagnosis    Aguila's esophagus without dysplasia    Duodenal mass    Cardiomyopathy (HCC)    Coronary artery disease involving native coronary artery of native heart without angina pectoris    BMI 40.0-44.9, adult    Spinal  been discussed with the patient/family and all questions answered to patient's satisfaction.     Anuj Butler MD  3/4/2025  7:48 AM    NOTE:  This report was transcribed using voice recognition software.  Every effort was made to ensure accuracy; however, inadvertent computerized transcription errors may be present.

## 2025-03-04 NOTE — PLAN OF CARE
Problem: Chronic Conditions and Co-morbidities  Goal: Patient's chronic conditions and co-morbidity symptoms are monitored and maintained or improved  3/4/2025 1042 by Nataly Jiang RN  Outcome: Progressing  3/4/2025 0005 by Daksha Stewart RN  Outcome: Progressing     Problem: Discharge Planning  Goal: Discharge to home or other facility with appropriate resources  Outcome: Progressing     Problem: Pain  Goal: Verbalizes/displays adequate comfort level or baseline comfort level  3/4/2025 1042 by Nataly Jiang RN  Outcome: Progressing  3/4/2025 0005 by Daksha Stewart RN  Outcome: Progressing     Problem: Safety - Adult  Goal: Free from fall injury  3/4/2025 1042 by Nataly Jiang RN  Outcome: Progressing  3/4/2025 0005 by Daksha Stewart RN  Outcome: Progressing     Problem: Skin/Tissue Integrity  Goal: Skin integrity remains intact  Description: 1.  Monitor for areas of redness and/or skin breakdown  2.  Assess vascular access sites hourly  3.  Every 4-6 hours minimum:  Change oxygen saturation probe site  4.  Every 4-6 hours:  If on nasal continuous positive airway pressure, respiratory therapy assess nares and determine need for appliance change or resting period  Outcome: Progressing     Problem: Respiratory - Adult  Goal: Achieves optimal ventilation and oxygenation  Outcome: Progressing     Problem: Cardiovascular - Adult  Goal: Maintains optimal cardiac output and hemodynamic stability  Outcome: Progressing  Goal: Absence of cardiac dysrhythmias or at baseline  Outcome: Progressing     Problem: Skin/Tissue Integrity - Adult  Goal: Skin integrity remains intact  Description: 1.  Monitor for areas of redness and/or skin breakdown  2.  Assess vascular access sites hourly  3.  Every 4-6 hours minimum:  Change oxygen saturation probe site  4.  Every 4-6 hours:  If on nasal continuous positive airway pressure, respiratory therapy assess nares and determine need for appliance change or

## 2025-03-04 NOTE — CARE COORDINATION
3/4/2025 Transfer to [facility]1/25. H/h 7.3/24.6, EGD 3/3/25- treated gastric AVM's. Referral to [facility]- orders obtained-no cost to pt unless IV's, meds or wound care. DME: if oxygen needed pt will provide a choice at that time per pt and his wife. Umang Gregg to review. Watch for discharge needs. CM/SS assigned to follow. Electronically signed by Akosua Burkett RN-BC on 3/4/2025 at 12:53 PM

## 2025-03-04 NOTE — PLAN OF CARE
Problem: Chronic Conditions and Co-morbidities  Goal: Patient's chronic conditions and co-morbidity symptoms are monitored and maintained or improved  Outcome: Progressing     Problem: Discharge Planning  Goal: Discharge to home or other facility with appropriate resources  Outcome: Progressing     Problem: Pain  Goal: Verbalizes/displays adequate comfort level or baseline comfort level  Outcome: Progressing     Problem: Safety - Adult  Goal: Free from fall injury  Outcome: Progressing     Problem: Skin/Tissue Integrity  Goal: Skin integrity remains intact  Description: 1.  Monitor for areas of redness and/or skin breakdown  2.  Assess vascular access sites hourly  3.  Every 4-6 hours minimum:  Change oxygen saturation probe site  4.  Every 4-6 hours:  If on nasal continuous positive airway pressure, respiratory therapy assess nares and determine need for appliance change or resting period  Outcome: Progressing     Problem: Respiratory - Adult  Goal: Achieves optimal ventilation and oxygenation  Outcome: Progressing      Brock Vanegas is a 17 y.o. male patient.    Temp: 97.7 °F (36.5 °C) (03/04/25 0509)  Pulse: 85 (03/04/25 0730)  Resp: (!) 21 (03/04/25 0730)  BP: (!) 102/56 (03/04/25 0815)  SpO2: 95 % (03/04/25 0730)  Weight: 66.3 kg (146 lb 2.6 oz) (03/04/25 0509)  Height: 5' (152.4 cm) (03/04/25 0509)    PICC  Date/Time: 3/4/2025 7:55 AM  Performed by: Maurice Luna RN  Consent Done: Yes  Time out: Immediately prior to procedure a time out was called to verify the correct patient, procedure, equipment, support staff and site/side marked as required  Indications: med administration and vascular access  Anesthesia: local infiltration  Local anesthetic: lidocaine 1% without epinephrine  Anesthetic Total (mL): 2  Preparation: skin prepped with ChloraPrep  Skin prep agent dried: skin prep agent completely dried prior to procedure  Sterile barriers: all five maximum sterile barriers used - cap, mask, sterile gown, sterile gloves, and large sterile sheet  Hand hygiene: hand hygiene performed prior to central venous catheter insertion  Location details: right brachial  Catheter type: triple lumen  Catheter size: 5 Fr  Catheter Length: 32cm    Ultrasound guidance: yes  Vessel Caliber: medium and patent, compressibility normal  Vascular Doppler: not done  Needle advanced into vessel with real time Ultrasound guidance.  Guidewire confirmed in vessel.  Sterile sheath used.  no esophageal manometryNumber of attempts: 1  Post-procedure: blood return through all ports, chlorhexidine patch and sterile dressing applied  Estimated blood loss (mL): 0    Assessment: placement verified by x-ray and successful placement  Complications: none          Name Maurice Luna RN  3/4/2025

## 2025-03-04 NOTE — PROGRESS NOTES
Spiritual Health History and Assessment/Progress Note  [facility]    Follow-up,  Encounter, Rituals, Rites and Sacraments, Spiritual/Emotional Needs ([place]),  ,  ,      Name: Paul Florez MRN: [id number]    Age: 74 y.o.     Sex: male   Language: English   Pentecostal: Gnosticist   Acute HFrEF (heart failure with reduced ejection fraction) ([facility])     Date: 3/4/2025                           Spiritual Assessment continued in [facility] 2 ICU        Referral/Consult From: Rounding   Encounter Overview/Reason: Follow-up,  Encounter, Rituals, Rites and Sacraments, Spiritual/Emotional Needs ([place]. [place])  Service Provided For: Patient    Yahaira, Belief, Meaning:   Patient identifies as spiritual and is connected with a yahaira tradition or spiritual practice  Family/Friends No family/friends present      Importance and Influence:  Patient unable to assess at this time  Family/Friends No family/friends present    Community:  Patient Other: unknown  Family/Friends No family/friends present    Assessment and Plan of Care:     Patient Interventions include: Facilitated expression of thoughts and feelings and Provided sacramental/Yarsani ritual  Family/Friends Interventions include: No family/friends present    Patient Plan of Care: No spiritual needs identified for follow-up and Spiritual Care available upon further referral  Family/Friends Plan of Care: No family/friends present    Electronically signed by Chaplain Edwar on 3/4/2025 at 12:11 PM

## 2025-03-04 NOTE — ANESTHESIA POSTPROCEDURE EVALUATION
Department of Anesthesiology  Postprocedure Note    Patient: Paul Florez  MRN: [id number]  YOB: 1950  Date of evaluation: 3/3/2025    Procedure Summary       Date: 03/03/25 Room / Location: [place] [address] / [facility]    Anesthesia Start: 1449 Anesthesia Stop: 1510    Procedure: ESOPHAGOGASTRODUODENOSCOPY CONTROL HEMORRHAGE Diagnosis:       Anemia, unspecified type      (Anemia, unspecified type [D64.9])    Surgeons: Paul Griffin DO Responsible Provider: Deep Rider DO    Anesthesia Type: MAC ASA Status: 4            Anesthesia Type: No value filed.    Lolis Phase I:      Lolis Phase II:      Anesthesia Post Evaluation    Patient location during evaluation: ICU  Patient participation: complete - patient participated  Level of consciousness: awake  Airway patency: patent  Nausea & Vomiting: no nausea and no vomiting  Cardiovascular status: hemodynamically stable  Respiratory status: acceptable  Hydration status: stable  Pain management: adequate    No notable events documented.

## 2025-03-05 LAB
ALBUMIN SERPL-MCNC: 2.6 G/DL (ref 3.5–5.2)
ALP SERPL-CCNC: 107 U/L (ref 40–129)
ALT SERPL-CCNC: 15 U/L (ref 0–40)
ANION GAP SERPL CALCULATED.3IONS-SCNC: 6 MMOL/L (ref 7–16)
AST SERPL-CCNC: 38 U/L (ref 0–39)
BASOPHILS # BLD: 0 K/UL (ref 0–0.2)
BASOPHILS NFR BLD: 0 % (ref 0–2)
BILIRUB DIRECT SERPL-MCNC: <0.2 MG/DL (ref 0–0.3)
BILIRUB INDIRECT SERPL-MCNC: ABNORMAL MG/DL (ref 0–1)
BILIRUB SERPL-MCNC: 0.4 MG/DL (ref 0–1.2)
BUN SERPL-MCNC: 19 MG/DL (ref 6–23)
CALCIUM SERPL-MCNC: 9 MG/DL (ref 8.6–10.2)
CHLORIDE SERPL-SCNC: 103 MMOL/L (ref 98–107)
CO2 SERPL-SCNC: 31 MMOL/L (ref 22–29)
CREAT SERPL-MCNC: 1.8 MG/DL (ref 0.7–1.2)
EOSINOPHIL # BLD: 0.09 K/UL (ref 0.05–0.5)
EOSINOPHILS RELATIVE PERCENT: 2 % (ref 0–6)
ERYTHROCYTE [DISTWIDTH] IN BLOOD BY AUTOMATED COUNT: 20.1 % (ref 11.5–15)
ERYTHROCYTE [DISTWIDTH] IN BLOOD BY AUTOMATED COUNT: 20.2 % (ref 11.5–15)
GFR, ESTIMATED: 39 ML/MIN/1.73M2
GLUCOSE BLD-MCNC: 136 MG/DL (ref 74–99)
GLUCOSE BLD-MCNC: 137 MG/DL (ref 74–99)
GLUCOSE BLD-MCNC: 137 MG/DL (ref 74–99)
GLUCOSE BLD-MCNC: 139 MG/DL (ref 74–99)
GLUCOSE BLD-MCNC: 141 MG/DL (ref 74–99)
GLUCOSE SERPL-MCNC: 121 MG/DL (ref 74–99)
HCT VFR BLD AUTO: 27 % (ref 37–54)
HCT VFR BLD AUTO: 28.3 % (ref 37–54)
HGB BLD-MCNC: 7.9 G/DL (ref 12.5–16.5)
HGB BLD-MCNC: 7.9 G/DL (ref 12.5–16.5)
INR PPP: 1.3
LYMPHOCYTES NFR BLD: 0.36 K/UL (ref 1.5–4)
LYMPHOCYTES RELATIVE PERCENT: 7 % (ref 20–42)
MAGNESIUM SERPL-MCNC: 1.8 MG/DL (ref 1.6–2.6)
MAGNESIUM SERPL-MCNC: 1.8 MG/DL (ref 1.6–2.6)
MCH RBC QN AUTO: 25.2 PG (ref 26–35)
MCH RBC QN AUTO: 26 PG (ref 26–35)
MCHC RBC AUTO-ENTMCNC: 27.9 G/DL (ref 32–34.5)
MCHC RBC AUTO-ENTMCNC: 29.3 G/DL (ref 32–34.5)
MCV RBC AUTO: 88.8 FL (ref 80–99.9)
MCV RBC AUTO: 90.1 FL (ref 80–99.9)
MONOCYTES NFR BLD: 0.27 K/UL (ref 0.1–0.95)
MONOCYTES NFR BLD: 5 % (ref 2–12)
NEUTROPHILS NFR BLD: 86 % (ref 43–80)
NEUTS SEG NFR BLD: 4.38 K/UL (ref 1.8–7.3)
PARTIAL THROMBOPLASTIN TIME: 33.2 SEC (ref 24.5–35.1)
PHOSPHATE SERPL-MCNC: 3.4 MG/DL (ref 2.5–4.5)
PHOSPHATE SERPL-MCNC: 3.8 MG/DL (ref 2.5–4.5)
PLATELET # BLD AUTO: 188 K/UL (ref 130–450)
PLATELET # BLD AUTO: 196 K/UL (ref 130–450)
PMV BLD AUTO: 10 FL (ref 7–12)
PMV BLD AUTO: 11.2 FL (ref 7–12)
POTASSIUM SERPL-SCNC: 4 MMOL/L (ref 3.5–5)
PROT SERPL-MCNC: 6.8 G/DL (ref 6.4–8.3)
PROTHROMBIN TIME: 13.9 SEC (ref 9.3–12.4)
RBC # BLD AUTO: 3.04 M/UL (ref 3.8–5.8)
RBC # BLD AUTO: 3.14 M/UL (ref 3.8–5.8)
RBC # BLD: ABNORMAL 10*6/UL
SODIUM SERPL-SCNC: 140 MMOL/L (ref 132–146)
WBC OTHER # BLD: 5.1 K/UL (ref 4.5–11.5)
WBC OTHER # BLD: 5.1 K/UL (ref 4.5–11.5)

## 2025-03-05 PROCEDURE — 2500000003 HC RX 250 WO HCPCS: Performed by: INTERNAL MEDICINE

## 2025-03-05 PROCEDURE — 2700000000 HC OXYGEN THERAPY PER DAY

## 2025-03-05 PROCEDURE — 85025 COMPLETE CBC W/AUTO DIFF WBC: CPT

## 2025-03-05 PROCEDURE — 2580000003 HC RX 258: Performed by: INTERNAL MEDICINE

## 2025-03-05 PROCEDURE — 83735 ASSAY OF MAGNESIUM: CPT

## 2025-03-05 PROCEDURE — 6370000000 HC RX 637 (ALT 250 FOR IP): Performed by: INTERNAL MEDICINE

## 2025-03-05 PROCEDURE — 85730 THROMBOPLASTIN TIME PARTIAL: CPT

## 2025-03-05 PROCEDURE — 84100 ASSAY OF PHOSPHORUS: CPT

## 2025-03-05 PROCEDURE — 94660 CPAP INITIATION&MGMT: CPT

## 2025-03-05 PROCEDURE — 2060000000 HC ICU INTERMEDIATE R&B

## 2025-03-05 PROCEDURE — 82962 GLUCOSE BLOOD TEST: CPT

## 2025-03-05 PROCEDURE — 80053 COMPREHEN METABOLIC PANEL: CPT

## 2025-03-05 PROCEDURE — 94669 MECHANICAL CHEST WALL OSCILL: CPT

## 2025-03-05 PROCEDURE — 82248 BILIRUBIN DIRECT: CPT

## 2025-03-05 PROCEDURE — 6370000000 HC RX 637 (ALT 250 FOR IP)

## 2025-03-05 PROCEDURE — 94640 AIRWAY INHALATION TREATMENT: CPT

## 2025-03-05 PROCEDURE — 36415 COLL VENOUS BLD VENIPUNCTURE: CPT

## 2025-03-05 PROCEDURE — 85610 PROTHROMBIN TIME: CPT

## 2025-03-05 PROCEDURE — 6370000000 HC RX 637 (ALT 250 FOR IP): Performed by: NURSE PRACTITIONER

## 2025-03-05 PROCEDURE — 99232 SBSQ HOSP IP/OBS MODERATE 35: CPT | Performed by: INTERNAL MEDICINE

## 2025-03-05 PROCEDURE — 85027 COMPLETE CBC AUTOMATED: CPT

## 2025-03-05 RX ORDER — IPRATROPIUM BROMIDE AND ALBUTEROL SULFATE 2.5; .5 MG/3ML; MG/3ML
1 SOLUTION RESPIRATORY (INHALATION)
Status: DISCONTINUED | OUTPATIENT
Start: 2025-03-06 | End: 2025-03-07 | Stop reason: HOSPADM

## 2025-03-05 RX ORDER — AMIODARONE HYDROCHLORIDE 200 MG/1
200 TABLET ORAL DAILY
Qty: 90 TABLET | Refills: 1 | Status: SHIPPED | OUTPATIENT
Start: 2025-03-05

## 2025-03-05 RX ORDER — IPRATROPIUM BROMIDE AND ALBUTEROL SULFATE 2.5; .5 MG/3ML; MG/3ML
1 SOLUTION RESPIRATORY (INHALATION)
Status: DISCONTINUED | OUTPATIENT
Start: 2025-03-05 | End: 2025-03-05

## 2025-03-05 RX ADMIN — IPRATROPIUM BROMIDE AND ALBUTEROL SULFATE 1 DOSE: .5; 2.5 SOLUTION RESPIRATORY (INHALATION) at 06:38

## 2025-03-05 RX ADMIN — Medication 1000 UNITS: at 08:25

## 2025-03-05 RX ADMIN — Medication 5 ML: at 21:31

## 2025-03-05 RX ADMIN — DOXYCYCLINE 100 MG: 100 INJECTION, POWDER, LYOPHILIZED, FOR SOLUTION INTRAVENOUS at 02:28

## 2025-03-05 RX ADMIN — PANTOPRAZOLE SODIUM 40 MG: 40 TABLET, DELAYED RELEASE ORAL at 15:57

## 2025-03-05 RX ADMIN — PANTOPRAZOLE SODIUM 40 MG: 40 TABLET, DELAYED RELEASE ORAL at 06:31

## 2025-03-05 RX ADMIN — IPRATROPIUM BROMIDE AND ALBUTEROL SULFATE 1 DOSE: .5; 2.5 SOLUTION RESPIRATORY (INHALATION) at 19:20

## 2025-03-05 RX ADMIN — PYRIDOXINE HCL TAB 50 MG 100 MG: 50 TAB at 08:25

## 2025-03-05 RX ADMIN — LEVOTHYROXINE SODIUM 88 MCG: 0.09 TABLET ORAL at 06:31

## 2025-03-05 RX ADMIN — IPRATROPIUM BROMIDE AND ALBUTEROL SULFATE 1 DOSE: .5; 2.5 SOLUTION RESPIRATORY (INHALATION) at 15:51

## 2025-03-05 RX ADMIN — IPRATROPIUM BROMIDE AND ALBUTEROL SULFATE 1 DOSE: .5; 2.5 SOLUTION RESPIRATORY (INHALATION) at 09:26

## 2025-03-05 RX ADMIN — AMIODARONE HYDROCHLORIDE 200 MG: 200 TABLET ORAL at 08:25

## 2025-03-05 RX ADMIN — MAGNESIUM OXIDE 200 MG: 400 TABLET ORAL at 08:25

## 2025-03-05 RX ADMIN — Medication 10 ML: at 08:25

## 2025-03-05 RX ADMIN — ATORVASTATIN CALCIUM 40 MG: 40 TABLET, FILM COATED ORAL at 21:26

## 2025-03-05 NOTE — CARE COORDINATION
3/5/25 SW Note: Met with pt & spouse at bedside. Pt currently on 6L of O2; no home O2. Pt reporting declining bi-pap at hs, does not have at home. Lives w/ spouse. Ambulates with cane or walker. [facility] (Umang/liaison accepted & following), but spouse reported phys stated potential need for ST rehab placement. Choiced with SNF list for review. Declined any DME provider preference if O2/DME needed upon d/c. IF DISCHARGING HOME, O2 WALK TEST NEEDED PRIOR TO D/C TO DETERMINE O2 NEED. Pt NEW HD, monitoring plan. SW to monitor d/c plan/needs. Electronically signed by ALEN Hernandez on 3/5/2025 at 11:44 AM      The Plan for Transition of Care is related to the following treatment goals: SNF    The Patient and/or patient representative was provided with a choice of provider and agrees   with the discharge plan. [x] Yes [] No    Freedom of choice list was provided with basic dialogue that supports the patient's individualized plan of care/goals, treatment preferences and shares the quality data associated with the providers. [x] Yes [] No

## 2025-03-05 NOTE — PROGRESS NOTES
4 Eyes Skin Assessment     NAME:  Paul Florez  YOB: 1950  MEDICAL RECORD NUMBER:  [id number]    The patient is being assessed for  Transfer to New Unit    I agree that at least one RN has performed a thorough Head to Toe Skin Assessment on the patient. ALL assessment sites listed below have been assessed.      Areas assessed by both nurses:    Head, Face, Ears, Shoulders, Back, Chest, Arms, Elbows, Hands, Sacrum. Buttock, Coccyx, Ischium, and Legs. Feet and Heels        Does the Patient have a Wound? Yes wound(s) were present on assessment. LDA wound assessment was Initiated and completed by RN  John Wu Prevention initiated by RN: No  Wound Care Orders initiated by RN: No    Pressure Injury (Stage 3,4, Unstageable, DTI, NWPT, and Complex wounds) if present, place Wound referral order by RN under : No    New Ostomies, if present place, Ostomy referral order under : No     Nurse 1 eSignature: Electronically signed by Rambo Benito RN on 3/5/25 at 12:52 AM EST    **SHARE this note so that the co-signing nurse can place an eSignature**    Nurse 2 eSignature: Electronically signed by Lily Ott RN on 3/5/25 at 1:18 AM EST

## 2025-03-05 NOTE — PLAN OF CARE
Problem: Chronic Conditions and Co-morbidities  Goal: Patient's chronic conditions and co-morbidity symptoms are monitored and maintained or improved  3/5/2025 0027 by Rambo Benito RN  Outcome: Progressing  3/4/2025 1042 by Nataly Jiang RN  Outcome: Progressing     Problem: Discharge Planning  Goal: Discharge to home or other facility with appropriate resources  3/5/2025 0027 by Rambo Benito RN  Outcome: Progressing  3/4/2025 1042 by Nataly Jiang RN  Outcome: Progressing     Problem: Pain  Goal: Verbalizes/displays adequate comfort level or baseline comfort level  3/5/2025 0027 by Rambo Benito RN  Outcome: Progressing  3/4/2025 1042 by Nataly Jiang RN  Outcome: Progressing     Problem: Safety - Adult  Goal: Free from fall injury  3/5/2025 0027 by Rambo Benito RN  Outcome: Progressing  3/4/2025 1042 by Nataly Jiang RN  Outcome: Progressing     Problem: Skin/Tissue Integrity  Goal: Skin integrity remains intact  Description: 1.  Monitor for areas of redness and/or skin breakdown  2.  Assess vascular access sites hourly  3.  Every 4-6 hours minimum:  Change oxygen saturation probe site  4.  Every 4-6 hours:  If on nasal continuous positive airway pressure, respiratory therapy assess nares and determine need for appliance change or resting period  3/5/2025 0027 by Rambo Benito RN  Outcome: Progressing  3/4/2025 1042 by Nataly Jiang RN  Outcome: Progressing     Problem: Respiratory - Adult  Goal: Achieves optimal ventilation and oxygenation  3/5/2025 0027 by Rambo Benito RN  Outcome: Progressing  3/4/2025 1042 by Nataly Jiang RN  Outcome: Progressing     Problem: Cardiovascular - Adult  Goal: Maintains optimal cardiac output and hemodynamic stability  3/5/2025 0027 by Rambo Benito RN  Outcome: Progressing  3/4/2025 1042 by Nataly Jiang RN  Outcome: Progressing  Goal: Absence of cardiac dysrhythmias or at baseline  3/5/2025 0027 by Rambo Benito  Nataly NOLEN RN  Outcome: Progressing  Goal: Glucose maintained within prescribed range  Outcome: Progressing     Problem: Hematologic - Adult  Goal: Maintains hematologic stability  3/5/2025 0027 by Rambo Benito RN  Outcome: Progressing  3/4/2025 1042 by Nataly Jiang RN  Outcome: Progressing     Problem: Neurosensory - Adult  Goal: Achieves stable or improved neurological status  3/5/2025 0027 by Rambo Benito RN  Outcome: Progressing  3/4/2025 1042 by Nataly Jiang RN  Outcome: Progressing  Goal: Achieves maximal functionality and self care  3/5/2025 0027 by Rambo Benito RN  Outcome: Progressing  3/4/2025 1042 by Nataly Jiang RN  Outcome: Progressing     Problem: Musculoskeletal - Adult  Goal: Return mobility to safest level of function  3/5/2025 0027 by Rambo Benito RN  Outcome: Progressing  3/4/2025 1042 by Nataly Jiang RN  Outcome: Progressing  Goal: Maintain proper alignment of affected body part  3/5/2025 0027 by Rambo Benito RN  Outcome: Progressing  3/4/2025 1042 by Nataly Jiang RN  Outcome: Progressing  Goal: Return ADL status to a safe level of function  3/5/2025 0027 by Rambo Benito RN  Outcome: Progressing  3/4/2025 1042 by Nataly Jiang RN  Outcome: Progressing     Problem: Nutrition Deficit:  Goal: Optimize nutritional status  3/5/2025 0027 by Rambo Benito RN  Outcome: Progressing  3/4/2025 1042 by Nataly Jiang RN  Outcome: Progressing

## 2025-03-05 NOTE — RT PROTOCOL NOTE
RT Nebulizer Bronchodilator Protocol Note    There is a bronchodilator order in the chart from a provider indicating to follow the RT Bronchodilator Protocol and there is an “Initiate RT Bronchodilator Protocol” order as well (see protocol at bottom of note).    CXR Findings:  No results found.    The findings from the last RT Protocol Assessment were as follows:  Smoking: Smoker 15 pack years or more  Respiratory Pattern: Dyspnea on exertion or RR 21-25 bpm  Breath Sounds: Slightly diminished and/or crackles  Cough: Strong, spontaneous, non-productive  Indication for Bronchodilator Therapy: Decreased or absent breath sounds  Bronchodilator Assessment Score: 5    Aerosolized bronchodilator medication orders have been revised according to the RT Nebulizer Bronchodilator Protocol below.    Respiratory Therapist to perform RT Therapy Protocol Assessment initially then follow the protocol.  Repeat RT Therapy Protocol Assessment PRN for score 0-3 or on second treatment, BID, and PRN for scores above 3.    No Indications - adjust the frequency to every 6 hours PRN wheezing or bronchospasm, if no treatments needed after 48 hours then discontinue using Per Protocol order mode.     If indication present, adjust the RT bronchodilator orders based on the Bronchodilator Assessment Score as indicated below.  If a patient is on this medication at home then do not decrease Frequency below that used at home.    0-3 - enter or revise RT bronchodilator order(s) to equivalent RT Bronchodilator order with Frequency of every 4 hours PRN for wheezing or increased work of breathing using Per Protocol order mode.       4-6 - enter or revise RT Bronchodilator order(s) to two equivalent RT bronchodilator orders with one order with BID Frequency and one order with Frequency of every 4 hours PRN wheezing or increased work of breathing using Per Protocol order mode.         7-10 - enter or revise RT Bronchodilator order(s) to two equivalent RT  bronchodilator orders with one order with TID Frequency and one order with Frequency of every 4 hours PRN wheezing or increased work of breathing using Per Protocol order mode.       11-13 - enter or revise RT Bronchodilator order(s) to one equivalent RT bronchodilator order with QID Frequency and an Albuterol order with Frequency of every 4 hours PRN wheezing or increased work of breathing using Per Protocol order mode.      Greater than 13 - enter or revise RT Bronchodilator order(s) to one equivalent RT bronchodilator order with every 4 hours Frequency and an Albuterol order with Frequency of every 2 hours PRN wheezing or increased work of breathing using Per Protocol order mode.     RT to enter RT Home Evaluation for COPD & MDI Assessment order using Per Protocol order mode.    Electronically signed by Eduin Warner RCP on 3/5/2025 at 3:57 PM

## 2025-03-05 NOTE — PROGRESS NOTES
Nephrology Note  Alex Palacios MD          Patient seen and examined.  Patient's wife is present at the bedside.  Chart reviewed.  Patient is sitting up in the chair.  He has been transferred out of intensive care unit  He is feeling much better.  His diet has been advanced and he is tolerating it well.  He is presently denying any shortness of breath.      Awake and alert .   In no acute distress.    Vital SignsBP (!) 133/59   Pulse 81   Temp 98.4 °F (36.9 °C) (Oral)   Resp 19   Ht 1.753 m (5' 9.02\")   Wt (!) 148.3 kg (326 lb 15.1 oz)   SpO2 92%   BMI 48.26 kg/m²   24HR INTAKE/OUTPUT:    Intake/Output Summary (Last 24 hours) at 3/5/2025 2357  Last data filed at 3/5/2025 1845  Gross per 24 hour   Intake 1200 ml   Output 1525 ml   Net -325 ml         PHYSICAL EXAM        Neck: No JVD  Lungs: Breath sounds decreased at the bases. No rales or ronchi.  Heart: Regular rate and rhythm. No S3 gallop. No  murmrur.  Abdomen: Soft non distended, non tender and normal bowel sounds.  Extremeties:   +1 bipedal edema           Current Facility-Administered Medications   Medication Dose Route Frequency Provider Last Rate Last Admin    [START ON 3/6/2025] ipratropium 0.5 mg-albuterol 2.5 mg (DUONEB) nebulizer solution 1 Dose  1 Dose Inhalation Q4H WA RT Sage Thompson U, DO        pantoprazole (PROTONIX) tablet 40 mg  40 mg Oral BID AC Paul Griffin DO   40 mg at 03/05/25 1557    vitamin B-6 (PYRIDOXINE) tablet 100 mg  100 mg Oral Daily Rambo Mccloud APRN - CNP   100 mg at 03/05/25 0825    Vitamin D (CHOLECALCIFEROL) tablet 1,000 Units  1,000 Units Oral Daily Chandler Membreno MD   1,000 Units at 03/05/25 0825    0.9 % sodium chloride infusion   IntraVENous PRN Rogelio Han APRN - CNP        levothyroxine (SYNTHROID) tablet 88 mcg  88 mcg Oral Daily Rambo Mccloud APRN - CNP   88 mcg at 03/05/25 0631    sodium chloride flush 0.9 % injection 5-40 mL  5-40 mL IntraVENous 2 times per day Sage Thompson  03/04/25  0423 03/05/25  1305    140 140   K 4.3 3.6 4.0    103 103   CO2 30* 31* 31*   BUN 20 22 19   CREATININE 2.2* 2.1* 1.8*       Serum Osmolality:   No results for input(s): \"OSMOS\" in the last 72 hours.      Urine Chemisrty:    No results for input(s): \"CLUR\", \"NAUR\", \"KUR\", \"LABCREAU\", \"UREAUR\", \"OSMOU\" in the last 72 hours.                                               No results for input(s): \"MALBCR\", \"LABPROT\" in the last 72 hours.           Assessment: / Plan:      Acute kidney injury.  Acute kidney injury secondary multiple factors including renal hypoperfusion in the setting of hypotension and use of angiotensin II receptor blocking agent in the form of losartan 100 mg once a day.   Urinary obstruction was considered as a possible significant etiology of acute kidney injury  as the patient did have brisk urine output after draining of the blood clots in the Merritt catheter after irrigating the Merritt.  Patient patient subsequently had persistent hyperkalemia and decline in the urinary output and patient underwent hemodialysis after insertion of temporary hemodialysis catheter.  Patient improved urinary output and following serum creatinine.  Plan on removal of temporary hemodialysis catheter tomorrow..    Hypotension with history of hypertension with chronic kidney disease stage G1 to stage G4.  Hypotension is resolved.  Patient no longer on midodrine.  Will continue to hold angiotensin II receptor blocking agent.  Blood pressure is at target of  less than 130/80 mmHg.    Anemia with mild iron deficiency.  Studies reflect mild iron deficiency.  Supplement iron once acute illness is over.  Transfuse for hemoglobin less than 7 g/dL.  Follow hemoglobin and hematocrit.       Secondary hyperparathyroidism due to renal insufficiency.  Patient also with vitamin D deficiency.  PTH level is 114.7    pg/ml with a Vitamin D level of 28.8   ng/ml.  We will start the patient on Calcitrol.  Follow PTH and

## 2025-03-05 NOTE — PROGRESS NOTES
Pt transferred to [facility].  Pt placed on cardiac monitor and reading vpaced.  Vitals recorded.  Merritt intact.  Pt with no complaints.  Call light within reach, bed alarm activated.

## 2025-03-05 NOTE — PROGRESS NOTES
Internal Medicine Progress Note     [facility]DA=[facility]     Chandler Abebe D.O., DIEGOI.                         Curry Dennis D.O., MARIA R Dillon D.O.     Gauri Clement, MSN, APRN, NP-C  Adonis Gray, MSN, APRN-CNP  Rambo Mccloud, MSN, APRN, NP-C  Leona Collins, MSN, APRN-CNP  Lily Isaac, MSN, APRN, NP-C     Primary Care Physician: Eduin Guzman DO   Admitting Physician:  Chandler Abebe DO  Admission date and time: 2/25/2025  6:39 PM    Room:  [address] [place] [address]  Admitting diagnosis: Hyperkalemia [E87.5]  Hypoglycemia [E16.2]  KEN (acute kidney injury) [N17.9]  Acute respiratory failure with hypoxia (HCC) [J96.01]  Congestive heart failure, unspecified HF chronicity, unspecified heart failure type (HCC) [I50.9]    Patient Name: Palu Florez  MRN: [id number]    Date of Service: 3/5/2025     Subjective:  Paul is a 74 y.o. male who was seen and examined today,3/5/2025, at the bedside.  Paul appears far more comfortable on examination today.  His oxygen has been weaned down to 4 L by nasal cannula.  He has been started on dialysis and is tolerating well.  His hemoglobin has been remaining stable at 8-7.9.  Patient denies any pain or discomfort and states he is starting to feel little better.  Review of System:   Constitutional:   Feeling much better today  HEENT:   Nasal cannula oxygen was in place.  Cardiovascular:   No current chest pain or palpitations.  Respiratory:   Improving shortness of breath.  Gastrointestinal:   Admits to an intact appetite but admits to decreased oral intake as he does not particularly care for the food.  Genitourinary:    Does not disclose any urgency, frequency, hematuria.  Voiding with the use of a Merritt catheter.  Dark-colored urine is appreciated.  Extremities:   Chronic upper and lower extremity edema.  Neurology:    Improving weakness and deconditioning.  Psch:   Less anxiety today.  Musculoskeletal:    Diffuse muscle aches and  19 03/05/2025 01:05 PM    CREATININE 1.8 03/05/2025 01:05 PM    CALCIUM 9.0 03/05/2025 01:05 PM    GFRAA >60 05/28/2022 05:18 AM    LABGLOM 39 03/05/2025 01:05 PM    LABGLOM >60 03/05/2024 02:38 PM    GLUCOSE 121 03/05/2025 01:05 PM    GLUCOSE 177 08/30/2011 08:21 AM       Assessment:  Acutely decompensated diastolic congestive heart failure with preserved ejection fraction  Large pleural effusion status-post thoracentesis with pleural fluid analysis suggesting exudative effusion currently maintained on antibiotic support  Acute on chronic kidney disease with persistent hyperkalemia necessitating the institution of temporary hemodialysis (presumably)  Relatively refractory hypoglycemia with underlying non-insulin-dependent diabetes mellitus type 2  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  Essential hypertension history with present hypotension, multifactorial  Atrial fibrillation with controlled ventricular sponsor on chronic Eliquis therapy  Multifactorial anemia with need to rule out GI bleed and consideration for EGD evaluation today  Coronary artery disease status post PCI in the past  Hyperlipidemia   BPH  Morbid obesity with BMI 49.18 kg/m² with likely obstructive sleep apnea and obesity hypoventilation      Plan:   Supplemental oxygen  Hemodialysis per nephrology  Monitor intake and output  Monitor hemoglobin hematocrit transfuse if hemoglobin less than 7  Continue work with PT OT  Monitor electrolytes replace as needed    More than 50% of my  time was spent at the bedside counseling/coordinating care with the patient and/or family with face to face contact.  This time was spent reviewing notes and laboratory data as well as instructing and counseling the patient. Time I spent with the family or surrogate(s) is included only if the patient was incapable of providing the necessary information or participating in medical decisions. I also discussed the differential diagnosis and all of the proposed

## 2025-03-05 NOTE — PLAN OF CARE
Problem: Chronic Conditions and Co-morbidities  Goal: Patient's chronic conditions and co-morbidity symptoms are monitored and maintained or improved  3/5/2025 1335 by Rachael Villafana, RN  Outcome: Progressing  3/5/2025 0027 by Rambo Benito, RN  Outcome: Progressing

## 2025-03-05 NOTE — PROGRESS NOTES
Pulmonary/Critical Care Progress Note    We are following patient for acute hypoxemic and hypercapnic respiratory failure superimposed on chronic respiratory failure related to morbid obesity and untreated obstructive sleep apnea, right pleural effusion (exudate but cytology is negative), chronic atrial fibrillation,, anemia    IMPRESSION / PLAN:    Acute on Chronic Hypercapneic Respiratory Failure   Morbid obesity / Obstructive Sleep Apnea / Obesity Hypoventilation Syndrome (presumed)  BiPAP qhs and PRN  Maintained on NC      HFpEF  Pulmonary HTN     Acute Kidney Injury on CKD  Started on hemodialysis - one today  Appreciate nephrology guidance    Hematuria  Anticoag stopped   Resolved  Urology following  Remove garner when OK with urology    Pleural effusion   Exudative  Cytology negative     Atrial Fibrillation  Anticoag on hold    Anemia  Transfuse for Hgb < 7  Seen by GI - no active GIB  Planned EGD  Appreciate input    DM II  Insulin sliding scale   Glu under good control     Up in chair      Steady improvement overall  Continue PT/ OT  Continue present management     3/5/2025   Doing well - not very compliant with BiPAP - refusing due drying effect  EGD revealing AVMs/Aguila's     State will try to use CPAP at home with humidification    All questions answered         Code Status: Full Code    __________________________________________    Subjective  Feeling better      Events last 24 hr  Not using bipap    MEDICATIONS:   pantoprazole  40 mg Oral BID AC    vitamin B-6  100 mg Oral Daily    Vitamin D  1,000 Units Oral Daily    levothyroxine  88 mcg Oral Daily    sodium chloride flush  5-40 mL IntraVENous 2 times per day    amiodarone  200 mg Oral Daily    [Held by provider] apixaban  5 mg Oral BID    [Held by provider] aspirin  81 mg Oral Daily    [Held by provider] losartan  100 mg Oral Daily    magnesium oxide  200 mg Oral Daily    [Held by provider] metoprolol succinate  50 mg Oral Daily    [Held by  evidence of diverticulitis.         XR CHEST PORTABLE   Final Result   Cardiomegaly with stable mild increased interstitial markings throughout the   lung fields with small bilateral pleural effusions.  No new focal parenchymal   opacification present.         IR FLUORO GUIDED CVA DEVICE PLMT/REPLACE/REMOVAL   Final Result   Successful placement of a temporary dialysis catheter via the right internal   jugular vein.         IR ULTRASOUND GUIDANCE VASCULAR ACCESS   Final Result   Successful placement of a temporary dialysis catheter via the right internal   jugular vein.         IR GUIDED THORACENTESIS PLEURAL   Final Result   Successful ultrasound guided thoracentesis.         XR CHEST PORTABLE   Final Result   Cardiomegaly with no change in the bilateral pleural effusions right greater   than left or bibasilar atelectasis.         XR CHEST PORTABLE   Final Result   Mild to moderate right-sided pleural effusion.  Can be manifestation of   decompensated congestive heart failure.  Please correlate clinically.             Lauro Mayo MD  Pulmonary & Critical Care  3/5/2025 3:30 PM

## 2025-03-05 NOTE — PROGRESS NOTES
PROGRESS NOTE  By Anuj Butler M.D.    [facility]  Dr. Kate Jason M.D.,  Dr. Josue Macias M.D.,   TONI JenningsO.,  Dr. Barry Johnson M.D.,  Dr. Dario Resendez D.O.,          Paul Florez  74 y.o.  male    SUBJECTIVE:  No new complaints    OBJECTIVE:    BP (!) 142/73   Pulse 80   Temp 98.1 °F (36.7 °C) (Axillary)   Resp 18   Ht 1.753 m (5' 9.02\")   Wt (!) 148.3 kg (326 lb 15.1 oz)   SpO2 93%   BMI 48.26 kg/m²     General: NAD/adult  male  HEENT: Anicteric sclera/moist oral mucosa  Neck: Supple with trachea midline  Chest: Symmetric excursion/nonlabored respirations  Cor: Regular  Abd.: Soft.  Nontender.  Obese  Extr.:  Bilateral lower extremity edema  Skin: Warm and dry/anicteric      DATA:    Monitor data reviewed -paced rhythm noted.       Lab Results   Component Value Date/Time    WBC 4.7 03/04/2025 04:23 AM    RBC 2.92 03/04/2025 04:23 AM    HGB 8.0 03/04/2025 12:40 PM    HCT 26.5 03/04/2025 12:40 PM    MCV 84.2 03/04/2025 04:23 AM    MCH 25.0 03/04/2025 04:23 AM    MCHC 29.7 03/04/2025 04:23 AM    RDW 19.6 03/04/2025 04:23 AM     03/04/2025 04:23 AM    MPV 11.0 03/04/2025 04:23 AM     Lab Results   Component Value Date/Time     03/04/2025 04:23 AM    K 3.6 03/04/2025 04:23 AM    K 4.3 05/28/2022 05:18 AM     03/04/2025 04:23 AM    CO2 31 03/04/2025 04:23 AM    BUN 22 03/04/2025 04:23 AM    CREATININE 2.1 03/04/2025 04:23 AM    CALCIUM 8.5 03/04/2025 04:23 AM    BILITOT 0.3 03/04/2025 04:23 AM    ALKPHOS 102 03/04/2025 04:23 AM    AST 31 03/04/2025 04:23 AM    ALT 13 03/04/2025 04:23 AM     No results found for: \"LIPASE\"  No results found for: \"AMYLASE\"      ASSESSMENT/PLAN:  Patient Active Problem List   Diagnosis    Aguila's esophagus without dysplasia    Duodenal mass    Cardiomyopathy (HCC)    Coronary artery disease involving native coronary artery of native heart without angina pectoris    BMI 40.0-44.9, adult    Spinal stenosis of lumbar  voice recognition software.  Every effort was made to ensure accuracy; however, inadvertent computerized transcription errors may be present.

## 2025-03-05 NOTE — PLAN OF CARE
Problem: Chronic Conditions and Co-morbidities  Goal: Patient's chronic conditions and co-morbidity symptoms are monitored and maintained or improved  3/5/2025 1459 by Rachael Villafana, RN  Outcome: Progressing  3/5/2025 1335 by Rachael Villafana, RN  Outcome: Progressing

## 2025-03-06 ENCOUNTER — APPOINTMENT (OUTPATIENT)
Dept: ULTRASOUND IMAGING | Age: 75
End: 2025-03-06
Payer: MEDICARE

## 2025-03-06 ENCOUNTER — APPOINTMENT (OUTPATIENT)
Dept: INTERVENTIONAL RADIOLOGY/VASCULAR | Age: 75
End: 2025-03-06
Payer: MEDICARE

## 2025-03-06 PROBLEM — E87.5 HYPERKALEMIA: Status: ACTIVE | Noted: 2025-03-06

## 2025-03-06 LAB
ALBUMIN SERPL-MCNC: 2.9 G/DL (ref 3.5–5.2)
ALP SERPL-CCNC: 130 U/L (ref 40–129)
ALT SERPL-CCNC: 21 U/L (ref 0–40)
ANION GAP SERPL CALCULATED.3IONS-SCNC: 7 MMOL/L (ref 7–16)
AST SERPL-CCNC: 53 U/L (ref 0–39)
BASOPHILS # BLD: 0.05 K/UL (ref 0–0.2)
BASOPHILS NFR BLD: 1 % (ref 0–2)
BILIRUB DIRECT SERPL-MCNC: <0.2 MG/DL (ref 0–0.3)
BILIRUB INDIRECT SERPL-MCNC: ABNORMAL MG/DL (ref 0–1)
BILIRUB SERPL-MCNC: 0.3 MG/DL (ref 0–1.2)
BUN SERPL-MCNC: 17 MG/DL (ref 6–23)
CALCIUM SERPL-MCNC: 8.8 MG/DL (ref 8.6–10.2)
CHLORIDE SERPL-SCNC: 104 MMOL/L (ref 98–107)
CO2 SERPL-SCNC: 30 MMOL/L (ref 22–29)
CREAT SERPL-MCNC: 1.8 MG/DL (ref 0.7–1.2)
EOSINOPHIL # BLD: 0.09 K/UL (ref 0.05–0.5)
EOSINOPHILS RELATIVE PERCENT: 2 % (ref 0–6)
ERYTHROCYTE [DISTWIDTH] IN BLOOD BY AUTOMATED COUNT: 20.6 % (ref 11.5–15)
GFR, ESTIMATED: 38 ML/MIN/1.73M2
GLUCOSE BLD-MCNC: 120 MG/DL (ref 74–99)
GLUCOSE BLD-MCNC: 121 MG/DL (ref 74–99)
GLUCOSE BLD-MCNC: 144 MG/DL (ref 74–99)
GLUCOSE BLD-MCNC: 270 MG/DL (ref 74–99)
GLUCOSE SERPL-MCNC: 117 MG/DL (ref 74–99)
HCT VFR BLD AUTO: 29.7 % (ref 37–54)
HGB BLD-MCNC: 8.3 G/DL (ref 12.5–16.5)
INR PPP: 1.3
LYMPHOCYTES NFR BLD: 0.7 K/UL (ref 1.5–4)
LYMPHOCYTES RELATIVE PERCENT: 13 % (ref 20–42)
MAGNESIUM SERPL-MCNC: 1.8 MG/DL (ref 1.6–2.6)
MCH RBC QN AUTO: 25.5 PG (ref 26–35)
MCHC RBC AUTO-ENTMCNC: 27.9 G/DL (ref 32–34.5)
MCV RBC AUTO: 91.1 FL (ref 80–99.9)
MONOCYTES NFR BLD: 0.09 K/UL (ref 0.1–0.95)
MONOCYTES NFR BLD: 2 % (ref 2–12)
NEUTROPHILS NFR BLD: 83 % (ref 43–80)
NEUTS SEG NFR BLD: 4.37 K/UL (ref 1.8–7.3)
PARTIAL THROMBOPLASTIN TIME: 32.6 SEC (ref 24.5–35.1)
PHOSPHATE SERPL-MCNC: 3.3 MG/DL (ref 2.5–4.5)
PLATELET # BLD AUTO: 223 K/UL (ref 130–450)
PMV BLD AUTO: 10.9 FL (ref 7–12)
POTASSIUM SERPL-SCNC: 3.9 MMOL/L (ref 3.5–5)
PROT SERPL-MCNC: 7.3 G/DL (ref 6.4–8.3)
PROTHROMBIN TIME: 13.6 SEC (ref 9.3–12.4)
RBC # BLD AUTO: 3.26 M/UL (ref 3.8–5.8)
RBC # BLD: ABNORMAL 10*6/UL
SODIUM SERPL-SCNC: 141 MMOL/L (ref 132–146)
WBC OTHER # BLD: 5.3 K/UL (ref 4.5–11.5)

## 2025-03-06 PROCEDURE — 80053 COMPREHEN METABOLIC PANEL: CPT

## 2025-03-06 PROCEDURE — 36415 COLL VENOUS BLD VENIPUNCTURE: CPT

## 2025-03-06 PROCEDURE — 6370000000 HC RX 637 (ALT 250 FOR IP): Performed by: NURSE PRACTITIONER

## 2025-03-06 PROCEDURE — 2060000000 HC ICU INTERMEDIATE R&B

## 2025-03-06 PROCEDURE — 6370000000 HC RX 637 (ALT 250 FOR IP): Performed by: INTERNAL MEDICINE

## 2025-03-06 PROCEDURE — 83735 ASSAY OF MAGNESIUM: CPT

## 2025-03-06 PROCEDURE — 84100 ASSAY OF PHOSPHORUS: CPT

## 2025-03-06 PROCEDURE — 82248 BILIRUBIN DIRECT: CPT

## 2025-03-06 PROCEDURE — 94669 MECHANICAL CHEST WALL OSCILL: CPT

## 2025-03-06 PROCEDURE — 99231 SBSQ HOSP IP/OBS SF/LOW 25: CPT | Performed by: INTERNAL MEDICINE

## 2025-03-06 PROCEDURE — 97110 THERAPEUTIC EXERCISES: CPT

## 2025-03-06 PROCEDURE — 6370000000 HC RX 637 (ALT 250 FOR IP)

## 2025-03-06 PROCEDURE — 2500000003 HC RX 250 WO HCPCS: Performed by: INTERNAL MEDICINE

## 2025-03-06 PROCEDURE — 82962 GLUCOSE BLOOD TEST: CPT

## 2025-03-06 PROCEDURE — 85610 PROTHROMBIN TIME: CPT

## 2025-03-06 PROCEDURE — 97530 THERAPEUTIC ACTIVITIES: CPT

## 2025-03-06 PROCEDURE — 94640 AIRWAY INHALATION TREATMENT: CPT

## 2025-03-06 PROCEDURE — 93970 EXTREMITY STUDY: CPT

## 2025-03-06 PROCEDURE — 85025 COMPLETE CBC W/AUTO DIFF WBC: CPT

## 2025-03-06 PROCEDURE — 2700000000 HC OXYGEN THERAPY PER DAY

## 2025-03-06 PROCEDURE — 85730 THROMBOPLASTIN TIME PARTIAL: CPT

## 2025-03-06 RX ORDER — CALCITRIOL 0.25 UG/1
0.25 CAPSULE, LIQUID FILLED ORAL
Status: DISCONTINUED | OUTPATIENT
Start: 2025-03-07 | End: 2025-03-07 | Stop reason: HOSPADM

## 2025-03-06 RX ORDER — PREDNISONE 20 MG/1
40 TABLET ORAL ONCE
Status: COMPLETED | OUTPATIENT
Start: 2025-03-06 | End: 2025-03-06

## 2025-03-06 RX ADMIN — AMIODARONE HYDROCHLORIDE 200 MG: 200 TABLET ORAL at 07:49

## 2025-03-06 RX ADMIN — PANTOPRAZOLE SODIUM 40 MG: 40 TABLET, DELAYED RELEASE ORAL at 06:08

## 2025-03-06 RX ADMIN — Medication 10 ML: at 07:49

## 2025-03-06 RX ADMIN — LEVOTHYROXINE SODIUM 88 MCG: 0.09 TABLET ORAL at 06:08

## 2025-03-06 RX ADMIN — MAGNESIUM OXIDE 200 MG: 400 TABLET ORAL at 07:49

## 2025-03-06 RX ADMIN — PYRIDOXINE HCL TAB 50 MG 100 MG: 50 TAB at 07:49

## 2025-03-06 RX ADMIN — Medication 1000 UNITS: at 07:49

## 2025-03-06 RX ADMIN — APIXABAN 5 MG: 5 TABLET, FILM COATED ORAL at 20:33

## 2025-03-06 RX ADMIN — IPRATROPIUM BROMIDE AND ALBUTEROL SULFATE 1 DOSE: .5; 2.5 SOLUTION RESPIRATORY (INHALATION) at 12:59

## 2025-03-06 RX ADMIN — PREDNISONE 40 MG: 20 TABLET ORAL at 17:13

## 2025-03-06 RX ADMIN — INSULIN LISPRO 4 UNITS: 100 INJECTION, SOLUTION INTRAVENOUS; SUBCUTANEOUS at 20:32

## 2025-03-06 RX ADMIN — PANTOPRAZOLE SODIUM 40 MG: 40 TABLET, DELAYED RELEASE ORAL at 17:13

## 2025-03-06 RX ADMIN — ATORVASTATIN CALCIUM 40 MG: 40 TABLET, FILM COATED ORAL at 20:33

## 2025-03-06 RX ADMIN — IPRATROPIUM BROMIDE AND ALBUTEROL SULFATE 1 DOSE: .5; 2.5 SOLUTION RESPIRATORY (INHALATION) at 16:13

## 2025-03-06 RX ADMIN — IPRATROPIUM BROMIDE AND ALBUTEROL SULFATE 1 DOSE: .5; 2.5 SOLUTION RESPIRATORY (INHALATION) at 06:02

## 2025-03-06 RX ADMIN — IPRATROPIUM BROMIDE AND ALBUTEROL SULFATE 1 DOSE: .5; 2.5 SOLUTION RESPIRATORY (INHALATION) at 09:36

## 2025-03-06 RX ADMIN — Medication 10 ML: at 20:33

## 2025-03-06 NOTE — CARE COORDINATION
Patient off the floor during rounds.  Will see later today or tomorrow -please, call with questions/concerns.  Thank you    Anuj Butler MD

## 2025-03-06 NOTE — PROGRESS NOTES
Pulmonary/Critical Care Progress Note    We are following patient for acute hypoxemic and hypercapnic respiratory failure superimposed on chronic respiratory failure related to morbid obesity and untreated obstructive sleep apnea, right pleural effusion (exudate but cytology is negative), chronic atrial fibrillation,, anemia    IMPRESSION / PLAN:    Acute on Chronic Hypercapneic Respiratory Failure   Morbid obesity / Obstructive Sleep Apnea / Obesity Hypoventilation Syndrome (presumed)  BiPAP qhs and PRN  Maintained on NC      HFpEF  Pulmonary HTN     Acute Kidney Injury on CKD  Started on hemodialysis   Appreciate nephrology guidance    Hematuria  Anticoag stopped   Resolved  Urology following  Remove garner when OK with urology    Pleural effusion   Exudative  Cytology negative     Atrial Fibrillation  Anticoag on hold    Anemia  Transfuse for Hgb < 7  Seen by GI - no active GIB  Planned EGD  Appreciate input    DM II  Insulin sliding scale   Glu under good control     Up in chair      Steady improvement overall  Continue PT/ OT  Continue present management     3/6/2025   No new complaints   Sitting up in chair   Doing well - not very compliant with BiPAP - refusing due drying effect    States will try to use CPAP at home with humidification    All questions answered         Code Status: Full Code    __________________________________________    Subjective  Feeling better      Events last 24 hr  Not using bipap    MEDICATIONS:   ipratropium 0.5 mg-albuterol 2.5 mg  1 Dose Inhalation Q4H WA RT    pantoprazole  40 mg Oral BID AC    vitamin B-6  100 mg Oral Daily    Vitamin D  1,000 Units Oral Daily    levothyroxine  88 mcg Oral Daily    sodium chloride flush  5-40 mL IntraVENous 2 times per day    amiodarone  200 mg Oral Daily    [Held by provider] apixaban  5 mg Oral BID    [Held by provider] aspirin  81 mg Oral Daily    [Held by provider] losartan  100 mg Oral Daily    magnesium oxide  200 mg Oral Daily    [Held by

## 2025-03-06 NOTE — PROGRESS NOTES
Physical Therapy Treatment Note/Plan of Care    Room #:  0539/0539-02  Patient Name: Paul Florez  YOB: 1950  MRN: [id number]    Date of Service: 3/6/2025     Tentative placement recommendation: Home with Home Health Physical Therapy  Equipment recommendation: Patient has needed equipment       Evaluating Physical Therapist: Andrzej Head, PT  #[id number]      Specific Provider Orders/Date/Referring Provider :     PT eval and treat  Start:  02/28/25 1245,   End:  02/28/25 1245,   ONE TIME,   Standing Count:  1 Occurrences,   R       Mis, Chandler GARAY DO    Admitting Diagnosis:   Hyperkalemia [E87.5]  Hypoglycemia [E16.2]  KEN (acute kidney injury) [N17.9]  Acute respiratory failure with hypoxia (HCC) [J96.01]  Congestive heart failure, unspecified HF chronicity, unspecified heart failure type (HCC) [I50.9]    Admitted with    shortness of breath low blood sugar, bilateral lower extremities swelling , hypoxic ,   Surgery:     Visit Diagnoses         Codes    Hyperkalemia     E87.5            Patient Active Problem List   Diagnosis    Aguila's esophagus without dysplasia    Duodenal mass    Cardiomyopathy (HCC)    Coronary artery disease involving native coronary artery of native heart without angina pectoris    BMI 40.0-44.9, adult    Spinal stenosis of lumbar region without neurogenic claudication    Lumbar spondylosis    Lumbar facet arthropathy    Lumbar disc disorder    Chronic pain syndrome [G89.4 (ICD-10-CM)]    Observation after surgery    Ventricular tachycardia (HCC)    Iron deficiency anemia, unspecified    Chronic systolic (congestive) heart failure (HCC)    Type 2 diabetes mellitus without complication, without long-term current use of insulin (HCC)    Shortness of breath    Other fatigue    JOSE MANUEL (obstructive sleep apnea)    Diarrhea    Obesity, class 3 (E66.813)    Body mass index [BMI] 45.0-49.9, adult (Z68.42)    Hypoglycemia    Acute HFrEF (heart failure with reduced ejection fraction)  (HCC)    Congestive heart failure (HCC)    Acute respiratory failure with hypoxia ([facility])    KEN (acute kidney injury)    PAF (paroxysmal atrial fibrillation) ([facility])        ASSESSMENT of Current Deficits Patient exhibits decreased strength, balance, endurance, coordination, and pain bilateral knees  impairing functional mobility, transfers, gait , gait distance, and tolerance to activity are barriers to d/c and require skilled intervention to address concerns listed above to increase safety and independence at discharge.   Decreased strength, balance and endurance  increases patient's risk for fall. Patient able to increase gait distance with wheeled walker; cues for line management and pacing. Patient fatigued at end of session due to the increase of activity this session. Patient needs continued PT to improve strength and endurance to tolerate and complete functional mobility with decreased assist required.        PHYSICAL THERAPY  PLAN OF CARE       Physical therapy plan of care is established based on physician order,  patient diagnosis and clinical assessment    Current Treatment Recommendations:    -Bed Mobility: Lower and upper extremity exercises, and trunk control activities  -Standing Balance: Perform strengthening exercises in standing to promote motor control with or without upper extremity support   -Transfers: Provide instruction on proper hand and foot position for adequate transfer of weight onto lower extremities and use of gait device if needed and Cues for hand placement, technique and safety. Provide stabilization to prevent fall   -Gait: Gait training and Standing activities to improve: base of support, weight shift, weight bearing    -Endurance: Utilize Supervised activities to increase level of endurance to allow for safe functional mobility including transfers and gait   -Instruction in independent management of O2 line    PT long term treatment goals are located in below grid    Patient and or

## 2025-03-06 NOTE — RT PROTOCOL NOTE
RT Nebulizer Bronchodilator Protocol Note    There is a bronchodilator order in the chart from a provider indicating to follow the RT Bronchodilator Protocol and there is an “Initiate RT Bronchodilator Protocol” order as well (see protocol at bottom of note).    CXR Findings:  No results found.    The findings from the last RT Protocol Assessment were as follows:  Smoking: Smoker 15 pack years or more  Respiratory Pattern: Dyspnea on exertion or RR 21-25 bpm  Breath Sounds: Slightly diminished and/or crackles  Cough: Strong, spontaneous, non-productive  Indication for Bronchodilator Therapy: Decreased or absent breath sounds  Bronchodilator Assessment Score: 5    Aerosolized bronchodilator medication orders have been revised according to the RT Nebulizer Bronchodilator Protocol below.    Respiratory Therapist to perform RT Therapy Protocol Assessment initially then follow the protocol.  Repeat RT Therapy Protocol Assessment PRN for score 0-3 or on second treatment, BID, and PRN for scores above 3.    No Indications - adjust the frequency to every 6 hours PRN wheezing or bronchospasm, if no treatments needed after 48 hours then discontinue using Per Protocol order mode.     If indication present, adjust the RT bronchodilator orders based on the Bronchodilator Assessment Score as indicated below.  If a patient is on this medication at home then do not decrease Frequency below that used at home.    0-3 - enter or revise RT bronchodilator order(s) to equivalent RT Bronchodilator order with Frequency of every 4 hours PRN for wheezing or increased work of breathing using Per Protocol order mode.       4-6 - enter or revise RT Bronchodilator order(s) to two equivalent RT bronchodilator orders with one order with BID Frequency and one order with Frequency of every 4 hours PRN wheezing or increased work of breathing using Per Protocol order mode.         7-10 - enter or revise RT Bronchodilator order(s) to two equivalent RT  bronchodilator orders with one order with TID Frequency and one order with Frequency of every 4 hours PRN wheezing or increased work of breathing using Per Protocol order mode.       11-13 - enter or revise RT Bronchodilator order(s) to one equivalent RT bronchodilator order with QID Frequency and an Albuterol order with Frequency of every 4 hours PRN wheezing or increased work of breathing using Per Protocol order mode.      Greater than 13 - enter or revise RT Bronchodilator order(s) to one equivalent RT bronchodilator order with every 4 hours Frequency and an Albuterol order with Frequency of every 2 hours PRN wheezing or increased work of breathing using Per Protocol order mode.     RT to enter RT Home Evaluation for COPD & MDI Assessment order using Per Protocol order mode.    Electronically signed by Eduin Warner RCP on 3/5/2025 at 7:52 PM

## 2025-03-06 NOTE — PROGRESS NOTES
texture.  Genitalia/Breast:  Voiding with use of a Merritt catheter.  Clear yellow urine      Medication:  Scheduled Meds:   [START ON 3/7/2025] calcitRIOL  0.25 mcg Oral Once per day on Monday Wednesday Friday    predniSONE  40 mg Oral Once    ipratropium 0.5 mg-albuterol 2.5 mg  1 Dose Inhalation Q4H WA RT    pantoprazole  40 mg Oral BID AC    vitamin B-6  100 mg Oral Daily    Vitamin D  1,000 Units Oral Daily    levothyroxine  88 mcg Oral Daily    sodium chloride flush  5-40 mL IntraVENous 2 times per day    amiodarone  200 mg Oral Daily    apixaban  5 mg Oral BID    [Held by provider] aspirin  81 mg Oral Daily    [Held by provider] losartan  100 mg Oral Daily    magnesium oxide  200 mg Oral Daily    [Held by provider] metoprolol succinate  50 mg Oral Daily    [Held by provider] tamsulosin  0.4 mg Oral Daily    atorvastatin  40 mg Oral Nightly    [Held by provider] furosemide  20 mg Oral Weekly    [Held by provider] glipiZIDE  5 mg Oral BID AC    insulin lispro  0-8 Units SubCUTAneous 4x Daily AC & HS     Continuous Infusions:   sodium chloride      sodium chloride      dextrose         Objective Data:  CBC with Differential:    Lab Results   Component Value Date/Time    WBC 5.3 03/06/2025 08:55 AM    RBC 3.26 03/06/2025 08:55 AM    HGB 8.3 03/06/2025 08:55 AM    HCT 29.7 03/06/2025 08:55 AM     03/06/2025 08:55 AM    MCV 91.1 03/06/2025 08:55 AM    MCH 25.5 03/06/2025 08:55 AM    MCHC 27.9 03/06/2025 08:55 AM    RDW 20.6 03/06/2025 08:55 AM    METASPCT 1 02/25/2025 06:50 PM    LYMPHOPCT 13 03/06/2025 08:55 AM    MONOPCT 2 03/06/2025 08:55 AM    EOSPCT 2 03/06/2025 08:55 AM    BASOPCT 1 03/06/2025 08:55 AM    MONOSABS 0.09 03/06/2025 08:55 AM    LYMPHSABS 0.70 03/06/2025 08:55 AM    EOSABS 0.09 03/06/2025 08:55 AM    BASOSABS 0.05 03/06/2025 08:55 AM     BMP:    Lab Results   Component Value Date/Time     03/05/2025 01:05 PM    K 4.0 03/05/2025 01:05 PM    K 4.3 05/28/2022 05:18 AM     03/05/2025  the patient was incapable of providing the necessary information or participating in medical decisions. I also discussed the differential diagnosis and all of the proposed management plans with the patient and individuals accompanying the patient.      The patient was seen, examined and then discussed with Dr. Abebe.     JEROME Kapoor CNP  3/6/2025  5:01 PM       I saw and evaluated the patient. I agree with the findings and the plan of care as documented in Rambo MAJOR note.    Chandler Abebe DO, FACOI  5:29 PM  3/6/2025

## 2025-03-06 NOTE — PROGRESS NOTES
Spiritual Health History and Assessment/Progress Note  [facility]    (P) Follow-up,  ,  ,      Name: Paul Florez MRN: [id number]    Age: 74 y.o.     Sex: male   Language: English   Hinduism: Scientologist   Acute HFrEF (heart failure with reduced ejection fraction) (HCC)     Date: 3/6/2025                           Spiritual Assessment began in [facility] PIC/ICU        Referral/Consult From: (P) Rounding   Encounter Overview/Reason: (P) Follow-up  Service Provided For: (P) Patient    Yahaira, Belief, Meaning:   Patient is connected with a yahaira tradition or spiritual practice  Family/Friends No family/friends present      Importance and Influence:  Patient has spiritual/personal beliefs that influence decisions regarding their health  Family/Friends No family/friends present    Community:  Patient is connected with a spiritual community  Family/Friends No family/friends present    Assessment and Plan of Care:     Patient Interventions include: Engaged in theological reflection  Family/Friends Interventions include: No family/friends present    Patient Plan of Care: Spiritual Care available upon further referral  Family/Friends Plan of Care: No family/friends present    Electronically signed by Chaplain Myrna on 3/6/2025 at 2:48 PM

## 2025-03-06 NOTE — PLAN OF CARE
Problem: Chronic Conditions and Co-morbidities  Goal: Patient's chronic conditions and co-morbidity symptoms are monitored and maintained or improved  3/6/2025 1600 by Rachael Villafana, RN  Outcome: Progressing  3/6/2025 1459 by Rachael Villafana, RN  Outcome: Progressing

## 2025-03-06 NOTE — PROGRESS NOTES
Comprehensive Nutrition Assessment    Type and Reason for Visit:  Reassess    Nutrition Recommendations/Plan:   Continue Current Diet (Regular 4 CHO, Low Na+)  Consult RD as needed      Malnutrition Assessment:  Malnutrition Status:  No malnutrition (02/28/25 0948)    Context:  Acute Illness     Findings of the 6 clinical characteristics of malnutrition:  Energy Intake:  Mild decrease in energy intake (poor po intake 2-3 days PTA)  Weight Loss:  No weight loss     Body Fat Loss:  No body fat loss     Muscle Mass Loss:  No muscle mass loss    Fluid Accumulation:  Mild Extremities   Strength:  Not Performed    Nutrition Assessment:    Pt admit w/ Acute Respiratory Failure w/ hypoxia, CHF, Hypoglycemia, KEN, HyperK+, R-Plueral Effusion. Pt is a current smoker. Pt s/p HD on 02/26/25 for worsening renal funtion, s/p Thoracentesis 2/26 (-800 cc's). PMHx: T2DM, HTN, GERD, Cardiomyopathy, A-fib, Diverticulitis, Aguila's Esophagus, CAD, Diverticulitis, Fatty Liver, PUD, Angioplasty w/ stent, JOSE MANUEL. Pt on Bi-Pap. Pt w/ poor po intake 2/2 continous Bi-Pap. Pt on Full liquid diet. Begin ONS (diabetic) TID, continue inpatient monitoring, f/up per policy. 03/06/25: F/up: Pt moved out of ICU. Pt conitnues w/ R-Pleural Effusion, KEN. Pt started on HD, remains non-compliant w/ Bi-Pap. Pt's diet advanced to Regular, tolerating w/ % of all meals. Continue inpatient monitoring, f/up per policy.    Nutrition Related Findings:    A/O x4, I/O -4396 since admit, abd wdl, +BS x4, +2 pitting weeping edema, 4L O2, Creatinine 1.8, GFR 39, all other labs noted, Hgb 7.9 Wound Type: None       Current Nutrition Intake & Therapies:    Average Meal Intake: %  Average Supplements Intake: None Ordered  ADULT DIET; Regular; 4 carb choices (60 gm/meal); Low Sodium (2 gm)    Anthropometric Measures:  Height: 175.3 cm (5' 9.02\")  Ideal Body Weight (IBW): 160 lbs (73 kg)    Admission Body Weight: 144.9 kg (319 lb 7.1 oz) (02/26/25 bed

## 2025-03-06 NOTE — PROGRESS NOTES
Patient came down to special procedures for removal of  temporary dialysis catheter placement.      Procedure was explained and questions were answered.  Patient was educated about the amount of radiation used with today's procedure.   Patient prepped secured and draped.     Emotional support given     1040 - Procedure start /73  81  16  95% on 3 liters nasal cannula      1045 - Procedure end /72  78  16  93% 0n 3 liters O2 nasal cnnula     Sutures removed and temporary dialysis catheter to right IJ removed. Pressure was held to right IJ area per Cindy alejandra. Tegaderm and 4x4 applied. CDI    Patient tolerated well.      nurse to nurse called, spoke with Rachael RN, nurse notified of above information    Patient transported back to the 5th floor.

## 2025-03-06 NOTE — CARE COORDINATION
3/6/25 Per review of PT visit today, recommending home with hhc. Followed up with pt & spouse, plan is for pt to discharge home with hhc. Pt currently on 4L of O2.  Stated no O2 provider preference, referral completed to [facility] for potential need upon d/c. O2 WALK TEST NEEDED PRIOR TO D/C TO DETERMINE O2 NEED. HD being discharged today. Updated nurse, phys stated d/c anticipated in the next 1-2 days. Updated [facility], following- HHC orders needed prior to d/c. SW following. Electronically signed by ALEN Hernandez on 3/6/2025 at 11:01 AM

## 2025-03-06 NOTE — PROGRESS NOTES
Nephrology Note  Alex Palacios MD          Patient seen and examined.  No family member is present at the bedside.  Chart reviewed.  Patient is sitting up in the chair.  Temporary hemodialysis catheter has been removed.  He is much less short of breath.  Appetite is good.  No nausea vomiting or dysguesia.    Awake and alert .   In no acute distress.    Vital SignsBP (!) 115/56   Pulse 78   Temp 98.2 °F (36.8 °C) (Oral)   Resp 20   Ht 1.753 m (5' 9.02\")   Wt (!) 148 kg (326 lb 4.5 oz)   SpO2 94%   BMI 48.16 kg/m²   24HR INTAKE/OUTPUT:    Intake/Output Summary (Last 24 hours) at 3/6/2025 1506  Last data filed at 3/6/2025 1155  Gross per 24 hour   Intake 1080 ml   Output 1550 ml   Net -470 ml         PHYSICAL EXAM        Neck: No JVD  Lungs: Breath sounds decreased at the bases. No rales or ronchi.  Heart: Regular rate and rhythm. No S3 gallop. No  murmrur.  Abdomen: Soft non distended, non tender and normal bowel sounds.  Extremeties:   +1 bipedal edema           Current Facility-Administered Medications   Medication Dose Route Frequency Provider Last Rate Last Admin    [START ON 3/7/2025] calcitRIOL (ROCALTROL) capsule 0.25 mcg  0.25 mcg Oral Once per day on Monday Wednesday Friday Alex Palacios MD        ipratropium 0.5 mg-albuterol 2.5 mg (DUONEB) nebulizer solution 1 Dose  1 Dose Inhalation Q4H WA RT Sage Thompson DO   1 Dose at 03/06/25 1259    pantoprazole (PROTONIX) tablet 40 mg  40 mg Oral BID AC Paul Griffin DO   40 mg at 03/06/25 0608    vitamin B-6 (PYRIDOXINE) tablet 100 mg  100 mg Oral Daily Rambo Mccloud APRN - CNP   100 mg at 03/06/25 0749    Vitamin D (CHOLECALCIFEROL) tablet 1,000 Units  1,000 Units Oral Daily Chandler Membreno MD   1,000 Units at 03/06/25 0749    0.9 % sodium chloride infusion   IntraVENous PRN Rogelio Han APRN - CNP        levothyroxine (SYNTHROID) tablet 88 mcg  88 mcg Oral Daily Rambo Mccloud APRN - CNP   88 mcg at 03/06/25 0608    sodium

## 2025-03-06 NOTE — PROGRESS NOTES
Late entry: patient bilateral upper extremities painful and swollen +3 to 4 edema, RN at bedside with physician and NP when rounding. NP examined arms and suggested they be elevated during when in bed.     1648: RN paged NP to report that patient still has complaints of bilateral arm pain, with +3 +4 edema that is red and painful to touch. Pending response.

## 2025-03-06 NOTE — PLAN OF CARE
Problem: Chronic Conditions and Co-morbidities  Goal: Patient's chronic conditions and co-morbidity symptoms are monitored and maintained or improved  3/5/2025 2144 by Linda Cesar RN  Outcome: Progressing  3/5/2025 1459 by Rachael Villafana RN  Outcome: Progressing  3/5/2025 1335 by Rachael Villafana RN  Outcome: Progressing     Problem: Discharge Planning  Goal: Discharge to home or other facility with appropriate resources  3/5/2025 2144 by Linda Cesar RN  Outcome: Progressing  3/5/2025 1459 by Rachael Villafana RN  Outcome: Progressing  3/5/2025 1335 by Rachael Villafana RN  Outcome: Progressing     Problem: Pain  Goal: Verbalizes/displays adequate comfort level or baseline comfort level  3/5/2025 2144 by Linda Cesar RN  Outcome: Progressing  3/5/2025 1459 by Rachael Villafana RN  Outcome: Progressing  3/5/2025 1335 by Rachael Villafana RN  Outcome: Progressing     Problem: Safety - Adult  Goal: Free from fall injury  3/5/2025 2144 by Linda Cesar RN  Outcome: Progressing  3/5/2025 1459 by Rachael Villafana RN  Outcome: Progressing  3/5/2025 1335 by Rachael Villafana RN  Outcome: Progressing     Problem: Skin/Tissue Integrity  Goal: Skin integrity remains intact  Description: 1.  Monitor for areas of redness and/or skin breakdown  2.  Assess vascular access sites hourly  3.  Every 4-6 hours minimum:  Change oxygen saturation probe site  4.  Every 4-6 hours:  If on nasal continuous positive airway pressure, respiratory therapy assess nares and determine need for appliance change or resting period  3/5/2025 2144 by Linda Cesar RN  Outcome: Progressing  3/5/2025 1459 by Rachael Villafana RN  Outcome: Progressing  3/5/2025 1335 by Rachael Villafana RN  Outcome: Progressing     Problem: Respiratory - Adult  Goal: Achieves optimal ventilation and oxygenation  3/5/2025 2144 by Linda Cesar RN  Outcome: Progressing  3/5/2025 1459 by Rachael Villafana RN  Outcome: Progressing  3/5/2025 1335 by Rachael Villafana RN  Outcome:  RN  Outcome: Progressing     Problem: Metabolic/Fluid and Electrolytes - Adult  Goal: Electrolytes maintained within normal limits  3/5/2025 1459 by Rachael Villafana RN  Outcome: Progressing  3/5/2025 1335 by Rachael Villafana RN  Outcome: Progressing  Goal: Hemodynamic stability and optimal renal function maintained  3/5/2025 1459 by Rachael Villafana RN  Outcome: Progressing  3/5/2025 1335 by Rachael Villafana RN  Outcome: Progressing  Goal: Glucose maintained within prescribed range  3/5/2025 1459 by Rachael Villafana RN  Outcome: Progressing  3/5/2025 1335 by Rachael Villafana RN  Outcome: Progressing     Problem: Hematologic - Adult  Goal: Maintains hematologic stability  3/5/2025 1459 by Rachael Villafana RN  Outcome: Progressing  3/5/2025 1335 by Rachael Villafana RN  Outcome: Progressing     Problem: Neurosensory - Adult  Goal: Achieves stable or improved neurological status  3/5/2025 1459 by Rachael Villafana RN  Outcome: Progressing  3/5/2025 1335 by Rachael Villafana RN  Outcome: Progressing  Goal: Achieves maximal functionality and self care  3/5/2025 1459 by Rachael Villafana RN  Outcome: Progressing  3/5/2025 1335 by Rachael Villafana RN  Outcome: Progressing     Problem: Musculoskeletal - Adult  Goal: Return mobility to safest level of function  3/5/2025 1459 by Rachael Villafana RN  Outcome: Progressing  3/5/2025 1335 by Rachael Villafana RN  Outcome: Progressing  Goal: Maintain proper alignment of affected body part  3/5/2025 1459 by Rachael Villafana RN  Outcome: Progressing  3/5/2025 1335 by Rachael Villafana RN  Outcome: Progressing  Goal: Return ADL status to a safe level of function  3/5/2025 1459 by Rachael Villafana RN  Outcome: Progressing  3/5/2025 1335 by Rachael Villafana RN  Outcome: Progressing     Problem: Nutrition Deficit:  Goal: Optimize nutritional status  3/5/2025 1459 by Rachael Villafana RN  Outcome: Progressing  3/5/2025 1335 by Rachael Villafana RN  Outcome: Progressing

## 2025-03-07 ENCOUNTER — TELEPHONE (OUTPATIENT)
Dept: OTHER | Age: 75
End: 2025-03-07

## 2025-03-07 VITALS
DIASTOLIC BLOOD PRESSURE: 66 MMHG | WEIGHT: 315 LBS | SYSTOLIC BLOOD PRESSURE: 138 MMHG | OXYGEN SATURATION: 95 % | RESPIRATION RATE: 20 BRPM | TEMPERATURE: 98.4 F | HEIGHT: 69 IN | BODY MASS INDEX: 46.65 KG/M2 | HEART RATE: 73 BPM

## 2025-03-07 LAB
ALBUMIN SERPL-MCNC: 2.7 G/DL (ref 3.5–5.2)
ALP SERPL-CCNC: 132 U/L (ref 40–129)
ALT SERPL-CCNC: 23 U/L (ref 0–40)
ANION GAP SERPL CALCULATED.3IONS-SCNC: 11 MMOL/L (ref 7–16)
AST SERPL-CCNC: 51 U/L (ref 0–39)
BASOPHILS # BLD: 0.05 K/UL (ref 0–0.2)
BASOPHILS NFR BLD: 1 % (ref 0–2)
BILIRUB DIRECT SERPL-MCNC: <0.2 MG/DL (ref 0–0.3)
BILIRUB INDIRECT SERPL-MCNC: ABNORMAL MG/DL (ref 0–1)
BILIRUB SERPL-MCNC: 0.4 MG/DL (ref 0–1.2)
BUN SERPL-MCNC: 17 MG/DL (ref 6–23)
CALCIUM SERPL-MCNC: 8.8 MG/DL (ref 8.6–10.2)
CHLORIDE SERPL-SCNC: 100 MMOL/L (ref 98–107)
CO2 SERPL-SCNC: 28 MMOL/L (ref 22–29)
CREAT SERPL-MCNC: 1.7 MG/DL (ref 0.7–1.2)
EOSINOPHIL # BLD: 0 K/UL (ref 0.05–0.5)
EOSINOPHILS RELATIVE PERCENT: 0 % (ref 0–6)
ERYTHROCYTE [DISTWIDTH] IN BLOOD BY AUTOMATED COUNT: 21 % (ref 11.5–15)
GFR, ESTIMATED: 42 ML/MIN/1.73M2
GLUCOSE BLD-MCNC: 121 MG/DL (ref 74–99)
GLUCOSE BLD-MCNC: 158 MG/DL (ref 74–99)
GLUCOSE BLD-MCNC: 172 MG/DL (ref 74–99)
GLUCOSE SERPL-MCNC: 132 MG/DL (ref 74–99)
HCT VFR BLD AUTO: 27.6 % (ref 37–54)
HGB BLD-MCNC: 7.9 G/DL (ref 12.5–16.5)
LYMPHOCYTES NFR BLD: 0.25 K/UL (ref 1.5–4)
LYMPHOCYTES RELATIVE PERCENT: 4 % (ref 20–42)
MAGNESIUM SERPL-MCNC: 1.9 MG/DL (ref 1.6–2.6)
MCH RBC QN AUTO: 25.3 PG (ref 26–35)
MCHC RBC AUTO-ENTMCNC: 28.6 G/DL (ref 32–34.5)
MCV RBC AUTO: 88.5 FL (ref 80–99.9)
METAMYELOCYTES ABSOLUTE COUNT: 0.05 K/UL (ref 0–0.12)
METAMYELOCYTES: 1 % (ref 0–1)
MONOCYTES NFR BLD: 0.15 K/UL (ref 0.1–0.95)
MONOCYTES NFR BLD: 3 % (ref 2–12)
NEUTROPHILS NFR BLD: 91 % (ref 43–80)
NEUTS SEG NFR BLD: 5.29 K/UL (ref 1.8–7.3)
PHOSPHATE SERPL-MCNC: 2.5 MG/DL (ref 2.5–4.5)
PLATELET # BLD AUTO: 214 K/UL (ref 130–450)
PMV BLD AUTO: 11.2 FL (ref 7–12)
POTASSIUM SERPL-SCNC: 3.8 MMOL/L (ref 3.5–5)
PROT SERPL-MCNC: 7.1 G/DL (ref 6.4–8.3)
RBC # BLD AUTO: 3.12 M/UL (ref 3.8–5.8)
RBC # BLD: ABNORMAL 10*6/UL
SODIUM SERPL-SCNC: 139 MMOL/L (ref 132–146)
WBC OTHER # BLD: 5.8 K/UL (ref 4.5–11.5)

## 2025-03-07 PROCEDURE — 82248 BILIRUBIN DIRECT: CPT

## 2025-03-07 PROCEDURE — 36415 COLL VENOUS BLD VENIPUNCTURE: CPT

## 2025-03-07 PROCEDURE — 94660 CPAP INITIATION&MGMT: CPT

## 2025-03-07 PROCEDURE — 6370000000 HC RX 637 (ALT 250 FOR IP): Performed by: INTERNAL MEDICINE

## 2025-03-07 PROCEDURE — 94640 AIRWAY INHALATION TREATMENT: CPT

## 2025-03-07 PROCEDURE — 6370000000 HC RX 637 (ALT 250 FOR IP)

## 2025-03-07 PROCEDURE — 2700000000 HC OXYGEN THERAPY PER DAY

## 2025-03-07 PROCEDURE — 51798 US URINE CAPACITY MEASURE: CPT

## 2025-03-07 PROCEDURE — 51702 INSERT TEMP BLADDER CATH: CPT

## 2025-03-07 PROCEDURE — 85025 COMPLETE CBC W/AUTO DIFF WBC: CPT

## 2025-03-07 PROCEDURE — 99231 SBSQ HOSP IP/OBS SF/LOW 25: CPT | Performed by: INTERNAL MEDICINE

## 2025-03-07 PROCEDURE — 83735 ASSAY OF MAGNESIUM: CPT

## 2025-03-07 PROCEDURE — 2500000003 HC RX 250 WO HCPCS: Performed by: INTERNAL MEDICINE

## 2025-03-07 PROCEDURE — 80053 COMPREHEN METABOLIC PANEL: CPT

## 2025-03-07 PROCEDURE — 82962 GLUCOSE BLOOD TEST: CPT

## 2025-03-07 PROCEDURE — 84100 ASSAY OF PHOSPHORUS: CPT

## 2025-03-07 RX ORDER — VITAMIN B COMPLEX
1000 TABLET ORAL DAILY
Qty: 30 TABLET | Refills: 0 | Status: SHIPPED | OUTPATIENT
Start: 2025-03-08

## 2025-03-07 RX ORDER — ACYCLOVIR 800 MG/1
1 TABLET ORAL
Qty: 1 EACH | Refills: 2 | Status: SHIPPED | OUTPATIENT
Start: 2025-03-07

## 2025-03-07 RX ORDER — CALCITRIOL 0.25 UG/1
0.25 CAPSULE, LIQUID FILLED ORAL
Qty: 30 CAPSULE | Refills: 0 | Status: SHIPPED | OUTPATIENT
Start: 2025-03-07

## 2025-03-07 RX ORDER — FUROSEMIDE 20 MG/1
20 TABLET ORAL
Qty: 30 TABLET | Refills: 0 | Status: SHIPPED | OUTPATIENT
Start: 2025-03-10

## 2025-03-07 RX ORDER — LOSARTAN POTASSIUM 50 MG/1
50 TABLET ORAL DAILY
Status: DISCONTINUED | OUTPATIENT
Start: 2025-03-07 | End: 2025-03-07 | Stop reason: HOSPADM

## 2025-03-07 RX ORDER — METOPROLOL SUCCINATE 50 MG/1
25 TABLET, EXTENDED RELEASE ORAL DAILY
COMMUNITY
Start: 2025-03-07

## 2025-03-07 RX ADMIN — PYRIDOXINE HCL TAB 50 MG 100 MG: 50 TAB at 09:18

## 2025-03-07 RX ADMIN — AMIODARONE HYDROCHLORIDE 200 MG: 200 TABLET ORAL at 09:18

## 2025-03-07 RX ADMIN — IPRATROPIUM BROMIDE AND ALBUTEROL SULFATE 1 DOSE: .5; 2.5 SOLUTION RESPIRATORY (INHALATION) at 14:46

## 2025-03-07 RX ADMIN — LEVOTHYROXINE SODIUM 88 MCG: 0.09 TABLET ORAL at 06:03

## 2025-03-07 RX ADMIN — LOSARTAN POTASSIUM 50 MG: 50 TABLET, FILM COATED ORAL at 14:08

## 2025-03-07 RX ADMIN — Medication 1000 UNITS: at 09:18

## 2025-03-07 RX ADMIN — PANTOPRAZOLE SODIUM 40 MG: 40 TABLET, DELAYED RELEASE ORAL at 06:03

## 2025-03-07 RX ADMIN — APIXABAN 5 MG: 5 TABLET, FILM COATED ORAL at 09:18

## 2025-03-07 RX ADMIN — PANTOPRAZOLE SODIUM 40 MG: 40 TABLET, DELAYED RELEASE ORAL at 14:08

## 2025-03-07 RX ADMIN — IPRATROPIUM BROMIDE AND ALBUTEROL SULFATE 1 DOSE: .5; 2.5 SOLUTION RESPIRATORY (INHALATION) at 06:59

## 2025-03-07 RX ADMIN — Medication 10 ML: at 09:19

## 2025-03-07 RX ADMIN — MAGNESIUM OXIDE 200 MG: 400 TABLET ORAL at 09:18

## 2025-03-07 RX ADMIN — IPRATROPIUM BROMIDE AND ALBUTEROL SULFATE 1 DOSE: .5; 2.5 SOLUTION RESPIRATORY (INHALATION) at 11:26

## 2025-03-07 NOTE — PROGRESS NOTES
Nephrology Note  Alex Palacios MD          Patient seen and examined.  No family member is present at the bedside.  Chart reviewed.  Patient is sitting up in the chair.  He is feeling much better.  Presently not using any supplemental oxygen.  He is much less short of breath.  Appetite is good.  No nausea vomiting or dysguesia.    Awake and alert .   In no acute distress.    Vital SignsBP 134/66   Pulse 78   Temp 98.1 °F (36.7 °C) (Oral)   Resp 20   Ht 1.753 m (5' 9.02\")   Wt (!) 148 kg (326 lb 4.5 oz)   SpO2 97%   BMI 48.16 kg/m²   24HR INTAKE/OUTPUT:    Intake/Output Summary (Last 24 hours) at 3/7/2025 1517  Last data filed at 3/7/2025 0423  Gross per 24 hour   Intake 360 ml   Output 1350 ml   Net -990 ml         PHYSICAL EXAM        Neck: No JVD  Lungs: Breath sounds decreased at the bases. No rales or ronchi.  Heart: Regular rate and rhythm. No S3 gallop. No  murmrur.  Abdomen: Soft non distended, non tender and normal bowel sounds.  Extremeties:   +1 bipedal edema           Current Facility-Administered Medications   Medication Dose Route Frequency Provider Last Rate Last Admin    losartan (COZAAR) tablet 50 mg  50 mg Oral Daily Alex Palacios MD   50 mg at 03/07/25 1408    calcitRIOL (ROCALTROL) capsule 0.25 mcg  0.25 mcg Oral Once per day on Monday Wednesday Friday Alex Palacios MD        ipratropium 0.5 mg-albuterol 2.5 mg (DUONEB) nebulizer solution 1 Dose  1 Dose Inhalation Q4H WA Sage Meyers, DO   1 Dose at 03/07/25 1446    pantoprazole (PROTONIX) tablet 40 mg  40 mg Oral BID Paul Wei DO   40 mg at 03/07/25 1408    vitamin B-6 (PYRIDOXINE) tablet 100 mg  100 mg Oral Daily Rambo Mccloud APRN - CNP   100 mg at 03/07/25 0918    Vitamin D (CHOLECALCIFEROL) tablet 1,000 Units  1,000 Units Oral Daily Chandler Membreno MD   1,000 Units at 03/07/25 0918    0.9 % sodium chloride infusion   IntraVENous PRN Rogelio Han, APRN - CNP        levothyroxine (SYNTHROID) tablet  levels.    Volume overload/edema.  Improved.  Use loop diuretics intermittently as needed.    Chronic kidney disease stage G3 a secondary to microvascular disease with diabetic nephropathy and/or hypertensive kidney disease with a baseline serum creatinine of  1.4  mg/dL to  1.6 mg/dL.         Okay to discharge from renal perspective.        Alex Palacios MD  Nephrology  Electronically signed by Alex Palacios MD on 3/7/2025 at 3:17 PM      Note: This report was completed utilizing computer voice recognition software. Every effort has been made to ensure accuracy, however; inadvertent computerized transcription errors may be present.

## 2025-03-07 NOTE — FLOWSHEET NOTE
Pulse ox was __91__% on 2L at rest.  Ambulated patient on 2L .  Oxygen saturation was ___84__% on 2 Liters while ambulating.  Oxygen applied.  Recovery pulse ox was __93___% on __4___ liters of oxygen while ambulating.

## 2025-03-07 NOTE — PLAN OF CARE
Patient's chart updated to reflect:        - HF care plan, HF education points and HF discharge instructions.  -Orders: 2 gram sodium diet, daily weights, I/O.  -PCP and cardiology follow up appointments to be scheduled within 7 days of hospital discharge.  -CHF education session will be provided to the patient prior to hospital discharge.    most recent EF:  Lab Results   Component Value Date    LVEF 50 02/26/2025    LVEFMODE Echo 02/26/2025       Radha Medellin, RN MSN,RN  Heart Failure Navigator    Future Appointments   Date Time Provider Department Center   3/13/2025 12:30 PM MARGARITA CERRATO ECHO 1 [facility] Rad/Car   4/3/2025 11:15 AM Js Beckett MD [facility]   4/17/2025  7:30 AM Eduin Guzman DO [facility] DEP

## 2025-03-07 NOTE — CARE COORDINATION
3/7/25 Pt to discharge home today, cath removed, awaiting urinary output. [facility] updated; nurse care cord put in orders for SN, PT, OT. Nurse completed O2 walk test, determined need for O2 @ 2L at rest; 4L w/ activity, nurse care cord completed orders & [facility] updated & delivered portable O2 tank to bedside to call spouse to arrange delivery to home. Pt declined further needs & per request made tct spouse, informed that pts son is to provide transport home & stay w/ him this evening reporting she is not returning home till tomorrow. Electronically signed by ALEN Hernandez on 3/7/2025 at 6:09 PM

## 2025-03-07 NOTE — PROGRESS NOTES
Pulmonary/Critical Care Progress Note    We are following patient for acute hypoxemic and hypercapnic respiratory failure superimposed on chronic respiratory failure related to morbid obesity and untreated obstructive sleep apnea, right pleural effusion (exudate but cytology is negative), chronic atrial fibrillation,, anemia    IMPRESSION / PLAN:    Acute on Chronic Hypercapneic Respiratory Failure   Morbid obesity / Obstructive Sleep Apnea / Obesity Hypoventilation Syndrome (presumed)  BiPAP qhs and PRN-- refusing here  Maintained on NC  Will need sleep study as outpatient       HFpEF  Pulmonary HTN     Acute Kidney Injury on CKD  S/p hemodialysis   Appreciate nephrology guidance    Hematuria  Anticoag stopped   Resolved  Urology following  Remove garner when OK with urology    Pleural effusion   Exudative  Cytology negative     Atrial Fibrillation  Anticoag on hold    Anemia  Transfuse for Hgb < 7  Seen by GI - no active GIB  Planned EGD  Appreciate input    DM II  Insulin sliding scale   Glu under good control            Steady improvement overall  Continue PT/ OT  Continue present management     3/7/2025   seen on 2L   No new complaints, awaiting discharge   Sitting up in chair   Doing well - not very compliant with BiPAP - refusing due drying effect  Appears stable for discharge from pulmonary perspective              Code Status: Full Code    __________________________________________    Subjective  Feeling better, down to 2L  Much improvement overall      Events last 24 hr  Not using bipap    MEDICATIONS:   losartan  50 mg Oral Daily    calcitRIOL  0.25 mcg Oral Once per day on Monday Wednesday Friday    ipratropium 0.5 mg-albuterol 2.5 mg  1 Dose Inhalation Q4H WA RT    pantoprazole  40 mg Oral BID AC    vitamin B-6  100 mg Oral Daily    Vitamin D  1,000 Units Oral Daily    levothyroxine  88 mcg Oral Daily    sodium chloride flush  5-40 mL IntraVENous 2 times per day    amiodarone  200 mg Oral Daily     apixaban  5 mg Oral BID    [Held by provider] aspirin  81 mg Oral Daily    magnesium oxide  200 mg Oral Daily    [Held by provider] metoprolol succinate  50 mg Oral Daily    [Held by provider] tamsulosin  0.4 mg Oral Daily    atorvastatin  40 mg Oral Nightly    [Held by provider] furosemide  20 mg Oral Weekly    [Held by provider] glipiZIDE  5 mg Oral BID AC    insulin lispro  0-8 Units SubCUTAneous 4x Daily AC & HS      sodium chloride      sodium chloride      dextrose       sodium chloride, sodium chloride flush, sodium chloride, polyethylene glycol, acetaminophen **OR** acetaminophen, glucose, dextrose bolus **OR** dextrose bolus, glucagon (rDNA), dextrose, dextrose        OBJECTIVE:  Vitals:    03/07/25 1300   BP: 134/66   Pulse: 78   Resp: 20   Temp: 98.1 °F (36.7 °C)   SpO2: 97%     FiO2 : 35 %  O2 Flow Rate (L/min): 2 L/min  O2 Device: Nasal cannula    PHYSICAL EXAM:  Constitutional: No fever, chills, diaphoresis  Skin: No skin rash, no skin breakdown  HEENT: Mucous membranes moist  Neck: Large neck circumference  Cardiovascular: S1, S2 regular.  No S3 or rubs present  Respiratory:  clear anteriorly   Gastrointestinal: Soft, markedly obese, no hepatosplenomegaly  Genitourinary: No CVA tenderness  Extremities: Trace edema in ankles, chronic venous stasis changes are present  Neurological: Awake, alert, oriented x 3.  No evidence of focal motor or sensory deficits  Psychological: Appropriate affect.  In fairly good spirits continue to improve    LABS:  WBC   Date Value Ref Range Status   03/07/2025 5.8 4.5 - 11.5 k/uL Final   03/06/2025 5.3 4.5 - 11.5 k/uL Final   03/05/2025 5.1 4.5 - 11.5 k/uL Final     Hemoglobin   Date Value Ref Range Status   03/07/2025 7.9 (L) 12.5 - 16.5 g/dL Final   03/06/2025 8.3 (L) 12.5 - 16.5 g/dL Final   03/05/2025 7.9 (L) 12.5 - 16.5 g/dL Final     Hematocrit   Date Value Ref Range Status   03/07/2025 27.6 (L) 37.0 - 54.0 % Final   03/06/2025 29.7 (L) 37.0 - 54.0 % Final

## 2025-03-07 NOTE — CONSULTS
recipes provided  [] Sodium free seasoning provided   [] Low sodium meal delivery options given to patient  [] Dietician consulted     [] Lack of transportation to appointments     [] Depression, given chronic illness  [] Primary team notified     [] Goals of care need addressed  [] Palliative care consulted     [] CHF CHW consulted, to assist with N/A      Chart Reviewed:  Diet: ADULT DIET; Regular; 4 carb choices (60 gm/meal); Low Sodium (2 gm)   Daily Weights: Patient Vitals for the past 96 hrs (Last 3 readings):   Weight   03/06/25 0542 (!) 148 kg (326 lb 4.5 oz)   03/04/25 0400 (!) 148.3 kg (326 lb 15.1 oz)     I/O:   Intake/Output Summary (Last 24 hours) at 3/7/2025 1521  Last data filed at 3/7/2025 0423  Gross per 24 hour   Intake 360 ml   Output 1350 ml   Net -990 ml       [] Nursing staff/manager notified of inaccurate [place]incoln weights or I/O        Discharge Plan:  Above identified barriers reviewed and needs addressed    Patient/family educated on daily monitoring tools for CHF, made aware of signs and symptoms of worsening HF and to notify provider immediately of change in symptoms.     Heart Failure Home Instructions placed in patient's discharge instructions    Per AHA guidelines patient to be closely monitored following discharge with 7 day follow up appointment    Scheduling with the CHF clinic Patient deferring at this time    Future Appointments   Date Time Provider Department Center   3/13/2025 12:30 PM MARGARITA SAQIB ECHO 1 [facility] Rad/Car   3/24/2025 10:00 AM Destiny Moran APRN - CNP [facility]   3/25/2025  2:00 PM Eduin Guzman DO [facility] DEP   4/3/2025 11:15 AM Js Beckett MD [facility]   4/17/2025  7:30 AM Eduin Guzman DO [facility]       Patient Education:  Self Monitoring/management:  Reviewed the introduction to Heart Failure, the HF zones, signs and symptoms to report on day 1 of onset, medications, medication compliance, the importance of  physician follow ups, smoking cessation, follow the “Heart Failure Zones”  The Heart Failure zones  Every Dose Every Day    Instructed to call 911 if you have any of the following symptoms:  Struggling to breathe unrelieved with rest  Having chest pain  Confusion or can’t think clearly      Patient verbalizes understanding of above.   Greater than 30 minutes was spent educating patient.        Radha Medellin RN   Heart Failure Navigator

## 2025-03-07 NOTE — DISCHARGE INSTRUCTIONS
Your [facility] Heart Failure Management system will be mailed to your home in 24-48 hours pending prior authorization by your insurance. Contact [employer] at [phone] before using your device for the first time and they will guide you in the application and set up of the monitor. Please wear to your appointment with RICKEY CHAVEZ CNP (provider visit for Dr. Beckett) in the CHF clinic on 3/24/25.                  HEART FAILURE  / CONGESTIVE HEART FAILURE  DISCHARGE INSTRUCTIONS:  GUIDELINES TO FOLLOW AT HOME    Self- Managed Care:     MEDICATIONS:  Take your medication as directed. If you are experiencing any side effects, inform your doctor, Do not stop taking any of your medications without letting your doctor know.   Check with your doctor before taking any over-the-counter medications / herbal / or dietary supplements. They may interfere with your other medications.  Do not take ibuprofen (Advil or Motrin) and naproxen (Aleve) without talking to your doctor first. They could make your heart failure worse.         WEIGHT MONITORING:   Weigh yourself everyday (with the same scale) around the same time of the day and write it down. (you can chart them on a calendar or keep track of them on paper.   Notify your doctor of a weight gain of 3 pounds or more in 1 day   OR a total of 5 pounds or more in 1 week    Take your weight record to your doctor visits  Also, the same goes if you loose more than 3# in one day, let your heart doctor know.         DIET:   Cardiac heart healthy diet- Low saturated / low trans fat, no added salt, caffeine restricted, Low sodium diet-   No more than 2,000mg (2 grams) of salt / sodium per day (which equals to a little less than  a teaspoon of salt)  If your doctor wants you on a fluid restriction...it is usually recommended a fluid limit of 2,000cc -  Fluid restriction- 2,000 ml (milliliters) = 64 ounces = you can have 8 glasses of fluid per day (each glass 8 ounces)    Follow a low salt  85 degrees, if the air is really humid, or wind chill is less than 20 degrees)                                             ADDITIONAL INFORMATION:  Avoid getting sick from colds and the flu. Stay away from friends or family that you know may have a contagious illness  Get plenty of rest   Get a flu shot each year.  Get a pneumococcal vaccine shot. If you have had one before, ask your doctor whether you need another dose.       My Goal for Self-management of Heart Failure Includes 5 steps :    1. Notice a change in symptoms ( weight gain, short of breath, leg swelling, decreased activity level, bloating....)    2. Evaluate the change: (use the Heart Failure Zones )     3. Decide to take action: decide what your options are, such as: (call your doctor for an extra visit, take a prescribed medication, such as your water pill if your doctor has given you directions to do so, CALL YOUR DOCTOR)    4. Come up with a strategy:  (now you call the doctor for advice / appointment. This is where you take action!!!  Do not wait, catch the symptom early and treat it before it worsens.    5. Evaluate the response: The next day, check your Heart Failure Zones: are you in the GREEN ZONE (safe zone)?  Worsening symptoms of YELLOW ZONE? Or have you moved to the RED ZONE and need to call 911 or go to the Emergency Room for evaluation? Call your doctor's office to update them on your symptoms of heart failure.

## 2025-03-07 NOTE — SIGNIFICANT EVENT
The patient is only urinated 50 cc since his Merritt removal.  Bladder scan was performed for 274 cc.  Will replace Merritt catheter and have patient follow-up with primary care physician in 1 week.

## 2025-03-09 LAB
MICROORGANISM SPEC CULT: NORMAL
MICROORGANISM/AGENT SPEC: NORMAL
SPECIMEN DESCRIPTION: NORMAL

## 2025-03-10 ENCOUNTER — TELEPHONE (OUTPATIENT)
Dept: PRIMARY CARE CLINIC | Age: 75
End: 2025-03-10

## 2025-03-10 ENCOUNTER — TELEPHONE (OUTPATIENT)
Dept: CARDIOLOGY CLINIC | Age: 75
End: 2025-03-10

## 2025-03-10 DIAGNOSIS — E11.9 TYPE 2 DIABETES MELLITUS WITHOUT COMPLICATION, WITHOUT LONG-TERM CURRENT USE OF INSULIN (HCC): Primary | ICD-10-CM

## 2025-03-10 PROBLEM — I47.20 VENTRICULAR TACHYCARDIA (HCC): Status: RESOLVED | Noted: 2024-02-01 | Resolved: 2025-03-10

## 2025-03-10 RX ORDER — TAMSULOSIN HYDROCHLORIDE 0.4 MG/1
0.4 CAPSULE ORAL DAILY
Qty: 90 CAPSULE | Refills: 3 | Status: SHIPPED | OUTPATIENT
Start: 2025-03-10

## 2025-03-10 RX ORDER — BLOOD-GLUCOSE METER
1 KIT MISCELLANEOUS DAILY
Qty: 100 EACH | Refills: 3 | Status: SHIPPED | OUTPATIENT
Start: 2025-03-10

## 2025-03-10 RX ORDER — FLASH GLUCOSE SENSOR
KIT MISCELLANEOUS
Qty: 4 EACH | Refills: 5 | Status: SHIPPED | OUTPATIENT
Start: 2025-03-10

## 2025-03-10 NOTE — TELEPHONE ENCOUNTER
Pt does not see a urologist.      Was given a referral to Nephrology while at the hospital, but no appt scheduled.

## 2025-03-10 NOTE — TELEPHONE ENCOUNTER
Requested Prescriptions     Pending Prescriptions Disp Refills    tamsulosin (FLOMAX) 0.4 MG capsule [Pharmacy Med Name: Tamsulosin HCl Oral Capsule 0.4 MG] 90 capsule 3     Sig: TAKE 1 CAPSULE EVERY DAY       Next appt is 3/25/2025  Last appt was 1/13/2025

## 2025-03-10 NOTE — TELEPHONE ENCOUNTER
Insurance will typically only cover freestyle michaela if patient is on insulin.  I will send a prescription to the pharmacy.  In the meantime, he should have a fingerstick blood sugar monitor and should bring fingerstick blood sugar readings in each visit.

## 2025-03-10 NOTE — TELEPHONE ENCOUNTER
Care Transitions Initial Follow Up Call    Outreach made within 2 business days of discharge: Yes    Patient: Paul Florez Patient : 1950   MRN: [id number]  Reason for Admission: Acute heart failure with reduced ejection fraction     Discharge Date: 3/7/25       Spoke with: Patient     Discharge department/facility: [facility]     TCM Interactive Patient Contact:  Was patient able to fill all prescriptions: Yes  Was patient instructed to bring all medications to the follow-up visit: Yes  Is patient taking all medications as directed in the discharge summary? Yes  Does patient understand their discharge instructions: Yes  Does patient have questions or concerns that need addressed prior to 7-14 day follow up office visit: no    Additional needs identified to be addressed with provider  Patient has garner catheter in place and would like it removed.              Scheduled appointment with PCP within 7-14 days    Follow Up  Future Appointments   Date Time Provider Department Center   3/13/2025 12:30 PM MARGARITA CERRATO ECHO 1 [facility] Rad/Car   3/24/2025 10:00 AM Destiny Moran, APRN - CNP [facility]   3/25/2025  2:00 PM Eduin Guzman DO NILES [facility] DEP   4/3/2025 11:15 AM Js Beckett MD [facility]   2025  7:30 AM Eduin Guzman DO NIL[facility] DEP       Modesta Rahman MA

## 2025-03-10 NOTE — TELEPHONE ENCOUNTER
Patient needs a urology appointment, not a nephrology appointment if he has an indwelling Merritt catheter!

## 2025-03-10 NOTE — TELEPHONE ENCOUNTER
PC to pts wife informing a order for Free Style Jaswinder has been sent to pharmacy.  Pt should check Blood sugar daily and keep a record and bring record to pt.     Spouse voiced understanding.

## 2025-03-10 NOTE — TELEPHONE ENCOUNTER
PC from pts wife.  Stated pt was in the hospital and discharged on Friday.      The hospital sent in a Rx for Freestyle Jaswinder 3 Sensor, but was told by pharmacy that they couldn't accept the order from the hospital, it can only come from PCP.      Pt is asking if an order for Freestyle Jaswinder 3 sensor can be sent to [facility] pharmacy.      Pt is aware provider is out of office

## 2025-03-10 NOTE — TELEPHONE ENCOUNTER
Patient recently discharged from hospital due to bilateral leg swelling. Patient is currently prescribed Lasix twice a week. Nursing is wanting to know if it would be appropriate and for approval of increasing Lasix dosage. Please advise

## 2025-03-10 NOTE — DISCHARGE SUMMARY
[address] [address]                            DISCHARGE SUMMARY      PATIENT NAME: MAURICIO RYAN                 : 1950  MED REC NO: [id number]                        ROOM: [id number]  ACCOUNT NO: [id number]                       ADMIT DATE: 2025  PROVIDER: Chandler Abebe DO      ADMITTING DIAGNOSIS:  Acutely decompensated diastolic congestive heart failure with preserved ejection fraction.    SECONDARY DISCHARGE DIAGNOSES:    1. Large left pleural effusion, status post thoracentesis with pleural fluid analysis suggesting exudative effusion, currently maintained on antibiotic support.  2. Acute on chronic kidney disease with persistent hyperkalemia necessitating the institution of temporary hemodialysis.  3. Relatively refractory hypoglycemia, underlying non-insulin dependent diabetes mellitus type 2.  4. 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  5. Essential hypertension with present hypotensive, resolved.  6. Atrial fibrillation, controlled ventricular response on chronic Eliquis therapy.  7. Multifactorial anemia.  8. Coronary artery disease with status post percutaneous coronary intervention in the past.  9. Hyperlipidemia.  10. Benign prostatic hypertrophy.  11. Morbid obesity with elevated body mass index of 49.18.    COMPLICATION:  No complication.    OPERATION:  Thoracentesis.  Institution of dialysis therapy x1.  The patient also had associated transfusion and endoscopy.    CONSULTATIONS OBTAINED:  With Dr. Griffin, Dr. Kemp, Critical Care with Dr. Franklin, Dr. Mcdonald, and Dr. Palacios.  No OMT was given.    ADMITTING PHYSICIANS:  Dr. Abebe and Dr. Dennis.    CHIEF COMPLAINT AND HISTORY OF CHIEF COMPLAINT:  Pleasant 74-year-old white male.  The patient was admitted to [facility].  The patient presented on 2025.  The patient presented to the hospital with what

## 2025-03-10 NOTE — TELEPHONE ENCOUNTER
PC from CONSTANTIN Devi with [facility].    Pt was discharged from [facility] on Friday 3/7/25 with a Garner Catheter.      Garner was originally removed, the hospital did a voiding trial, but pt failed due to still having 250cc left in bladder so Garner was put back in before discharge on 3/7/25.    [facility] is asking if they can have an order for Voiding trial to see if pt can have garner removed.    Pt has voiced he does not want the catheter in and wants it removed.    Has hospital f/u scheduled on 3/25/25, does not see a urologist.     [facility] is aware provider is out of office.    Please advise.

## 2025-03-10 NOTE — TELEPHONE ENCOUNTER
PC to CONSTANTIN Devi with [facility] notifying provider is not able to give order for Voiding trial.  Pt will need to schedule with Nephrology.     Nurse voiced understanding and stated she will make sure pt schedules with Nephrology.

## 2025-03-11 ENCOUNTER — TELEPHONE (OUTPATIENT)
Dept: CARDIOLOGY CLINIC | Age: 75
End: 2025-03-11

## 2025-03-11 RX ORDER — FLASH GLUCOSE SCANNING READER
EACH MISCELLANEOUS
Qty: 1 EACH | Refills: 0 | OUTPATIENT
Start: 2025-03-11

## 2025-03-11 NOTE — TELEPHONE ENCOUNTER
I sent a prescription yesterday for the freestyle michaela with a reader.  I believe the insurance declined it.  You can check with Danielle.

## 2025-03-11 NOTE — TELEPHONE ENCOUNTER
----- Message from Sarai PEREZ sent at 2/26/2025  1:34 PM EST -----  Once he is discharged will need follow up in [place] office within 2 weeks for HF follow-up.

## 2025-03-11 NOTE — TELEPHONE ENCOUNTER
Name of Medication(s) Requested:  Requested Prescriptions     Pending Prescriptions Disp Refills    Continuous Glucose  (FREESTYLE COLT 14 DAY READER) JACIEL [Pharmacy Med Name: FREESTYLE COLT READER (14-DAY)] 1 each 0       Medication is on current medication list Yes and No    Dosage and directions were verified? Yes    Quantity verified: 30 day supply     Pharmacy Verified?  Yes    Last Appointment:  1/13/2025    Future appts:  Future Appointments   Date Time Provider Department Center   3/13/2025 12:30 PM MARGARITA CERRATO ECHO 1 R[facility] PATINO [facility] Rad/Car   3/20/2025  2:15 PM Eduin Guzman DO NILES [facility] ECC DEP   3/24/2025 10:00 AM Destiny Moran APRN - CNP [facility]   4/3/2025 11:15 AM Js Beckett MD [place] [facility]   4/17/2025  7:30 AM Eduin Guzman DO NIL[facility] ECC DEP        (If no appt send self scheduling link. .REFILLAPPT)  Scheduling request sent?     [] Yes  [x] No    Does patient need updated?  [] Yes  [x] No

## 2025-03-16 LAB
MICROORGANISM SPEC CULT: NORMAL
MICROORGANISM/AGENT SPEC: NORMAL
SPECIMEN DESCRIPTION: NORMAL

## 2025-03-19 RX ORDER — OMEPRAZOLE 40 MG/1
40 CAPSULE, DELAYED RELEASE ORAL DAILY
Qty: 90 CAPSULE | Refills: 3 | Status: SHIPPED | OUTPATIENT
Start: 2025-03-19

## 2025-03-19 NOTE — TELEPHONE ENCOUNTER
Requested Prescriptions     Pending Prescriptions Disp Refills    omeprazole (PRILOSEC) 40 MG delayed release capsule [Pharmacy Med Name: Omeprazole Oral Capsule Delayed Release 40 MG] 90 capsule 3     Sig: TAKE 1 CAPSULE EVERY DAY       Next appt is 3/20/2025  Last appt was 1/13/2025

## 2025-03-20 ENCOUNTER — OFFICE VISIT (OUTPATIENT)
Dept: PRIMARY CARE CLINIC | Age: 75
End: 2025-03-20

## 2025-03-20 VITALS
HEIGHT: 69 IN | WEIGHT: 306 LBS | HEART RATE: 85 BPM | BODY MASS INDEX: 45.32 KG/M2 | TEMPERATURE: 98 F | SYSTOLIC BLOOD PRESSURE: 102 MMHG | OXYGEN SATURATION: 100 % | DIASTOLIC BLOOD PRESSURE: 80 MMHG

## 2025-03-20 DIAGNOSIS — E11.9 TYPE 2 DIABETES MELLITUS WITHOUT COMPLICATION, WITHOUT LONG-TERM CURRENT USE OF INSULIN: ICD-10-CM

## 2025-03-20 DIAGNOSIS — K22.70 BARRETT'S ESOPHAGUS WITHOUT DYSPLASIA: ICD-10-CM

## 2025-03-20 DIAGNOSIS — D64.9 ANEMIA, UNSPECIFIED TYPE: ICD-10-CM

## 2025-03-20 DIAGNOSIS — I25.10 CAD IN NATIVE ARTERY: ICD-10-CM

## 2025-03-20 DIAGNOSIS — I50.22 CHRONIC SYSTOLIC (CONGESTIVE) HEART FAILURE: ICD-10-CM

## 2025-03-20 DIAGNOSIS — E03.9 ACQUIRED HYPOTHYROIDISM: ICD-10-CM

## 2025-03-20 DIAGNOSIS — Z09 HOSPITAL DISCHARGE FOLLOW-UP: Primary | ICD-10-CM

## 2025-03-20 DIAGNOSIS — I42.9 CARDIOMYOPATHY, UNSPECIFIED TYPE (HCC): ICD-10-CM

## 2025-03-20 DIAGNOSIS — I48.0 PAROXYSMAL ATRIAL FIBRILLATION (HCC): ICD-10-CM

## 2025-03-20 DIAGNOSIS — E66.813 OBESITY, CLASS 3: ICD-10-CM

## 2025-03-20 RX ORDER — FUROSEMIDE 20 MG/1
20 TABLET ORAL
Qty: 30 TABLET | Refills: 0 | Status: SHIPPED | OUTPATIENT
Start: 2025-03-21

## 2025-03-20 RX ORDER — BLOOD SUGAR DIAGNOSTIC
STRIP MISCELLANEOUS
Qty: 100 STRIP | Refills: 3 | Status: SHIPPED | OUTPATIENT
Start: 2025-03-20

## 2025-03-20 NOTE — PROGRESS NOTES
Post-Discharge Transitional Care Follow Up      Paul Florez   YOB: 1950    Date of Office Visit:  3/20/2025  Date of Hospital Admission: 2/25/25  Date of Hospital Discharge: 3/7/25  Readmission Risk Score (high >=14%. Medium >=10%):Readmission Risk Score: 18.3      Care management risk score Rising risk (score 2-5) and Complex Care (Scores >=6): No Risk Score On File     Non face to face  following discharge, date last encounter closed (first attempt may have been earlier): *No documented post hospital discharge outreach found in the last 14 days     Call initiated 2 business days of discharge: *No response recorded in the last 14 days     Hospital discharge follow-up  -     ME DISCHARGE MEDS RECONCILED W/ CURRENT OUTPATIENT MED LIST  Type 2 diabetes mellitus without complication, without long-term current use of insulin (HCC)  -     blood glucose test strips (ACCU-CHEK GUIDE) strip; Check FSBS fasting daily, Disp-100 strip, R-3Normal  Obesity, class 3 (E66.813)  Body mass index [BMI] 45.0-49.9, adult (Z68.42)  Chronic systolic (congestive) heart failure (HCC)  -     furosemide (LASIX) 20 MG tablet; Take 1 tablet by mouth three times a week, Disp-30 tablet, R-0Normal  Paroxysmal atrial fibrillation (HCC)  Cardiomyopathy, unspecified type (HCC)  Acquired hypothyroidism  Anemia, unspecified type  CAD in native artery  Aguila's esophagus without dysplasia      Medical Decision Making: moderate complexity  Return in 4 weeks (on 4/17/2025) for Labs, Follow up.           Subjective:   Patient is here for hospital follow-up.  Hospitalized 2/25/2025-3/7/2025.  Diagnosis: Acute Decompensated Diastolic CHF with Preserved EF.  He is accompanied by wife.  He continues to complain of swelling in his lower extremities.  Overall he feels okay with the exception of some chest congestion over the past 2 days.  Sputum is clear in color.  He denies any chest discomfort, shortness of breath, orthopnea or PND.  He is

## 2025-03-21 NOTE — TELEPHONE ENCOUNTER
Adding Destiny to encounter. Patient is scheduled with CHF on 3/24/25 and will follow up with Cardiology on 4/3/25. Patient had BMP drawn on 3/7/25. Please advise if repeat BMP is needed.

## 2025-03-21 NOTE — TELEPHONE ENCOUNTER
Spoke with patient and advised of recommendations; Nursing from homecare visits are only once a week and was there yesterday and did not draw labs. Pt states he is scheduled to get labs on Monday prior to his appointment with CHF. Patient also states that his PCP has provided him with a temporary prescription of Lasix until he is seen Monday.

## 2025-03-23 LAB
MICROORGANISM SPEC CULT: NORMAL
MICROORGANISM/AGENT SPEC: NORMAL
SPECIMEN DESCRIPTION: NORMAL

## 2025-03-24 ENCOUNTER — HOSPITAL ENCOUNTER (OUTPATIENT)
Age: 75
Discharge: HOME | End: 2025-03-24
Payer: MEDICARE

## 2025-03-24 ENCOUNTER — OFFICE VISIT (OUTPATIENT)
Age: 75
End: 2025-03-24
Payer: MEDICARE

## 2025-03-24 VITALS
BODY MASS INDEX: 45.07 KG/M2 | OXYGEN SATURATION: 94 % | RESPIRATION RATE: 16 BRPM | HEART RATE: 72 BPM | SYSTOLIC BLOOD PRESSURE: 120 MMHG | DIASTOLIC BLOOD PRESSURE: 66 MMHG | WEIGHT: 305.4 LBS

## 2025-03-24 DIAGNOSIS — I50.32 HEART FAILURE WITH IMPROVED EJECTION FRACTION (HFIMPEF) (HCC): Primary | ICD-10-CM

## 2025-03-24 LAB
ANION GAP SERPL CALCULATED.3IONS-SCNC: 12 MMOL/L (ref 7–16)
BACTERIA URNS QL MICRO: ABNORMAL
BILIRUB UR QL STRIP: NEGATIVE
BUN SERPL-MCNC: 24 MG/DL (ref 6–23)
CALCIUM SERPL-MCNC: 9.1 MG/DL (ref 8.6–10.2)
CHLORIDE SERPL-SCNC: 99 MMOL/L (ref 98–107)
CLARITY UR: CLEAR
CO2 SERPL-SCNC: 24 MMOL/L (ref 22–29)
COLOR UR: YELLOW
CREAT SERPL-MCNC: 1.4 MG/DL (ref 0.7–1.2)
CREAT UR-MCNC: 30.1 MG/DL (ref 40–278)
EPI CELLS #/AREA URNS HPF: ABNORMAL /HPF
ERYTHROCYTE [DISTWIDTH] IN BLOOD BY AUTOMATED COUNT: 19.3 % (ref 11.5–15)
GFR, ESTIMATED: 53 ML/MIN/1.73M2
GLUCOSE SERPL-MCNC: 177 MG/DL (ref 74–99)
GLUCOSE UR STRIP-MCNC: NEGATIVE MG/DL
HCT VFR BLD AUTO: 35.1 % (ref 37–54)
HGB BLD-MCNC: 9.9 G/DL (ref 12.5–16.5)
HGB UR QL STRIP.AUTO: ABNORMAL
KETONES UR STRIP-MCNC: NEGATIVE MG/DL
LEUKOCYTE ESTERASE UR QL STRIP: NEGATIVE
MAGNESIUM SERPL-MCNC: 1.7 MG/DL (ref 1.6–2.6)
MCH RBC QN AUTO: 25.2 PG (ref 26–35)
MCHC RBC AUTO-ENTMCNC: 28.2 G/DL (ref 32–34.5)
MCV RBC AUTO: 89.3 FL (ref 80–99.9)
MICROALBUMIN UR-MCNC: 65 MG/L (ref 0–19)
MICROALBUMIN/CREAT UR-RTO: 215 MCG/MG CREAT (ref 0–30)
NITRITE UR QL STRIP: NEGATIVE
PH UR STRIP: 5.5 [PH] (ref 5–8)
PHOSPHATE SERPL-MCNC: 3 MG/DL (ref 2.5–4.5)
PLATELET # BLD AUTO: 272 K/UL (ref 130–450)
PMV BLD AUTO: 11.3 FL (ref 7–12)
POTASSIUM SERPL-SCNC: 3.9 MMOL/L (ref 3.5–5)
PROT UR STRIP-MCNC: NEGATIVE MG/DL
RBC # BLD AUTO: 3.93 M/UL (ref 3.8–5.8)
RBC #/AREA URNS HPF: ABNORMAL /HPF
SODIUM SERPL-SCNC: 135 MMOL/L (ref 132–146)
SP GR UR STRIP: 1.01 (ref 1–1.03)
UROBILINOGEN UR STRIP-ACNC: 0.2 EU/DL (ref 0–1)
WBC #/AREA URNS HPF: ABNORMAL /HPF
WBC OTHER # BLD: 5.4 K/UL (ref 4.5–11.5)

## 2025-03-24 PROCEDURE — 36415 COLL VENOUS BLD VENIPUNCTURE: CPT

## 2025-03-24 PROCEDURE — 84100 ASSAY OF PHOSPHORUS: CPT

## 2025-03-24 PROCEDURE — 85027 COMPLETE CBC AUTOMATED: CPT

## 2025-03-24 PROCEDURE — 4004F PT TOBACCO SCREEN RCVD TLK: CPT | Performed by: NURSE PRACTITIONER

## 2025-03-24 PROCEDURE — 1111F DSCHRG MED/CURRENT MED MERGE: CPT | Performed by: NURSE PRACTITIONER

## 2025-03-24 PROCEDURE — 82570 ASSAY OF URINE CREATININE: CPT

## 2025-03-24 PROCEDURE — 82043 UR ALBUMIN QUANTITATIVE: CPT

## 2025-03-24 PROCEDURE — 99214 OFFICE O/P EST MOD 30 MIN: CPT | Performed by: NURSE PRACTITIONER

## 2025-03-24 PROCEDURE — 1123F ACP DISCUSS/DSCN MKR DOCD: CPT | Performed by: NURSE PRACTITIONER

## 2025-03-24 PROCEDURE — 81001 URINALYSIS AUTO W/SCOPE: CPT

## 2025-03-24 PROCEDURE — 3017F COLORECTAL CA SCREEN DOC REV: CPT | Performed by: NURSE PRACTITIONER

## 2025-03-24 PROCEDURE — G8417 CALC BMI ABV UP PARAM F/U: HCPCS | Performed by: NURSE PRACTITIONER

## 2025-03-24 PROCEDURE — 83735 ASSAY OF MAGNESIUM: CPT

## 2025-03-24 PROCEDURE — 80048 BASIC METABOLIC PNL TOTAL CA: CPT

## 2025-03-24 PROCEDURE — G8427 DOCREV CUR MEDS BY ELIG CLIN: HCPCS | Performed by: NURSE PRACTITIONER

## 2025-03-24 NOTE — PATIENT INSTRUCTIONS
Had blood work drawn prior to appointment today, will review once resulted  Continue current cardiac medications   Discussed sleep apnea evaluation, patient deferring at this time  Continue to wear HFMS  Follow up with CHF clinic in 2 weeks  Follow up with Dr. Beckett as scheduled  Weigh yourself daily    -Stay Hydrated, 64 oz     -Diet should sodium restricted to 2 grams    -Again watch your daily weight trends and if you gain water weight please follow below instructions.    -If you gain 3-5 pounds in 2-3 days OR notice that you are retaining fluid in anyway just like you did before then take an extra dose of your water pill (furosemide/Lasix) every day until you lose the weight or feel better.     -If you notice that you have taken more than 2 extra doses in 1 week then please call and let us know.    -If at any time you feel that you are retaining fluid, your medications are not working, or you feel ill in anyway, then please call us for either same day appointment or the next day, and for instructions. Our goal is to keep you out of the emergency room and the hospital and we have ways to do it. You just need to call us in a timely manner.     -If you become sick for other reasons, and notice that you are not urinating as much, the urine is very dark, you have significant diarrhea or vomiting, then please DO NOT take your water pill and CALL US immediately.

## 2025-03-24 NOTE — PROGRESS NOTES
Rfl: 0    omeprazole (PRILOSEC) 40 MG delayed release capsule, TAKE 1 CAPSULE EVERY DAY, Disp: 90 capsule, Rfl: 3    tamsulosin (FLOMAX) 0.4 MG capsule, TAKE 1 CAPSULE EVERY DAY, Disp: 90 capsule, Rfl: 3    metoprolol succinate (TOPROL XL) 50 MG extended release tablet, Take 0.5 tablets by mouth daily, Disp: , Rfl:     calcitRIOL (ROCALTROL) 0.25 MCG capsule, Take 1 capsule by mouth three times a week, Disp: 30 capsule, Rfl: 0    Vitamin D (CHOLECALCIFEROL) 25 MCG (1000 UT) TABS tablet, Take 1 tablet by mouth daily, Disp: 30 tablet, Rfl: 0    vitamin B-6 (B-6) 100 MG tablet, Take 1 tablet by mouth daily, Disp: 30 tablet, Rfl: 0    amiodarone (CORDARONE) 200 MG tablet, Take 1 tablet by mouth daily, Disp: 90 tablet, Rfl: 1    Psyllium (VEGETABLE LAXATIVE PO), Take 2 tablets by mouth daily, Disp: , Rfl:     levothyroxine (SYNTHROID) 88 MCG tablet, Take 1 tablet by mouth daily, Disp: 90 tablet, Rfl: 0    apixaban (ELIQUIS) 5 MG TABS tablet, Take 1 tablet by mouth 2 times daily, Disp: 180 tablet, Rfl: 3    atorvastatin (LIPITOR) 40 MG tablet, TAKE 1 TABLET EVERY DAY, Disp: 90 tablet, Rfl: 3    blood glucose monitor strips, 1 strip by Other route Test one times a day & as needed for symptoms of irregular blood glucose. Dispense sufficient amount for indicated testing frequency plus additional to accommodate PRN testing needs., Disp: , Rfl:     ferrous gluconate (FERGON) 324 (38 Fe) MG tablet, Take 1 tablet by mouth daily (with breakfast), Disp: , Rfl:     Magnesium Oxide (MAGNESIUM-OXIDE) 250 MG TABS tablet, Take 1 tablet by mouth daily, Disp: 90 tablet, Rfl: 3    Multiple Vitamin (MULTI-VITAMIN DAILY PO), Take by mouth daily, Disp: , Rfl:          GUIDELINE DIRECTED MEDICAL THERAPY for HFrEF:  ARNI/ACE I/ARB: (1a indication)  No  Hydralazine/Nitrates (1a in symptomatic AA population, 2b if unable to tolerate ACE/ARB/ARNI)  No  Beta blocker: (1a indication)  Metoprolol Succinate (Toprol)  Aldosterone antagonist: (1a

## 2025-03-26 DIAGNOSIS — E03.2 HYPOTHYROIDISM DUE TO MEDICATION: ICD-10-CM

## 2025-03-26 RX ORDER — GLIPIZIDE 5 MG/1
TABLET ORAL
Qty: 180 TABLET | Refills: 3 | OUTPATIENT
Start: 2025-03-26

## 2025-03-26 RX ORDER — LEVOTHYROXINE SODIUM 88 UG/1
88 TABLET ORAL DAILY
Qty: 90 TABLET | Refills: 1 | Status: SHIPPED | OUTPATIENT
Start: 2025-03-26

## 2025-03-26 NOTE — TELEPHONE ENCOUNTER
Requested Prescriptions     Pending Prescriptions Disp Refills    levothyroxine (SYNTHROID) 88 MCG tablet [Pharmacy Med Name: Levothyroxine Sodium Oral Tablet 88 MCG] 90 tablet 3     Sig: TAKE 1 TABLET EVERY DAY     Refused Prescriptions Disp Refills    metFORMIN (GLUCOPHAGE) 1000 MG tablet [Pharmacy Med Name: metFORMIN HCl Oral Tablet 1000 MG] 180 tablet 3     Sig: TAKE 1 TABLET TWICE DAILY WITH MEALS    glipiZIDE (GLUCOTROL) 5 MG tablet [Pharmacy Med Name: glipiZIDE Oral Tablet 5 MG] 180 tablet 3     Sig: TAKE 1 TABLET TWICE DAILY BEFORE MEALS       Next appt is 4/17/2025  Last appt was 3/20/2025

## 2025-03-26 NOTE — TELEPHONE ENCOUNTER
Requested Prescriptions     Pending Prescriptions Disp Refills    metFORMIN (GLUCOPHAGE) 1000 MG tablet [Pharmacy Med Name: metFORMIN HCl Oral Tablet 1000 MG] 180 tablet 3     Sig: TAKE 1 TABLET TWICE DAILY WITH MEALS    levothyroxine (SYNTHROID) 88 MCG tablet [Pharmacy Med Name: Levothyroxine Sodium Oral Tablet 88 MCG] 90 tablet 3     Sig: TAKE 1 TABLET EVERY DAY    glipiZIDE (GLUCOTROL) 5 MG tablet [Pharmacy Med Name: glipiZIDE Oral Tablet 5 MG] 180 tablet 3     Sig: TAKE 1 TABLET TWICE DAILY BEFORE MEALS       Next appt is 4/17/2025  Last appt was 3/20/2025   last TSH 2/26/2025

## 2025-03-28 LAB
MICROORGANISM SPEC CULT: NORMAL
MICROORGANISM/AGENT SPEC: NORMAL
SPECIMEN DESCRIPTION: NORMAL

## 2025-03-31 DIAGNOSIS — I50.32 CHRONIC HEART FAILURE WITH PRESERVED EJECTION FRACTION (HCC): Primary | ICD-10-CM

## 2025-03-31 DIAGNOSIS — I50.32 HEART FAILURE WITH IMPROVED EJECTION FRACTION (HFIMPEF) (HCC): Primary | ICD-10-CM

## 2025-03-31 PROCEDURE — 99091 COLLJ & INTERPJ DATA EA 30 D: CPT | Performed by: NURSE PRACTITIONER

## 2025-04-02 ASSESSMENT — ENCOUNTER SYMPTOMS
SHORTNESS OF BREATH: 1
COUGH: 0
WHEEZING: 0
SORE THROAT: 0
ABDOMINAL PAIN: 0
PHOTOPHOBIA: 0
FACIAL SWELLING: 0
VOMITING: 0
EYE DISCHARGE: 0
EYE PAIN: 0
ABDOMINAL DISTENTION: 0
BLOOD IN STOOL: 0
RHINORRHEA: 0
EYE ITCHING: 0
DIARRHEA: 0
SINUS PRESSURE: 0
COLOR CHANGE: 0
CONSTIPATION: 0
NAUSEA: 0

## 2025-04-03 ENCOUNTER — OFFICE VISIT (OUTPATIENT)
Dept: CARDIOLOGY CLINIC | Age: 75
End: 2025-04-03
Payer: MEDICARE

## 2025-04-03 VITALS
OXYGEN SATURATION: 91 % | TEMPERATURE: 96.9 F | DIASTOLIC BLOOD PRESSURE: 62 MMHG | HEART RATE: 76 BPM | SYSTOLIC BLOOD PRESSURE: 108 MMHG | WEIGHT: 294.7 LBS | BODY MASS INDEX: 43.65 KG/M2 | RESPIRATION RATE: 18 BRPM | HEIGHT: 69 IN

## 2025-04-03 DIAGNOSIS — E87.5 HYPERKALEMIA: ICD-10-CM

## 2025-04-03 DIAGNOSIS — E16.2 HYPOGLYCEMIA: ICD-10-CM

## 2025-04-03 DIAGNOSIS — I42.9 CARDIOMYOPATHY, UNSPECIFIED TYPE (HCC): Primary | ICD-10-CM

## 2025-04-03 DIAGNOSIS — I48.0 PAF (PAROXYSMAL ATRIAL FIBRILLATION) (HCC): ICD-10-CM

## 2025-04-03 DIAGNOSIS — M48.061 SPINAL STENOSIS OF LUMBAR REGION WITHOUT NEUROGENIC CLAUDICATION: ICD-10-CM

## 2025-04-03 DIAGNOSIS — E66.813 OBESITY, CLASS 3: ICD-10-CM

## 2025-04-03 DIAGNOSIS — I25.10 CORONARY ARTERY DISEASE INVOLVING NATIVE CORONARY ARTERY OF NATIVE HEART WITHOUT ANGINA PECTORIS: ICD-10-CM

## 2025-04-03 DIAGNOSIS — I50.22 CHRONIC SYSTOLIC CONGESTIVE HEART FAILURE (HCC): ICD-10-CM

## 2025-04-03 DIAGNOSIS — M47.816 LUMBAR SPONDYLOSIS: ICD-10-CM

## 2025-04-03 DIAGNOSIS — M47.816 LUMBAR FACET ARTHROPATHY: ICD-10-CM

## 2025-04-03 DIAGNOSIS — G47.33 OSA (OBSTRUCTIVE SLEEP APNEA): ICD-10-CM

## 2025-04-03 DIAGNOSIS — I50.22 CHRONIC SYSTOLIC (CONGESTIVE) HEART FAILURE: ICD-10-CM

## 2025-04-03 DIAGNOSIS — K22.70 BARRETT'S ESOPHAGUS WITHOUT DYSPLASIA: ICD-10-CM

## 2025-04-03 DIAGNOSIS — E11.9 TYPE 2 DIABETES MELLITUS WITHOUT COMPLICATION, WITHOUT LONG-TERM CURRENT USE OF INSULIN: ICD-10-CM

## 2025-04-03 DIAGNOSIS — I42.9 CARDIOMYOPATHY, UNSPECIFIED TYPE (HCC): ICD-10-CM

## 2025-04-03 DIAGNOSIS — D50.8 OTHER IRON DEFICIENCY ANEMIA: ICD-10-CM

## 2025-04-03 DIAGNOSIS — M51.9 LUMBAR DISC DISORDER: ICD-10-CM

## 2025-04-03 LAB
ANION GAP SERPL CALCULATED.3IONS-SCNC: 13 MMOL/L (ref 7–16)
BUN BLDV-MCNC: 34 MG/DL (ref 6–23)
CALCIUM SERPL-MCNC: 9.3 MG/DL (ref 8.6–10.2)
CHLORIDE BLD-SCNC: 100 MMOL/L (ref 98–107)
CO2: 24 MMOL/L (ref 22–29)
CREAT SERPL-MCNC: 1.7 MG/DL (ref 0.7–1.2)
GFR, ESTIMATED: 41 ML/MIN/1.73M2
GLUCOSE BLD-MCNC: 158 MG/DL (ref 74–99)
POTASSIUM SERPL-SCNC: 4.8 MMOL/L (ref 3.5–5)
SODIUM BLD-SCNC: 137 MMOL/L (ref 132–146)

## 2025-04-03 PROCEDURE — 2022F DILAT RTA XM EVC RTNOPTHY: CPT | Performed by: INTERNAL MEDICINE

## 2025-04-03 PROCEDURE — 1111F DSCHRG MED/CURRENT MED MERGE: CPT | Performed by: INTERNAL MEDICINE

## 2025-04-03 PROCEDURE — 1159F MED LIST DOCD IN RCRD: CPT | Performed by: INTERNAL MEDICINE

## 2025-04-03 PROCEDURE — 4004F PT TOBACCO SCREEN RCVD TLK: CPT | Performed by: INTERNAL MEDICINE

## 2025-04-03 PROCEDURE — 3044F HG A1C LEVEL LT 7.0%: CPT | Performed by: INTERNAL MEDICINE

## 2025-04-03 PROCEDURE — 1123F ACP DISCUSS/DSCN MKR DOCD: CPT | Performed by: INTERNAL MEDICINE

## 2025-04-03 PROCEDURE — 3017F COLORECTAL CA SCREEN DOC REV: CPT | Performed by: INTERNAL MEDICINE

## 2025-04-03 PROCEDURE — 93000 ELECTROCARDIOGRAM COMPLETE: CPT | Performed by: INTERNAL MEDICINE

## 2025-04-03 PROCEDURE — G8427 DOCREV CUR MEDS BY ELIG CLIN: HCPCS | Performed by: INTERNAL MEDICINE

## 2025-04-03 PROCEDURE — 99214 OFFICE O/P EST MOD 30 MIN: CPT | Performed by: INTERNAL MEDICINE

## 2025-04-03 PROCEDURE — G8417 CALC BMI ABV UP PARAM F/U: HCPCS | Performed by: INTERNAL MEDICINE

## 2025-04-03 NOTE — PROGRESS NOTES
dme    Out Patient CARDIOLOGY Follow Up Visit    Name: Paul Florez    Age: 74 y.o.    Date of Admission: No admission date for patient encounter.    Date of Service: 4/3/2025    Reason for Consultation:   Chief Complaint   Patient presents with    Annual Exam          Referring Physician: No admitting provider for patient encounter.    History of Present Illness: 74-year-old male with history of Aguila esophagus, duodenal mass, coronary artery disease status post OM stent in May 2022, chronic left bundle branch block, hypertension, obesity, hyperlipidemia, diabetes,prior tobacco abuse, chronic kidney disease who was hospitalized in May 2022 with chest pain and dyspnea on exertion and echocardiogram revealed EF of 30 to 35%.  He underwent invasive coronary angiogram and found to have significant OM disease requiring drug-eluting stent.  He had [employer] CRT-D implantation by  on November 11, 2022.  He present for follow-up visit today and report in February 2024 he went to [facility] to sell his house in order to move permanently to [place] and while he was there he developed diarrhea that led to significant hypomagnesemia and subsequently ventricular tachycardia requiring ICD shock.  Chart review shows patient had echocardiogram in February 2024 revealing EF of 55 to 60%.  He also had cardiac catheterization in [facility] revealed mild in-stent restenosis of prior OM stent otherwise no significant obstructive coronary disease.  On today's visit, he reports since last visit he was hospitalized for significant and profound hypoglycemia after taking glipizide and metformin and apparently not eating well.  He was noted to have profound acute kidney injury requiring hemodialysis.  He reports he is now placed on oxygen therapy.  On today's visit he reports he is feeling much better and states symptoms have resolved and states he has more energy now than before.  He has been taking Lasix 20 mg 3

## 2025-04-07 ENCOUNTER — HOSPITAL ENCOUNTER (OUTPATIENT)
Dept: OTHER | Age: 75
Setting detail: THERAPIES SERIES
Discharge: HOME | End: 2025-04-07
Payer: MEDICARE

## 2025-04-07 VITALS
DIASTOLIC BLOOD PRESSURE: 80 MMHG | HEART RATE: 78 BPM | SYSTOLIC BLOOD PRESSURE: 120 MMHG | RESPIRATION RATE: 17 BRPM | OXYGEN SATURATION: 100 % | WEIGHT: 292.4 LBS | BODY MASS INDEX: 43.18 KG/M2

## 2025-04-07 LAB
ANION GAP SERPL CALCULATED.3IONS-SCNC: 10 MMOL/L (ref 7–16)
BNP SERPL-MCNC: 845 PG/ML (ref 0–450)
BUN SERPL-MCNC: 27 MG/DL (ref 6–23)
CALCIUM SERPL-MCNC: 9.5 MG/DL (ref 8.6–10.2)
CHLORIDE SERPL-SCNC: 101 MMOL/L (ref 98–107)
CO2 SERPL-SCNC: 25 MMOL/L (ref 22–29)
CREAT SERPL-MCNC: 1.5 MG/DL (ref 0.7–1.2)
GFR, ESTIMATED: 49 ML/MIN/1.73M2
GLUCOSE SERPL-MCNC: 146 MG/DL (ref 74–99)
POTASSIUM SERPL-SCNC: 4.3 MMOL/L (ref 3.5–5)
SODIUM SERPL-SCNC: 136 MMOL/L (ref 132–146)

## 2025-04-07 PROCEDURE — 80048 BASIC METABOLIC PNL TOTAL CA: CPT

## 2025-04-07 PROCEDURE — 36415 COLL VENOUS BLD VENIPUNCTURE: CPT

## 2025-04-07 PROCEDURE — 99205 OFFICE O/P NEW HI 60 MIN: CPT

## 2025-04-07 PROCEDURE — 83880 ASSAY OF NATRIURETIC PEPTIDE: CPT

## 2025-04-07 ASSESSMENT — PATIENT HEALTH QUESTIONNAIRE - PHQ9
SUM OF ALL RESPONSES TO PHQ QUESTIONS 1-9: 0
1. LITTLE INTEREST OR PLEASURE IN DOING THINGS: NOT AT ALL
2. FEELING DOWN, DEPRESSED OR HOPELESS: NOT AT ALL
SUM OF ALL RESPONSES TO PHQ QUESTIONS 1-9: 0

## 2025-04-07 NOTE — PROGRESS NOTES
consulted  [] Low sodium meal delivery options given to patient  [] Dietician consulted   [] Low sodium recipes provided  [] Sodium free seasoning provided   [x] Fluid intake 6-8 cups (around 64 oz)  [x] Reviewed currently prescribed medications with patient, educated on importance of compliance and answered any questions regarding their medication  [] Pill box provided to patient  [] Patient using pill packing pharmacy   [] CPAP/BiPAP use  [] Low impact exercise / cardiac rehab   [] LifeVest use  [x] Patient aware of signs and symptoms of worsening HF, CHF clinic phone number provided and made aware to call clinic for sooner if evaluation if needed     [] Difficulty affording medications  [] CHF CHW consulted  [] Prescription assistance information given   [] [facility] medication assistance program information given   [] Sample medications provided to patient to help bridge gap until affordability N/A          Scheduled to follow up in CHF clinic on:   Future Appointments   Date Time Provider Department Center   4/17/2025  7:30 AM Eduin Guzman DO N[facility]   5/8/2025  8:15 AM [facility] CHF ROOM 2 [facility]   7/17/2025  1:00 PM Js Beckett MD [facility] [facility]

## 2025-04-08 ENCOUNTER — TELEPHONE (OUTPATIENT)
Dept: PRIMARY CARE CLINIC | Age: 75
End: 2025-04-08

## 2025-04-08 NOTE — TELEPHONE ENCOUNTER
Continue to monitor blood sugar readings.  Watch diet.  I am hesitant to place patient back on medication at this time.  Metformin can affect kidney function.

## 2025-04-08 NOTE — TELEPHONE ENCOUNTER
PC from pt, stated he was told to inform provider if his blood sugar readings start going up.    Pt stated it started out in low 100's, then went to 120-130 and today it was 152.    Pt asked if he should go back on his medication (he has been off medication x 1 month.  Hospital had him stop Metformin and Glipizide.    Please advise.

## 2025-04-09 NOTE — TELEPHONE ENCOUNTER
PC to pt, informing to Continue to monitor blood sugar readings. Watch diet. I am hesitant to place patient back on medication at this time. Metformin can affect kidney function.     Pt voiced understanding and will bring readings to appt on 4/17/25

## 2025-04-17 ENCOUNTER — OFFICE VISIT (OUTPATIENT)
Dept: PRIMARY CARE CLINIC | Age: 75
End: 2025-04-17
Payer: MEDICARE

## 2025-04-17 ENCOUNTER — TELEPHONE (OUTPATIENT)
Dept: PRIMARY CARE CLINIC | Age: 75
End: 2025-04-17

## 2025-04-17 VITALS
DIASTOLIC BLOOD PRESSURE: 84 MMHG | OXYGEN SATURATION: 96 % | BODY MASS INDEX: 42.95 KG/M2 | TEMPERATURE: 98.6 F | SYSTOLIC BLOOD PRESSURE: 132 MMHG | WEIGHT: 290 LBS | HEIGHT: 69 IN | HEART RATE: 88 BPM

## 2025-04-17 DIAGNOSIS — I48.0 PAROXYSMAL ATRIAL FIBRILLATION (HCC): ICD-10-CM

## 2025-04-17 DIAGNOSIS — E03.2 HYPOTHYROIDISM DUE TO MEDICATION: ICD-10-CM

## 2025-04-17 DIAGNOSIS — I50.22 CHRONIC SYSTOLIC (CONGESTIVE) HEART FAILURE: ICD-10-CM

## 2025-04-17 DIAGNOSIS — D64.9 ANEMIA, UNSPECIFIED TYPE: ICD-10-CM

## 2025-04-17 DIAGNOSIS — E11.9 TYPE 2 DIABETES MELLITUS WITHOUT COMPLICATION, WITHOUT LONG-TERM CURRENT USE OF INSULIN: Primary | ICD-10-CM

## 2025-04-17 DIAGNOSIS — E03.9 ACQUIRED HYPOTHYROIDISM: ICD-10-CM

## 2025-04-17 LAB
BILIRUB UR QL STRIP: NEGATIVE
CLARITY UR: CLEAR
COLOR UR: YELLOW
GLUCOSE UR STRIP-MCNC: NEGATIVE MG/DL
HCT VFR BLD CALC: 38.1 % (ref 37–54)
HEMOGLOBIN: 10.9 G/DL (ref 12.5–16.5)
HGB UR QL STRIP.AUTO: ABNORMAL
KETONES UR STRIP-MCNC: NEGATIVE MG/DL
LEUKOCYTE ESTERASE UR QL STRIP: NEGATIVE
MCH RBC QN AUTO: 24.8 PG (ref 26–35)
MCHC RBC AUTO-ENTMCNC: 28.6 G/DL (ref 32–34.5)
MCV RBC AUTO: 86.6 FL (ref 80–99.9)
NITRITE UR QL STRIP: NEGATIVE
PDW BLD-RTO: 18.1 % (ref 11.5–15)
PH UR STRIP: 6 [PH] (ref 5–8)
PLATELET # BLD: 227 K/UL (ref 130–450)
PMV BLD AUTO: 12.3 FL (ref 7–12)
PROT UR STRIP-MCNC: ABNORMAL MG/DL
RBC # BLD: 4.4 M/UL (ref 3.8–5.8)
RBC #/AREA URNS HPF: ABNORMAL /HPF
SP GR UR STRIP: 1.02 (ref 1–1.03)
UROBILINOGEN UR STRIP-ACNC: 0.2 EU/DL (ref 0–1)
WBC # BLD: 5.5 K/UL (ref 4.5–11.5)
WBC #/AREA URNS HPF: ABNORMAL /HPF

## 2025-04-17 PROCEDURE — 3044F HG A1C LEVEL LT 7.0%: CPT | Performed by: FAMILY MEDICINE

## 2025-04-17 PROCEDURE — G8417 CALC BMI ABV UP PARAM F/U: HCPCS | Performed by: FAMILY MEDICINE

## 2025-04-17 PROCEDURE — 3017F COLORECTAL CA SCREEN DOC REV: CPT | Performed by: FAMILY MEDICINE

## 2025-04-17 PROCEDURE — 4004F PT TOBACCO SCREEN RCVD TLK: CPT | Performed by: FAMILY MEDICINE

## 2025-04-17 PROCEDURE — G2211 COMPLEX E/M VISIT ADD ON: HCPCS | Performed by: FAMILY MEDICINE

## 2025-04-17 PROCEDURE — 1159F MED LIST DOCD IN RCRD: CPT | Performed by: FAMILY MEDICINE

## 2025-04-17 PROCEDURE — G8427 DOCREV CUR MEDS BY ELIG CLIN: HCPCS | Performed by: FAMILY MEDICINE

## 2025-04-17 PROCEDURE — 2022F DILAT RTA XM EVC RTNOPTHY: CPT | Performed by: FAMILY MEDICINE

## 2025-04-17 PROCEDURE — 1123F ACP DISCUSS/DSCN MKR DOCD: CPT | Performed by: FAMILY MEDICINE

## 2025-04-17 PROCEDURE — 36415 COLL VENOUS BLD VENIPUNCTURE: CPT | Performed by: FAMILY MEDICINE

## 2025-04-17 PROCEDURE — 99214 OFFICE O/P EST MOD 30 MIN: CPT | Performed by: FAMILY MEDICINE

## 2025-04-17 PROCEDURE — 1160F RVW MEDS BY RX/DR IN RCRD: CPT | Performed by: FAMILY MEDICINE

## 2025-04-17 ASSESSMENT — ENCOUNTER SYMPTOMS
EYE DISCHARGE: 0
COUGH: 0
VOMITING: 0
RHINORRHEA: 0
EYE ITCHING: 0
FACIAL SWELLING: 0
COLOR CHANGE: 0
DIARRHEA: 0
SINUS PRESSURE: 0
WHEEZING: 0
APNEA: 1
CONSTIPATION: 0
EYE PAIN: 0
SORE THROAT: 0
SHORTNESS OF BREATH: 0
BLOOD IN STOOL: 0
NAUSEA: 0
ABDOMINAL DISTENTION: 0
ABDOMINAL PAIN: 0
PHOTOPHOBIA: 0

## 2025-04-17 NOTE — PROGRESS NOTES
Venipuncture was obtained from left arm. Patient tolerated the procedure without complications or complaints.  
3/3/2025    ESOPHAGOGASTRODUODENOSCOPY CONTROL HEMORRHAGE performed by Paul Griffin DO at [facility] ENDOSCOPY      Family History   Problem Relation Age of Onset    Cancer Mother     Hypertension Mother     Cancer Father       Social History       Tobacco History       Smoking Status  Some Days Smoking Tobacco Type  Cigars      Smokeless Tobacco Use  Never      Tobacco Comments  occassional cigar               Alcohol History       Alcohol Use Status  No              Drug Use       Drug Use Status  No              Sexual Activity       Sexually Active  Not Asked                   No Known Allergies     Objective   /84 (Patient Position: Sitting)   Pulse 88   Temp 98.6 °F (37 °C) (Temporal)   Ht 1.753 m (5' 9\")   Wt 131.5 kg (290 lb)   SpO2 96%   BMI 42.83 kg/m²   Current Outpatient Medications   Medication Sig Dispense Refill    levothyroxine (SYNTHROID) 88 MCG tablet TAKE 1 TABLET EVERY DAY 90 tablet 1    blood glucose test strips (ACCU-CHEK GUIDE) strip Check FSBS fasting daily 100 strip 3    furosemide (LASIX) 20 MG tablet Take 1 tablet by mouth three times a week 30 tablet 0    omeprazole (PRILOSEC) 40 MG delayed release capsule TAKE 1 CAPSULE EVERY DAY 90 capsule 3    tamsulosin (FLOMAX) 0.4 MG capsule TAKE 1 CAPSULE EVERY DAY 90 capsule 3    metoprolol succinate (TOPROL XL) 50 MG extended release tablet Take 0.5 tablets by mouth daily      calcitRIOL (ROCALTROL) 0.25 MCG capsule Take 1 capsule by mouth three times a week 30 capsule 0    Vitamin D (CHOLECALCIFEROL) 25 MCG (1000 UT) TABS tablet Take 1 tablet by mouth daily 30 tablet 0    vitamin B-6 (B-6) 100 MG tablet Take 1 tablet by mouth daily 30 tablet 0    amiodarone (CORDARONE) 200 MG tablet Take 1 tablet by mouth daily 90 tablet 1    Psyllium (VEGETABLE LAXATIVE PO) Take 2 tablets by mouth daily      apixaban (ELIQUIS) 5 MG TABS tablet Take 1 tablet by mouth 2 times daily 180 tablet 3    atorvastatin (LIPITOR) 40 MG tablet TAKE 1 TABLET EVERY

## 2025-04-17 NOTE — TELEPHONE ENCOUNTER
PC from pts wife, Vivi, stating [employer] needs an order to d/c oxygen    Please advise.     ([employer] # [phone])

## 2025-04-18 LAB
ALBUMIN: 3.7 G/DL (ref 3.5–5.2)
ALP BLD-CCNC: 105 U/L (ref 40–129)
ALT SERPL-CCNC: 34 U/L (ref 0–40)
ANION GAP SERPL CALCULATED.3IONS-SCNC: 22 MMOL/L (ref 7–16)
AST SERPL-CCNC: 47 U/L (ref 0–39)
BILIRUB SERPL-MCNC: 0.3 MG/DL (ref 0–1.2)
BUN BLDV-MCNC: 28 MG/DL (ref 6–23)
CALCIUM SERPL-MCNC: 9.5 MG/DL (ref 8.6–10.2)
CHLORIDE BLD-SCNC: 100 MMOL/L (ref 98–107)
CO2: 16 MMOL/L (ref 22–29)
CREAT SERPL-MCNC: 1.5 MG/DL (ref 0.7–1.2)
GFR, ESTIMATED: 49 ML/MIN/1.73M2
GLUCOSE FASTING: 124 MG/DL (ref 74–99)
POTASSIUM SERPL-SCNC: 4.5 MMOL/L (ref 3.5–5)
SODIUM BLD-SCNC: 138 MMOL/L (ref 132–146)
TOTAL PROTEIN: 8.1 G/DL (ref 6.4–8.3)
TSH SERPL DL<=0.05 MIU/L-ACNC: 3.11 UIU/ML (ref 0.27–4.2)

## 2025-04-18 NOTE — TELEPHONE ENCOUNTER
NYLA Trinidad to inform letter to [place] oxygen is complete  She asked that we please fax to I[facility] @ [phone]      Will scan fax confirmation once received

## 2025-04-22 ENCOUNTER — RESULTS FOLLOW-UP (OUTPATIENT)
Dept: PRIMARY CARE CLINIC | Age: 75
End: 2025-04-22

## 2025-04-29 DIAGNOSIS — I50.32 CHRONIC HEART FAILURE WITH PRESERVED EJECTION FRACTION (HCC): Primary | ICD-10-CM

## 2025-04-29 PROCEDURE — 99091 COLLJ & INTERPJ DATA EA 30 D: CPT | Performed by: NURSE PRACTITIONER

## 2025-04-29 NOTE — PROGRESS NOTES
This patient has previously agreed to monthly remote monitoring with HFMS (heart failure management system) device for heart failure management purposes.     The monitored patient Thoracic Fluid Index (TFI) is:    [x] Within normal limits  [] Above threshold  [] Below threshold      The following adjustments have been made where applicable     [] Diuretic uptitration  [] Diuretic de-escalation  [] Goal Direct Medical Therapy (GDMT) adjusted  [x] No changes required      There was a collection and interpretation of physiologic data digitally stored and/or transmitted by the patient and/or caregiver (to the physician or other qualified healthcare professional, qualified by education, training, licensure/regulation) from their Heart Failure Management System (HFMS) for a minimum of 30 minutes of time, for this calendar month.     Reviewed by:    MORIAH Donis  Electronically signed by JEROME Donis CNP on 4/29/2025 at 2:19 PM

## 2025-04-29 NOTE — PROGRESS NOTES
This patient has previously agreed to monthly remote monitoring with HFMS (heart failure management system) device for heart failure management purposes.     The monitored patient Thoracic Fluid Index (TFI) is:    [x] Within normal limits  [] Above threshold  [] Below threshold      The following adjustments have been made where applicable     [] Diuretic uptitration  [] Diuretic de-escalation  [] Goal Direct Medical Therapy (GDMT) adjusted  [x] No changes required      There was a collection and interpretation of physiologic data digitally stored and/or transmitted by the patient and/or caregiver (to the physician or other qualified healthcare professional, qualified by education, training, licensure/regulation) from their Heart Failure Management System (HFMS) for a minimum of 30 minutes of time, for this calendar month.     Reviewed by:    MORIAH Donis  Electronically signed by JEROME Donis CNP on 4/29/2025 at 2:23 PM

## 2025-04-30 DIAGNOSIS — I50.32 HEART FAILURE WITH IMPROVED EJECTION FRACTION (HFIMPEF) (HCC): Primary | ICD-10-CM

## 2025-05-05 ENCOUNTER — TELEPHONE (OUTPATIENT)
Dept: PRIMARY CARE CLINIC | Age: 75
End: 2025-05-05

## 2025-05-05 RX ORDER — CALCITRIOL 0.25 UG/1
0.25 CAPSULE, LIQUID FILLED ORAL
Qty: 30 CAPSULE | Refills: 0 | Status: CANCELLED | OUTPATIENT
Start: 2025-05-05

## 2025-05-05 NOTE — TELEPHONE ENCOUNTER
I have no idea who ordered the medication or why it was ordered.  May want to check with nephrology?

## 2025-05-05 NOTE — TELEPHONE ENCOUNTER
Pt calling asking if he is supposed to continue the following medication that was prescribed while he was in the hospital    If so, Pt will need a refill      calcitRIOL (ROCALTROL) 0.25 MCG capsule #36  Si capsule by mouth three times a week      [facility]        Last Appt: 25  Next Appt: 25      *Please, inform patient

## 2025-05-08 ENCOUNTER — HOSPITAL ENCOUNTER (OUTPATIENT)
Dept: OTHER | Age: 75
Setting detail: THERAPIES SERIES
Discharge: HOME | End: 2025-05-08
Payer: MEDICARE

## 2025-05-08 VITALS
BODY MASS INDEX: 42.09 KG/M2 | SYSTOLIC BLOOD PRESSURE: 137 MMHG | HEART RATE: 70 BPM | OXYGEN SATURATION: 94 % | DIASTOLIC BLOOD PRESSURE: 73 MMHG | WEIGHT: 285 LBS | RESPIRATION RATE: 18 BRPM

## 2025-05-08 LAB
ANION GAP SERPL CALCULATED.3IONS-SCNC: 12 MMOL/L (ref 7–16)
BNP SERPL-MCNC: 556 PG/ML (ref 0–450)
BUN SERPL-MCNC: 35 MG/DL (ref 6–23)
CALCIUM SERPL-MCNC: 9.3 MG/DL (ref 8.6–10.2)
CHLORIDE SERPL-SCNC: 104 MMOL/L (ref 98–107)
CO2 SERPL-SCNC: 24 MMOL/L (ref 22–29)
CREAT SERPL-MCNC: 1.5 MG/DL (ref 0.7–1.2)
GFR, ESTIMATED: 49 ML/MIN/1.73M2
GLUCOSE SERPL-MCNC: 133 MG/DL (ref 74–99)
POTASSIUM SERPL-SCNC: 4.2 MMOL/L (ref 3.5–5)
SODIUM SERPL-SCNC: 140 MMOL/L (ref 132–146)

## 2025-05-08 PROCEDURE — 83880 ASSAY OF NATRIURETIC PEPTIDE: CPT

## 2025-05-08 PROCEDURE — 99214 OFFICE O/P EST MOD 30 MIN: CPT

## 2025-05-08 PROCEDURE — 80048 BASIC METABOLIC PNL TOTAL CA: CPT

## 2025-05-08 PROCEDURE — 36415 COLL VENOUS BLD VENIPUNCTURE: CPT

## 2025-05-08 NOTE — PROGRESS NOTES
Value   04/17/2025 38.1     Platelets (k/uL)   Date Value   04/17/2025 227     Iron Studies:  Ferritin (ng/mL)   Date Value   02/28/2025 54     Iron (ug/dL)   Date Value   02/28/2025 52 (L)     TIBC (ug/dL)   Date Value   02/28/2025 222 (L)     Hepatic:  AST (U/L)   Date Value   04/17/2025 47 (H)     ALT (U/L)   Date Value   04/17/2025 34     Total Bilirubin (mg/dL)   Date Value   04/17/2025 0.3     Alkaline Phosphatase (U/L)   Date Value   04/17/2025 105     INR:  INR (no units)   Date Value   03/06/2025 1.3         Wt Readings from Last 3 Encounters:   05/08/25 129.3 kg (285 lb)   04/17/25 131.5 kg (290 lb)   04/07/25 132.6 kg (292 lb 6.4 oz)           ASSESSMENT/PLAN:    [x] Euvolemic          [] Hypervolemic, with increase from baseline:  [] Shortness of breath/DOMINGUEZ  [] JVD  [] HJR  [] Abnormal lung assessment:   [] Orthopnea  [] PND  [] Decreased urinary response to oral diuretic   [] Scrotal swelling   [] Lower extremity edema  [] Compression stockings provided  [] Decline in functional capacity (unable to perform activities they had previously been able to do)  [] Weight gain     [] IV diuretics given No  [] Provider notified of recurrent IV diuretic use    Additional Notes: Patient presents to the [facility] for a follow up appointment with a weight loss of 7 pounds in 1 month. He is euvolemic on assessment and reports feeling at his baseline.           [x]Lab work obtained    [x] Patient/Family Educated On:  [x] HF zones (Green, Yellow, Red) and aware of when to take action   [x] Daily weights  [] Scale provided   [x] Low sodium diet (2000 mg)  Barriers to compliance  [] Refuses to monitor diet  [] Socioeconomic difficulties  [] Unable to cook for self (use of frozen meals, can goods, etc)  [] CHF CHW consulted  [] Low sodium meal delivery options given to patient  [] Dietician consulted   [] Low sodium recipes provided  [] Sodium free seasoning provided   [x] Fluid intake 6-8 cups (around 64 oz)  [x]

## 2025-05-21 ENCOUNTER — TELEPHONE (OUTPATIENT)
Dept: CARDIOLOGY CLINIC | Age: 75
End: 2025-05-21

## 2025-05-21 LAB
MICROORGANISM SPEC CULT: NO GROWTH
SPECIMEN DESCRIPTION: NORMAL

## 2025-05-21 NOTE — TELEPHONE ENCOUNTER
Cardiac Clearance needed for:      Cystoscopy retrograde pyelogram, complex ureteroscopy, laser lithotripsy, J stent, left (possible) CVAC    Date: 5/23/25    Patient denies any chest pain, shortness of breath or palpitations since last visit in April.  Patient is able to complete all activities of daily living, including; climbing one flight of stairs and walking one block without cardiac symptoms. No request to hold any blood thinners. Please advise.

## 2025-05-23 ENCOUNTER — HOSPITAL ENCOUNTER (OUTPATIENT)
Age: 75
Discharge: HOME | End: 2025-05-25

## 2025-05-23 PROCEDURE — 88300 SURGICAL PATH GROSS: CPT

## 2025-05-23 PROCEDURE — 82365 CALCULUS SPECTROSCOPY: CPT

## 2025-05-27 ENCOUNTER — TELEPHONE (OUTPATIENT)
Dept: CARDIOLOGY CLINIC | Age: 75
End: 2025-05-27

## 2025-05-27 NOTE — TELEPHONE ENCOUNTER
Patient calling office back to reschedule appt. He is not sure if it is to be rescheduled for the same day 07/17 at an earlier appt or earlier in the months? Please contact.

## 2025-05-29 LAB — SURGICAL PATHOLOGY REPORT: NORMAL

## 2025-05-29 NOTE — PROGRESS NOTES
This patient has previously agreed to monthly remote monitoring with HFMS (heart failure management system) device for heart failure management purposes.     HFMS monitoring for the month of :     The monitored patient Thoracic Fluid Index (TFI) is:    [x] Within normal limits  [] Above threshold  [] Below threshold      The following adjustments have been made where applicable     [] Diuretic uptitration  [] Diuretic de-escalation  [] Goal Direct Medical Therapy (GDMT) adjusted  [x] No changes required      There was a collection and interpretation of physiologic data digitally stored and/or transmitted by the patient and/or caregiver (to the physician or other qualified healthcare professional, qualified by education, training, licensure/regulation) from their Heart Failure Management System (HFMS) for a minimum of 30 minutes of time, for this calendar month.       Please see uploaded media device report      Reviewed by:  MORIAH Donis        Electronically signed by Jose G Franco RN on 5/29/2025 at 2:20 PM

## 2025-05-29 NOTE — PROGRESS NOTES
This patient has previously agreed to monthly remote monitoring with HFMS (heart failure management system) device for heart failure management purposes.     HFMS monitoring for the month of :     The monitored patient Thoracic Fluid Index (TFI) is:    [x] Within normal limits  [] Above threshold  [] Below threshold      The following adjustments have been made where applicable     [] Diuretic uptitration  [] Diuretic de-escalation  [] Goal Direct Medical Therapy (GDMT) adjusted  [x] No changes required      There was a collection and interpretation of physiologic data digitally stored and/or transmitted by the patient and/or caregiver (to the physician or other qualified healthcare professional, qualified by education, training, licensure/regulation) from their Heart Failure Management System (HFMS) for a minimum of 30 minutes of time, for this calendar month.       Please see uploaded media device report      Reviewed by:  MORIAH Donis        Electronically signed by Jose G Franco RN on 5/29/2025 at 2:22 PM

## 2025-05-30 DIAGNOSIS — I50.22 CHRONIC SYSTOLIC (CONGESTIVE) HEART FAILURE (HCC): ICD-10-CM

## 2025-05-30 PROBLEM — I50.21 ACUTE HFREF (HEART FAILURE WITH REDUCED EJECTION FRACTION) (HCC): Status: RESOLVED | Noted: 2025-02-26 | Resolved: 2025-05-30

## 2025-05-30 PROBLEM — N17.9 AKI (ACUTE KIDNEY INJURY): Status: RESOLVED | Noted: 2025-02-26 | Resolved: 2025-05-30

## 2025-05-30 PROBLEM — I07.1 MODERATE TRICUSPID REGURGITATION BY PRIOR ECHOCARDIOGRAM: Status: ACTIVE | Noted: 2025-05-30

## 2025-05-30 PROBLEM — J96.01 ACUTE RESPIRATORY FAILURE WITH HYPOXIA (HCC): Status: RESOLVED | Noted: 2025-02-26 | Resolved: 2025-05-30

## 2025-05-30 PROBLEM — E87.5 HYPERKALEMIA: Status: RESOLVED | Noted: 2025-03-06 | Resolved: 2025-05-30

## 2025-05-30 PROBLEM — E16.2 HYPOGLYCEMIA: Status: RESOLVED | Noted: 2025-02-25 | Resolved: 2025-05-30

## 2025-05-30 RX ORDER — FUROSEMIDE 20 MG/1
20 TABLET ORAL
Qty: 90 TABLET | Refills: 1 | Status: SHIPPED | OUTPATIENT
Start: 2025-05-30

## 2025-05-30 NOTE — TELEPHONE ENCOUNTER
Name of Medication(s) Requested:  Requested Prescriptions     Pending Prescriptions Disp Refills    furosemide (LASIX) 20 MG tablet 30 tablet 0     Sig: Take 1 tablet by mouth three times a week       Medication is on current medication list Yes    Dosage and directions were verified? Yes    Quantity verified: 30 day supply     Pharmacy Verified?  Yes    Last Appointment:  4/17/2025    Future appts:  Future Appointments   Date Time Provider Department Center   7/10/2025  8:15 AM [facility] ROOM 1 [facility]   7/17/2025  9:15 AM Js Beckett MD [facility]   7/21/2025  8:45 AM Eduin Guzman DO [facility] DEP   11/5/2025  8:30 AM Eduin Guzman DO [facility] DEP        (If no appt send self scheduling link. .REFILLAPPT)  Scheduling request sent?     [] Yes  [x] No    Does patient need updated?  [] Yes  [x] No

## 2025-06-02 LAB
STONE COMPOSITION: NORMAL
STONE DESCRIPTION: NORMAL
STONE MASS: 1019 MG

## 2025-06-11 ENCOUNTER — HOSPITAL ENCOUNTER (OUTPATIENT)
Age: 75
Discharge: HOME | End: 2025-06-13

## 2025-06-11 PROCEDURE — 82365 CALCULUS SPECTROSCOPY: CPT

## 2025-06-17 LAB
STONE COMPOSITION: NORMAL
STONE DESCRIPTION: NORMAL
STONE MASS: 42 MG
SURGICAL PATHOLOGY REPORT: NORMAL

## 2025-07-10 ENCOUNTER — HOSPITAL ENCOUNTER (OUTPATIENT)
Dept: OTHER | Age: 75
Setting detail: THERAPIES SERIES
Discharge: HOME | End: 2025-07-10
Payer: MEDICARE

## 2025-07-10 VITALS
DIASTOLIC BLOOD PRESSURE: 79 MMHG | OXYGEN SATURATION: 99 % | WEIGHT: 288 LBS | HEART RATE: 68 BPM | BODY MASS INDEX: 42.53 KG/M2 | RESPIRATION RATE: 18 BRPM | SYSTOLIC BLOOD PRESSURE: 133 MMHG

## 2025-07-10 LAB
ANION GAP SERPL CALCULATED.3IONS-SCNC: 12 MMOL/L (ref 7–16)
BNP SERPL-MCNC: 397 PG/ML (ref 0–450)
BUN SERPL-MCNC: 20 MG/DL (ref 8–23)
CALCIUM SERPL-MCNC: 8.8 MG/DL (ref 8.8–10.2)
CHLORIDE SERPL-SCNC: 104 MMOL/L (ref 98–107)
CO2 SERPL-SCNC: 24 MMOL/L (ref 22–29)
CREAT SERPL-MCNC: 1.5 MG/DL (ref 0.7–1.2)
GFR, ESTIMATED: 49 ML/MIN/1.73M2
GLUCOSE SERPL-MCNC: 148 MG/DL (ref 74–99)
POTASSIUM SERPL-SCNC: 4 MMOL/L (ref 3.5–5.1)
SODIUM SERPL-SCNC: 140 MMOL/L (ref 136–145)

## 2025-07-10 PROCEDURE — 80048 BASIC METABOLIC PNL TOTAL CA: CPT

## 2025-07-10 PROCEDURE — 83880 ASSAY OF NATRIURETIC PEPTIDE: CPT

## 2025-07-10 PROCEDURE — 36415 COLL VENOUS BLD VENIPUNCTURE: CPT

## 2025-07-10 PROCEDURE — 99214 OFFICE O/P EST MOD 30 MIN: CPT

## 2025-07-10 SDOH — HEALTH STABILITY: PHYSICAL HEALTH: ON AVERAGE, HOW MANY DAYS PER WEEK DO YOU ENGAGE IN MODERATE TO STRENUOUS EXERCISE (LIKE A BRISK WALK)?: 0 DAYS

## 2025-07-10 SDOH — HEALTH STABILITY: PHYSICAL HEALTH: ON AVERAGE, HOW MANY MINUTES DO YOU ENGAGE IN EXERCISE AT THIS LEVEL?: 0 MIN

## 2025-07-10 ASSESSMENT — PATIENT HEALTH QUESTIONNAIRE - PHQ9
SUM OF ALL RESPONSES TO PHQ QUESTIONS 1-9: 1
SUM OF ALL RESPONSES TO PHQ QUESTIONS 1-9: 1
1. LITTLE INTEREST OR PLEASURE IN DOING THINGS: SEVERAL DAYS
SUM OF ALL RESPONSES TO PHQ QUESTIONS 1-9: 1
SUM OF ALL RESPONSES TO PHQ QUESTIONS 1-9: 1
2. FEELING DOWN, DEPRESSED OR HOPELESS: NOT AT ALL

## 2025-07-10 ASSESSMENT — LIFESTYLE VARIABLES
HOW OFTEN DO YOU HAVE A DRINK CONTAINING ALCOHOL: 1
HOW OFTEN DO YOU HAVE A DRINK CONTAINING ALCOHOL: NEVER
HOW OFTEN DO YOU HAVE SIX OR MORE DRINKS ON ONE OCCASION: 1

## 2025-07-10 NOTE — PROGRESS NOTES
CBC:  WBC (k/uL)   Date Value   04/17/2025 5.5     Hemoglobin (g/dL)   Date Value   04/17/2025 10.9 (L)     Hematocrit (%)   Date Value   04/17/2025 38.1     Platelets (k/uL)   Date Value   04/17/2025 227     Iron Studies:  Ferritin (ng/mL)   Date Value   02/28/2025 54     Iron (ug/dL)   Date Value   02/28/2025 52 (L)     TIBC (ug/dL)   Date Value   02/28/2025 222 (L)     Hepatic:  AST (U/L)   Date Value   04/17/2025 47 (H)     ALT (U/L)   Date Value   04/17/2025 34     Total Bilirubin (mg/dL)   Date Value   04/17/2025 0.3     Alkaline Phosphatase (U/L)   Date Value   04/17/2025 105     INR:  INR (no units)   Date Value   03/06/2025 1.3         Wt Readings from Last 3 Encounters:   07/10/25 130.6 kg (288 lb)   05/08/25 129.3 kg (285 lb)   04/17/25 131.5 kg (290 lb)           ASSESSMENT/PLAN:    [x] Euvolemic          [] Hypervolemic, with increase from baseline:  [] Shortness of breath/DOMINGUEZ  [] JVD  [] HJR  [] Abnormal lung assessment:   [] Orthopnea  [] PND  [] Decreased urinary response to oral diuretic   [] Scrotal swelling   [] Lower extremity edema  [] Compression stockings provided  [] Decline in functional capacity (unable to perform activities they had previously been able to do)  [] Weight gain     [] IV diuretics given No  [] Provider notified of recurrent IV diuretic use    Additional Notes: Patient presents to the CHF Clinic for a follow up appointment with a weight gain of 3lbs in 2 months. He states weighing himself daily at home with no noticeable issues to report. He is euvolemic on assessment and reports feeling at his baseline. Labs drawn and follow scheduled for 3 months. Patient has upcomming appointments with his PCP and Cardiology this month. Patient also reports just being seen by his nephrologist last week and was cleared to have his next follow up with [facility] in 6 month. Patient instructed to call clinic with any worsening signs and symptoms of heart failure.           [x]Lab work

## 2025-07-17 DIAGNOSIS — I50.22 CHRONIC SYSTOLIC (CONGESTIVE) HEART FAILURE (HCC): ICD-10-CM

## 2025-07-17 RX ORDER — FUROSEMIDE 20 MG/1
20 TABLET ORAL
Qty: 90 TABLET | Refills: 1 | Status: SHIPPED | OUTPATIENT
Start: 2025-07-18

## 2025-07-17 NOTE — TELEPHONE ENCOUNTER
Name of Medication(s) Requested:  Requested Prescriptions     Pending Prescriptions Disp Refills    furosemide (LASIX) 20 MG tablet 90 tablet 1     Sig: Take 1 tablet by mouth three times a week       Medication is on current medication list Yes    Dosage and directions were verified? Yes    Quantity verified: 30 day supply     Pharmacy Verified?  Yes    Last Appointment:  4/17/2025    Future appts:  Future Appointments   Date Time Provider Department Center   7/21/2025  8:45 AM Eduin Guzman DO [facility] DEP   7/24/2025  9:00 AM Js Beckett [facility]   9/11/2025  9:30 AM Delma Quezada, APRN - CNP [facility]   9/11/2025  9:30 AM SCHEDULE, DEVICE CLINIC 1 [place] ELECTRO PHYS [facility]   10/9/2025  8:15 AM [facility] CHF ROOM 1 [facility]   11/5/2025  8:30 AM Eduin Guzman DO [facility] DEP        (If no appt send self scheduling link. .REFILLAPPT)  Scheduling request sent?     [] Yes  [x] No    Does patient need updated?  [] Yes  [x] No

## 2025-07-21 ENCOUNTER — OFFICE VISIT (OUTPATIENT)
Dept: PRIMARY CARE CLINIC | Age: 75
End: 2025-07-21
Payer: MEDICARE

## 2025-07-21 VITALS
OXYGEN SATURATION: 95 % | SYSTOLIC BLOOD PRESSURE: 136 MMHG | TEMPERATURE: 97.1 F | WEIGHT: 292 LBS | BODY MASS INDEX: 43.25 KG/M2 | RESPIRATION RATE: 14 BRPM | HEIGHT: 69 IN | DIASTOLIC BLOOD PRESSURE: 74 MMHG | HEART RATE: 69 BPM

## 2025-07-21 DIAGNOSIS — E11.22 TYPE 2 DIABETES MELLITUS WITH CHRONIC KIDNEY DISEASE, WITHOUT LONG-TERM CURRENT USE OF INSULIN, UNSPECIFIED CKD STAGE (HCC): ICD-10-CM

## 2025-07-21 DIAGNOSIS — Z12.5 SCREENING FOR PROSTATE CANCER: ICD-10-CM

## 2025-07-21 DIAGNOSIS — Z00.00 MEDICARE ANNUAL WELLNESS VISIT, SUBSEQUENT: Primary | ICD-10-CM

## 2025-07-21 DIAGNOSIS — I50.22 CHRONIC SYSTOLIC (CONGESTIVE) HEART FAILURE (HCC): ICD-10-CM

## 2025-07-21 DIAGNOSIS — I42.9 CARDIOMYOPATHY, UNSPECIFIED TYPE (HCC): ICD-10-CM

## 2025-07-21 DIAGNOSIS — E03.9 ACQUIRED HYPOTHYROIDISM: ICD-10-CM

## 2025-07-21 DIAGNOSIS — E55.9 VITAMIN D INSUFFICIENCY: ICD-10-CM

## 2025-07-21 DIAGNOSIS — I48.0 PAROXYSMAL ATRIAL FIBRILLATION (HCC): ICD-10-CM

## 2025-07-21 PROBLEM — N25.81 SECONDARY HYPERPARATHYROIDISM OF RENAL ORIGIN: Chronic | Status: ACTIVE | Noted: 2025-07-02

## 2025-07-21 PROBLEM — N18.32 STAGE 3B CHRONIC KIDNEY DISEASE (HCC): Status: ACTIVE | Noted: 2025-07-02

## 2025-07-21 PROBLEM — I12.9 BENIGN HYPERTENSIVE KIDNEY DISEASE WITH CHRONIC KIDNEY DISEASE: Status: ACTIVE | Noted: 2025-07-02

## 2025-07-21 PROBLEM — R80.9 PROTEINURIA: Chronic | Status: ACTIVE | Noted: 2025-07-02

## 2025-07-21 LAB
ALBUMIN: 3.6 G/DL (ref 3.5–5.2)
ALP BLD-CCNC: 101 U/L (ref 40–129)
ALT SERPL-CCNC: 29 U/L (ref 0–50)
ANION GAP SERPL CALCULATED.3IONS-SCNC: 12 MMOL/L (ref 7–16)
AST SERPL-CCNC: 41 U/L (ref 0–50)
BILIRUB SERPL-MCNC: 0.5 MG/DL (ref 0–1.2)
BUN BLDV-MCNC: 22 MG/DL (ref 8–23)
CALCIUM SERPL-MCNC: 9.1 MG/DL (ref 8.8–10.2)
CHLORIDE BLD-SCNC: 107 MMOL/L (ref 98–107)
CHOLESTEROL, TOTAL: 128 MG/DL
CO2: 24 MMOL/L (ref 22–29)
CREAT SERPL-MCNC: 1.5 MG/DL (ref 0.7–1.2)
GFR, ESTIMATED: 50 ML/MIN/1.73M2
GLUCOSE FASTING: 147 MG/DL (ref 74–99)
HBA1C MFR BLD: 7.4 %
HCT VFR BLD CALC: 39.5 % (ref 37–54)
HDLC SERPL-MCNC: 55 MG/DL
HEMOGLOBIN: 11.9 G/DL (ref 12.5–16.5)
LDL CHOLESTEROL: 56 MG/DL
MAGNESIUM: 1.7 MG/DL (ref 1.6–2.4)
MCH RBC QN AUTO: 26 PG (ref 26–35)
MCHC RBC AUTO-ENTMCNC: 30.1 G/DL (ref 32–34.5)
MCV RBC AUTO: 86.2 FL (ref 80–99.9)
PDW BLD-RTO: 19.1 % (ref 11.5–15)
PLATELET, FLUORESCENCE: 155 K/UL (ref 130–450)
PMV BLD AUTO: 13.1 FL (ref 7–12)
POTASSIUM SERPL-SCNC: 4.3 MMOL/L (ref 3.5–5.1)
PROSTATE SPECIFIC ANTIGEN: 1.55 NG/ML (ref 0–4)
RBC # BLD: 4.58 M/UL (ref 3.8–5.8)
SODIUM BLD-SCNC: 143 MMOL/L (ref 136–145)
T4 FREE: 1.3 NG/DL (ref 0.9–1.7)
TOTAL PROTEIN: 7.3 G/DL (ref 6.4–8.3)
TRIGL SERPL-MCNC: 87 MG/DL
TSH SERPL DL<=0.05 MIU/L-ACNC: 2.82 UIU/ML (ref 0.27–4.2)
VITAMIN D 25-HYDROXY: 32.5 NG/ML (ref 30–100)
VLDLC SERPL CALC-MCNC: 17 MG/DL
WBC # BLD: 4.4 K/UL (ref 4.5–11.5)

## 2025-07-21 PROCEDURE — 1160F RVW MEDS BY RX/DR IN RCRD: CPT | Performed by: FAMILY MEDICINE

## 2025-07-21 PROCEDURE — 1123F ACP DISCUSS/DSCN MKR DOCD: CPT | Performed by: FAMILY MEDICINE

## 2025-07-21 PROCEDURE — 83036 HEMOGLOBIN GLYCOSYLATED A1C: CPT | Performed by: FAMILY MEDICINE

## 2025-07-21 PROCEDURE — 3017F COLORECTAL CA SCREEN DOC REV: CPT | Performed by: FAMILY MEDICINE

## 2025-07-21 PROCEDURE — 3051F HG A1C>EQUAL 7.0%<8.0%: CPT | Performed by: FAMILY MEDICINE

## 2025-07-21 PROCEDURE — 36415 COLL VENOUS BLD VENIPUNCTURE: CPT | Performed by: FAMILY MEDICINE

## 2025-07-21 PROCEDURE — G0439 PPPS, SUBSEQ VISIT: HCPCS | Performed by: FAMILY MEDICINE

## 2025-07-21 PROCEDURE — 1159F MED LIST DOCD IN RCRD: CPT | Performed by: FAMILY MEDICINE

## 2025-07-21 NOTE — PATIENT INSTRUCTIONS
day.    Try to make half your plate fruits and vegetables, one-fourth whole grains, and one-fourth lean proteins. Try including dairy with your meals.   Eat more fruits and vegetables. Try to have them with most meals and snacks.   Foods for healthy eating        Fruits   These can be fresh, frozen, canned, or dried.  Try to choose whole fruit rather than fruit juice.  Eat a variety of colors.        Vegetables   These can be fresh, frozen, canned, or dried.  Beans, peas, and lentils count too.        Whole grains   Choose whole-grain breads, cereals, and noodles.  Try brown rice.        Lean proteins   These can include lean meat, poultry, fish, and eggs.  You can also have tofu, beans, peas, lentils, nuts, and seeds.        Dairy   Try milk, yogurt, and cheese.  Choose low-fat or fat-free when you can.  If you need to, use lactose-free milk or fortified plant-based milk products, such as soy milk.        Water   Drink water when you're thirsty.  Limit sugar-sweetened drinks, including soda, fruit drinks, and sports drinks.  Where can you learn more?  Go to https://www.[employer].net/patientEd and enter T756 to learn more about \"Eating Healthy Foods: Care Instructions.\"  Current as of: October 7, 2024  Content Version: 14.5  © 0208-7844 [employer].   Care instructions adapted under license by [employer]. If you have questions about a medical condition or this instruction, always ask your healthcare professional. [employer], disclaims any warranty or liability for your use of this information.         Starting a Weight-Loss Plan: Care Instructions  Overview    It can be a challenge to lose weight. But your doctor can help you make a weight-loss plan that meets your needs.  You don't have to make a lot of big changes at once. A better idea might be to focus on small changes and stick with them. When those changes become habit, you can add a few more changes.  Some people find it helpful to

## 2025-07-21 NOTE — PROGRESS NOTES
Medicare Annual Wellness Visit    Paul Florez is here for Medicare AWV (subsequent)    Assessment & Plan   Medicare annual wellness visit, subsequent  -     CBC  -     Comprehensive Metabolic Panel, Fasting  -     Lipid Panel  Chronic systolic (congestive) heart failure (HCC)  -     CBC  -     Comprehensive Metabolic Panel, Fasting  -     Magnesium  -     TSH  Cardiomyopathy, unspecified type (HCC)  -     CBC  -     Comprehensive Metabolic Panel, Fasting  -     TSH  Type 2 diabetes mellitus with chronic kidney disease, without long-term current use of insulin, unspecified CKD stage (HCC)  -     CBC  -     Comprehensive Metabolic Panel, Fasting  -     Lipid Panel  -     Magnesium  -     POCT glycosylated hemoglobin (Hb A1C)  -     metFORMIN (GLUCOPHAGE) 500 MG tablet; Take 1 tablet by mouth daily (with breakfast), Disp-90 tablet, R-1Normal  Paroxysmal atrial fibrillation (HCC)  -     CBC  -     Comprehensive Metabolic Panel, Fasting  -     TSH  Acquired hypothyroidism  -     TSH  -     T4, Free  Vitamin D insufficiency  -     Vitamin D 25 Hydroxy  Screening for prostate cancer  -     PSA Screening       Return in 4 months (on 11/5/2025) for Medicare Annual Wellness Visit in 1 year, Labs, Follow up.     Subjective       Patient's complete Health Risk Assessment and screening values have been reviewed and are found in Flowsheets. The following problems were reviewed today and where indicated follow up appointments were made and/or referrals ordered.    Positive Risk Factor Screenings with Interventions:              Inactivity:  On average, how many days per week do you engage in moderate to strenuous exercise (like a brisk walk)?: 0 days (!) Abnormal  On average, how many minutes do you engage in exercise at this level?: 0 min  Interventions:  Recommend low impact, moderate intensity cardiovascular exercise minimum 150 minutes weekly.  Discussed a recumbent bike.    Poor Eating Habits/Diet:  Do you eat

## 2025-07-24 ENCOUNTER — OFFICE VISIT (OUTPATIENT)
Dept: CARDIOLOGY CLINIC | Age: 75
End: 2025-07-24
Payer: MEDICARE

## 2025-07-24 VITALS
WEIGHT: 288 LBS | BODY MASS INDEX: 42.65 KG/M2 | HEIGHT: 69 IN | HEART RATE: 69 BPM | DIASTOLIC BLOOD PRESSURE: 60 MMHG | SYSTOLIC BLOOD PRESSURE: 130 MMHG | RESPIRATION RATE: 20 BRPM

## 2025-07-24 DIAGNOSIS — I42.9 CARDIOMYOPATHY, UNSPECIFIED TYPE (HCC): Primary | ICD-10-CM

## 2025-07-24 DIAGNOSIS — E11.22 TYPE 2 DIABETES MELLITUS WITH CHRONIC KIDNEY DISEASE, WITHOUT LONG-TERM CURRENT USE OF INSULIN, UNSPECIFIED CKD STAGE (HCC): Chronic | ICD-10-CM

## 2025-07-24 DIAGNOSIS — K22.70 BARRETT'S ESOPHAGUS WITHOUT DYSPLASIA: ICD-10-CM

## 2025-07-24 DIAGNOSIS — G47.33 OSA (OBSTRUCTIVE SLEEP APNEA): ICD-10-CM

## 2025-07-24 DIAGNOSIS — I25.10 CORONARY ARTERY DISEASE INVOLVING NATIVE CORONARY ARTERY OF NATIVE HEART WITHOUT ANGINA PECTORIS: ICD-10-CM

## 2025-07-24 DIAGNOSIS — M47.816 LUMBAR SPONDYLOSIS: ICD-10-CM

## 2025-07-24 DIAGNOSIS — I07.1 MODERATE TRICUSPID REGURGITATION BY PRIOR ECHOCARDIOGRAM: ICD-10-CM

## 2025-07-24 DIAGNOSIS — I48.0 PAF (PAROXYSMAL ATRIAL FIBRILLATION) (HCC): ICD-10-CM

## 2025-07-24 DIAGNOSIS — I50.22 CHRONIC SYSTOLIC (CONGESTIVE) HEART FAILURE (HCC): ICD-10-CM

## 2025-07-24 PROCEDURE — 3017F COLORECTAL CA SCREEN DOC REV: CPT | Performed by: INTERNAL MEDICINE

## 2025-07-24 PROCEDURE — 1123F ACP DISCUSS/DSCN MKR DOCD: CPT | Performed by: INTERNAL MEDICINE

## 2025-07-24 PROCEDURE — G8417 CALC BMI ABV UP PARAM F/U: HCPCS | Performed by: INTERNAL MEDICINE

## 2025-07-24 PROCEDURE — 2022F DILAT RTA XM EVC RTNOPTHY: CPT | Performed by: INTERNAL MEDICINE

## 2025-07-24 PROCEDURE — 4004F PT TOBACCO SCREEN RCVD TLK: CPT | Performed by: INTERNAL MEDICINE

## 2025-07-24 PROCEDURE — 1159F MED LIST DOCD IN RCRD: CPT | Performed by: INTERNAL MEDICINE

## 2025-07-24 PROCEDURE — 3051F HG A1C>EQUAL 7.0%<8.0%: CPT | Performed by: INTERNAL MEDICINE

## 2025-07-24 PROCEDURE — 99214 OFFICE O/P EST MOD 30 MIN: CPT | Performed by: INTERNAL MEDICINE

## 2025-07-24 PROCEDURE — 93000 ELECTROCARDIOGRAM COMPLETE: CPT | Performed by: INTERNAL MEDICINE

## 2025-07-24 PROCEDURE — G8427 DOCREV CUR MEDS BY ELIG CLIN: HCPCS | Performed by: INTERNAL MEDICINE

## 2025-07-24 RX ORDER — HYDRALAZINE HYDROCHLORIDE 10 MG/1
10 TABLET, FILM COATED ORAL 2 TIMES DAILY
Qty: 60 TABLET | Refills: 3 | Status: SHIPPED | OUTPATIENT
Start: 2025-07-24

## 2025-07-24 RX ORDER — ISOSORBIDE DINITRATE 5 MG/1
5 TABLET ORAL 2 TIMES DAILY
Qty: 90 TABLET | Refills: 3 | Status: SHIPPED | OUTPATIENT
Start: 2025-07-24

## 2025-07-24 NOTE — PROGRESS NOTES
dme    Out Patient CARDIOLOGY Follow Up Visit    Name: Paul Florez    Age: 74 y.o.    Date of Admission: No admission date for patient encounter.    Date of Service: 7/25/2025    Reason for Consultation:   Chief Complaint   Patient presents with    Cardiomyopathy     3 mo ov.pt has no c/c          Referring Physician: No admitting provider for patient encounter.    History of Present Illness: 74-year-old male with history of [place] esophagus, duodenal mass, coronary artery disease status post OM stent in May 2022, chronic left bundle branch block, hypertension, obesity, hyperlipidemia, diabetes,prior tobacco abuse, chronic kidney disease who was hospitalized in May 2022 with chest pain and dyspnea on exertion and echocardiogram revealed EF of 30 to 35%.  He underwent invasive coronary angiogram and found to have significant OM disease requiring drug-eluting stent.  He had [employer] CRT-D implantation by  on November 11, 2022.  In February 2024 he went to [facility] to sell his house in order to move permanently to [place] and while he was there he developed diarrhea that led to significant hypomagnesemia and subsequently ventricular tachycardia requiring ICD shock.  Chart review shows patient had echocardiogram in February 2024 revealing EF of 55 to 60%.  He also had cardiac catheterization in [facility] revealed mild in-stent restenosis of prior OM stent otherwise no significant obstructive coronary disease. He was noted to have profound acute kidney injury requiring hemodialysis during recent hospitalization.  On today's visit he reports doing well and states he is active without any symptoms.  He is following up closely with nephrology.  Patient is on metoprolol succinate, Isordil and low-dose hydralazine is added for further optimization of guideline directed medical therapy.             Past Medical History:  Past Medical History:   Diagnosis Date    Anemia     Atrial flutter (HCC)

## 2025-07-31 RX ORDER — AMIODARONE HYDROCHLORIDE 200 MG/1
200 TABLET ORAL DAILY
Qty: 90 TABLET | Refills: 1 | Status: SHIPPED | OUTPATIENT
Start: 2025-07-31

## 2025-08-21 DIAGNOSIS — E03.2 HYPOTHYROIDISM DUE TO MEDICATION: ICD-10-CM

## 2025-08-21 RX ORDER — LEVOTHYROXINE SODIUM 88 UG/1
88 TABLET ORAL DAILY
Qty: 90 TABLET | Refills: 1 | Status: SHIPPED | OUTPATIENT
Start: 2025-08-21

## 2025-08-26 RX ORDER — ISOSORBIDE DINITRATE 5 MG/1
5 TABLET ORAL 2 TIMES DAILY
Qty: 90 TABLET | Refills: 3 | Status: SHIPPED | OUTPATIENT
Start: 2025-08-26

## 2025-08-26 RX ORDER — HYDRALAZINE HYDROCHLORIDE 10 MG/1
10 TABLET, FILM COATED ORAL 2 TIMES DAILY
Qty: 90 TABLET | Refills: 3 | Status: SHIPPED | OUTPATIENT
Start: 2025-08-26

## (undated) DEVICE — Device: Brand: DEFENDO VALVE AND CONNECTOR KIT

## (undated) DEVICE — TOWEL,OR,DSP,ST,BLUE,STD,6/PK,12PK/CS: Brand: MEDLINE

## (undated) DEVICE — CONTAINER SPEC COLL 960ML POLYPR TRIANG GRAD INTAKE/OUTPUT

## (undated) DEVICE — TUBING, SUCTION, 1/4" X 10', STRAIGHT: Brand: MEDLINE

## (undated) DEVICE — KIT BEDSIDE REVITAL OX 500ML

## (undated) DEVICE — SNARE ENDOSCP L240CM LOOP W13MM DIA2.4MM SHT THROW SM OVL

## (undated) DEVICE — BAG SPECIMEN BIOHAZARD 6IN X 9IN

## (undated) DEVICE — BLOCK BITE 60FR CAREGUARD

## (undated) DEVICE — GOWN ISOLATN XL BLU POLYPR OVR HD OPN BK KNIT CUF PROTCT

## (undated) DEVICE — 4-PORT MANIFOLD: Brand: NEPTUNE 2

## (undated) DEVICE — FIAPC® PROBE W/ FILTER 2200 A OD 2.3MM/6.9FR; L 2.2M/7.2FT: Brand: ERBE

## (undated) DEVICE — KENDALL 450 SERIES MONITORING FOAM ELECTRODE - RECTANGULAR SHAPE ( 3/PK): Brand: KENDALL

## (undated) DEVICE — WIPES SKIN CLOTH READYPREP 9 X 10.5 IN 2% CHLORHEX GLUCONATE CHG PREOP

## (undated) DEVICE — AIR/WATER CLEANING ADAPTER FOR OLYMPUS® GI ENDOSCOPE: Brand: BULLDOG®

## (undated) DEVICE — SPONGE GZ 4IN 4IN 4 PLY N WVN AVANT

## (undated) DEVICE — COVER,LIGHT HANDLE,FLX,1/PK: Brand: MEDLINE INDUSTRIES, INC.

## (undated) DEVICE — ELECTRODE PT RET AD L9FT HI MOIST COND ADH HYDRGEL CORDED

## (undated) DEVICE — MASK,FACE,MAXFLUIDPROTECT,SHIELD/ERLPS: Brand: MEDLINE

## (undated) DEVICE — YANKAUER,BULB TIP,W/O VENT,RIGID,STERILE: Brand: MEDLINE

## (undated) DEVICE — TRAP ENDOSCP CLR PLAS 4 CHMBR FIX DISP CATCHEM

## (undated) DEVICE — FORCEPS BX L240CM JAW DIA2.8MM L CAP W/ NDL MIC MESH TOOTH

## (undated) DEVICE — JELLY,LUBE,STERILE,FLIP TOP,TUBE,4-OZ: Brand: MEDLINE